# Patient Record
Sex: MALE | Race: WHITE | URBAN - METROPOLITAN AREA
[De-identification: names, ages, dates, MRNs, and addresses within clinical notes are randomized per-mention and may not be internally consistent; named-entity substitution may affect disease eponyms.]

---

## 2017-01-16 LAB — PSA SERPL-MCNC: 0.66

## 2017-04-06 ENCOUNTER — EMERGENCY (EMERGENCY)
Facility: HOSPITAL | Age: 52
LOS: 1 days | Discharge: HOME | End: 2017-04-06
Attending: EMERGENCY MEDICINE | Admitting: EMERGENCY MEDICINE
Payer: MEDICARE

## 2017-04-06 ENCOUNTER — TRANSCRIPTION ENCOUNTER (OUTPATIENT)
Age: 52
End: 2017-04-06

## 2017-04-06 VITALS
WEIGHT: 149.91 LBS | RESPIRATION RATE: 14 BRPM | SYSTOLIC BLOOD PRESSURE: 156 MMHG | TEMPERATURE: 97 F | HEIGHT: 70 IN | OXYGEN SATURATION: 95 % | HEART RATE: 97 BPM | DIASTOLIC BLOOD PRESSURE: 98 MMHG

## 2017-04-06 DIAGNOSIS — L97.521 NON-PRESSURE CHRONIC ULCER OF OTHER PART OF LEFT FOOT LIMITED TO BREAKDOWN OF SKIN: ICD-10-CM

## 2017-04-06 DIAGNOSIS — I10 ESSENTIAL (PRIMARY) HYPERTENSION: ICD-10-CM

## 2017-04-06 DIAGNOSIS — F79 UNSPECIFIED INTELLECTUAL DISABILITIES: ICD-10-CM

## 2017-04-06 LAB
ANION GAP SERPL CALC-SCNC: 10 MMOL/L — SIGNIFICANT CHANGE UP (ref 5–17)
BUN SERPL-MCNC: 21 MG/DL — SIGNIFICANT CHANGE UP (ref 7–23)
CALCIUM SERPL-MCNC: 9.5 MG/DL — SIGNIFICANT CHANGE UP (ref 8.5–10.1)
CHLORIDE SERPL-SCNC: 103 MMOL/L — SIGNIFICANT CHANGE UP (ref 96–108)
CO2 SERPL-SCNC: 26 MMOL/L — SIGNIFICANT CHANGE UP (ref 22–31)
CREAT SERPL-MCNC: 1.1 MG/DL — SIGNIFICANT CHANGE UP (ref 0.5–1.3)
ERYTHROCYTE [SEDIMENTATION RATE] IN BLOOD: 7 MM/HR — SIGNIFICANT CHANGE UP (ref 0–20)
GLUCOSE SERPL-MCNC: 105 MG/DL — HIGH (ref 70–99)
HCT VFR BLD CALC: 45.5 % — SIGNIFICANT CHANGE UP (ref 39–50)
HGB BLD-MCNC: 15.4 G/DL — SIGNIFICANT CHANGE UP (ref 13–17)
MCHC RBC-ENTMCNC: 30.1 PG — SIGNIFICANT CHANGE UP (ref 27–34)
MCHC RBC-ENTMCNC: 34 GM/DL — SIGNIFICANT CHANGE UP (ref 32–36)
MCV RBC AUTO: 88.6 FL — SIGNIFICANT CHANGE UP (ref 80–100)
PLATELET # BLD AUTO: 188 K/UL — SIGNIFICANT CHANGE UP (ref 150–400)
POTASSIUM SERPL-MCNC: 3.7 MMOL/L — SIGNIFICANT CHANGE UP (ref 3.5–5.3)
POTASSIUM SERPL-SCNC: 3.7 MMOL/L — SIGNIFICANT CHANGE UP (ref 3.5–5.3)
RBC # BLD: 5.13 M/UL — SIGNIFICANT CHANGE UP (ref 4.2–5.8)
RBC # FLD: 10.9 % — SIGNIFICANT CHANGE UP (ref 10.3–14.5)
SODIUM SERPL-SCNC: 139 MMOL/L — SIGNIFICANT CHANGE UP (ref 135–145)
WBC # BLD: 6.3 K/UL — SIGNIFICANT CHANGE UP (ref 3.8–10.5)
WBC # FLD AUTO: 6.3 K/UL — SIGNIFICANT CHANGE UP (ref 3.8–10.5)

## 2017-04-06 PROCEDURE — 86140 C-REACTIVE PROTEIN: CPT

## 2017-04-06 PROCEDURE — 99283 EMERGENCY DEPT VISIT LOW MDM: CPT | Mod: 25

## 2017-04-06 PROCEDURE — 85652 RBC SED RATE AUTOMATED: CPT

## 2017-04-06 PROCEDURE — 83036 HEMOGLOBIN GLYCOSYLATED A1C: CPT

## 2017-04-06 PROCEDURE — 80048 BASIC METABOLIC PNL TOTAL CA: CPT

## 2017-04-06 PROCEDURE — 73660 X-RAY EXAM OF TOE(S): CPT | Mod: 26,LT

## 2017-04-06 PROCEDURE — 85027 COMPLETE CBC AUTOMATED: CPT

## 2017-04-06 PROCEDURE — 73660 X-RAY EXAM OF TOE(S): CPT

## 2017-04-06 PROCEDURE — 99284 EMERGENCY DEPT VISIT MOD MDM: CPT

## 2017-04-06 RX ORDER — AZTREONAM 2 G
1 VIAL (EA) INJECTION
Qty: 20 | Refills: 0 | OUTPATIENT
Start: 2017-04-06 | End: 2017-04-16

## 2017-04-06 RX ADMIN — Medication 1 TABLET(S): at 20:31

## 2017-04-06 NOTE — ED PROVIDER NOTE - OBJECTIVE STATEMENT
50 yo white male with non healing wound to left 4th toe x 1-week per aide who is with and knows patient. Just came from podiatrist after debridement was performed and now here for evaluation

## 2017-04-06 NOTE — ED ADULT NURSE NOTE - OBJECTIVE STATEMENT
received pt in [facility] w/ aide in attendance with left 4th toe redness had debridement today by podiatrist & was sent to ER Pt seen by Dr Santana Will monitor

## 2017-04-06 NOTE — ED PROVIDER NOTE - MUSCULOSKELETAL, MLM
Chronic deformities, hammer type toes especially left 2nd toe with erythema and superficial type skin ulcer on dorsum of distal 2nd toe

## 2017-04-06 NOTE — ED PROVIDER NOTE - CONSTITUTIONAL, MLM
normal... Well appearing, white male with mild MRDD, well nourished, awake, alert, oriented to person, place, time/situation and in no apparent distress.

## 2017-04-07 LAB
CRP SERPL-MCNC: <0.2 MG/DL — SIGNIFICANT CHANGE UP (ref 0–0.4)
HBA1C BLD-MCNC: 5.4 % — SIGNIFICANT CHANGE UP (ref 4–5.6)

## 2017-04-19 ENCOUNTER — OUTPATIENT (OUTPATIENT)
Dept: OUTPATIENT SERVICES | Facility: HOSPITAL | Age: 52
LOS: 1 days | End: 2017-04-19
Payer: MEDICARE

## 2017-04-19 DIAGNOSIS — S91.309A UNSPECIFIED OPEN WOUND, UNSPECIFIED FOOT, INITIAL ENCOUNTER: ICD-10-CM

## 2017-04-19 PROCEDURE — G0463: CPT

## 2017-04-20 DIAGNOSIS — L40.9 PSORIASIS, UNSPECIFIED: ICD-10-CM

## 2017-04-20 DIAGNOSIS — I10 ESSENTIAL (PRIMARY) HYPERTENSION: ICD-10-CM

## 2017-04-20 DIAGNOSIS — M21.531: ICD-10-CM

## 2017-04-20 DIAGNOSIS — G40.909 EPILEPSY, UNSPECIFIED, NOT INTRACTABLE, WITHOUT STATUS EPILEPTICUS: ICD-10-CM

## 2017-04-20 DIAGNOSIS — M21.532: ICD-10-CM

## 2017-04-20 DIAGNOSIS — M48.00 SPINAL STENOSIS, SITE UNSPECIFIED: ICD-10-CM

## 2017-04-20 DIAGNOSIS — L97.511 NON-PRESSURE CHRONIC ULCER OF OTHER PART OF RIGHT FOOT LIMITED TO BREAKDOWN OF SKIN: ICD-10-CM

## 2017-04-20 DIAGNOSIS — Z79.899 OTHER LONG TERM (CURRENT) DRUG THERAPY: ICD-10-CM

## 2017-04-20 DIAGNOSIS — L97.521 NON-PRESSURE CHRONIC ULCER OF OTHER PART OF LEFT FOOT LIMITED TO BREAKDOWN OF SKIN: ICD-10-CM

## 2017-04-20 DIAGNOSIS — F79 UNSPECIFIED INTELLECTUAL DISABILITIES: ICD-10-CM

## 2017-04-20 DIAGNOSIS — N31.9 NEUROMUSCULAR DYSFUNCTION OF BLADDER, UNSPECIFIED: ICD-10-CM

## 2017-05-03 ENCOUNTER — OUTPATIENT (OUTPATIENT)
Dept: OUTPATIENT SERVICES | Facility: HOSPITAL | Age: 52
LOS: 1 days | End: 2017-05-03
Payer: MEDICARE

## 2017-05-03 DIAGNOSIS — L97.511 NON-PRESSURE CHRONIC ULCER OF OTHER PART OF RIGHT FOOT LIMITED TO BREAKDOWN OF SKIN: ICD-10-CM

## 2017-05-03 PROCEDURE — G0463: CPT

## 2017-05-04 DIAGNOSIS — M21.531: ICD-10-CM

## 2017-05-04 DIAGNOSIS — M48.00 SPINAL STENOSIS, SITE UNSPECIFIED: ICD-10-CM

## 2017-05-04 DIAGNOSIS — F79 UNSPECIFIED INTELLECTUAL DISABILITIES: ICD-10-CM

## 2017-05-04 DIAGNOSIS — I10 ESSENTIAL (PRIMARY) HYPERTENSION: ICD-10-CM

## 2017-05-04 DIAGNOSIS — N31.9 NEUROMUSCULAR DYSFUNCTION OF BLADDER, UNSPECIFIED: ICD-10-CM

## 2017-05-04 DIAGNOSIS — L97.511 NON-PRESSURE CHRONIC ULCER OF OTHER PART OF RIGHT FOOT LIMITED TO BREAKDOWN OF SKIN: ICD-10-CM

## 2017-05-04 DIAGNOSIS — M21.532: ICD-10-CM

## 2017-05-04 DIAGNOSIS — L97.521 NON-PRESSURE CHRONIC ULCER OF OTHER PART OF LEFT FOOT LIMITED TO BREAKDOWN OF SKIN: ICD-10-CM

## 2017-05-04 DIAGNOSIS — G40.909 EPILEPSY, UNSPECIFIED, NOT INTRACTABLE, WITHOUT STATUS EPILEPTICUS: ICD-10-CM

## 2017-05-04 DIAGNOSIS — L40.9 PSORIASIS, UNSPECIFIED: ICD-10-CM

## 2017-05-04 DIAGNOSIS — Z79.899 OTHER LONG TERM (CURRENT) DRUG THERAPY: ICD-10-CM

## 2017-05-17 ENCOUNTER — APPOINTMENT (OUTPATIENT)
Dept: UROLOGY | Facility: CLINIC | Age: 52
End: 2017-05-17

## 2017-05-17 VITALS
DIASTOLIC BLOOD PRESSURE: 76 MMHG | OXYGEN SATURATION: 97 % | SYSTOLIC BLOOD PRESSURE: 137 MMHG | WEIGHT: 148 LBS | HEART RATE: 83 BPM | HEIGHT: 69 IN | TEMPERATURE: 97.6 F | BODY MASS INDEX: 21.92 KG/M2

## 2017-05-17 DIAGNOSIS — F79 UNSPECIFIED INTELLECTUAL DISABILITIES: ICD-10-CM

## 2017-07-06 ENCOUNTER — OUTPATIENT (OUTPATIENT)
Dept: OUTPATIENT SERVICES | Facility: HOSPITAL | Age: 52
LOS: 1 days | End: 2017-07-06
Payer: MEDICARE

## 2017-07-06 DIAGNOSIS — L97.511 NON-PRESSURE CHRONIC ULCER OF OTHER PART OF RIGHT FOOT LIMITED TO BREAKDOWN OF SKIN: ICD-10-CM

## 2017-07-06 PROCEDURE — G0463: CPT

## 2017-07-08 DIAGNOSIS — N31.9 NEUROMUSCULAR DYSFUNCTION OF BLADDER, UNSPECIFIED: ICD-10-CM

## 2017-07-08 DIAGNOSIS — G40.909 EPILEPSY, UNSPECIFIED, NOT INTRACTABLE, WITHOUT STATUS EPILEPTICUS: ICD-10-CM

## 2017-07-08 DIAGNOSIS — Z79.899 OTHER LONG TERM (CURRENT) DRUG THERAPY: ICD-10-CM

## 2017-07-08 DIAGNOSIS — M48.00 SPINAL STENOSIS, SITE UNSPECIFIED: ICD-10-CM

## 2017-07-08 DIAGNOSIS — M21.531: ICD-10-CM

## 2017-07-08 DIAGNOSIS — L40.9 PSORIASIS, UNSPECIFIED: ICD-10-CM

## 2017-07-08 DIAGNOSIS — L97.521 NON-PRESSURE CHRONIC ULCER OF OTHER PART OF LEFT FOOT LIMITED TO BREAKDOWN OF SKIN: ICD-10-CM

## 2017-07-08 DIAGNOSIS — M21.532: ICD-10-CM

## 2017-07-08 DIAGNOSIS — L97.511 NON-PRESSURE CHRONIC ULCER OF OTHER PART OF RIGHT FOOT LIMITED TO BREAKDOWN OF SKIN: ICD-10-CM

## 2017-07-08 DIAGNOSIS — F79 UNSPECIFIED INTELLECTUAL DISABILITIES: ICD-10-CM

## 2017-07-08 DIAGNOSIS — I10 ESSENTIAL (PRIMARY) HYPERTENSION: ICD-10-CM

## 2017-07-08 DIAGNOSIS — E66.3 OVERWEIGHT: ICD-10-CM

## 2017-09-09 ENCOUNTER — EMERGENCY (EMERGENCY)
Facility: HOSPITAL | Age: 52
LOS: 1 days | Discharge: HOME | End: 2017-09-09
Admitting: EMERGENCY MEDICINE
Payer: MEDICARE

## 2017-09-09 VITALS
SYSTOLIC BLOOD PRESSURE: 122 MMHG | OXYGEN SATURATION: 97 % | TEMPERATURE: 97 F | RESPIRATION RATE: 16 BRPM | WEIGHT: 149.91 LBS | HEART RATE: 79 BPM | DIASTOLIC BLOOD PRESSURE: 83 MMHG
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
## 2017-09-09 DIAGNOSIS — S01.01XA LACERATION WITHOUT FOREIGN BODY OF SCALP, INITIAL ENCOUNTER: ICD-10-CM

## 2017-09-09 PROCEDURE — 70450 CT HEAD/BRAIN W/O DYE: CPT | Mod: 26

## 2017-09-09 PROCEDURE — 12001 RPR S/N/AX/GEN/TRNK 2.5CM/<: CPT

## 2017-09-09 PROCEDURE — 99284 EMERGENCY DEPT VISIT MOD MDM: CPT | Mod: 25

## 2017-09-09 PROCEDURE — 70450 CT HEAD/BRAIN W/O DYE: CPT

## 2017-11-13 ENCOUNTER — MEDICATION RENEWAL (OUTPATIENT)
Age: 52
End: 2017-11-13

## 2018-01-10 ENCOUNTER — MEDICATION RENEWAL (OUTPATIENT)
Age: 53
End: 2018-01-10

## 2018-03-23 ENCOUNTER — MEDICATION RENEWAL (OUTPATIENT)
Age: 53
End: 2018-03-23

## 2018-04-25 ENCOUNTER — MEDICATION RENEWAL (OUTPATIENT)
Age: 53
End: 2018-04-25

## 2018-05-18 ENCOUNTER — APPOINTMENT (OUTPATIENT)
Dept: UROLOGY | Facility: CLINIC | Age: 53
End: 2018-05-18

## 2018-05-22 ENCOUNTER — MEDICATION RENEWAL (OUTPATIENT)
Age: 53
End: 2018-05-22

## 2018-06-12 ENCOUNTER — APPOINTMENT (OUTPATIENT)
Dept: UROLOGY | Facility: CLINIC | Age: 53
End: 2018-06-12
Payer: MEDICARE

## 2018-06-12 VITALS
OXYGEN SATURATION: 98 % | HEIGHT: 69 IN | TEMPERATURE: 98.4 F | DIASTOLIC BLOOD PRESSURE: 78 MMHG | RESPIRATION RATE: 18 BRPM | HEART RATE: 80 BPM | BODY MASS INDEX: 22.22 KG/M2 | WEIGHT: 150 LBS | SYSTOLIC BLOOD PRESSURE: 138 MMHG

## 2018-06-12 PROCEDURE — 99213 OFFICE O/P EST LOW 20 MIN: CPT

## 2018-06-14 ENCOUNTER — MEDICATION RENEWAL (OUTPATIENT)
Age: 53
End: 2018-06-14

## 2018-08-10 ENCOUNTER — INPATIENT (INPATIENT)
Facility: HOSPITAL | Age: 53
LOS: 9 days | Discharge: NURSING, REHAB OR LONG-TERM CARE | DRG: 580 | End: 2018-08-20
Attending: FAMILY MEDICINE | Admitting: HOSPITALIST
Payer: MEDICARE

## 2018-08-10 VITALS
TEMPERATURE: 97 F | SYSTOLIC BLOOD PRESSURE: 99 MMHG | HEART RATE: 113 BPM | RESPIRATION RATE: 15 BRPM | WEIGHT: 175.05 LBS | OXYGEN SATURATION: 97 % | DIASTOLIC BLOOD PRESSURE: 56 MMHG | HEIGHT: 70 IN

## 2018-08-10 DIAGNOSIS — R10.819 ABDOMINAL TENDERNESS, UNSPECIFIED SITE: ICD-10-CM

## 2018-08-10 DIAGNOSIS — L03.113 CELLULITIS OF RIGHT UPPER LIMB: ICD-10-CM

## 2018-08-10 DIAGNOSIS — R56.9 UNSPECIFIED CONVULSIONS: ICD-10-CM

## 2018-08-10 DIAGNOSIS — M48.00 SPINAL STENOSIS, SITE UNSPECIFIED: ICD-10-CM

## 2018-08-10 DIAGNOSIS — L40.9 PSORIASIS, UNSPECIFIED: ICD-10-CM

## 2018-08-10 DIAGNOSIS — N31.9 NEUROMUSCULAR DYSFUNCTION OF BLADDER, UNSPECIFIED: ICD-10-CM

## 2018-08-10 DIAGNOSIS — Z29.9 ENCOUNTER FOR PROPHYLACTIC MEASURES, UNSPECIFIED: ICD-10-CM

## 2018-08-10 DIAGNOSIS — I10 ESSENTIAL (PRIMARY) HYPERTENSION: ICD-10-CM

## 2018-08-10 DIAGNOSIS — F79 UNSPECIFIED INTELLECTUAL DISABILITIES: ICD-10-CM

## 2018-08-10 LAB
ALBUMIN SERPL ELPH-MCNC: 2.8 G/DL — LOW (ref 3.3–5)
ALP SERPL-CCNC: 66 U/L — SIGNIFICANT CHANGE UP (ref 40–120)
ALT FLD-CCNC: 24 U/L — SIGNIFICANT CHANGE UP (ref 12–78)
ANION GAP SERPL CALC-SCNC: 12 MMOL/L — SIGNIFICANT CHANGE UP (ref 5–17)
APPEARANCE UR: CLEAR — SIGNIFICANT CHANGE UP
APTT BLD: 31.3 SEC — SIGNIFICANT CHANGE UP (ref 27.5–37.4)
AST SERPL-CCNC: 43 U/L — HIGH (ref 15–37)
BASOPHILS # BLD AUTO: 0.02 K/UL — SIGNIFICANT CHANGE UP (ref 0–0.2)
BASOPHILS NFR BLD AUTO: 0.1 % — SIGNIFICANT CHANGE UP (ref 0–2)
BILIRUB SERPL-MCNC: 0.8 MG/DL — SIGNIFICANT CHANGE UP (ref 0.2–1.2)
BILIRUB UR-MCNC: NEGATIVE — SIGNIFICANT CHANGE UP
BUN SERPL-MCNC: 21 MG/DL — SIGNIFICANT CHANGE UP (ref 7–23)
CALCIUM SERPL-MCNC: 9.5 MG/DL — SIGNIFICANT CHANGE UP (ref 8.5–10.1)
CHLORIDE SERPL-SCNC: 101 MMOL/L — SIGNIFICANT CHANGE UP (ref 96–108)
CO2 SERPL-SCNC: 24 MMOL/L — SIGNIFICANT CHANGE UP (ref 22–31)
COLOR SPEC: SIGNIFICANT CHANGE UP
CREAT SERPL-MCNC: 1 MG/DL — SIGNIFICANT CHANGE UP (ref 0.5–1.3)
DIFF PNL FLD: ABNORMAL
EOSINOPHIL # BLD AUTO: 0 K/UL — SIGNIFICANT CHANGE UP (ref 0–0.5)
EOSINOPHIL NFR BLD AUTO: 0 % — SIGNIFICANT CHANGE UP (ref 0–6)
GLUCOSE SERPL-MCNC: 124 MG/DL — HIGH (ref 70–99)
GLUCOSE UR QL: NEGATIVE — SIGNIFICANT CHANGE UP
HCT VFR BLD CALC: 37.5 % — LOW (ref 39–50)
HGB BLD-MCNC: 13.1 G/DL — SIGNIFICANT CHANGE UP (ref 13–17)
IMM GRANULOCYTES NFR BLD AUTO: 0.5 % — SIGNIFICANT CHANGE UP (ref 0–1.5)
INR BLD: 1.55 RATIO — HIGH (ref 0.88–1.16)
KETONES UR-MCNC: NEGATIVE — SIGNIFICANT CHANGE UP
LACTATE SERPL-SCNC: 1.3 MMOL/L — SIGNIFICANT CHANGE UP (ref 0.7–2)
LEUKOCYTE ESTERASE UR-ACNC: NEGATIVE — SIGNIFICANT CHANGE UP
LYMPHOCYTES # BLD AUTO: 0.58 K/UL — LOW (ref 1–3.3)
LYMPHOCYTES # BLD AUTO: 3.9 % — LOW (ref 13–44)
MCHC RBC-ENTMCNC: 29.6 PG — SIGNIFICANT CHANGE UP (ref 27–34)
MCHC RBC-ENTMCNC: 34.9 GM/DL — SIGNIFICANT CHANGE UP (ref 32–36)
MCV RBC AUTO: 84.8 FL — SIGNIFICANT CHANGE UP (ref 80–100)
MONOCYTES # BLD AUTO: 1.46 K/UL — HIGH (ref 0–0.9)
MONOCYTES NFR BLD AUTO: 9.9 % — SIGNIFICANT CHANGE UP (ref 2–14)
NEUTROPHILS # BLD AUTO: 12.64 K/UL — HIGH (ref 1.8–7.4)
NEUTROPHILS NFR BLD AUTO: 85.6 % — HIGH (ref 43–77)
NITRITE UR-MCNC: NEGATIVE — SIGNIFICANT CHANGE UP
PH UR: 5 — SIGNIFICANT CHANGE UP (ref 5–8)
PLATELET # BLD AUTO: 163 K/UL — SIGNIFICANT CHANGE UP (ref 150–400)
POTASSIUM SERPL-MCNC: 4.1 MMOL/L — SIGNIFICANT CHANGE UP (ref 3.5–5.3)
POTASSIUM SERPL-SCNC: 4.1 MMOL/L — SIGNIFICANT CHANGE UP (ref 3.5–5.3)
PROCALCITONIN SERPL-MCNC: 0.96 NG/ML — HIGH (ref 0–0.04)
PROT SERPL-MCNC: 7.8 G/DL — SIGNIFICANT CHANGE UP (ref 6–8.3)
PROT UR-MCNC: 75 MG/DL
PROTHROM AB SERPL-ACNC: 17.1 SEC — HIGH (ref 9.8–12.7)
RBC # BLD: 4.42 M/UL — SIGNIFICANT CHANGE UP (ref 4.2–5.8)
RBC # FLD: 11.4 % — SIGNIFICANT CHANGE UP (ref 10.3–14.5)
SODIUM SERPL-SCNC: 137 MMOL/L — SIGNIFICANT CHANGE UP (ref 135–145)
SP GR SPEC: 1.02 — SIGNIFICANT CHANGE UP (ref 1.01–1.02)
UROBILINOGEN FLD QL: NEGATIVE — SIGNIFICANT CHANGE UP
WBC # BLD: 14.78 K/UL — HIGH (ref 3.8–10.5)
WBC # FLD AUTO: 14.78 K/UL — HIGH (ref 3.8–10.5)

## 2018-08-10 PROCEDURE — 71045 X-RAY EXAM CHEST 1 VIEW: CPT | Mod: 26

## 2018-08-10 PROCEDURE — 99223 1ST HOSP IP/OBS HIGH 75: CPT | Mod: GC,AI

## 2018-08-10 PROCEDURE — 99285 EMERGENCY DEPT VISIT HI MDM: CPT

## 2018-08-10 PROCEDURE — 93010 ELECTROCARDIOGRAM REPORT: CPT

## 2018-08-10 PROCEDURE — 93971 EXTREMITY STUDY: CPT | Mod: 26,RT

## 2018-08-10 RX ORDER — DOCUSATE SODIUM 100 MG
100 CAPSULE ORAL THREE TIMES A DAY
Qty: 0 | Refills: 0 | Status: DISCONTINUED | OUTPATIENT
Start: 2018-08-10 | End: 2018-08-12

## 2018-08-10 RX ORDER — VANCOMYCIN HCL 1 G
1500 VIAL (EA) INTRAVENOUS EVERY 12 HOURS
Qty: 0 | Refills: 0 | Status: DISCONTINUED | OUTPATIENT
Start: 2018-08-11 | End: 2018-08-10

## 2018-08-10 RX ORDER — VANCOMYCIN HCL 1 G
1000 VIAL (EA) INTRAVENOUS EVERY 12 HOURS
Qty: 0 | Refills: 0 | Status: DISCONTINUED | OUTPATIENT
Start: 2018-08-11 | End: 2018-08-12

## 2018-08-10 RX ORDER — ENOXAPARIN SODIUM 100 MG/ML
40 INJECTION SUBCUTANEOUS EVERY 24 HOURS
Qty: 0 | Refills: 0 | Status: DISCONTINUED | OUTPATIENT
Start: 2018-08-10 | End: 2018-08-12

## 2018-08-10 RX ORDER — CALCIUM CARBONATE 500(1250)
500 TABLET ORAL
Qty: 0 | Refills: 0 | COMMUNITY

## 2018-08-10 RX ORDER — SENNA PLUS 8.6 MG/1
2 TABLET ORAL AT BEDTIME
Qty: 0 | Refills: 0 | Status: DISCONTINUED | OUTPATIENT
Start: 2018-08-10 | End: 2018-08-12

## 2018-08-10 RX ORDER — OXYCODONE AND ACETAMINOPHEN 5; 325 MG/1; MG/1
1 TABLET ORAL EVERY 6 HOURS
Qty: 0 | Refills: 0 | Status: DISCONTINUED | OUTPATIENT
Start: 2018-08-10 | End: 2018-08-12

## 2018-08-10 RX ORDER — LAMOTRIGINE 25 MG/1
100 TABLET, ORALLY DISINTEGRATING ORAL
Qty: 0 | Refills: 0 | Status: DISCONTINUED | OUTPATIENT
Start: 2018-08-10 | End: 2018-08-12

## 2018-08-10 RX ORDER — MORPHINE SULFATE 50 MG/1
1 CAPSULE, EXTENDED RELEASE ORAL EVERY 4 HOURS
Qty: 0 | Refills: 0 | Status: DISCONTINUED | OUTPATIENT
Start: 2018-08-10 | End: 2018-08-12

## 2018-08-10 RX ORDER — CITALOPRAM 10 MG/1
10 TABLET, FILM COATED ORAL DAILY
Qty: 0 | Refills: 0 | Status: DISCONTINUED | OUTPATIENT
Start: 2018-08-10 | End: 2018-08-12

## 2018-08-10 RX ORDER — ACETAMINOPHEN 500 MG
650 TABLET ORAL ONCE
Qty: 0 | Refills: 0 | Status: COMPLETED | OUTPATIENT
Start: 2018-08-10 | End: 2018-08-10

## 2018-08-10 RX ORDER — SODIUM CHLORIDE 9 MG/ML
1000 INJECTION INTRAMUSCULAR; INTRAVENOUS; SUBCUTANEOUS ONCE
Qty: 0 | Refills: 0 | Status: COMPLETED | OUTPATIENT
Start: 2018-08-10 | End: 2018-08-10

## 2018-08-10 RX ORDER — TAMSULOSIN HYDROCHLORIDE 0.4 MG/1
0.4 CAPSULE ORAL AT BEDTIME
Qty: 0 | Refills: 0 | Status: DISCONTINUED | OUTPATIENT
Start: 2018-08-10 | End: 2018-08-12

## 2018-08-10 RX ORDER — POLYETHYLENE GLYCOL 3350 17 G/17G
17 POWDER, FOR SOLUTION ORAL DAILY
Qty: 0 | Refills: 0 | Status: DISCONTINUED | OUTPATIENT
Start: 2018-08-10 | End: 2018-08-12

## 2018-08-10 RX ORDER — TIZANIDINE 4 MG/1
2 TABLET ORAL DAILY
Qty: 0 | Refills: 0 | Status: DISCONTINUED | OUTPATIENT
Start: 2018-08-10 | End: 2018-08-12

## 2018-08-10 RX ORDER — ACETAMINOPHEN 500 MG
650 TABLET ORAL EVERY 6 HOURS
Qty: 0 | Refills: 0 | Status: DISCONTINUED | OUTPATIENT
Start: 2018-08-10 | End: 2018-08-12

## 2018-08-10 RX ORDER — VANCOMYCIN HCL 1 G
1000 VIAL (EA) INTRAVENOUS ONCE
Qty: 0 | Refills: 0 | Status: COMPLETED | OUTPATIENT
Start: 2018-08-10 | End: 2018-08-10

## 2018-08-10 RX ORDER — CALCIUM CARBONATE 500(1250)
1 TABLET ORAL AT BEDTIME
Qty: 0 | Refills: 0 | Status: DISCONTINUED | OUTPATIENT
Start: 2018-08-10 | End: 2018-08-10

## 2018-08-10 RX ORDER — BETHANECHOL CHLORIDE 25 MG
25 TABLET ORAL DAILY
Qty: 0 | Refills: 0 | Status: DISCONTINUED | OUTPATIENT
Start: 2018-08-10 | End: 2018-08-12

## 2018-08-10 RX ORDER — PIPERACILLIN AND TAZOBACTAM 4; .5 G/20ML; G/20ML
3.38 INJECTION, POWDER, LYOPHILIZED, FOR SOLUTION INTRAVENOUS EVERY 8 HOURS
Qty: 0 | Refills: 0 | Status: DISCONTINUED | OUTPATIENT
Start: 2018-08-10 | End: 2018-08-12

## 2018-08-10 RX ORDER — PIPERACILLIN AND TAZOBACTAM 4; .5 G/20ML; G/20ML
3.38 INJECTION, POWDER, LYOPHILIZED, FOR SOLUTION INTRAVENOUS ONCE
Qty: 0 | Refills: 0 | Status: COMPLETED | OUTPATIENT
Start: 2018-08-10 | End: 2018-08-10

## 2018-08-10 RX ADMIN — TIZANIDINE 2 MILLIGRAM(S): 4 TABLET ORAL at 22:39

## 2018-08-10 RX ADMIN — PIPERACILLIN AND TAZOBACTAM 25 GRAM(S): 4; .5 INJECTION, POWDER, LYOPHILIZED, FOR SOLUTION INTRAVENOUS at 20:35

## 2018-08-10 RX ADMIN — Medication 250 MILLIGRAM(S): at 12:30

## 2018-08-10 RX ADMIN — Medication 100 MILLIGRAM(S): at 22:39

## 2018-08-10 RX ADMIN — Medication 1 TABLET(S): at 22:39

## 2018-08-10 RX ADMIN — SODIUM CHLORIDE 1000 MILLILITER(S): 9 INJECTION INTRAMUSCULAR; INTRAVENOUS; SUBCUTANEOUS at 12:00

## 2018-08-10 RX ADMIN — Medication 1000 MILLIGRAM(S): at 13:30

## 2018-08-10 RX ADMIN — TAMSULOSIN HYDROCHLORIDE 0.4 MILLIGRAM(S): 0.4 CAPSULE ORAL at 22:39

## 2018-08-10 RX ADMIN — SODIUM CHLORIDE 1000 MILLILITER(S): 9 INJECTION INTRAMUSCULAR; INTRAVENOUS; SUBCUTANEOUS at 13:00

## 2018-08-10 RX ADMIN — ENOXAPARIN SODIUM 40 MILLIGRAM(S): 100 INJECTION SUBCUTANEOUS at 22:40

## 2018-08-10 RX ADMIN — SENNA PLUS 2 TABLET(S): 8.6 TABLET ORAL at 22:39

## 2018-08-10 RX ADMIN — Medication 650 MILLIGRAM(S): at 20:50

## 2018-08-10 RX ADMIN — PIPERACILLIN AND TAZOBACTAM 200 GRAM(S): 4; .5 INJECTION, POWDER, LYOPHILIZED, FOR SOLUTION INTRAVENOUS at 12:00

## 2018-08-10 RX ADMIN — PIPERACILLIN AND TAZOBACTAM 3.38 GRAM(S): 4; .5 INJECTION, POWDER, LYOPHILIZED, FOR SOLUTION INTRAVENOUS at 12:30

## 2018-08-10 NOTE — PATIENT PROFILE ADULT. - SOURCE OF INFORMATION, PROFILE
patient/chart(s)/Copy Forward H&P from 8/10/2018 and transfer packet sent with patient from [facility]

## 2018-08-10 NOTE — PATIENT PROFILE ADULT. - NS MD HP INPLANTS MED DEV
unable to ascertain this info from transfer documents sent with patient from [facility]

## 2018-08-10 NOTE — H&P ADULT - NSHPSOCIALHISTORY_GEN_ALL_CORE
Does not smoke, or drink, or use recreational drugs.  Ambulates with wheelchair. Requires assistance for ADL's. Resident of [facility] in [place].

## 2018-08-10 NOTE — ED ADULT NURSE NOTE - OBJECTIVE STATEMENT
received pt in bed #14b PT A&O w/ swelling, warmth & pain to right arm since this AM Pt seen by Dr Santana Will monitor

## 2018-08-10 NOTE — H&P ADULT - ATTENDING COMMENTS
Seen and examined by attending MD, agree with above and edited to reflect my findings. Case discussed with patients mother  at bedside, who voiced understanding and agreement to aforementioned plan.

## 2018-08-10 NOTE — H&P ADULT - PROBLEM SELECTOR PLAN 1
- admit to [facility]  - start vanco and zosyn  - tylenol PRN for fevers  - pain control  - ID consulted (Dr. Pierce) - admit to [facility]  - Cont vancomycin and zosyn  - tylenol PRN for fevers  - pain control  - ID consulted (Dr. Pierce)

## 2018-08-10 NOTE — H&P ADULT - ASSESSMENT
51 y/o MRDD male with PMHx of HTN, neurogenic bladder, psoriasis, seizure, and spinal stenosis sent to the ED from [facility] for evaluation of worsening swelling and pain in RUE, admitted with Cellulitis of RUE.

## 2018-08-10 NOTE — H&P ADULT - NSHPREVIEWOFSYSTEMS_GEN_ALL_CORE
Constitutional: denies fever, chills, diaphoresis   HEENT: denies blurry vision, double vision, eye pain, difficulty hearing  Respiratory: denies SOB, PIÑA, cough, sputum production, wheezing  Cardiovascular: denies CP, palpitations  Gastrointestinal: denies nausea, vomiting, diarrhea, constipation, admits to suprapubic pain  Genitourinary: denies dysuria, frequency, urgency  Musculoskeletal: denies myalgias, admits to swelling and pain in RUE  Neurologic: denies headache, weakness, dizziness  ROS negative except as noted above

## 2018-08-10 NOTE — ED PROVIDER NOTE - OBJECTIVE STATEMENT
51 yo MRDD white male sent here for evaluation of worsening swelling and pain to RUE x few days, no trauma, pain increased with movement, better with rest associated with redness and warmth. No documented fever or chills and no nausea, vomiting or diarrhea.

## 2018-08-10 NOTE — H&P ADULT - PROBLEM SELECTOR PLAN 9
DVT PPx: Lovenox  [place] SCORE: 4   Pharmacological PPx indicated  Fall Precautions  Seizure Precautions  Aspiration Precautions DVT PPx: Lovenox  Pad[place] SCORE: 4   Pharmacological PPx indicated  Fall Precautions  Seizure Precautions  Aspiration Precautions  Enhanced obs for prevention agitation. Per pts mom, does well with  in room. Will reassess for possible benefit of constant obs

## 2018-08-10 NOTE — PATIENT PROFILE ADULT. - COPING STRESSORS, PROFILE
unable to ascertain this info from transfer documents sent with patient from [facility]

## 2018-08-10 NOTE — PATIENT PROFILE ADULT. - MEDICATION, ABILITY TO FOLLOW SCHEDULE, PROFILE
group home assists with med pre pour and administration of meds/lack of knowledge regarding managing health concern

## 2018-08-10 NOTE — ED PROVIDER NOTE - CONSTITUTIONAL, MLM
normal... Well appearing, white MRDD male, well nourished, awake, alert, oriented to person, place, time/situation and in no apparent distress.

## 2018-08-10 NOTE — H&P ADULT - NSHPPHYSICALEXAM_GEN_ALL_CORE
Vital Signs Last 24 Hrs  T(C): 37.2 (10 Aug 2018 17:55), Max: 38.3 (10 Aug 2018 11:45)  T(F): 99 (10 Aug 2018 17:55), Max: 101 (10 Aug 2018 11:45)  HR: 86 (10 Aug 2018 17:55) (86 - 113)  BP: 110/71 (10 Aug 2018 17:55) (99/56 - 113/63)  BP(mean): --  RR: 16 (10 Aug 2018 17:55) (15 - 16)  SpO2: 99% (10 Aug 2018 17:55) (97% - 99%)    Physical Exam:  General: Well developed, well nourished, NAD  HEENT: NCAT, PERRLA, EOMI b/l, moist mucous membranes   Neck: Supple, nontender, no masses  Neurology: A&Ox3, nonfocal, CN II-XII grossly intact, sensation intact  Respiratory: CTA B/L, No W/R/R  CV: RRR, +S1/S2, no murmurs, rubs or gallops  Abdominal: Soft, TTP on deep palpation in suprapubic region. ND +BSx4  Extremities: + peripheral pulses  MSK: swelling, erythema and warmth on pt's RUE. Limited ROM.  Skin: erythematous rashes present on back, lower extremities Vital Signs Last 24 Hrs  T(C): 37.2 (10 Aug 2018 17:55), Max: 38.3 (10 Aug 2018 11:45)  T(F): 99 (10 Aug 2018 17:55), Max: 101 (10 Aug 2018 11:45)  HR: 86 (10 Aug 2018 17:55) (86 - 113)  BP: 110/71 (10 Aug 2018 17:55) (99/56 - 113/63)  BP(mean): --  RR: 16 (10 Aug 2018 17:55) (15 - 16)  SpO2: 99% (10 Aug 2018 17:55) (97% - 99%)    Physical Exam:  General: Well developed, well nourished, NAD  HEENT: NCAT, PERRLA, EOMI b/l, moist mucous membranes   Neck: Supple, nontender, no masses  Neurology: A&Ox3, nonfocal, CN II-XII grossly intact, sensation intact  Respiratory: CTA B/L, No W/R/R  CV: RRR, +S1/S2, no murmurs, rubs or gallops  Abdominal: Soft, TTP on deep palpation in suprapubic region. ND +BSx4  Extremities: + peripheral pulses  MSK: swelling, erythema and warmth on pt's RUE. Limited ROM. no regional lymphadenopathy  Skin: erythematous rashes present on back, lower extremities

## 2018-08-10 NOTE — H&P ADULT - FAMILY HISTORY
Grandparent  Still living? Unknown  Family history of breast cancer, Age at diagnosis: Age Unknown     Father  Still living? Unknown  Family history of MI (myocardial infarction), Age at diagnosis: Age Unknown

## 2018-08-10 NOTE — H&P ADULT - HISTORY OF PRESENT ILLNESS
Pt is a 53 y/o MRDD male with PMHx of HTN, neurogenic bladder, psoriasis, seizure, and spinal stenosis sent here from [facility] for evaluation of worsening swelling and pain in RUE present for several days. History was elicited from his mother and home aide, present in ED. Pt denies any trauma, or fall, and states pain is increased with movement, and better with rest. No documented fever or chills and no nausea, vomiting or diarrhea.    In the ED: VS 99/56 53 y/o MRDD male with PMHx of HTN, neurogenic bladder, psoriasis, seizure, and spinal stenosis sent to the ED from [facility] for evaluation of worsening swelling and pain in RUE present for several days. Pt unsure of when swelling started, but states swelling and pain got progressively worse. History was elicited from his mother and home aide, present in ED. Pt denies any trauma, or fall, and states pain is increased with movement, and better with rest. No documented fever or chills and no nausea, vomiting or diarrhea.    In the ED: VS 99/56  RR 15 spO2 97% RA  Labs: WBC: 14.78 Hct: 37.5 PT:17.1 INR:1.55 Glu: 124 Alb 2.8 Ast : 43 ProCal: 0.96  US RUE: No evidence of DVT CXR: normal chest  Pt was given 1 dose Zosyn,1 dose Vanco and 1L NS. Blood cultures sent, pending results.

## 2018-08-10 NOTE — H&P ADULT - NSHPLABSRESULTS_GEN_ALL_CORE
13.1   14.78 )-----------( 163      ( 10 Aug 2018 12:09 )             37.5       08-10    137  |  101  |  21  ----------------------------<  124<H>  4.1   |  24  |  1.00    Ca    9.5      10 Aug 2018 12:09    TPro  7.8  /  Alb  2.8<L>  /  TBili  0.8  /  DBili  x   /  AST  43<H>  /  ALT  24  /  AlkPhos  66  08-10                  PT/INR - ( 10 Aug 2018 12:09 )   PT: 17.1 sec;   INR: 1.55 ratio         PTT - ( 10 Aug 2018 12:09 )  PTT:31.3 sec    Lactate Trend  08-10 @ 16:08 Lactate:1.3             CAPILLARY BLOOD GLUCOSE

## 2018-08-10 NOTE — ED ADULT NURSE NOTE - INTERVENTIONS DEFINITIONS
[place] to call system/Physically safe environment: no spills, clutter or unnecessary equipment/Monitor for mental status changes and reorient to person, place, and time

## 2018-08-11 ENCOUNTER — TRANSCRIPTION ENCOUNTER (OUTPATIENT)
Age: 53
End: 2018-08-11

## 2018-08-11 LAB
ALBUMIN SERPL ELPH-MCNC: 2.3 G/DL — LOW (ref 3.3–5)
ALP SERPL-CCNC: 56 U/L — SIGNIFICANT CHANGE UP (ref 40–120)
ALT FLD-CCNC: 29 U/L — SIGNIFICANT CHANGE UP (ref 12–78)
ANION GAP SERPL CALC-SCNC: 7 MMOL/L — SIGNIFICANT CHANGE UP (ref 5–17)
AST SERPL-CCNC: 41 U/L — HIGH (ref 15–37)
BASOPHILS # BLD AUTO: 0.03 K/UL — SIGNIFICANT CHANGE UP (ref 0–0.2)
BASOPHILS NFR BLD AUTO: 0.3 % — SIGNIFICANT CHANGE UP (ref 0–2)
BILIRUB SERPL-MCNC: 0.7 MG/DL — SIGNIFICANT CHANGE UP (ref 0.2–1.2)
BUN SERPL-MCNC: 21 MG/DL — SIGNIFICANT CHANGE UP (ref 7–23)
CALCIUM SERPL-MCNC: 9.5 MG/DL — SIGNIFICANT CHANGE UP (ref 8.5–10.1)
CHLORIDE SERPL-SCNC: 106 MMOL/L — SIGNIFICANT CHANGE UP (ref 96–108)
CO2 SERPL-SCNC: 27 MMOL/L — SIGNIFICANT CHANGE UP (ref 22–31)
CREAT SERPL-MCNC: 1 MG/DL — SIGNIFICANT CHANGE UP (ref 0.5–1.3)
EOSINOPHIL # BLD AUTO: 0.11 K/UL — SIGNIFICANT CHANGE UP (ref 0–0.5)
EOSINOPHIL NFR BLD AUTO: 1.2 % — SIGNIFICANT CHANGE UP (ref 0–6)
ERYTHROCYTE [SEDIMENTATION RATE] IN BLOOD: 97 MM/HR — HIGH (ref 0–20)
GLUCOSE SERPL-MCNC: 97 MG/DL — SIGNIFICANT CHANGE UP (ref 70–99)
HCT VFR BLD CALC: 33.1 % — LOW (ref 39–50)
HGB BLD-MCNC: 11.2 G/DL — LOW (ref 13–17)
IMM GRANULOCYTES NFR BLD AUTO: 0.3 % — SIGNIFICANT CHANGE UP (ref 0–1.5)
LYMPHOCYTES # BLD AUTO: 0.51 K/UL — LOW (ref 1–3.3)
LYMPHOCYTES # BLD AUTO: 5.5 % — LOW (ref 13–44)
MCHC RBC-ENTMCNC: 29.5 PG — SIGNIFICANT CHANGE UP (ref 27–34)
MCHC RBC-ENTMCNC: 33.8 GM/DL — SIGNIFICANT CHANGE UP (ref 32–36)
MCV RBC AUTO: 87.1 FL — SIGNIFICANT CHANGE UP (ref 80–100)
MONOCYTES # BLD AUTO: 0.85 K/UL — SIGNIFICANT CHANGE UP (ref 0–0.9)
MONOCYTES NFR BLD AUTO: 9.2 % — SIGNIFICANT CHANGE UP (ref 2–14)
NEUTROPHILS # BLD AUTO: 7.71 K/UL — HIGH (ref 1.8–7.4)
NEUTROPHILS NFR BLD AUTO: 83.5 % — HIGH (ref 43–77)
PLATELET # BLD AUTO: 133 K/UL — LOW (ref 150–400)
POTASSIUM SERPL-MCNC: 3.6 MMOL/L — SIGNIFICANT CHANGE UP (ref 3.5–5.3)
POTASSIUM SERPL-SCNC: 3.6 MMOL/L — SIGNIFICANT CHANGE UP (ref 3.5–5.3)
PROCALCITONIN SERPL-MCNC: 0.68 NG/ML — HIGH (ref 0–0.04)
PROT SERPL-MCNC: 6.9 G/DL — SIGNIFICANT CHANGE UP (ref 6–8.3)
RBC # BLD: 3.8 M/UL — LOW (ref 4.2–5.8)
RBC # FLD: 11.4 % — SIGNIFICANT CHANGE UP (ref 10.3–14.5)
SODIUM SERPL-SCNC: 140 MMOL/L — SIGNIFICANT CHANGE UP (ref 135–145)
WBC # BLD: 9.24 K/UL — SIGNIFICANT CHANGE UP (ref 3.8–10.5)
WBC # FLD AUTO: 9.24 K/UL — SIGNIFICANT CHANGE UP (ref 3.8–10.5)

## 2018-08-11 PROCEDURE — 73201 CT UPPER EXTREMITY W/DYE: CPT | Mod: 26,RT

## 2018-08-11 PROCEDURE — 99233 SBSQ HOSP IP/OBS HIGH 50: CPT

## 2018-08-11 RX ADMIN — Medication 250 MILLIGRAM(S): at 01:10

## 2018-08-11 RX ADMIN — Medication 0.1 MILLIGRAM(S): at 06:02

## 2018-08-11 RX ADMIN — TIZANIDINE 2 MILLIGRAM(S): 4 TABLET ORAL at 17:50

## 2018-08-11 RX ADMIN — Medication 100 MILLIGRAM(S): at 06:02

## 2018-08-11 RX ADMIN — Medication 1 TABLET(S): at 17:52

## 2018-08-11 RX ADMIN — Medication 0.1 MILLIGRAM(S): at 22:57

## 2018-08-11 RX ADMIN — PIPERACILLIN AND TAZOBACTAM 25 GRAM(S): 4; .5 INJECTION, POWDER, LYOPHILIZED, FOR SOLUTION INTRAVENOUS at 03:15

## 2018-08-11 RX ADMIN — Medication 250 MILLIGRAM(S): at 12:20

## 2018-08-11 RX ADMIN — Medication 25 MILLIGRAM(S): at 17:52

## 2018-08-11 RX ADMIN — Medication 1 TABLET(S): at 22:57

## 2018-08-11 RX ADMIN — PIPERACILLIN AND TAZOBACTAM 25 GRAM(S): 4; .5 INJECTION, POWDER, LYOPHILIZED, FOR SOLUTION INTRAVENOUS at 22:57

## 2018-08-11 RX ADMIN — PIPERACILLIN AND TAZOBACTAM 25 GRAM(S): 4; .5 INJECTION, POWDER, LYOPHILIZED, FOR SOLUTION INTRAVENOUS at 12:00

## 2018-08-11 RX ADMIN — LAMOTRIGINE 100 MILLIGRAM(S): 25 TABLET, ORALLY DISINTEGRATING ORAL at 06:02

## 2018-08-11 RX ADMIN — CITALOPRAM 10 MILLIGRAM(S): 10 TABLET, FILM COATED ORAL at 17:51

## 2018-08-11 RX ADMIN — Medication 100 MILLIGRAM(S): at 17:50

## 2018-08-11 RX ADMIN — LAMOTRIGINE 100 MILLIGRAM(S): 25 TABLET, ORALLY DISINTEGRATING ORAL at 17:51

## 2018-08-11 RX ADMIN — POLYETHYLENE GLYCOL 3350 17 GRAM(S): 17 POWDER, FOR SOLUTION ORAL at 17:54

## 2018-08-11 RX ADMIN — TAMSULOSIN HYDROCHLORIDE 0.4 MILLIGRAM(S): 0.4 CAPSULE ORAL at 22:57

## 2018-08-11 RX ADMIN — Medication 100 MILLIGRAM(S): at 22:57

## 2018-08-11 RX ADMIN — ENOXAPARIN SODIUM 40 MILLIGRAM(S): 100 INJECTION SUBCUTANEOUS at 22:58

## 2018-08-11 NOTE — PROGRESS NOTE ADULT - PROBLEM SELECTOR PLAN 9
DVT PPx: Lovenox  Pad[place] SCORE: 4   Pharmacological PPx indicated  Fall Precautions  Seizure Precautions  Aspiration Precautions  Enhanced obs for prevention agitation. Per pts mom, does well with  in room. Will reassess for possible benefit of constant obs

## 2018-08-11 NOTE — CONSULT NOTE ADULT - SUBJECTIVE AND OBJECTIVE BOX
Infectious Diseases Consult by Grisel Chang MD    Reason for Consult :Severe cellulitis right arm r/o septic arthritis right elbow    HPI:  53 y/o MRDD male with PMHx of HTN, neurogenic bladder, psoriasis, seizure, and spinal stenosis sent to the ED from [facility] for evaluation of worsening swelling and pain in RUE present for several days. Pt unsure of when swelling started, but states swelling and pain got progressively worse. History was elicited from his mother and home aide, present in ED. Pt denies any trauma, or fall, and states pain is increased with movement, and better with rest. No documented fever or chills and no nausea, vomiting or diarrhea. He has hx of right elbow abscess with MRSA in  .     he denies any hx of fall . he was febrile in ER to 101 .    In the ED: VS 99/56  RR 15 spO2 97% RA  Labs: WBC: 14.78 Hct: 37.5 PT:17.1 INR:1.55 Glu: 124 Alb 2.8 Ast : 43 ProCal: 0.96  US RUE: No evidence of DVT CXR: normal chest  Pt was given 1 dose Zosyn,1 dose Vanco and 1L NS. Blood cultures sent, pending results. (10 Aug 2018 17:05)        Past Medical & Surgical Hx:  PAST MEDICAL & SURGICAL HISTORY:  Spinal stenosis  Psoriasis  Neurogenic bladder  Seizure  Hypertension  Mental retardation  No significant past surgical history      Social History-- disabled  lives in group home   EtOH: denies   Tobacco: denies   Drug Use: denies     FAMILY HISTORY:  Family history of MI (myocardial infarction) (Father): Father  Family history of breast cancer (Grandparent): Grandmother on mother&#x27;s side      Allergies    No Known Allergies    Intolerances    Home/ Out patient  Medications :  Home Medications:  Artificial Tears preserved ophthalmic solution: 1 drop(s) to each affected eye 3 times a day (10 Aug 2018 21:24)  bethanechol 25 mg oral tablet: 1 tab(s) orally once a day (10 Aug 2018 21:24)  Calcium 500+D oral tablet, chewable: 2 tab(s) orally once a day (10 Aug 2018 21:24)  Catapres 0.1 mg oral tablet: 1 tab(s) orally once a day(morning) (10 Aug 2018 21:24)  Catapres 0.1 mg oral tablet: 2 tab(s) orally once a day (@8PM) (10 Aug 2018 21:24)  CeleXA 10 mg oral tablet: 1 tab(s) orally once a day (10 Aug 2018 21:24)  Diprolene AF 0.05% topical cream: Apply topically to affected area 2 times a day(for 2weeks) (10 Aug 2018 21:24)  docusate sodium 100 mg oral capsule: 1 cap(s) orally 3 times a day (10 Aug 2018 21:24)  Dovonex 0.005% topical cream: Apply topically to affected area 2 times a day (10 Aug 2018 21:24)  fluocinonide 0.05% topical cream: Apply topically to affected area 2 times a day(for 2 weeks) (10 Aug 2018 21:24)  lamoTRIgine 100 mg oral tablet: 1 tab(s) orally 2 times a day (10 Aug 2018 21:24)  MiraLax - oral powder for reconstitution: 17 gram(s) orally once a day (10 Aug 2018 21:24)  Multiple Vitamins oral tablet: 1 tab(s) orally once a day (10 Aug 2018 21:24)  Peridex 0.12% mucous membrane liquid: 15 milliliter(s) mucous membrane 2 times a day (10 Aug 2018 21:24)  PreviDent 5000 Plus topical paste: Apply topically to affected area once a day (brush daily) (10 Aug 2018 21:24)  tamsulosin 0.4 mg oral capsule: 1 cap(s) orally once a day (at bedtime) (10 Aug 2018 21:24)  tiZANidine 2 mg oral tablet: 2 milligram(s) orally once a day (10 Aug 2018 21:24)      Current Inpatient Medications :    ANTIBIOTICS:   piperacillin/tazobactam IVPB. 3.375 Gram(s) IV Intermittent every 8 hours  vancomycin  IVPB 1000 milliGRAM(s) IV Intermittent every 12 hours      OTHER RELEVANT MEDICATIONS :  acetaminophen   Tablet. 650 milliGRAM(s) Oral every 6 hours PRN  bethanechol 25 milliGRAM(s) Oral daily  calcium carbonate 1250 mG  + Vitamin D (OsCal 500 + D) 1 Tablet(s) Oral at bedtime  citalopram 10 milliGRAM(s) Oral daily  cloNIDine 0.1 milliGRAM(s) Oral three times a day  docusate sodium 100 milliGRAM(s) Oral three times a day  enoxaparin Injectable 40 milliGRAM(s) SubCutaneous every 24 hours  lamoTRIgine 100 milliGRAM(s) Oral two times a day  LORazepam   Injectable 1 milliGRAM(s) IntraMuscular every 4 hours PRN  morphine  - Injectable 1 milliGRAM(s) IV Push every 4 hours PRN  multivitamin 1 Tablet(s) Oral daily  oxyCODONE    5 mG/acetaminophen 325 mG 1 Tablet(s) Oral every 6 hours PRN  polyethylene glycol 3350 17 Gram(s) Oral daily  senna 2 Tablet(s) Oral at bedtime PRN  tamsulosin 0.4 milliGRAM(s) Oral at bedtime  tiZANidine 2 milliGRAM(s) Oral daily      ROS:  CONSTITUTIONAL:  positive for fever or chills, feels well, good appetite  EYES:  Negative  blurry vision or double vision  CARDIOVASCULAR:  Negative for chest pain or palpitations  RESPIRATORY:  Negative for cough, wheezing, or SOB   GASTROINTESTINAL:  Negative for nausea, vomiting, diarrhea, constipation, or abdominal pain  GENITOURINARY:  Negative frequency, urgency , dysuria or hematuria   NEUROLOGIC:  No headache, confusion, dizziness, lightheadedness  All other systems were reviewed and are negative          I&O's Detail    10 Aug 2018 07:01  -  11 Aug 2018 07:00  --------------------------------------------------------  IN:    IV PiggyBack: 450 mL  Total IN: 450 mL    OUT:    Voided: 300 mL  Total OUT: 300 mL    Total NET: 150 mL          Physical Exam:  Vital Signs Last 24 Hrs  T(C): 36.9 (11 Aug 2018 05:27), Max: 38.3 (10 Aug 2018 11:45)  T(F): 98.5 (11 Aug 2018 05:27), Max: 101 (10 Aug 2018 11:45)  HR: 90 (11 Aug 2018 05:27) (86 - 113)  BP: 110/72 (11 Aug 2018 05:27) (99/56 - 125/79)  BP(mean): --  RR: 18 (11 Aug 2018 05:27) (15 - 18)  SpO2: 97% (11 Aug 2018 05:27) (95% - 99%)  Height (cm): 177.8 (08-10 @ 19:07)  Weight (kg): 71.4 (08-10 @ 19:07)  BMI (kg/m2): 22.6 (08-10 @ 19:07)  BSA (m2): 1.89 (08-10 @ 19:07)    General: well developed well nourished, in no acute distress  Eyes: sclera anicteric, pupils equal and reactive to light  ENMT: buccal mucosa moist, pharynx not injected  Neck: supple, trachea midline  Lungs: clear, no wheeze/rhonchi  Cardiovascular: regular rate and rhythm, S1 S2  Abdomen: soft, nontender, no organomegaly present, bowel sounds normal  Neurological:  alert and oriented x3, Cranial Nerves II-XII grossly intact  Skin:no increased ecchymosis/petechiae/purpura  Lymph Nodes: no palpable cervical/supraclavicular lymph nodes enlargements  Extremities: no cyanosis/clubbing, marked STS of the right arm, most at elbow with brawny induration and erythema . ROM of elbow restricted . Skin is intact , Warmth     Labs:                  11.2   9.24   )----------(  133       ( 11 Aug 2018 08:39 )               33.1      140    |  106    |  21     ----------------------------<  97         ( 11 Aug 2018 08:39 )  3.6     |  27     |  1.00     Ca    9.5        ( 11 Aug 2018 08:39 )    TPro  6.9    /  Alb  2.3    /  TBili  0.7    /  DBili  x      /  AST  41     /  ALT  29     /  AlkPhos  56     ( 11 Aug 2018 08:39 )    LIVER FUNCTIONS - ( 11 Aug 2018 08:39 )  Alb: 2.3 g/dL / Pro: 6.9 g/dL / ALK PHOS: 56 U/L / ALT: 29 U/L / AST: 41 U/L / GGT: x           Lactate, Blood: 1.3 mmol/L (08-10-18 @ 16:08)      CAPILLARY BLOOD GLUCOSE        Urinalysis Basic - ( 10 Aug 2018 22:39 )    Color: Pale Yellow / Appearance: Clear / S.020 / pH: x  Gluc: x / Ketone: Negative  / Bili: Negative / Urobili: Negative   Blood: x / Protein: 75 mg/dL / Nitrite: Negative   Leuk Esterase: Negative / RBC: 0-2 /HPF / WBC 3-5   Sq Epi: x / Non Sq Epi: Few / Bacteria: Moderate          RECENT CULTURES:    RADIOLOGY & ADDITIONAL STUDIES:   US Duplex Venous Upper Ext Ltd, Right (08.10.18 @ 13:14) >    The right internal jugular, subclavian, axillary, brachial, basilic and   cephalic veins are patent and compressible where applicable.     Doppler examination shows normal spontaneous and phasic flow.    IMPRESSION:     No evidence of right upper extremity deep venous thrombosis.      Assessment :   53 y/o MRDD male with PMHx of HTN, neurogenic bladder, psoriasis, seizure, and spinal stenosis admitted with fever , swelling of the right arm most at elbow, denies any trauma . he most likely has cellulitis with soft tissue infection, possibility of olecranon bursitis or septic bursitis cannot be rule out .    He has hx of right elbow abscess with MRSA in      Plan :   - will screen for MRSA  - get ct scan of right arm with IV contrast   - elevate right arm   - get sed rate, CRP, procalcitonin   - warm compresses to right arm       Continue with present regime .  Appropriate use of antibiotics and adverse effects reviewed.      I have discussed the above plan of care with patient's family in detail. They expressed understanding of the treatment plan . Risks, benefits and alternatives discussed in detail. I have asked if they have any questions or concerns and appropriately addressed them to the best of my ability .      > 55 minutes spent in direct patient care reviewing  the notes, lab data/ imaging , discussion with multidisciplinary team. All questions were addressed and answered to the best of my capacity .    Thank you for allowing me to participate in the care of your patient .      Grisel Chang MD  [phone]

## 2018-08-11 NOTE — DIETITIAN INITIAL EVALUATION ADULT. - PROBLEM SELECTOR PLAN 1
- admit to [facility]  - Cont vancomycin and zosyn  - tylenol PRN for fevers  - pain control  - ID consulted (Dr. Pierce)

## 2018-08-11 NOTE — PROGRESS NOTE ADULT - PROBLEM SELECTOR PLAN 1
-WBC improving, no fever now.  -ID consult appreciated.  -will CT rt upper arm with c+. called mother and grp home few times for consent.  -Still waiting for call back  - Cont Zosyn, warm compresses to arm. elevate arm  - tylenol PRN for fevers  - pain control

## 2018-08-11 NOTE — DIETITIAN INITIAL EVALUATION ADULT. - OTHER INFO
As per chart 52 year old  Male with PMH of MRDD, HTN, neurogenic bladder, psoriasis, seizure, and spinal stenosis sent to the ED from [facility] for evaluation of worsening swelling and pain in RUE present for several days. Pt found to have right arm cellulitis. Pt reports currently good appetite and PO intake. Pt's admission weight 157.7lbs. Pt reports UBW of 151lbs. Pt with +3 right arm edema, no pressure ulcers noted at this time. No BM since admission.

## 2018-08-11 NOTE — PROGRESS NOTE ADULT - ASSESSMENT
53 y/o MRDD male with PMHx of HTN, neurogenic bladder, psoriasis, seizure, and spinal stenosis sent to the ED from [facility] for evaluation of worsening swelling and pain in RUE, admitted with Cellulitis of RUE.

## 2018-08-11 NOTE — PROGRESS NOTE ADULT - SUBJECTIVE AND OBJECTIVE BOX
53 y/o MRDD male with PMHx of HTN, neurogenic bladder, psoriasis, seizure, and spinal stenosis sent to the ED from [facility] for evaluation of worsening swelling and pain in RUE, admitted with Cellulitis of RUE.      INTERVAL HPI:  OVERNIGHT EVENTS:  T(F): 98.5 (18 @ 05:27), Max: 101 (08-10-18 @ 19:07)  HR: 90 (18 @ 05:27) (86 - 106)  BP: 110/72 (18 @ 05:27) (110/71 - 125/79)  RR: 18 (18 @ 05:27) (16 - 18)  SpO2: 97% (18 @ 05:27) (95% - 99%)  Wt(kg): --  I&O's Summary    10 Aug 2018 07:01  -  11 Aug 2018 07:00  --------------------------------------------------------  IN: 450 mL / OUT: 300 mL / NET: 150 mL        REVIEW OF SYSTEM:    Constitutional: No fever, chills, fatigue  Neuro: No headache, numbness, weakness  Resp: No cough, wheezing, shortness of breath  CVS: No chest pain, palpitations, leg swelling  GI: No abdominal pain, nausea, vomiting, diarrhea   : No dysuria, frequency, incontinence  Skin: No itching, burning, redness of RUE  Msk: pain and swelling of R upper arm.  Psych: No depression, anxiety, mood swings          PHYSICAL EXAM:  GENERAL: NAD, well-developed  HEAD:  Atraumatic, Normocephalic  EYES: EOMI, PERRLA, conjunctiva and sclera clear  ENMT: No tonsillar erythema, exudates, or enlargement; Moist mucous membranes,   NECK: Supple, No JVD, Normal thyroid  HEART: Regular rate and rhythm; No murmurs, rubs, or gallops  RESPIRATORY: CTA B/L, No wheezing / rhonchi  ABDOMEN: Soft, Nontender, Nondistended; Bowel sounds present  NEUROLOGY: A&Ox3, nonfocal, moving all extremities.  EXTREMITIES:  R upper arm swollen with tenderness and erythema  SKIN: erythema of rt UE    LABS:                        11.2   9.24  )-----------( 133      ( 11 Aug 2018 08:39 )             33.1     08    140  |  106  |  21  ----------------------------<  97  3.6   |  27  |  1.00    Ca    9.5      11 Aug 2018 08:39    TPro  6.9  /  Alb  2.3<L>  /  TBili  0.7  /  DBili  x   /  AST  41<H>  /  ALT  29  /  AlkPhos  56  0811    PT/INR - ( 10 Aug 2018 12:09 )   PT: 17.1 sec;   INR: 1.55 ratio         PTT - ( 10 Aug 2018 12:09 )  PTT:31.3 sec  Urinalysis Basic - ( 10 Aug 2018 22:39 )    Color: Pale Yellow / Appearance: Clear / S.020 / pH: x  Gluc: x / Ketone: Negative  / Bili: Negative / Urobili: Negative   Blood: x / Protein: 75 mg/dL / Nitrite: Negative   Leuk Esterase: Negative / RBC: 0-2 /HPF / WBC 3-5   Sq Epi: x / Non Sq Epi: Few / Bacteria: Moderate      CAPILLARY BLOOD GLUCOSE                  MEDICATIONS  (STANDING):  bethanechol 25 milliGRAM(s) Oral daily  calcium carbonate 1250 mG  + Vitamin D (OsCal 500 + D) 1 Tablet(s) Oral at bedtime  citalopram 10 milliGRAM(s) Oral daily  cloNIDine 0.1 milliGRAM(s) Oral three times a day  docusate sodium 100 milliGRAM(s) Oral three times a day  enoxaparin Injectable 40 milliGRAM(s) SubCutaneous every 24 hours  lamoTRIgine 100 milliGRAM(s) Oral two times a day  multivitamin 1 Tablet(s) Oral daily  piperacillin/tazobactam IVPB. 3.375 Gram(s) IV Intermittent every 8 hours  polyethylene glycol 3350 17 Gram(s) Oral daily  tamsulosin 0.4 milliGRAM(s) Oral at bedtime  tiZANidine 2 milliGRAM(s) Oral daily  vancomycin  IVPB 1000 milliGRAM(s) IV Intermittent every 12 hours    MEDICATIONS  (PRN):  acetaminophen   Tablet. 650 milliGRAM(s) Oral every 6 hours PRN Mild Pain (1 - 3)  LORazepam   Injectable 1 milliGRAM(s) IntraMuscular every 4 hours PRN Seizure  morphine  - Injectable 1 milliGRAM(s) IV Push every 4 hours PRN Severe Pain (7 - 10)  oxyCODONE    5 mG/acetaminophen 325 mG 1 Tablet(s) Oral every 6 hours PRN Moderate Pain (4 - 6)  senna 2 Tablet(s) Oral at bedtime PRN Constipation

## 2018-08-12 LAB
ANION GAP SERPL CALC-SCNC: 8 MMOL/L — SIGNIFICANT CHANGE UP (ref 5–17)
BUN SERPL-MCNC: 23 MG/DL — SIGNIFICANT CHANGE UP (ref 7–23)
CALCIUM SERPL-MCNC: 9.6 MG/DL — SIGNIFICANT CHANGE UP (ref 8.5–10.1)
CHLORIDE SERPL-SCNC: 105 MMOL/L — SIGNIFICANT CHANGE UP (ref 96–108)
CO2 SERPL-SCNC: 28 MMOL/L — SIGNIFICANT CHANGE UP (ref 22–31)
CREAT SERPL-MCNC: 1.1 MG/DL — SIGNIFICANT CHANGE UP (ref 0.5–1.3)
CRP SERPL-MCNC: 26.01 MG/DL — HIGH (ref 0–0.4)
CULTURE RESULTS: NO GROWTH — SIGNIFICANT CHANGE UP
GLUCOSE SERPL-MCNC: 118 MG/DL — HIGH (ref 70–99)
HCT VFR BLD CALC: 33.4 % — LOW (ref 39–50)
HGB BLD-MCNC: 11.4 G/DL — LOW (ref 13–17)
MCHC RBC-ENTMCNC: 29.8 PG — SIGNIFICANT CHANGE UP (ref 27–34)
MCHC RBC-ENTMCNC: 34.1 GM/DL — SIGNIFICANT CHANGE UP (ref 32–36)
MCV RBC AUTO: 87.2 FL — SIGNIFICANT CHANGE UP (ref 80–100)
MRSA PCR RESULT.: SIGNIFICANT CHANGE UP
NRBC # BLD: 0 /100 WBCS — SIGNIFICANT CHANGE UP (ref 0–0)
PLATELET # BLD AUTO: 152 K/UL — SIGNIFICANT CHANGE UP (ref 150–400)
POTASSIUM SERPL-MCNC: 3.7 MMOL/L — SIGNIFICANT CHANGE UP (ref 3.5–5.3)
POTASSIUM SERPL-SCNC: 3.7 MMOL/L — SIGNIFICANT CHANGE UP (ref 3.5–5.3)
RBC # BLD: 3.83 M/UL — LOW (ref 4.2–5.8)
RBC # FLD: 11.6 % — SIGNIFICANT CHANGE UP (ref 10.3–14.5)
S AUREUS DNA NOSE QL NAA+PROBE: DETECTED
SODIUM SERPL-SCNC: 141 MMOL/L — SIGNIFICANT CHANGE UP (ref 135–145)
SPECIMEN SOURCE: SIGNIFICANT CHANGE UP
VANCOMYCIN TROUGH SERPL-MCNC: 10.1 UG/ML — SIGNIFICANT CHANGE UP (ref 10–20)
WBC # BLD: 9.76 K/UL — SIGNIFICANT CHANGE UP (ref 3.8–10.5)
WBC # FLD AUTO: 9.76 K/UL — SIGNIFICANT CHANGE UP (ref 3.8–10.5)

## 2018-08-12 PROCEDURE — 73080 X-RAY EXAM OF ELBOW: CPT | Mod: 26,RT

## 2018-08-12 PROCEDURE — 73030 X-RAY EXAM OF SHOULDER: CPT | Mod: 26,RT

## 2018-08-12 PROCEDURE — 73060 X-RAY EXAM OF HUMERUS: CPT | Mod: 26,RT

## 2018-08-12 PROCEDURE — 99233 SBSQ HOSP IP/OBS HIGH 50: CPT

## 2018-08-12 RX ORDER — VANCOMYCIN HCL 1 G
1250 VIAL (EA) INTRAVENOUS EVERY 12 HOURS
Qty: 0 | Refills: 0 | Status: DISCONTINUED | OUTPATIENT
Start: 2018-08-12 | End: 2018-08-12

## 2018-08-12 RX ORDER — KETOROLAC TROMETHAMINE 30 MG/ML
30 SYRINGE (ML) INJECTION EVERY 6 HOURS
Qty: 0 | Refills: 0 | Status: DISCONTINUED | OUTPATIENT
Start: 2018-08-12 | End: 2018-08-12

## 2018-08-12 RX ORDER — PIPERACILLIN AND TAZOBACTAM 4; .5 G/20ML; G/20ML
3.38 INJECTION, POWDER, LYOPHILIZED, FOR SOLUTION INTRAVENOUS EVERY 8 HOURS
Qty: 0 | Refills: 0 | Status: DISCONTINUED | OUTPATIENT
Start: 2018-08-12 | End: 2018-08-14

## 2018-08-12 RX ORDER — ONDANSETRON 8 MG/1
4 TABLET, FILM COATED ORAL ONCE
Qty: 0 | Refills: 0 | Status: DISCONTINUED | OUTPATIENT
Start: 2018-08-12 | End: 2018-08-12

## 2018-08-12 RX ORDER — SODIUM CHLORIDE 9 MG/ML
1000 INJECTION, SOLUTION INTRAVENOUS
Qty: 0 | Refills: 0 | Status: DISCONTINUED | OUTPATIENT
Start: 2018-08-12 | End: 2018-08-12

## 2018-08-12 RX ORDER — TAMSULOSIN HYDROCHLORIDE 0.4 MG/1
0.4 CAPSULE ORAL AT BEDTIME
Qty: 0 | Refills: 0 | Status: DISCONTINUED | OUTPATIENT
Start: 2018-08-12 | End: 2018-08-17

## 2018-08-12 RX ORDER — VANCOMYCIN HCL 1 G
1250 VIAL (EA) INTRAVENOUS EVERY 12 HOURS
Qty: 0 | Refills: 0 | Status: DISCONTINUED | OUTPATIENT
Start: 2018-08-12 | End: 2018-08-14

## 2018-08-12 RX ORDER — LAMOTRIGINE 25 MG/1
100 TABLET, ORALLY DISINTEGRATING ORAL
Qty: 0 | Refills: 0 | Status: DISCONTINUED | OUTPATIENT
Start: 2018-08-12 | End: 2018-08-17

## 2018-08-12 RX ORDER — HYDROMORPHONE HYDROCHLORIDE 2 MG/ML
0.5 INJECTION INTRAMUSCULAR; INTRAVENOUS; SUBCUTANEOUS
Qty: 0 | Refills: 0 | Status: DISCONTINUED | OUTPATIENT
Start: 2018-08-12 | End: 2018-08-12

## 2018-08-12 RX ORDER — CITALOPRAM 10 MG/1
10 TABLET, FILM COATED ORAL DAILY
Qty: 0 | Refills: 0 | Status: DISCONTINUED | OUTPATIENT
Start: 2018-08-12 | End: 2018-08-17

## 2018-08-12 RX ORDER — KETOROLAC TROMETHAMINE 30 MG/ML
30 SYRINGE (ML) INJECTION EVERY 6 HOURS
Qty: 0 | Refills: 0 | Status: DISCONTINUED | OUTPATIENT
Start: 2018-08-12 | End: 2018-08-17

## 2018-08-12 RX ORDER — DOCUSATE SODIUM 100 MG
100 CAPSULE ORAL
Qty: 0 | Refills: 0 | Status: DISCONTINUED | OUTPATIENT
Start: 2018-08-12 | End: 2018-08-17

## 2018-08-12 RX ORDER — BETHANECHOL CHLORIDE 25 MG
25 TABLET ORAL DAILY
Qty: 0 | Refills: 0 | Status: DISCONTINUED | OUTPATIENT
Start: 2018-08-12 | End: 2018-08-17

## 2018-08-12 RX ADMIN — LAMOTRIGINE 100 MILLIGRAM(S): 25 TABLET, ORALLY DISINTEGRATING ORAL at 06:06

## 2018-08-12 RX ADMIN — Medication 100 MILLIGRAM(S): at 23:36

## 2018-08-12 RX ADMIN — Medication 0.1 MILLIGRAM(S): at 06:06

## 2018-08-12 RX ADMIN — PIPERACILLIN AND TAZOBACTAM 25 GRAM(S): 4; .5 INJECTION, POWDER, LYOPHILIZED, FOR SOLUTION INTRAVENOUS at 06:06

## 2018-08-12 RX ADMIN — Medication 250 MILLIGRAM(S): at 01:03

## 2018-08-12 RX ADMIN — Medication 100 MILLIGRAM(S): at 06:06

## 2018-08-12 RX ADMIN — PIPERACILLIN AND TAZOBACTAM 25 GRAM(S): 4; .5 INJECTION, POWDER, LYOPHILIZED, FOR SOLUTION INTRAVENOUS at 23:56

## 2018-08-12 RX ADMIN — TAMSULOSIN HYDROCHLORIDE 0.4 MILLIGRAM(S): 0.4 CAPSULE ORAL at 23:36

## 2018-08-12 RX ADMIN — Medication 0.1 MILLIGRAM(S): at 23:36

## 2018-08-12 RX ADMIN — LAMOTRIGINE 100 MILLIGRAM(S): 25 TABLET, ORALLY DISINTEGRATING ORAL at 23:36

## 2018-08-12 RX ADMIN — Medication 30 MILLIGRAM(S): at 23:39

## 2018-08-12 NOTE — CONSULT NOTE ADULT - ATTENDING COMMENTS
Patient seen/examined.  S/p irrigation and debridement for right arm abscess.  patient with no significant complaints    Exam:  neuro intact  wound extensive over posterior humerus 12cm x 5 cm; triceps muscle belly with good-healthy appearing muscle.  Contractile and bleeding.  no residual purulence    Plan:  Delayed closure with VAC.  may need closure of wound in a delayed fashion

## 2018-08-12 NOTE — PHYSICAL THERAPY INITIAL EVALUATION ADULT - ADDITIONAL COMMENTS
As per group home representative, patient was able to transfer and ambulate short distances with RW with A x 1.  PT used wheelchair as primary form of locomotion.

## 2018-08-12 NOTE — CONSULT NOTE ADULT - ASSESSMENT
A/P: 52y Male with R Upper arm abcess drained in OR with Dr. Becerra and orthopedic assistance    Shoulder, Humerus, Elbow xrays  Follow up physical exam once patient alert and oriented to baseline  IV abx per primary team  Patient has packing in wound to allow drainage  Pain control  FU labs/imaging  Will discuss with Dr. Charles who is aware of consult

## 2018-08-12 NOTE — PROGRESS NOTE ADULT - PROBLEM SELECTOR PLAN 1
-WBC improving, no fever now.  -CT of RUE shows abscesses within the right triceps muscle.  -Dr us called for consult. NPO for ? I&D  - Cont Zosyn, IV Vanco, warm compresses to arm. elevate arm  - F/U with ID  - tylenol PRN for fevers  - pain control

## 2018-08-12 NOTE — PROGRESS NOTE ADULT - ASSESSMENT
53 y/o MRDD male with PMHx of HTN, neurogenic bladder, psoriasis, seizure, and spinal stenosis sent to the ED from [facility] for evaluation of worsening swelling and pain in RUE, admitted with Cellulitis of RUE.

## 2018-08-12 NOTE — PROGRESS NOTE ADULT - SUBJECTIVE AND OBJECTIVE BOX
53 y/o MRDD male with PMHx of HTN, neurogenic bladder, psoriasis, seizure, and spinal stenosis sent to the ED from [facility] for evaluation of worsening swelling and pain in RUE, admitted with Cellulitis of RUE.      INTERVAL HPI: Pt seen and examined bedside, c/o pain and swelling of RUE. wants to go home. d/w mother and encouraged him to keep RT arm elevated   OVERNIGHT EVENTS:  Vital Signs Last 24 Hrs  T(C): 37.4 (12 Aug 2018 05:28), Max: 37.7 (11 Aug 2018 14:18)  T(F): 99.4 (12 Aug 2018 05:28), Max: 99.9 (11 Aug 2018 14:18)  HR: 97 (12 Aug 2018 05:28) (97 - 102)  BP: 137/88 (12 Aug 2018 05:28) (118/78 - 137/88)  BP(mean): --  RR: 17 (12 Aug 2018 05:28) (17 - 17)  SpO2: 98% (12 Aug 2018 05:28) (96% - 98%)      REVIEW OF SYSTEM:    Constitutional: No fever, chills, fatigue  Neuro: No headache, numbness, weakness  Resp: No cough, wheezing, shortness of breath  CVS: No chest pain, palpitations, leg swelling  GI: No abdominal pain, nausea, vomiting, diarrhea   : No dysuria, frequency, incontinence  Skin: No itching, burning, redness of RUE  Msk: pain and swelling of R upper arm.  Psych: No depression, anxiety, mood swings          PHYSICAL EXAM:  GENERAL: NAD, well-developed  HEAD:  Atraumatic, Normocephalic  EYES: EOMI, PERRLA, conjunctiva and sclera clear  ENMT: No tonsillar erythema, exudates, or enlargement; Moist mucous membranes,   NECK: Supple, No JVD, Normal thyroid  HEART: Regular rate and rhythm; No murmurs, rubs, or gallops  RESPIRATORY: CTA B/L, No wheezing / rhonchi  ABDOMEN: Soft, Nontender, Nondistended; Bowel sounds present  NEUROLOGY: A&Ox3, nonfocal, moving all extremities.  EXTREMITIES: improving R upper arm edema with tenderness and erythema. extending down below elbow  SKIN: erythema of rt UE    LABS:                        11.4   9.76  )-----------( 152      ( 12 Aug 2018 06:57 )             33.4     12 Aug 2018 06:57    141    |  105    |  23     ----------------------------<  118    3.7     |  28     |  1.10     Ca    9.6        12 Aug 2018 06:57      PT/INR - ( 10 Aug 2018 12:09 )   PT: 17.1 sec;   INR: 1.55 ratio         PTT - ( 10 Aug 2018 12:09 )  PTT:31.3 sec  Urinalysis Basic - ( 10 Aug 2018 22:39 )    Color: Pale Yellow / Appearance: Clear / S.020 / pH: x  Gluc: x / Ketone: Negative  / Bili: Negative / Urobili: Negative   Blood: x / Protein: 75 mg/dL / Nitrite: Negative   Leuk Esterase: Negative / RBC: 0-2 /HPF / WBC 3-5   Sq Epi: x / Non Sq Epi: Few / Bacteria: Moderate      CAPILLARY BLOOD GLUCOSE        UCx       RADIOLOGY & ADDITIONAL TESTS:   CT Humerus w/ IV Cont, Right   There are multiple loculated fluid collections in the wall enhancement,   likely representing abscesses within the right triceps muscle, the   largest lungs measure 2.3 x 4.4 x 7.4 cm (AP x TRV x CC).    No vascular thrombosis.    No fracture or dislocation.    The visualized right lung and upper right abdomen are unremarkable.    < end of copied text >            MEDICATIONS  (STANDING):  bethanechol 25 milliGRAM(s) Oral daily  calcium carbonate 1250 mG  + Vitamin D (OsCal 500 + D) 1 Tablet(s) Oral at bedtime  citalopram 10 milliGRAM(s) Oral daily  cloNIDine 0.1 milliGRAM(s) Oral three times a day  docusate sodium 100 milliGRAM(s) Oral three times a day  enoxaparin Injectable 40 milliGRAM(s) SubCutaneous every 24 hours  lamoTRIgine 100 milliGRAM(s) Oral two times a day  multivitamin 1 Tablet(s) Oral daily  piperacillin/tazobactam IVPB. 3.375 Gram(s) IV Intermittent every 8 hours  polyethylene glycol 3350 17 Gram(s) Oral daily  tamsulosin 0.4 milliGRAM(s) Oral at bedtime  tiZANidine 2 milliGRAM(s) Oral daily  vancomycin  IVPB 1000 milliGRAM(s) IV Intermittent every 12 hours    MEDICATIONS  (PRN):  acetaminophen   Tablet. 650 milliGRAM(s) Oral every 6 hours PRN Mild Pain (1 - 3)  LORazepam   Injectable 1 milliGRAM(s) IntraMuscular every 4 hours PRN Seizure  morphine  - Injectable 1 milliGRAM(s) IV Push every 4 hours PRN Severe Pain (7 - 10)  oxyCODONE    5 mG/acetaminophen 325 mG 1 Tablet(s) Oral every 6 hours PRN Moderate Pain (4 - 6)  senna 2 Tablet(s) Oral at bedtime PRN Constipation

## 2018-08-12 NOTE — PHYSICAL THERAPY INITIAL EVALUATION ADULT - GENERAL OBSERVATIONS, REHAB EVAL
Pt received supine in bed, + IV intact, group home representative present, requires encouragement to participate in therapy.

## 2018-08-12 NOTE — CONSULT NOTE ADULT - SUBJECTIVE AND OBJECTIVE BOX
Orthopedics paged by OR and Dr. Becerra intraoperatively during I&D of posterior arm abcess. The abcess was in the posterior sub q and intramuscular throughout the triceps musculature. Most of abcess was drained when Orthopedics scrubbed in to assist with I&D. Triceps muscle was debrided and most of wound was clean. Incision didn't appear to go through the triceps muscle completely. There didn't appear to be any damage no any surrounding neurovascular structures. Patient was copeously irrigated and partially closed proximally and distally with packing placed intramuscularly to fill the space  and allow for drainage. Incisions was from 5-6 cm above elbow extending proximally about 8-10 inches.     PE: See HPI  Intraopertive, no exam possible at time of consult, for description of wound see HPI or operative report form Dr. Becerra  Intraoperative culture taken      HEALTH ISSUES - PROBLEM Dx:  51 yo Male with Right arm abcess extending into triceps muscle s/p I&D    Psoriasis: Psoriasis  Spinal stenosis: Spinal stenosis  Mental retardation:   Seizure  Suprapubic tenderness  Neurogenic bladder  Hypertension:  MRSA of extremity in past        MEDICATIONS  (STANDING):  piperacillin/tazobactam IVPB. 3.375 Gram(s) IV Intermittent every 8 hours  vancomycin  IVPB 1250 milliGRAM(s) IV Intermittent every 12 hours    Allergies    No Known Allergies  Intolerances                        11.4   9.76  )-----------( 152      ( 12 Aug 2018 06:57 )             33.4     12 Aug 2018 06:57    141    |  105    |  23     ----------------------------<  118    3.7     |  28     |  1.10     Ca    9.6        12 Aug 2018 06:57    TPro  6.9    /  Alb  2.3    /  TBili  0.7    /  DBili  x      /  AST  41     /  ALT  29     /  AlkPhos  56     11 Aug 2018 08:39  Vital Signs Last 24 Hrs  T(C): 37.4 (08-12-18 @ 15:15), Max: 37.4 (08-12-18 @ 05:28)  T(F): 99.3 (08-12-18 @ 15:15), Max: 99.4 (08-12-18 @ 05:28)  HR: 85 (08-12-18 @ 15:37) (85 - 102)  BP: 135/85 (08-12-18 @ 15:15) (116/74 - 145/79)  BP(mean): --  RR: 15 (08-12-18 @ 15:37) (11 - 18)  SpO2: 100% (08-12-18 @ 15:37) (95% - 100%)

## 2018-08-12 NOTE — BRIEF OPERATIVE NOTE - PROCEDURE
<<-----Click on this checkbox to enter Procedure Drainage of abscess  08/12/2018  intramuscular, right triceps  Active  [email or web address]

## 2018-08-12 NOTE — CONSULT NOTE ADULT - SUBJECTIVE AND OBJECTIVE BOX
52 year old man admitted 2 days ago for worsening pain and swelling of posterior right arm which began several days earlier.  No known trauma or other etiology.    PMH: MRDD, seizure disorder, Htn, spinal stenosis, nonambulatory  meds noted on chart  NKA    FH nc  SH lives in group home    PE T max 99.9 VSS  Induration and swelling of posterior aspect right arm from shoulder to elbow  WBC 14 on admit to 9 today    CT images reviewed with radiologist: multiple loculated fluid collections, likely abscesses involving entire right triceps, largest measuring 7.4 x 4.4 x 2.3 cm    P: need immediate drainage in OR; pt had breakfast but I believe benefit of not waiting outweighs benefit of waiting 8 hours NPO

## 2018-08-12 NOTE — BRIEF OPERATIVE NOTE - OPERATION/FINDINGS
about 100 ml of creamy white pus in multiple loculations for almost entire length of right triceps; all tissue appears fully viable

## 2018-08-12 NOTE — PROGRESS NOTE ADULT - SUBJECTIVE AND OBJECTIVE BOX
ID Progress note covering Dr. Pierce    Name: DARI CIFUENTES  Age: 52y  Gender: Male  MRN: [id number]    Interval History-- Events noted, afebrile, results of Ct scan of the arm shows multiloculated collection right triceps . Seen by Dr. Becerra scheduled for I & D . MRSA screen negative .    Notes reviewed    Past Medical History--  Spinal stenosis  Psoriasis  Neurogenic bladder  Seizure  Hypertension  Mental retardation  No significant past surgical history      For details regarding the patient's social history, family history, and other miscellaneous elements, please refer the initial infectious diseases consultation and/or the admitting history and physical examination for this admission.    Allergies--  Allergies    No Known Allergies    Intolerances        Medications--  Antibiotics:  piperacillin/tazobactam IVPB. 3.375 Gram(s) IV Intermittent every 8 hours  vancomycin  IVPB 1000 milliGRAM(s) IV Intermittent every 12 hours    Immunologic:    Other:  acetaminophen   Tablet. PRN  bethanechol  calcium carbonate 1250 mG  + Vitamin D (OsCal 500 + D)  citalopram  cloNIDine  docusate sodium  enoxaparin Injectable  lamoTRIgine  LORazepam   Injectable PRN  morphine  - Injectable PRN  multivitamin  oxyCODONE    5 mG/acetaminophen 325 mG PRN  polyethylene glycol 3350  senna PRN  tamsulosin  tiZANidine      Review of Systems--  Review of systems unable to be obtained secondary to clinical condition.    Physical Examination--    Vital Signs: T(F): 99.4 (08-12-18 @ 05:28), Max: 99.9 (08-11-18 @ 14:18)  HR: 97 (08-12-18 @ 05:28)  BP: 137/88 (08-12-18 @ 05:28)  RR: 17 (08-12-18 @ 05:28)  SpO2: 98% (08-12-18 @ 05:28)  Wt(kg): --  General: Nontoxic-appearing Male in no acute distress.  HEENT: AT/NC. PERRL. EOMI. Anicteric. Conjunctiva pink and moist. Oropharynx clear. Dentition fair.  Neck: Not rigid. No sense of mass.  Nodes: None palpable.  Lungs: Clear bilaterally without rales, wheezing or rhonchi  Heart: Regular rate and rhythm. No Murmur. No rub. No gallop. No palpable thrill.  Abdomen: Bowel sounds present and normoactive. Soft. Nondistended. Nontender. No sense of mass. No organomegaly.  Back: No spinal tenderness. No costovertebral angle tenderness.   Extremities: No cyanosis or clubbing. decreased erythema and STS of the right arm and elbow , decreased tenderness   Skin: Warm. Dry. Good turgor. No rash. No vasculitic stigmata.  Psychiatric: Appropriate affect and mood for situation.         Laboratory Studies--  CBC                        11.4   9.76  )-----------( 152      ( 12 Aug 2018 06:57 )             33.4       Chemistries  08-12    141  |  105  |  23  ----------------------------<  118<H>  3.7   |  28  |  1.10    Ca    9.6      12 Aug 2018 06:57    TPro  6.9  /  Alb  2.3<L>  /  TBili  0.7  /  DBili  x   /  AST  41<H>  /  ALT  29  /  AlkPhos  56  08-11    Sedimentation Rate, Erythrocyte (08.11.18 @ 10:56)    Sedimentation Rate, Erythrocyte: 97 mm/hr    C-Reactive Protein, Serum (08.11.18 @ 15:49)    C-Reactive Protein, Serum: 26.01 mg/dL    Procalcitonin, Serum (08.11.18 @ 10:56)    Procalcitonin, Serum: 0.68:     Vancomycin Level, Trough -Pre 4th Dose, order if dosed q6/8/12h (08.12.18 @ 01:19)    Vancomycin Level, Trough: 10.1:     Recent Cultures:    Culture - Urine (collected 11 Aug 2018 10:09)  Source: .Urine Clean Catch (Midstream)  Final Report (12 Aug 2018 10:52):    No growth    Culture - Blood (collected 10 Aug 2018 14:56)  Source: .Blood Blood-Peripheral  Preliminary Report (11 Aug 2018 15:05):    No growth to date.    Culture - Blood (collected 10 Aug 2018 14:56)  Source: .Blood Blood-Peripheral  Preliminary Report (11 Aug 2018 15:05):    No growth to date.      MRSA/MSSA PCR (08.11.18 @ 15:49)    MRSA PCR Result.: NotDetec:     Staph Aureus PCR Result: Detected      Radiology:  CT Humerus w/ IV Cont, Right (08.11.18 @ 15:20) >  FINDINGS/  IMPRESSION:    There are multiple loculated fluid collections in the wall enhancement,   likely representing abscesses within the right triceps muscle, the   largest lungs measure 2.3 x 4.4 x 7.4 cm (AP x TRV x CC).    No vascular thrombosis.    No fracture or dislocation.    The visualized right lung and upper right abdomen are unremarkable.        Assessment--  53 y/o MRDD male with PMHx of HTN, neurogenic bladder, psoriasis, seizure, and spinal stenosis admitted with fever , swelling of the right arm most at elbow, denies any trauma . he most likely has cellulitis with soft tissue infection, possibility of olecranon bursitis or septic bursitis cannot be rule out . Ct scan shows multiloculated right triceps abscesses .     He has hx of right elbow abscess with MRSA in 2014 , but MRSA screen negative .    Ct scan of elbow - shows multi loculated fluid collections consistent with abscess  within right triceps muscle     Plan :   - will increase Vancomycin to 1250 q 12 , follow intra op cs   - keep right arm elevated  - DC MRSA precautions   - warm compresses to right arm     Discussed with Dr. Layton and Dr. Becerra     Continue with present regime .  Appropriate use of antibiotics and adverse effects reviewed.    I have discussed the above plan of care with patient 's family in detail. They expressed understanding of the treatment plan . Risks, benefits and alternatives discussed in detail. I have asked if they have any questions or concerns and appropriately addressed them to the best of my ability .      > 35 minutes spent in direct patient care reviewing  the notes, lab data/ imaging , discussion with multidisciplinary team. All questions were addressed and answered to the best of my capacity .    Thank you for allowing me to participate in the care of your patient .        Grisel Chang MD  [phone]

## 2018-08-12 NOTE — PROGRESS NOTE ADULT - ATTENDING COMMENTS
53 y/o MRDD male with PMHx of HTN, neurogenic bladder, psoriasis, seizure, and spinal stenosis. pt admitted for cellulitis of RUE and on CT found to have triceps abscesses . Dr Storey evaluated pt and is for OR and I&D. Pt is NPO and stable. WBCs decreased and is afebrile. swelling has   Pt is optimized and ia cleared for I&D.

## 2018-08-13 ENCOUNTER — TRANSCRIPTION ENCOUNTER (OUTPATIENT)
Age: 53
End: 2018-08-13

## 2018-08-13 LAB
ANION GAP SERPL CALC-SCNC: 9 MMOL/L — SIGNIFICANT CHANGE UP (ref 5–17)
BASOPHILS # BLD AUTO: 0.01 K/UL — SIGNIFICANT CHANGE UP (ref 0–0.2)
BASOPHILS NFR BLD AUTO: 0.1 % — SIGNIFICANT CHANGE UP (ref 0–2)
BUN SERPL-MCNC: 22 MG/DL — SIGNIFICANT CHANGE UP (ref 7–23)
CALCIUM SERPL-MCNC: 9.5 MG/DL — SIGNIFICANT CHANGE UP (ref 8.5–10.1)
CHLORIDE SERPL-SCNC: 102 MMOL/L — SIGNIFICANT CHANGE UP (ref 96–108)
CO2 SERPL-SCNC: 30 MMOL/L — SIGNIFICANT CHANGE UP (ref 22–31)
CREAT SERPL-MCNC: 1.1 MG/DL — SIGNIFICANT CHANGE UP (ref 0.5–1.3)
EOSINOPHIL # BLD AUTO: 0.18 K/UL — SIGNIFICANT CHANGE UP (ref 0–0.5)
EOSINOPHIL NFR BLD AUTO: 2.3 % — SIGNIFICANT CHANGE UP (ref 0–6)
GLUCOSE SERPL-MCNC: 103 MG/DL — HIGH (ref 70–99)
HCT VFR BLD CALC: 30.3 % — LOW (ref 39–50)
HGB BLD-MCNC: 10.1 G/DL — LOW (ref 13–17)
IMM GRANULOCYTES NFR BLD AUTO: 0.5 % — SIGNIFICANT CHANGE UP (ref 0–1.5)
LYMPHOCYTES # BLD AUTO: 1.11 K/UL — SIGNIFICANT CHANGE UP (ref 1–3.3)
LYMPHOCYTES # BLD AUTO: 14.1 % — SIGNIFICANT CHANGE UP (ref 13–44)
MCHC RBC-ENTMCNC: 29.5 PG — SIGNIFICANT CHANGE UP (ref 27–34)
MCHC RBC-ENTMCNC: 33.3 GM/DL — SIGNIFICANT CHANGE UP (ref 32–36)
MCV RBC AUTO: 88.6 FL — SIGNIFICANT CHANGE UP (ref 80–100)
MONOCYTES # BLD AUTO: 0.89 K/UL — SIGNIFICANT CHANGE UP (ref 0–0.9)
MONOCYTES NFR BLD AUTO: 11.3 % — SIGNIFICANT CHANGE UP (ref 2–14)
NEUTROPHILS # BLD AUTO: 5.63 K/UL — SIGNIFICANT CHANGE UP (ref 1.8–7.4)
NEUTROPHILS NFR BLD AUTO: 71.7 % — SIGNIFICANT CHANGE UP (ref 43–77)
NRBC # BLD: 0 /100 WBCS — SIGNIFICANT CHANGE UP (ref 0–0)
PLATELET # BLD AUTO: 176 K/UL — SIGNIFICANT CHANGE UP (ref 150–400)
POTASSIUM SERPL-MCNC: 4.1 MMOL/L — SIGNIFICANT CHANGE UP (ref 3.5–5.3)
POTASSIUM SERPL-SCNC: 4.1 MMOL/L — SIGNIFICANT CHANGE UP (ref 3.5–5.3)
RBC # BLD: 3.42 M/UL — LOW (ref 4.2–5.8)
RBC # FLD: 11.6 % — SIGNIFICANT CHANGE UP (ref 10.3–14.5)
SODIUM SERPL-SCNC: 141 MMOL/L — SIGNIFICANT CHANGE UP (ref 135–145)
WBC # BLD: 7.86 K/UL — SIGNIFICANT CHANGE UP (ref 3.8–10.5)
WBC # FLD AUTO: 7.86 K/UL — SIGNIFICANT CHANGE UP (ref 3.8–10.5)

## 2018-08-13 PROCEDURE — 99233 SBSQ HOSP IP/OBS HIGH 50: CPT | Mod: GC

## 2018-08-13 RX ORDER — TIZANIDINE 4 MG/1
2 TABLET ORAL DAILY
Qty: 0 | Refills: 0 | Status: DISCONTINUED | OUTPATIENT
Start: 2018-08-13 | End: 2018-08-17

## 2018-08-13 RX ADMIN — Medication 100 MILLIGRAM(S): at 18:41

## 2018-08-13 RX ADMIN — PIPERACILLIN AND TAZOBACTAM 25 GRAM(S): 4; .5 INJECTION, POWDER, LYOPHILIZED, FOR SOLUTION INTRAVENOUS at 05:56

## 2018-08-13 RX ADMIN — Medication 0.1 MILLIGRAM(S): at 22:31

## 2018-08-13 RX ADMIN — Medication 25 MILLIGRAM(S): at 12:44

## 2018-08-13 RX ADMIN — Medication 0.1 MILLIGRAM(S): at 15:09

## 2018-08-13 RX ADMIN — Medication 166.67 MILLIGRAM(S): at 12:44

## 2018-08-13 RX ADMIN — LAMOTRIGINE 100 MILLIGRAM(S): 25 TABLET, ORALLY DISINTEGRATING ORAL at 18:41

## 2018-08-13 RX ADMIN — Medication 30 MILLIGRAM(S): at 00:21

## 2018-08-13 RX ADMIN — TAMSULOSIN HYDROCHLORIDE 0.4 MILLIGRAM(S): 0.4 CAPSULE ORAL at 22:31

## 2018-08-13 RX ADMIN — LAMOTRIGINE 100 MILLIGRAM(S): 25 TABLET, ORALLY DISINTEGRATING ORAL at 05:56

## 2018-08-13 RX ADMIN — Medication 166.67 MILLIGRAM(S): at 18:42

## 2018-08-13 RX ADMIN — Medication 0.1 MILLIGRAM(S): at 05:56

## 2018-08-13 RX ADMIN — PIPERACILLIN AND TAZOBACTAM 25 GRAM(S): 4; .5 INJECTION, POWDER, LYOPHILIZED, FOR SOLUTION INTRAVENOUS at 15:09

## 2018-08-13 RX ADMIN — CITALOPRAM 10 MILLIGRAM(S): 10 TABLET, FILM COATED ORAL at 12:44

## 2018-08-13 RX ADMIN — Medication 166.67 MILLIGRAM(S): at 00:42

## 2018-08-13 RX ADMIN — PIPERACILLIN AND TAZOBACTAM 25 GRAM(S): 4; .5 INJECTION, POWDER, LYOPHILIZED, FOR SOLUTION INTRAVENOUS at 22:31

## 2018-08-13 RX ADMIN — Medication 100 MILLIGRAM(S): at 05:56

## 2018-08-13 NOTE — PROGRESS NOTE ADULT - SUBJECTIVE AND OBJECTIVE BOX
Patient seen and examined at bedside. Reports no acute events overnight. Pain is well controlled.    PHYSICAL EXAM:  Vital Signs Last 24 Hrs  T(C): 36.5 (13 Aug 2018 06:00), Max: 37.4 (12 Aug 2018 12:04)  T(F): 97.7 (13 Aug 2018 06:00), Max: 99.3 (12 Aug 2018 12:04)  HR: 81 (13 Aug 2018 06:00) (81 - 95)  BP: 121/84 (13 Aug 2018 06:00) (116/74 - 145/79)  RR: 19 (13 Aug 2018 06:00) (11 - 19)  SpO2: 98% (13 Aug 2018 06:00) (95% - 100%)    Gen: NAD, Resting comfortably  RUE: ACE bandage in place; Clean/Dry/Intact  SILT C5-T1  +AIN/PIN/Ulnar/Median/Radial  +2/4 Radial pulse  Painless ROM of Wrist and fingers  Compartments soft and compressible

## 2018-08-13 NOTE — DISCHARGE NOTE ADULT - PROVIDER TOKENS
TOKEN:'[id number]' TOKEN:'[id number]:'[id number]' TOKEN:'[id number]:'[id number]:'[id number]'

## 2018-08-13 NOTE — PROGRESS NOTE ADULT - PROBLEM SELECTOR PLAN 4
- evaluate for UTI vs cystitis  - Urine Cx negative, UA unremarkable UTI ruled out.   - Urine Cx negative, UA unremarkable

## 2018-08-13 NOTE — DISCHARGE NOTE ADULT - FUNCTIONAL SCREEN CURRENT LEVEL: BATHING, MLM
Burow's Advancement Flap Text: The defect edges were debeveled with a #15 scalpel blade.  Given the location of the defect and the proximity to free margins a Burow's advancement flap was deemed most appropriate.  Using a sterile surgical marker, the appropriate advancement flap was drawn incorporating the defect and placing the expected incisions within the relaxed skin tension lines where possible.    The area thus outlined was incised deep to adipose tissue with a #15 scalpel blade.  The skin margins were undermined to an appropriate distance in all directions utilizing iris scissors. (4) completely dependent

## 2018-08-13 NOTE — PROGRESS NOTE ADULT - SUBJECTIVE AND OBJECTIVE BOX
Patient is a 52y old  Male who presents with a chief complaint of Right arm swelling (10 Aug 2018 20:51)      INTERVAL HPI/OVERNIGHT EVENTS:  Denies present complaints. Notes R arm pain, can move fingers but movement of R arm elicits pain.     MEDICATIONS  (STANDING):  bethanechol 25 milliGRAM(s) Oral daily  citalopram 10 milliGRAM(s) Oral daily  cloNIDine 0.1 milliGRAM(s) Oral three times a day  docusate sodium 100 milliGRAM(s) Oral two times a day  lamoTRIgine 100 milliGRAM(s) Oral two times a day  piperacillin/tazobactam IVPB. 3.375 Gram(s) IV Intermittent every 8 hours  tamsulosin 0.4 milliGRAM(s) Oral at bedtime  vancomycin  IVPB 1250 milliGRAM(s) IV Intermittent every 12 hours    MEDICATIONS  (PRN):  ketorolac   Injectable 30 milliGRAM(s) IV Push every 6 hours PRN Moderate Pain (4 - 6)  LORazepam   Injectable 1 milliGRAM(s) IntraMuscular every 4 hours PRN seizure      Allergies    No Known Allergies    Intolerances        REVIEW OF SYSTEMS:  CONSTITUTIONAL: No fever, weight loss, or fatigue  EYES: No eye pain, visual disturbances, or discharge  RESPIRATORY: No cough, wheezing, chills or hemoptysis; No shortness of breath  CARDIOVASCULAR: No chest pain, palpitations, dizziness, or leg swelling  GASTROINTESTINAL: No abdominal or epigastric pain. No nausea, vomiting, or hematemesis; No diarrhea or constipation. No melena or hematochezia.  NEUROLOGICAL: No headaches, memory loss, loss of strength, numbness, or tremors  SKIN: No itching, burning, rashes, or lesions   MUSCULOSKELETAL: Nonpitting edema of dorsum and phalanges of R hand.   PSYCHIATRIC: No depression, anxiety, mood swings, or difficulty sleeping      Vital Signs Last 24 Hrs  T(C): 36.5 (13 Aug 2018 06:00), Max: 37.4 (12 Aug 2018 12:04)  T(F): 97.7 (13 Aug 2018 06:00), Max: 99.3 (12 Aug 2018 12:04)  HR: 81 (13 Aug 2018 06:00) (81 - 95)  BP: 121/84 (13 Aug 2018 06:00) (116/74 - 145/79)  BP(mean): --  RR: 19 (13 Aug 2018 06:00) (11 - 19)  SpO2: 98% (13 Aug 2018 06:00) (95% - 100%)    PHYSICAL EXAM:  GENERAL: NAD, well-groomed, well-developed  HEAD:  Atraumatic, Normocephalic  EYES: EOMI, PERRLA, conjunctiva and sclera clear  ENMT: No tonsillar erythema, exudates, or enlargement; Moist mucous membranes, Good dentition, No lesions  NECK: Supple, No JVD, Normal thyroid  NERVOUS SYSTEM:  Alert & Oriented X3, Good concentration; Motor Strength 5/5 B/L upper and lower extremities; DTRs 2+ intact and symmetric  CHEST/LUNG: Clear to auscultation bilaterally; No rales, rhonchi, wheezing, or rubs  HEART: Regular rate and rhythm; No murmurs, rubs, or gallops  ABDOMEN: Soft, Nontender, Nondistended; Bowel sounds present  EXTREMITIES:  2+ Peripheral Pulses, No clubbing, cyanosis, or edema  LYMPH: No lymphadenopathy noted  SKIN: No rashes or lesions    LABS:                        10.1   7.86  )-----------( 176      ( 13 Aug 2018 06:52 )             30.3     13 Aug 2018 06:57    141    |  102    |  22     ----------------------------<  103    4.1     |  30     |  1.10     Ca    9.5        13 Aug 2018 06:57          CAPILLARY BLOOD GLUCOSE          RADIOLOGY & ADDITIONAL TESTS:    Imaging Personally Reviewed:  [ ] YES  [ ] NO    Consultant(s) Notes Reviewed:  [ ] YES  [ ] NO    Care Discussed with Consultants/Other Providers [ ] YES  [ ] NO

## 2018-08-13 NOTE — DISCHARGE NOTE ADULT - MEDICATION SUMMARY - MEDICATIONS TO TAKE
I will START or STAY ON the medications listed below when I get home from the hospital:    Catapres 0.1 mg oral tablet  -- 1 tab(s) by mouth once a day(morning)  -- Indication: For Hypertension    Catapres 0.1 mg oral tablet  -- 2 tab(s) by mouth once a day (@8PM)  -- Indication: For Hypertension    tamsulosin 0.4 mg oral capsule  -- 1 cap(s) by mouth once a day (at bedtime)  -- Indication: For bph    lamoTRIgine 100 mg oral tablet  -- 1 tab(s) by mouth 2 times a day  -- Indication: For Seizure    CeleXA 10 mg oral tablet  -- 1 tab(s) by mouth once a day  -- Indication: For Mental retardation    Peridex 0.12% mucous membrane liquid  -- 15 milliliter(s) mucous membrane 2 times a day  -- Indication: For Need for prophylactic measure    fluocinonide 0.05% topical cream  -- Apply on skin to affected area 2 times a day(for 2 weeks)  -- Indication: For Psoriasis    Diprolene AF 0.05% topical cream  -- Apply on skin to affected area 2 times a day(for 2weeks)  -- Indication: For Psoriasis    Dovonex 0.005% topical cream  -- Apply on skin to affected area 2 times a day  -- Indication: For Psoriasis    docusate sodium 100 mg oral capsule  -- 1 cap(s) by mouth 3 times a day  -- Indication: For Constipation  prn     MiraLax - oral powder for reconstitution  -- 17 gram(s) by mouth once a day  -- 17 gram in 8 ounze of water or juice by mouth daily  -- Indication: For Constipation  prn     bethanechol 25 mg oral tablet  -- 1 tab(s) by mouth once a day  -- Indication: For Neurogenic bladder    PreviDent 5000 Plus topical paste  -- Apply on skin to affected area once a day (brush daily)  -- Indication: For Need for prophylactic measure    tiZANidine 2 mg oral tablet  -- 2 milligram(s) by mouth once a day  -- Indication: For Spinal stenosis    Artificial Tears preserved ophthalmic solution  -- 1 drop(s) to each affected eye 3 times a day  -- each eye  -- Indication: For Need for prophylactic measure    sulfamethoxazole-trimethoprim 800 mg-160 mg oral tablet  -- 1 tab(s) by mouth 2 times a day  -- Indication: For Abscess tricep post drain     Calcium 500+D oral tablet, chewable  -- 2 tab(s) by mouth once a day  -- Indication: For Need for prophylactic measure    Multiple Vitamins oral tablet  -- 1 tab(s) by mouth once a day  -- Indication: For Need for prophylactic measure

## 2018-08-13 NOTE — DISCHARGE NOTE ADULT - CARE PROVIDERS DIRECT ADDRESSES
[email or web address] ,[email or web address],DirectAddress_Unknown,[email or web address]

## 2018-08-13 NOTE — DISCHARGE NOTE ADULT - CARE PLAN
Principal Discharge DX:	Cellulitis of right upper extremity  Goal:	Controlled  Assessment and plan of treatment:	You were diagnosed with a skin infection. Continue ____  x times per day  Secondary Diagnosis:	Neurogenic bladder  Goal:	Controlled  Assessment and plan of treatment:	Continue home medications of tamsulosin and bethanecol  Secondary Diagnosis:	Essential hypertension  Assessment and plan of treatment:	Continue clonidine as prescribed  Secondary Diagnosis:	Seizure  Assessment and plan of treatment:	-Continue home dose lamotrogine and ativan PRN as prescribed  Secondary Diagnosis:	Spinal stenosis  Assessment and plan of treatment:	Continue tizanidine Principal Discharge DX:	Cellulitis of right upper extremity  Goal:	Controlled  Assessment and plan of treatment:	You were diagnosed with a skin infection. Continue ____  x times per day  You were treated with IV antibiotics  Secondary Diagnosis:	Neurogenic bladder  Goal:	Controlled  Assessment and plan of treatment:	Continue home medications of tamsulosin and bethanecol  Secondary Diagnosis:	Essential hypertension  Assessment and plan of treatment:	Continue clonidine as prescribed  Secondary Diagnosis:	Seizure  Assessment and plan of treatment:	-Continue home dose lamotrogine and ativan PRN as prescribed  Secondary Diagnosis:	Spinal stenosis  Assessment and plan of treatment:	Continue tizanidine Principal Discharge DX:	Cellulitis of right upper extremity  Goal:	Controlled  Assessment and plan of treatment:	You were diagnosed with a skin infection. Continue ____  x times per day  You were treated with IV antibiotics  You went to the OR for drainage of abscesses in your R arm  Secondary Diagnosis:	Neurogenic bladder  Goal:	Controlled  Assessment and plan of treatment:	Continue home medications of tamsulosin and bethanecol  Secondary Diagnosis:	Essential hypertension  Assessment and plan of treatment:	Continue clonidine as prescribed  Secondary Diagnosis:	Seizure  Assessment and plan of treatment:	-Continue home dose lamotrogine and ativan PRN as prescribed  Secondary Diagnosis:	Spinal stenosis  Assessment and plan of treatment:	Continue tizanidine Principal Discharge DX:	Cellulitis of right upper extremity  Goal:	Controlled  Assessment and plan of treatment:	You were diagnosed with a skin infection. Continue ____  x times per day  You were treated with IV antibiotics  You went to the OR for drainage of abscesses in your R arm    I&D Discharge Instructions:    1. Weight Bearing as Tolerated Right Upper Extremity  2. Keep dressing clean, dry and intact; If dressing becomes saturated, change with Aquacel bandage or Sterile Gauze and Tegederms  3. Follow-up with Orthopedic Surgeon Dr. Parra in office in 10-14 days. Call office to schedule appointment.  4. Keep arm elevated as much as possible to help with swelling; Ice as needed  5. If showering keep dressing completely dry; Use plastic garbage bags with tape to cover  6. Continue DVT prophylaxis per Med Rec  7. Continue current antibiotic therapy as instructed by infectious disease  Secondary Diagnosis:	Neurogenic bladder  Goal:	Controlled  Assessment and plan of treatment:	Continue home medications of tamsulosin and bethanecol  Secondary Diagnosis:	Essential hypertension  Assessment and plan of treatment:	Continue clonidine as prescribed  Secondary Diagnosis:	Seizure  Assessment and plan of treatment:	-Continue home dose lamotrogine and ativan PRN as prescribed  Secondary Diagnosis:	Spinal stenosis  Assessment and plan of treatment:	Continue tizanidine Principal Discharge DX:	Cellulitis of right upper extremity  Goal:	Controlled  Assessment and plan of treatment:	You were diagnosed with a skin infection. Continue ____  x times per day  You were treated with IV antibiotics  You went to the OR for drainage of abscesses in your R arm    I&D Discharge Instructions:    1. Weight Bearing as Tolerated Right Upper Extremity  2. Keep dressing clean, dry and intact; If dressing becomes saturated, change with Aquacel bandage or Sterile Gauze and Tegederms  3. Follow-up with Orthopedic Surgeon Dr. Parra in office in 7-10 days. Call office to schedule appointment.  4. Keep arm elevated as much as possible to help with swelling; Ice as needed  5. If showering keep dressing completely dry; Use plastic garbage bags with tape to cover  6. Continue DVT prophylaxis per Med Rec  7. Continue current antibiotic therapy as instructed by infectious disease  Secondary Diagnosis:	Neurogenic bladder  Goal:	Controlled  Assessment and plan of treatment:	Continue home medications of tamsulosin and bethanecol  Secondary Diagnosis:	Essential hypertension  Assessment and plan of treatment:	Continue clonidine as prescribed  Secondary Diagnosis:	Seizure  Assessment and plan of treatment:	-Continue home dose lamotrogine and ativan PRN as prescribed  Secondary Diagnosis:	Spinal stenosis  Assessment and plan of treatment:	Continue tizanidine Principal Discharge DX:	Cellulitis of right upper extremity  Goal:	Controlled  Assessment and plan of treatment:	You were diagnosed with a skin infection and treated with IV antibiotics. Continue Bactrim DS oral twice per day for 12 more days (14 day course which began evening of 8/18. Official start day 8/19). Today is day 2 of 14, and patient is to receive second dose of bactrim today (last dose 5:30 am 8/20).     You went to the OR for drainage of abscesses in your R arm    I&D Discharge Instructions:    1. Weight Bearing as Tolerated Right Upper Extremity  2. Keep dressing clean, dry and intact; If dressing becomes saturated, change with Aquacel bandage or Sterile Gauze and Tegederms  3. Follow-up with Orthopedic Surgeon Dr. Parra in office in 7-10 days. Call office to schedule appointment.  4. Keep arm elevated as much as possible to help with swelling; Ice as needed  5. If showering keep dressing completely dry; Use plastic garbage bags with tape to cover  6. Continue DVT prophylaxis per Med Rec  7. Continue current antibiotic therapy as instructed by infectious disease  Secondary Diagnosis:	Neurogenic bladder  Goal:	Controlled  Assessment and plan of treatment:	Continue home medications of tamsulosin and bethanecol  Secondary Diagnosis:	Essential hypertension  Assessment and plan of treatment:	Continue clonidine as prescribed  Secondary Diagnosis:	Seizure  Assessment and plan of treatment:	-Continue home dose lamotrogine and ativan PRN as prescribed  Secondary Diagnosis:	Spinal stenosis  Assessment and plan of treatment:	Continue tizanidine  Secondary Diagnosis:	Rash  Goal:	Controlled  Assessment and plan of treatment:	You were found to have a rash on your shins. Continue hydrocortisone cream two times a day as needed. Principal Discharge DX:	Cellulitis of right upper extremity  Goal:	Controlled  Assessment and plan of treatment:	You were diagnosed with a skin infection and treated with IV antibiotics. Continue Bactrim DS oral twice per day for 12 more days (14 day course which began evening of 8/18. Official start day 8/19). Today is day 2 of 14.     You went to the OR for drainage of abscesses in your R arm    I&D Discharge Instructions:    1. Weight Bearing as Tolerated Right Upper Extremity  2. Keep dressing clean, dry and intact; If dressing becomes saturated, change with Aquacel bandage or Sterile Gauze and Tegederms  3. Follow-up with Orthopedic Surgeon Dr. Parra in office in 7-10 days. Call office to schedule appointment.  4. Keep arm elevated as much as possible to help with swelling; Ice as needed  5. If showering keep dressing completely dry; Use plastic garbage bags with tape to cover  6. Continue DVT prophylaxis per Med Rec  7. Continue current antibiotic therapy as instructed by infectious disease  Secondary Diagnosis:	Neurogenic bladder  Goal:	Controlled  Assessment and plan of treatment:	Continue home medications of tamsulosin and bethanecol  Secondary Diagnosis:	Essential hypertension  Assessment and plan of treatment:	Continue clonidine as prescribed  Secondary Diagnosis:	Seizure  Assessment and plan of treatment:	-Continue home dose lamotrogine and ativan PRN as prescribed  Secondary Diagnosis:	Spinal stenosis  Assessment and plan of treatment:	Continue tizanidine  Secondary Diagnosis:	Rash  Goal:	Controlled  Assessment and plan of treatment:	You were found to have a rash on your shins. Continue hydrocortisone cream two times a day as needed. Principal Discharge DX:	Cellulitis of right upper extremity  Goal:	Controlled  Assessment and plan of treatment:	You were diagnosed with a skin infection and treated with IV antibiotics. Continue Bactrim DS oral twice per day for 12 more days (14 day course which began evening of 8/18. Official start day 8/19). Today is day 2 of 14. Patient received only 1 dose of day 2 (5:30 on 8/20). Patient due for second dose of day 2 tonight at 17:30. Patient has 13 day prescription instead of 12 because of this.     -650 mg of tylenol Q6H can be used for pain control    You went to the OR for drainage of abscesses in your R arm    I&D Discharge Instructions:    1. Weight Bearing as Tolerated Right Upper Extremity  2. Keep dressing clean, dry and intact; If dressing becomes saturated, change with Aquacel bandage or Sterile Gauze and Tegederms  3. Follow-up with Orthopedic Surgeon Dr. Parra in office in 7-10 days. Call office to schedule appointment.  4. Keep arm elevated as much as possible to help with swelling; Ice as needed  5. If showering keep dressing completely dry; Use plastic garbage bags with tape to cover  6. Continue DVT prophylaxis per Med Rec  7. Continue current antibiotic therapy as instructed by infectious disease  Secondary Diagnosis:	Neurogenic bladder  Goal:	Controlled  Assessment and plan of treatment:	Continue home medications of tamsulosin and bethanecol  Secondary Diagnosis:	Essential hypertension  Assessment and plan of treatment:	Continue clonidine as prescribed  Secondary Diagnosis:	Seizure  Assessment and plan of treatment:	-Continue home dose lamotrogine and ativan PRN as prescribed  Secondary Diagnosis:	Spinal stenosis  Assessment and plan of treatment:	Continue tizanidine  Secondary Diagnosis:	Rash  Goal:	Controlled  Assessment and plan of treatment:	You were found to have a rash on your shins. Continue hydrocortisone cream two times a day as needed. Principal Discharge DX:	Cellulitis of right upper extremity  Goal:	Controlled  Assessment and plan of treatment:	You were diagnosed with a skin infection and treated with IV antibiotics. Continue Bactrim DS oral twice per day for 12 more days (14 day course which began evening of 8/18. Official start day 8/19). Today is day 2 of 14. Patient received only 1 dose of day 2 (5:30 on 8/20). Patient due for second dose of day 2 tonight at 17:30. Patient has 13 day prescription instead of 12 because of this.     -650 mg of tylenol Q6H can be used for pain control    You went to the OR for drainage of abscesses in your R arm    I&D Discharge Instructions:    1. Weight Bearing as Tolerated Right Upper Extremity  2. Keep dressing clean, dry and intact; If dressing becomes saturated, change with Aquacel bandage or Sterile Gauze and Tegederms  3. Follow-up with Orthopedic Surgeon Dr. Parra in office in 7-10 days. Call office to schedule appointment.  4. Keep arm elevated as much as possible to help with swelling; Ice as needed  5. If showering keep dressing completely dry; Use plastic garbage bags with tape to cover  6. Continue DVT prophylaxis per Med Rec  7. Continue current antibiotic therapy as instructed by infectious disease  Secondary Diagnosis:	Neurogenic bladder  Goal:	Controlled  Assessment and plan of treatment:	Continue home medications of tamsulosin and bethanecol  Secondary Diagnosis:	Essential hypertension  Assessment and plan of treatment:	Continue clonidine as prescribed  Secondary Diagnosis:	Seizure  Assessment and plan of treatment:	-Continue home dose lamotrogine and ativan PRN as prescribed  Secondary Diagnosis:	Spinal stenosis  Assessment and plan of treatment:	Continue tizanidine  Secondary Diagnosis:	Rash  Goal:	Controlled  Assessment and plan of treatment:	You were found to have a rash on your shins. Continue steroid creams as needed for psoriasis.

## 2018-08-13 NOTE — DISCHARGE NOTE ADULT - PATIENT PORTAL LINK FT
You can access the [facility], offered by [facility], by registering with the following website: [email or web address]

## 2018-08-13 NOTE — SWALLOW BEDSIDE ASSESSMENT ADULT - COMMENTS
51 y/o MRDD male from [facility] with PMHx of HTN, neurogenic bladder, psoriasis, seizure, and spinal stenosis sent to the ED from [facility] for evaluation of worsening swelling and pain in RUE present for several days.  Pt awake, alert, communicates wants/ needs, follows commands, cognitive-linguistic deficits  Pt c reduced mastication of solids c adequate mastication and timely trigger of swallow c clear breath sounds pre/post deglutition via cervical auscultation. Spoke c 1:1 group home companion bedside and Dr Miller

## 2018-08-13 NOTE — SWALLOW BEDSIDE ASSESSMENT ADULT - ORAL PREPARATORY PHASE
Decreased mastication ability/Within functional limits/Reduced oral grading Decreased mastication ability

## 2018-08-13 NOTE — PROGRESS NOTE ADULT - ASSESSMENT
51 y/o MRDD male with PMHx of HTN, neurogenic bladder, psoriasis, seizure, and spinal stenosis sent to the ED from [facility] for evaluation of worsening swelling and pain in RUE, admitted with Cellulitis of RUE.

## 2018-08-13 NOTE — PROGRESS NOTE ADULT - SUBJECTIVE AND OBJECTIVE BOX
Patient is a 52y old  Male who presents with a chief complaint of Right arm swelling (10 Aug 2018 20:51)      INTERVAL HPI/OVERNIGHT EVENTS:  Denies present complaints. Notes R arm pain, can move fingers but movement of R arm elicits pain.    MEDICATIONS  (STANDING):  bethanechol 25 milliGRAM(s) Oral daily  citalopram 10 milliGRAM(s) Oral daily  cloNIDine 0.1 milliGRAM(s) Oral three times a day  docusate sodium 100 milliGRAM(s) Oral two times a day  lamoTRIgine 100 milliGRAM(s) Oral two times a day  piperacillin/tazobactam IVPB. 3.375 Gram(s) IV Intermittent every 8 hours  tamsulosin 0.4 milliGRAM(s) Oral at bedtime  vancomycin  IVPB 1250 milliGRAM(s) IV Intermittent every 12 hours    MEDICATIONS  (PRN):  ketorolac   Injectable 30 milliGRAM(s) IV Push every 6 hours PRN Moderate Pain (4 - 6)  LORazepam   Injectable 1 milliGRAM(s) IntraMuscular every 4 hours PRN seizure      Allergies:    No Known Allergies    Intolerances        REVIEW OF SYSTEMS:  CONSTITUTIONAL: No fever, weight loss, or fatigue  RESPIRATORY: No cough, wheezing, chills or hemoptysis; No shortness of breath  CARDIOVASCULAR: No chest pain, palpitations, dizziness, or leg swelling  NEUROLOGICAL: No headaches, memory loss, loss of strength, numbness, or tremors  SKIN: No itching, burning, rashes, or lesions   MUSCULOSKELETAL: Nonpitting edema of dorsum and phalanges of R hand.   PSYCHIATRIC: No depression, anxiety, mood swings, or difficulty sleeping      Vital Signs Last 24 Hrs  T(C): 36.5 (13 Aug 2018 06:00), Max: 37.4 (12 Aug 2018 12:04)  T(F): 97.7 (13 Aug 2018 06:00), Max: 99.3 (12 Aug 2018 12:04)  HR: 81 (13 Aug 2018 06:00) (81 - 95)  BP: 121/84 (13 Aug 2018 06:00) (116/74 - 145/79)  BP(mean): --  RR: 19 (13 Aug 2018 06:00) (11 - 19)  SpO2: 98% (13 Aug 2018 06:00) (95% - 100%)    PHYSICAL EXAM:  GENERAL: NAD, well-groomed, well-developed  NERVOUS SYSTEM:  Alert & Oriented X3, Good concentration; Motor Strength 5/5 B/L upper and lower extremities; DTRs 2+ intact and symmetric  CHEST/LUNG: Clear to auscultation bilaterally; No rales, rhonchi, wheezing, or rubs  HEART: Regular rate and rhythm; No murmurs, rubs, or gallops  EXTREMITIES:  2+ Peripheral Pulses, No clubbing, cyanosis, or edema  LYMPH: No lymphadenopathy noted  SKIN: No rashes or lesions    LABS:                        10.1   7.86  )-----------( 176      ( 13 Aug 2018 06:52 )             30.3     13 Aug 2018 06:57    141    |  102    |  22     ----------------------------<  103    4.1     |  30     |  1.10     Ca    9.5        13 Aug 2018 06:57          CAPILLARY BLOOD GLUCOSE          RADIOLOGY & ADDITIONAL TESTS:    Imaging Personally Reviewed:  [ ] YES  [ ] NO    Consultant(s) Notes Reviewed:  [ ] YES  [ ] NO    Care Discussed with Consultants/Other Providers [ ] YES  [ ] NO Patient is a 52y old  Male who presents with a chief complaint of Right arm swelling (10 Aug 2018 20:51)      INTERVAL HPI/OVERNIGHT EVENTS:  Denies present complaints. Notes R arm pain, can move fingers but movement of R arm elicits pain.    MEDICATIONS  (STANDING):  bethanechol 25 milliGRAM(s) Oral daily  citalopram 10 milliGRAM(s) Oral daily  cloNIDine 0.1 milliGRAM(s) Oral three times a day  docusate sodium 100 milliGRAM(s) Oral two times a day  lamoTRIgine 100 milliGRAM(s) Oral two times a day  piperacillin/tazobactam IVPB. 3.375 Gram(s) IV Intermittent every 8 hours  tamsulosin 0.4 milliGRAM(s) Oral at bedtime  vancomycin  IVPB 1250 milliGRAM(s) IV Intermittent every 12 hours    MEDICATIONS  (PRN):  ketorolac   Injectable 30 milliGRAM(s) IV Push every 6 hours PRN Moderate Pain (4 - 6)  LORazepam   Injectable 1 milliGRAM(s) IntraMuscular every 4 hours PRN seizure      Allergies:    No Known Allergies    Intolerances        REVIEW OF SYSTEMS:  CONSTITUTIONAL: No fever, weight loss, or fatigue  RESPIRATORY: No cough, wheezing, chills or hemoptysis; No shortness of breath  CARDIOVASCULAR: No chest pain, palpitations, dizziness, or leg swelling  NEUROLOGICAL: No headaches, memory loss, loss of strength, numbness, or tremors  SKIN: No itching, burning, rashes, or lesions   MUSCULOSKELETAL: Nonpitting edema of dorsum and phalanges of R hand.   PSYCHIATRIC: No depression, anxiety, mood swings, or difficulty sleeping      Vital Signs Last 24 Hrs  T(C): 36.5 (13 Aug 2018 06:00), Max: 37.4 (12 Aug 2018 12:04)  T(F): 97.7 (13 Aug 2018 06:00), Max: 99.3 (12 Aug 2018 12:04)  HR: 81 (13 Aug 2018 06:00) (81 - 95)  BP: 121/84 (13 Aug 2018 06:00) (116/74 - 145/79)  RR: 19 (13 Aug 2018 06:00) (11 - 19)  SpO2: 98% (13 Aug 2018 06:00) (95% - 100%)    PHYSICAL EXAM:  GENERAL: NAD, well-groomed, well-developed  NERVOUS SYSTEM:  Alert & Oriented X3, Good concentration  CHEST/LUNG: Clear to auscultation bilaterally; No rales, rhonchi, wheezing, or rubs  HEART: Regular rate and rhythm; No murmurs, rubs, or gallops  EXTREMITIES:  2+ Peripheral Pulses, No clubbing, cyanosis, or edema  LYMPH: No lymphadenopathy noted  SKIN: No rashes or lesions    LABS:                        10.1   7.86  )-----------( 176      ( 13 Aug 2018 06:52 )             30.3     13 Aug 2018 06:57    141    |  102    |  22     ----------------------------<  103    4.1     |  30     |  1.10     Ca    9.5        13 Aug 2018 06:57          CAPILLARY BLOOD GLUCOSE          RADIOLOGY & ADDITIONAL TESTS:    Imaging Personally Reviewed:  [ ] YES  [ ] NO    Consultant(s) Notes Reviewed:  [ ] YES  [ ] NO    Care Discussed with Consultants/Other Providers [ ] YES  [ ] NO Patient is a 52y old  Male who presents with a chief complaint of Right arm swelling (10 Aug 2018 20:51)      INTERVAL HPI/OVERNIGHT EVENTS:  Denies present complaints. Notes R arm pain, can move fingers but movement of R arm elicits pain.    MEDICATIONS  (STANDING):  bethanechol 25 milliGRAM(s) Oral daily  citalopram 10 milliGRAM(s) Oral daily  cloNIDine 0.1 milliGRAM(s) Oral three times a day  docusate sodium 100 milliGRAM(s) Oral two times a day  lamoTRIgine 100 milliGRAM(s) Oral two times a day  piperacillin/tazobactam IVPB. 3.375 Gram(s) IV Intermittent every 8 hours  tamsulosin 0.4 milliGRAM(s) Oral at bedtime  vancomycin  IVPB 1250 milliGRAM(s) IV Intermittent every 12 hours    MEDICATIONS  (PRN):  ketorolac   Injectable 30 milliGRAM(s) IV Push every 6 hours PRN Moderate Pain (4 - 6)  LORazepam   Injectable 1 milliGRAM(s) IntraMuscular every 4 hours PRN seizure      Allergies:    No Known Allergies    Intolerances        REVIEW OF SYSTEMS:  CONSTITUTIONAL: No fever, weight loss, or fatigue  RESPIRATORY: No cough, wheezing, chills or hemoptysis; No shortness of breath  CARDIOVASCULAR: No chest pain, palpitations, dizziness, or leg swelling  NEUROLOGICAL: No headaches, memory loss, loss of strength, numbness, or tremors  SKIN: No itching, burning, rashes, or lesions   MUSCULOSKELETAL: Nonpitting edema of dorsum and phalanges of R hand.   PSYCHIATRIC: No depression, anxiety, mood swings, or difficulty sleeping      Vital Signs Last 24 Hrs  T(C): 36.5 (13 Aug 2018 06:00), Max: 37.4 (12 Aug 2018 12:04)  T(F): 97.7 (13 Aug 2018 06:00), Max: 99.3 (12 Aug 2018 12:04)  HR: 81 (13 Aug 2018 06:00) (81 - 95)  BP: 121/84 (13 Aug 2018 06:00) (116/74 - 145/79)  RR: 19 (13 Aug 2018 06:00) (11 - 19)  SpO2: 98% (13 Aug 2018 06:00) (95% - 100%)    PHYSICAL EXAM:  GENERAL: NAD, well-groomed, well-developed  NERVOUS SYSTEM:  Alert & Oriented X3, Good concentration  CHEST/LUNG: Clear to auscultation bilaterally; No rales, rhonchi, wheezing, or rubs  HEART: Regular rate and rhythm; No murmurs, rubs, or gallops  EXTREMITIES:  2+ Peripheral Pulses, No clubbing, cyanosis, or edema, RUE: dressing C/D/I, no strikethrough, +swelling of arm, better movement in fingers today, decreased swelling, sensation intact and pulses present, brisk capillary refill.   LYMPH: No lymphadenopathy noted  SKIN: No rashes or lesions    LABS:                        10.1   7.86  )-----------( 176      ( 13 Aug 2018 06:52 )             30.3     13 Aug 2018 06:57    141    |  102    |  22     ----------------------------<  103    4.1     |  30     |  1.10     Ca    9.5        13 Aug 2018 06:57          CAPILLARY BLOOD GLUCOSE          RADIOLOGY & ADDITIONAL TESTS:    Imaging Personally Reviewed:  [ ] YES  [ ] NO    Consultant(s) Notes Reviewed:  [ ] YES  [ ] NO    Care Discussed with Consultants/Other Providers [ ] YES  [ ] NO

## 2018-08-13 NOTE — PROGRESS NOTE ADULT - PROBLEM SELECTOR PLAN 1
- Cont vancomycin 1250 IV Q12 Q8H  -Vanc trough pre 4th dose  -Continue zosyn 3.375g IV   - tylenol PRN for fevers  - pain control  -Dr. Pierce consulted and rec's appreciated. - Cont vancomycin 1250 IV Q12h  -Vanc trough pre 4th dose  -Continue zosyn 3.375g IV q8h  - tylenol PRN for fevers  - pain control  -Dr. Pierce consulted and rec's appreciated. Prelim operative culture growing MRSA.   -Cont vancomycin. Decrease dose to 1g q12h, given trough level this AM and worsening kidney function.  -Vanco trough prior to this evening's dose. If >20 will hold.   -Stop Zosyn per ID recs.  - tylenol PRN for fevers  - pain control  -ID Dr. Chang following. Prelim operative culture growing MRSA.   -Cont vancomycin 1250 IV q12h  -Trough pre 4th dose.  -Continue Zosyn.  - tylenol PRN for fevers  - pain control  -ID Dr. Chang following.

## 2018-08-13 NOTE — DISCHARGE NOTE ADULT - INSTRUCTIONS
Dysphagia 2 Mechanical Soft Thin Liquids Dysphagia 2 Mechanical Soft Thin Liquids  no alcohol with pain medication  encourage fluids daily

## 2018-08-13 NOTE — DISCHARGE NOTE ADULT - HOSPITAL COURSE
Patient is a 52 year old male admitted for RUE pain likely secondary to cellulitis. CT with IV contrast done of R arm demonstrating multiple fluid loculations (8/11). ID consulted (Dr. Kerline Chang) Pt given antibiotics. General surgery consulted (Dr. Dickson) and 100 cc's of fluid drained from abscesses. MRSA culture of R elbow was negative. Ortho consulted (Dr. Charles). Patient is a 52 year old male admitted for RUE pain likely secondary to cellulitis. CT with IV contrast done of R arm demonstrating multiple fluid loculations (8/11). ID consulted (Dr. Prashanth Chang) Pt given IV antibiotics. General surgery consulted (Dr. Becerra) and 100 cc's of fluid drained from abscesses in R arm. Surgical pathology positive for MRSA. Ortho consulted (Dr. Charles) as patient had extensive infiltration of abscesses in R arm. Patient is a 52 year old male admitted for RUE pain likely secondary to cellulitis. CT with IV contrast done of R arm demonstrating multiple fluid loculations (8/11). ID consulted (Dr. Prashanth Chang) Pt given IV antibiotics. General surgery consulted (Dr. Becerra) and 100 cc's of fluid drained from abscesses in R arm. Surgical pathology positive for MRSA. Ortho consulted (Dr. Charles) as patient had extensive infiltration of abscesses in R arm. Patient was continued on his medications while in the hospital. Patient is a 52 year old male admitted for RUE pain likely secondary to cellulitis. CT with IV contrast done of R arm demonstrating multiple fluid loculations (8/11). ID consulted (Dr. Prashanth Chang) Pt given IV antibiotics. General surgery consulted (Dr. Becerra) and 100 cc's of fluid drained from abscesses in R arm. Surgical pathology positive for MRSA. Ortho consulted (Dr. Charles) as patient had extensive infiltration of abscesses in R arm. Patient was continued on his medications while in the hospital. Patient had a wound vac put on for 6 days, and was post-op for wound vac removal and incision closure for 3 days prior to discharge. Patient hemodynamically stable and medically optimized for discharge. Patient is a 52 year old male admitted for RUE pain likely secondary to cellulitis. CT with IV contrast done of R arm demonstrating multiple fluid loculations (8/11). ID consulted (Dr. Prashanth Chang) Pt given IV antibiotics. General surgery consulted (Dr. Becerra) and 100 cc's of fluid drained from abscesses in R arm. Surgical pathology positive for MRSA. Ortho consulted (Dr. Charles) as patient had extensive infiltration of abscesses in R arm. Patient was continued on his medications while in the hospital. Patient had a wound vac put on for 6 days, and was post-op for wound vac removal and incision closure for 3 days prior to discharge. Patient hemodynamically stable and medically optimized for discharge.     Physical Exam:  Vital Signs Last 24 Hrs  T(C): 36.6 (20 Aug 2018 05:23), Max: 36.8 (19 Aug 2018 20:03)  T(F): 97.9 (20 Aug 2018 05:23), Max: 98.2 (19 Aug 2018 20:03)  HR: 70 (20 Aug 2018 05:23) (70 - 83)  BP: 100/67 (20 Aug 2018 05:23) (100/67 - 115/-)  RR: 16 (20 Aug 2018 05:23) (16 - 17)  SpO2: 99% (20 Aug 2018 05:23) (97% - 99%)    Gen: NAD, well developed, well nourished  Neck: No lymphadenopathy  Lungs: CTA b/l  Heart: +S1/S2 no m/r/g  Abdomen: +BSx4, NT, ND  Extremities: Bandage on R arm. No strikethrough. 2+ radial pulses b/l Patient is a 52 year old male admitted for RUE pain likely secondary to cellulitis. CT with IV contrast done of R arm demonstrating multiple fluid loculations (8/11). ID consulted (Dr. Prashanth Chang) Pt given IV antibiotics. General surgery consulted (Dr. Becerra) and 100 cc's of fluid drained from abscesses in R arm. Surgical pathology positive for MRSA. Ortho consulted (Dr. Charles) as patient had extensive infiltration of abscesses in R arm. Patient was continued on his medications while in the hospital. Patient had a wound vac put on for 6 days, and was post-op for wound vac removal and incision closure for 3 days prior to discharge.  so Cellulitis of RUE, found  to have multi loculated fluid collections consistent with abscess  within right triceps muscle ,   s/p wide I X D by surgery/orthopedics  , pt stable as per ortho wound closed    id dr chang  changed to PO bactrim DS 1 tab bid x 14 days , mild mackenzie notice day of dc explained need  increase oral hydration and fu cr closely out pt .  Patient hemodynamically stable and medically optimized for discharge.     Physical Exam: day of dc 8-20-18  Vital Signs Last 24 Hrs  T(C): 36.6 (20 Aug 2018 05:23), Max: 36.8 (19 Aug 2018 20:03)  T(F): 97.9 (20 Aug 2018 05:23), Max: 98.2 (19 Aug 2018 20:03)  HR: 70 (20 Aug 2018 05:23) (70 - 83)  BP: 100/67 (20 Aug 2018 05:23) (100/67 - 115/-)  RR: 16 (20 Aug 2018 05:23) (16 - 17)  SpO2: 99% (20 Aug 2018 05:23) (97% - 99%)    Gen: NAD, well developed, well nourished   Neck: No lymphadenopathy  Lungs: CTA b/l , bl air entery no wheezing   Heart: +S1/S2 no m/r/g  Abdomen: +BSx4, NT, ND  Extremities: Bandage on R arm intact surgical wound . No strikethrough. 2+ radial pulses b/l  skin rash posriatic lesion on head and leg and elbow, gu intact .  fu out pt pmd with in 1wk , and ortho in 1 to 2wk  . pt can resume all prior activity diet and meds as prior to hospitalization . pt going on abx po bactrim please fu cr closely with this abx  cr at dc  1.5 please check  electrolyte 2 to 3days  fu very closely  with abx , pt need to increase  oral fluid intake . fu  pmd with in 1 wk.

## 2018-08-13 NOTE — PROGRESS NOTE ADULT - PROBLEM SELECTOR PLAN 1
- Cont vancomycin 1250 IV Q12 Q8H  -Vanc trough pre 4th dose  -Continue zosyn 3.375g IV   - tylenol PRN for fevers  - pain control  -Dr. Pierce consulted and rec's appreciated.

## 2018-08-13 NOTE — PROGRESS NOTE ADULT - ASSESSMENT
A/P: 52M s/p I&D of RUE POD 1  Analgesia  DVT ppx  WBAT RUE  PT/OT  FU Labs  FU Imaging  Will discuss with attending and advise if plan changes

## 2018-08-13 NOTE — SWALLOW BEDSIDE ASSESSMENT ADULT - SWALLOW EVAL: RECOMMENDED FEEDING/EATING TECHNIQUES
crush medication (when feasible)/hard swallow w/ each bite or sip/position upright (90 degrees)/alternate food with liquid/oral hygiene

## 2018-08-13 NOTE — PROGRESS NOTE ADULT - ATTENDING COMMENTS
Seen and examined. Discussed with resident, and agree with assessment and plan above (edited where necessary).   Patient appears to be improving. Continue to monitor. May need further debridement if he does not continue to improve. Continues to improve. Ortho planning for wound vac and then eventual closure. Will discuss plan going forward with them and inquire about need for re-imaging (possible MRI to r/o OM) Patient seen and examined. Case discussed with resident. Agree with above (edited where necessary).

## 2018-08-13 NOTE — DISCHARGE NOTE ADULT - ADDITIONAL INSTRUCTIONS
pt going on abx po bactrim please fu cr closely with this abx / repeat  with in 1wk . fu out pt pmd with in 1wk , and ortho in 1 to 2wk  . pt can resume all prior activity diet and meds as prior to hospitalization . pt going on abx po bactrim please fu cr closely with this abx  cr at dc  1.5 please check  electrolyte 2 to 3days  fu very closely  with abx , pt need to increase  oral fluid intake . fu  pmd with in 1 wk.

## 2018-08-13 NOTE — PROGRESS NOTE ADULT - ASSESSMENT
51 y/o MRDD male with PMHx of HTN, neurogenic bladder, psoriasis, seizure, and spinal stenosis sent to the ED from [facility] for evaluation of worsening swelling and pain in RUE, admitted with Cellulitis of RUE.

## 2018-08-13 NOTE — PROGRESS NOTE ADULT - SUBJECTIVE AND OBJECTIVE BOX
DEPARTMENT OF ANESTHESIA  POST ANESTHETIC EVALUATION    The Patient was interviewed and evaluated.  The patient is S/P a i and d or right arm abscess    Vital Signs Last 24 Hrs  T(C): 36.5 (13 Aug 2018 06:00), Max: 37.4 (12 Aug 2018 12:04)  T(F): 97.7 (13 Aug 2018 06:00), Max: 99.3 (12 Aug 2018 12:04)  HR: 81 (13 Aug 2018 06:00) (81 - 95)  BP: 121/84 (13 Aug 2018 06:00) (116/74 - 145/79)  BP(mean): --  RR: 19 (13 Aug 2018 06:00) (11 - 19)  SpO2: 98% (13 Aug 2018 06:00) (95% - 100%)    Evaluation:  The patient is doing well      (x ) No apparent complications or complaints regarding anesthesia care at this time  (x) All questions were answered    Condition:  (x ) Stable      ( ) Guarded      ( ) Critical    Recommendations:  (x) None     ( ) Other:

## 2018-08-13 NOTE — DISCHARGE NOTE ADULT - CARE PROVIDER_API CALL
Jessie Maldonado), Internal Medicine  [address] [place] [address]  Phone: [phone]  Fax: [phone] Jessie Maldonado), Internal Medicine  [address] [place] [address]  Phone: [phone]  Fax: [phone]    Zenon Dale), Orthopaedic Surgery Surgery  [address] [place] [address]  Phone: [phone]  Fax: [phone] Jessie Maldonado), Internal Medicine  [address] [place] [address]  Phone: [phone]  Fax: [phone]    Zenon Dale), Orthopaedic Surgery Surgery  [address] [place] [address]  Phone: [phone]  Fax: [phone]    Grisel Chang), Infectious Disease; Internal Medicine  [address] [place] [address]  Phone: [phone]  Fax: [phone]

## 2018-08-13 NOTE — DISCHARGE NOTE ADULT - PLAN OF CARE
Controlled You were diagnosed with a skin infection. Continue ____  x times per day Continue home medications of tamsulosin and bethanecol Continue clonidine as prescribed -Continue home dose lamotrogine and ativan PRN as prescribed Continue tizanidine You were diagnosed with a skin infection. Continue ____  x times per day  You were treated with IV antibiotics You were diagnosed with a skin infection. Continue ____  x times per day  You were treated with IV antibiotics  You went to the OR for drainage of abscesses in your R arm You were diagnosed with a skin infection. Continue ____  x times per day  You were treated with IV antibiotics  You went to the OR for drainage of abscesses in your R arm    I&D Discharge Instructions:    1. Weight Bearing as Tolerated Right Upper Extremity  2. Keep dressing clean, dry and intact; If dressing becomes saturated, change with Aquacel bandage or Sterile Gauze and Tegederms  3. Follow-up with Orthopedic Surgeon Dr. Parra in office in 10-14 days. Call office to schedule appointment.  4. Keep arm elevated as much as possible to help with swelling; Ice as needed  5. If showering keep dressing completely dry; Use plastic garbage bags with tape to cover  6. Continue DVT prophylaxis per Med Rec  7. Continue current antibiotic therapy as instructed by infectious disease You were diagnosed with a skin infection. Continue ____  x times per day  You were treated with IV antibiotics  You went to the OR for drainage of abscesses in your R arm    I&D Discharge Instructions:    1. Weight Bearing as Tolerated Right Upper Extremity  2. Keep dressing clean, dry and intact; If dressing becomes saturated, change with Aquacel bandage or Sterile Gauze and Tegederms  3. Follow-up with Orthopedic Surgeon Dr. Parra in office in 7-10 days. Call office to schedule appointment.  4. Keep arm elevated as much as possible to help with swelling; Ice as needed  5. If showering keep dressing completely dry; Use plastic garbage bags with tape to cover  6. Continue DVT prophylaxis per Med Rec  7. Continue current antibiotic therapy as instructed by infectious disease You were diagnosed with a skin infection and treated with IV antibiotics. Continue Bactrim DS oral twice per day for 12 more days (14 day course which began evening of 8/18. Official start day 8/19). Today is day 2 of 14, and patient is to receive second dose of bactrim today (last dose 5:30 am 8/20).     You went to the OR for drainage of abscesses in your R arm    I&D Discharge Instructions:    1. Weight Bearing as Tolerated Right Upper Extremity  2. Keep dressing clean, dry and intact; If dressing becomes saturated, change with Aquacel bandage or Sterile Gauze and Tegederms  3. Follow-up with Orthopedic Surgeon Dr. Parra in office in 7-10 days. Call office to schedule appointment.  4. Keep arm elevated as much as possible to help with swelling; Ice as needed  5. If showering keep dressing completely dry; Use plastic garbage bags with tape to cover  6. Continue DVT prophylaxis per Med Rec  7. Continue current antibiotic therapy as instructed by infectious disease You were found to have a rash on your shins. Continue hydrocortisone cream two times a day as needed. You were diagnosed with a skin infection and treated with IV antibiotics. Continue Bactrim DS oral twice per day for 12 more days (14 day course which began evening of 8/18. Official start day 8/19). Today is day 2 of 14.     You went to the OR for drainage of abscesses in your R arm    I&D Discharge Instructions:    1. Weight Bearing as Tolerated Right Upper Extremity  2. Keep dressing clean, dry and intact; If dressing becomes saturated, change with Aquacel bandage or Sterile Gauze and Tegederms  3. Follow-up with Orthopedic Surgeon Dr. Parra in office in 7-10 days. Call office to schedule appointment.  4. Keep arm elevated as much as possible to help with swelling; Ice as needed  5. If showering keep dressing completely dry; Use plastic garbage bags with tape to cover  6. Continue DVT prophylaxis per Med Rec  7. Continue current antibiotic therapy as instructed by infectious disease You were diagnosed with a skin infection and treated with IV antibiotics. Continue Bactrim DS oral twice per day for 12 more days (14 day course which began evening of 8/18. Official start day 8/19). Today is day 2 of 14. Patient received only 1 dose of day 2 (5:30 on 8/20). Patient due for second dose of day 2 tonight at 17:30. Patient has 13 day prescription instead of 12 because of this.     -650 mg of tylenol Q6H can be used for pain control    You went to the OR for drainage of abscesses in your R arm    I&D Discharge Instructions:    1. Weight Bearing as Tolerated Right Upper Extremity  2. Keep dressing clean, dry and intact; If dressing becomes saturated, change with Aquacel bandage or Sterile Gauze and Tegederms  3. Follow-up with Orthopedic Surgeon Dr. Parra in office in 7-10 days. Call office to schedule appointment.  4. Keep arm elevated as much as possible to help with swelling; Ice as needed  5. If showering keep dressing completely dry; Use plastic garbage bags with tape to cover  6. Continue DVT prophylaxis per Med Rec  7. Continue current antibiotic therapy as instructed by infectious disease You were found to have a rash on your shins. Continue steroid creams as needed for psoriasis.

## 2018-08-14 DIAGNOSIS — R79.89 OTHER SPECIFIED ABNORMAL FINDINGS OF BLOOD CHEMISTRY: ICD-10-CM

## 2018-08-14 LAB
-  AMPICILLIN/SULBACTAM: SIGNIFICANT CHANGE UP
-  CEFAZOLIN: SIGNIFICANT CHANGE UP
-  CLINDAMYCIN: SIGNIFICANT CHANGE UP
-  DAPTOMYCIN: SIGNIFICANT CHANGE UP
-  ERYTHROMYCIN: SIGNIFICANT CHANGE UP
-  GENTAMICIN: SIGNIFICANT CHANGE UP
-  LINEZOLID: SIGNIFICANT CHANGE UP
-  OXACILLIN: SIGNIFICANT CHANGE UP
-  PENICILLIN: SIGNIFICANT CHANGE UP
-  RIFAMPIN: SIGNIFICANT CHANGE UP
-  TETRACYCLINE: SIGNIFICANT CHANGE UP
-  TRIMETHOPRIM/SULFAMETHOXAZOLE: SIGNIFICANT CHANGE UP
-  VANCOMYCIN: SIGNIFICANT CHANGE UP
ANION GAP SERPL CALC-SCNC: 7 MMOL/L — SIGNIFICANT CHANGE UP (ref 5–17)
BUN SERPL-MCNC: 20 MG/DL — SIGNIFICANT CHANGE UP (ref 7–23)
CALCIUM SERPL-MCNC: 9.6 MG/DL — SIGNIFICANT CHANGE UP (ref 8.5–10.1)
CHLORIDE SERPL-SCNC: 107 MMOL/L — SIGNIFICANT CHANGE UP (ref 96–108)
CO2 SERPL-SCNC: 31 MMOL/L — SIGNIFICANT CHANGE UP (ref 22–31)
CREAT SERPL-MCNC: 1.3 MG/DL — SIGNIFICANT CHANGE UP (ref 0.5–1.3)
GLUCOSE SERPL-MCNC: 94 MG/DL — SIGNIFICANT CHANGE UP (ref 70–99)
HCT VFR BLD CALC: 33.5 % — LOW (ref 39–50)
HGB BLD-MCNC: 11.3 G/DL — LOW (ref 13–17)
MCHC RBC-ENTMCNC: 30.1 PG — SIGNIFICANT CHANGE UP (ref 27–34)
MCHC RBC-ENTMCNC: 33.7 GM/DL — SIGNIFICANT CHANGE UP (ref 32–36)
MCV RBC AUTO: 89.1 FL — SIGNIFICANT CHANGE UP (ref 80–100)
METHOD TYPE: SIGNIFICANT CHANGE UP
NRBC # BLD: 0 /100 WBCS — SIGNIFICANT CHANGE UP (ref 0–0)
PLATELET # BLD AUTO: 220 K/UL — SIGNIFICANT CHANGE UP (ref 150–400)
POTASSIUM SERPL-MCNC: 4.1 MMOL/L — SIGNIFICANT CHANGE UP (ref 3.5–5.3)
POTASSIUM SERPL-SCNC: 4.1 MMOL/L — SIGNIFICANT CHANGE UP (ref 3.5–5.3)
RBC # BLD: 3.76 M/UL — LOW (ref 4.2–5.8)
RBC # FLD: 11.6 % — SIGNIFICANT CHANGE UP (ref 10.3–14.5)
SODIUM SERPL-SCNC: 145 MMOL/L — SIGNIFICANT CHANGE UP (ref 135–145)
VANCOMYCIN TROUGH SERPL-MCNC: 19.2 UG/ML — SIGNIFICANT CHANGE UP (ref 10–20)
VANCOMYCIN TROUGH SERPL-MCNC: 20.3 UG/ML — HIGH (ref 10–20)
WBC # BLD: 6.56 K/UL — SIGNIFICANT CHANGE UP (ref 3.8–10.5)
WBC # FLD AUTO: 6.56 K/UL — SIGNIFICANT CHANGE UP (ref 3.8–10.5)

## 2018-08-14 PROCEDURE — 99233 SBSQ HOSP IP/OBS HIGH 50: CPT | Mod: GC

## 2018-08-14 RX ORDER — VANCOMYCIN HCL 1 G
1000 VIAL (EA) INTRAVENOUS EVERY 12 HOURS
Qty: 0 | Refills: 0 | Status: COMPLETED | OUTPATIENT
Start: 2018-08-14 | End: 2019-07-13

## 2018-08-14 RX ORDER — VANCOMYCIN HCL 1 G
1000 VIAL (EA) INTRAVENOUS EVERY 12 HOURS
Qty: 0 | Refills: 0 | Status: DISCONTINUED | OUTPATIENT
Start: 2018-08-14 | End: 2018-08-17

## 2018-08-14 RX ORDER — ENOXAPARIN SODIUM 100 MG/ML
40 INJECTION SUBCUTANEOUS DAILY
Qty: 0 | Refills: 0 | Status: COMPLETED | OUTPATIENT
Start: 2018-08-14 | End: 2018-08-16

## 2018-08-14 RX ADMIN — Medication 166.67 MILLIGRAM(S): at 06:15

## 2018-08-14 RX ADMIN — Medication 0.1 MILLIGRAM(S): at 05:36

## 2018-08-14 RX ADMIN — Medication 0.1 MILLIGRAM(S): at 21:27

## 2018-08-14 RX ADMIN — TAMSULOSIN HYDROCHLORIDE 0.4 MILLIGRAM(S): 0.4 CAPSULE ORAL at 21:27

## 2018-08-14 RX ADMIN — CITALOPRAM 10 MILLIGRAM(S): 10 TABLET, FILM COATED ORAL at 11:55

## 2018-08-14 RX ADMIN — LAMOTRIGINE 100 MILLIGRAM(S): 25 TABLET, ORALLY DISINTEGRATING ORAL at 05:36

## 2018-08-14 RX ADMIN — Medication 100 MILLIGRAM(S): at 17:46

## 2018-08-14 RX ADMIN — Medication 0.1 MILLIGRAM(S): at 14:00

## 2018-08-14 RX ADMIN — Medication 100 MILLIGRAM(S): at 05:38

## 2018-08-14 RX ADMIN — Medication 250 MILLIGRAM(S): at 20:27

## 2018-08-14 RX ADMIN — Medication 30 MILLIGRAM(S): at 14:55

## 2018-08-14 RX ADMIN — ENOXAPARIN SODIUM 40 MILLIGRAM(S): 100 INJECTION SUBCUTANEOUS at 11:55

## 2018-08-14 RX ADMIN — Medication 25 MILLIGRAM(S): at 11:55

## 2018-08-14 RX ADMIN — LAMOTRIGINE 100 MILLIGRAM(S): 25 TABLET, ORALLY DISINTEGRATING ORAL at 17:46

## 2018-08-14 RX ADMIN — TIZANIDINE 2 MILLIGRAM(S): 4 TABLET ORAL at 11:55

## 2018-08-14 RX ADMIN — PIPERACILLIN AND TAZOBACTAM 25 GRAM(S): 4; .5 INJECTION, POWDER, LYOPHILIZED, FOR SOLUTION INTRAVENOUS at 05:37

## 2018-08-14 NOTE — PROGRESS NOTE ADULT - PROBLEM SELECTOR PLAN 1
-Vanc trough pre 4th dose  -Continue zosyn 3.375g IV q8h  -Hold Vanc till 6 pm as trough >20 (20.3)  - tylenol PRN for fevers  - pain control  -Dr. Pierce consulted and rec's appreciated.  -Awaiting Ortho Consult for continuation of DVT PPx  -SCD's in the mean time Vancomycin held this AM.   Given worsening renal function will decrease dose to 1g IV q12h starting tonight (provided trough is below 20).   -Stop Zosyn, given MRSA in operative culture (prelim).   - tylenol PRN for fevers  - pain control  -Per general surgery, ortho is planning for possible wound vac and then eventual closure. Awaiting call back from ortho.  ID: Bernardo following.

## 2018-08-14 NOTE — PROGRESS NOTE ADULT - ASSESSMENT
A/P: 52M s/p I&D of RUE POD 2  Analgesia  DVT ppx  WBAT RUE  OR Cultures prelim Staph Aureus;   BCx NGTD  PT/OT  FU Labs  FU Imaging  Will discuss with attending and advise if plan changes

## 2018-08-14 NOTE — PROGRESS NOTE ADULT - SUBJECTIVE AND OBJECTIVE BOX
Patient seen and examined at bedside. Reports no acute events overnight. Pain is well controlled.    PHYSICAL EXAM:  Vital Signs Last 24 Hrs  T(C): 36.4 (14 Aug 2018 05:09), Max: 36.9 (13 Aug 2018 20:16)  T(F): 97.5 (14 Aug 2018 05:09), Max: 98.5 (13 Aug 2018 20:16)  HR: 80 (14 Aug 2018 05:09) (80 - 88)  BP: 116/77 (14 Aug 2018 05:09) (116/77 - 143/90)  RR: 16 (14 Aug 2018 05:09) (16 - 18)  SpO2: 97% (14 Aug 2018 05:09) (94% - 100%)    Gen: NAD, Resting comfortably  RUE: ACE bandage in place; Clean/Dry/Intact  SILT C5-T1  +AIN/PIN/Ulnar/Median/Radial  +2/4 Radial pulse  Painless ROM of Wrist and fingers  Compartments soft and compressible

## 2018-08-14 NOTE — PROVIDER CONTACT NOTE (CHANGE IN STATUS NOTIFICATION) - ASSESSMENT
Patient is a 53 yo MRDD male presenting with open surgical wound right tricep secondary to abscess wound is red non-granulating 15 x 5 x 5 wound edges clean mallorie-wound intact patient tolerated procedure with some distress CAM therapy easily distracted him and dressing continued without any s/s of pain or distress. Will continue to follow

## 2018-08-14 NOTE — PROGRESS NOTE ADULT - PROBLEM SELECTOR PLAN 6
- c/w Citalopram 10mg  Supportive care. Slight increase in creatinine today.  Likely due to acute infection, ?dehydration, and treatment with Vancomycin.   Will give gentle fluids and Vancomycin dose adjusted by ID.   Recheck in AM.

## 2018-08-14 NOTE — PROGRESS NOTE ADULT - ASSESSMENT
53 y/o MRDD male with PMHx of HTN, neurogenic bladder, psoriasis, seizure, and spinal stenosis sent to the ED from [facility] for evaluation of worsening swelling and pain in RUE, admitted with Cellulitis of RUE.

## 2018-08-14 NOTE — PROGRESS NOTE ADULT - SUBJECTIVE AND OBJECTIVE BOX
Patient is a 52y old  Male who presents with a chief complaint of Right arm swelling (10 Aug 2018 20:51)      INTERVAL HPI/OVERNIGHT EVENTS:  Denies present complaints. Complaining of R arm pain, but able to make more significant fist in R hand vs. yesterday.     MEDICATIONS  (STANDING):  bethanechol 25 milliGRAM(s) Oral daily  citalopram 10 milliGRAM(s) Oral daily  cloNIDine 0.1 milliGRAM(s) Oral three times a day  docusate sodium 100 milliGRAM(s) Oral two times a day  lamoTRIgine 100 milliGRAM(s) Oral two times a day  piperacillin/tazobactam IVPB. 3.375 Gram(s) IV Intermittent every 8 hours  tamsulosin 0.4 milliGRAM(s) Oral at bedtime  vancomycin  IVPB 1250 milliGRAM(s) IV Intermittent every 12 hours    MEDICATIONS  (PRN):  ketorolac   Injectable 30 milliGRAM(s) IV Push every 6 hours PRN Moderate Pain (4 - 6)  LORazepam   Injectable 1 milliGRAM(s) IntraMuscular every 4 hours PRN seizure      Allergies:    No Known Allergies    Intolerances        REVIEW OF SYSTEMS:  CONSTITUTIONAL: No fever, weight loss, or fatigue  RESPIRATORY: No cough, wheezing, chills or hemoptysis; No shortness of breath  CARDIOVASCULAR: No chest pain, palpitations, dizziness, or leg swelling  NEUROLOGICAL: No headaches, memory loss, loss of strength, numbness, or tremors  SKIN: No itching, burning, rashes, or lesions   MUSCULOSKELETAL: Nonpitting edema of dorsum and phalanges of R hand.   PSYCHIATRIC: No depression, anxiety, mood swings, or difficulty sleeping      ICU Vital Signs Last 24 Hrs  T(C): 36.4 (14 Aug 2018 05:09), Max: 36.9 (13 Aug 2018 20:16)  T(F): 97.5 (14 Aug 2018 05:09), Max: 98.5 (13 Aug 2018 20:16)  HR: 80 (14 Aug 2018 05:09) (80 - 88)  BP: 116/77 (14 Aug 2018 05:09) (116/77 - 143/90)  RR: 16 (14 Aug 2018 05:09) (16 - 18)  SpO2: 97% (14 Aug 2018 05:09) (94% - 100%)    PHYSICAL EXAM:  GENERAL: NAD, well-groomed, well-developed  NERVOUS SYSTEM:  Alert & Oriented X3, Good concentration  CHEST/LUNG: Clear to auscultation bilaterally; No rales, rhonchi, wheezing, or rubs  HEART: Regular rate and rhythm; No murmurs, rubs, or gallops  EXTREMITIES:  2+ Peripheral Pulses, No clubbing, cyanosis, or edema. Bandage on R arm.   LYMPH: No lymphadenopathy noted  SKIN: No rashes or lesions    LABS:                        10.1   7.86  )-----------( 176      ( 13 Aug 2018 06:52 )             30.3     13 Aug 2018 06:57    141    |  102    |  22     ----------------------------<  103    4.1     |  30     |  1.10     Ca    9.5        13 Aug 2018 06:57          CAPILLARY BLOOD GLUCOSE          RADIOLOGY & ADDITIONAL TESTS:    Imaging Personally Reviewed:  [ ] YES  [ ] NO    Consultant(s) Notes Reviewed:  [ ] YES  [ ] NO    Care Discussed with Consultants/Other Providers [ ] YES  [ ] NO Patient is a 52y old  Male who presents with a chief complaint of Right arm swelling (10 Aug 2018 20:51)      INTERVAL HPI/OVERNIGHT EVENTS:  Denies present complaints. Complaining of R arm pain, but able to make more significant fist in R hand vs. yesterday.     MEDICATIONS  (STANDING):  bethanechol 25 milliGRAM(s) Oral daily  citalopram 10 milliGRAM(s) Oral daily  cloNIDine 0.1 milliGRAM(s) Oral three times a day  docusate sodium 100 milliGRAM(s) Oral two times a day  lamoTRIgine 100 milliGRAM(s) Oral two times a day  piperacillin/tazobactam IVPB. 3.375 Gram(s) IV Intermittent every 8 hours  tamsulosin 0.4 milliGRAM(s) Oral at bedtime  vancomycin  IVPB 1250 milliGRAM(s) IV Intermittent every 12 hours    MEDICATIONS  (PRN):  ketorolac   Injectable 30 milliGRAM(s) IV Push every 6 hours PRN Moderate Pain (4 - 6)  LORazepam   Injectable 1 milliGRAM(s) IntraMuscular every 4 hours PRN seizure      Allergies:    No Known Allergies    Intolerances        REVIEW OF SYSTEMS:  CONSTITUTIONAL: No fever, weight loss, or fatigue  RESPIRATORY: No cough, wheezing, chills or hemoptysis; No shortness of breath  CARDIOVASCULAR: No chest pain, palpitations, dizziness, or leg swelling  NEUROLOGICAL: No headaches, memory loss, loss of strength, numbness, or tremors  SKIN: No itching, burning, rashes, or lesions   MUSCULOSKELETAL: Nonpitting edema of dorsum and phalanges of R hand.   PSYCHIATRIC: No depression, anxiety, mood swings, or difficulty sleeping      ICU Vital Signs Last 24 Hrs  T(C): 36.4 (14 Aug 2018 05:09), Max: 36.9 (13 Aug 2018 20:16)  T(F): 97.5 (14 Aug 2018 05:09), Max: 98.5 (13 Aug 2018 20:16)  HR: 80 (14 Aug 2018 05:09) (80 - 88)  BP: 116/77 (14 Aug 2018 05:09) (116/77 - 143/90)  RR: 16 (14 Aug 2018 05:09) (16 - 18)  SpO2: 97% (14 Aug 2018 05:09) (94% - 100%)    PHYSICAL EXAM:  GENERAL: NAD, well-groomed, well-developed  NERVOUS SYSTEM:  Alert & Oriented X3, Good concentration  CHEST/LUNG: Clear to auscultation bilaterally; No rales, rhonchi, wheezing, or rubs  HEART: Regular rate and rhythm; No murmurs, rubs, or gallops  EXTREMITIES:  2+ Peripheral Pulses, No clubbing, cyanosis, or edema. Bandage on R arm.   LYMPH: No lymphadenopathy noted  SKIN: No rashes or lesions    LABS:       08-14    145  |  107  |  20  ----------------------------<  94  4.1   |  31  |  1.30    Ca    9.6      14 Aug 2018 06:16                            11.3   6.56  )-----------( 220      ( 14 Aug 2018 06:16 )             33.5           CAPILLARY BLOOD GLUCOSE          RADIOLOGY & ADDITIONAL TESTS:    Imaging Personally Reviewed:  [ ] YES  [ ] NO    Consultant(s) Notes Reviewed:  [ ] YES  [ ] NO    Care Discussed with Consultants/Other Providers [ ] YES  [ ] NO Patient is a 52y old  Male who presents with a chief complaint of Right arm swelling (10 Aug 2018 20:51)      INTERVAL HPI/OVERNIGHT EVENTS:  Denies present complaints. Complaining of R arm pain, but able to make more significant fist in R hand vs. yesterday.     MEDICATIONS  (STANDING):  bethanechol 25 milliGRAM(s) Oral daily  citalopram 10 milliGRAM(s) Oral daily  cloNIDine 0.1 milliGRAM(s) Oral three times a day  docusate sodium 100 milliGRAM(s) Oral two times a day  lamoTRIgine 100 milliGRAM(s) Oral two times a day  piperacillin/tazobactam IVPB. 3.375 Gram(s) IV Intermittent every 8 hours  tamsulosin 0.4 milliGRAM(s) Oral at bedtime  vancomycin  IVPB 1250 milliGRAM(s) IV Intermittent every 12 hours    MEDICATIONS  (PRN):  ketorolac   Injectable 30 milliGRAM(s) IV Push every 6 hours PRN Moderate Pain (4 - 6)  LORazepam   Injectable 1 milliGRAM(s) IntraMuscular every 4 hours PRN seizure      Allergies:    No Known Allergies    Intolerances        REVIEW OF SYSTEMS:  CONSTITUTIONAL: No fever, weight loss, or fatigue  RESPIRATORY: No cough, wheezing, chills or hemoptysis; No shortness of breath  CARDIOVASCULAR: No chest pain, palpitations, dizziness, or leg swelling  NEUROLOGICAL: No headaches, memory loss, loss of strength, numbness, or tremors  SKIN: No itching, burning, rashes, or lesions   MUSCULOSKELETAL: Nonpitting edema of dorsum and phalanges of R hand.   PSYCHIATRIC: No depression, anxiety, mood swings, or difficulty sleeping      ICU Vital Signs Last 24 Hrs  T(C): 36.4 (14 Aug 2018 05:09), Max: 36.9 (13 Aug 2018 20:16)  T(F): 97.5 (14 Aug 2018 05:09), Max: 98.5 (13 Aug 2018 20:16)  HR: 80 (14 Aug 2018 05:09) (80 - 88)  BP: 116/77 (14 Aug 2018 05:09) (116/77 - 143/90)  RR: 16 (14 Aug 2018 05:09) (16 - 18)  SpO2: 97% (14 Aug 2018 05:09) (94% - 100%)    PHYSICAL EXAM:  GENERAL: NAD, well-groomed, well-developed  NERVOUS SYSTEM:  Alert & Oriented X3, Good concentration  CHEST/LUNG: Clear to auscultation bilaterally; No rales, rhonchi, wheezing, or rubs  HEART: Regular rate and rhythm; No murmurs, rubs, or gallops  EXTREMITIES:  2+ Peripheral Pulses, No clubbing, cyanosis, or edema. Bandage on R arm C/D/I, no strikethrough, decreased swelling in uncovered portion of arm, good capillary refill in fingers of right hand and sensation intact.   LYMPH: No lymphadenopathy noted  SKIN: No rashes or lesions    LABS:       08-14    145  |  107  |  20  ----------------------------<  94  4.1   |  31  |  1.30    Ca    9.6      14 Aug 2018 06:16                            11.3   6.56  )-----------( 220      ( 14 Aug 2018 06:16 )             33.5           CAPILLARY BLOOD GLUCOSE          RADIOLOGY & ADDITIONAL TESTS:    Imaging Personally Reviewed:  [ ] YES  [ ] NO    Consultant(s) Notes Reviewed:  [ ] YES  [ ] NO    Care Discussed with Consultants/Other Providers [ ] YES  [ ] NO

## 2018-08-14 NOTE — PROGRESS NOTE ADULT - PROBLEM SELECTOR PLAN 9
DVT PPx: Lovenox  [place] SCORE: 4   Pharmacological PPx indicated  Fall Precautions  Seizure Precautions  Aspiration Precautions  Enhanced obs for prevention agitation. Per pts mom, does well with  in room. Will reassess for possible benefit of constant obs - home aide to bring in topical emollient

## 2018-08-14 NOTE — PROGRESS NOTE ADULT - SUBJECTIVE AND OBJECTIVE BOX
ID Progress note     Name: DARI CIFUENTES  Age: 52y  Gender: Male  MRN: [id number]    Interval History-- Events noted, doing well had wound vac placed by orthopedics . Mother at bedside   Notes reviewed    Past Medical History--  Spinal stenosis  Psoriasis  Neurogenic bladder  Seizure  Hypertension  Mental retardation  No significant past surgical history      For details regarding the patient's social history, family history, and other miscellaneous elements, please refer the initial infectious diseases consultation and/or the admitting history and physical examination for this admission.    Allergies--  Allergies    No Known Allergies    Intolerances        Medications--  Antibiotics:  piperacillin/tazobactam IVPB. 3.375 Gram(s) IV Intermittent every 8 hours  vancomycin  IVPB 1250 milliGRAM(s) IV Intermittent every 12 hours    Immunologic:    Other:  bethanechol  citalopram  cloNIDine  docusate sodium  enoxaparin Injectable  ketorolac   Injectable PRN  lamoTRIgine  LORazepam   Injectable PRN  tamsulosin  tiZANidine      Review of Systems--  Review of systems unable to be obtained secondary to clinical condition.    Physical Examination--    Vital Signs: T(F): 97.5 (08-14-18 @ 05:09), Max: 98.5 (08-13-18 @ 20:16)  HR: 80 (08-14-18 @ 05:09)  BP: 116/77 (08-14-18 @ 05:09)  RR: 16 (08-14-18 @ 05:09)  SpO2: 97% (08-14-18 @ 05:09)  Wt(kg): --  General: Nontoxic-appearing Male in no acute distress.  HEENT: AT/NC. PERRL. EOMI. Anicteric. Conjunctiva pink and moist. Oropharynx clear. Dentition fair.  Neck: Not rigid. No sense of mass.  Nodes: None palpable.  Lungs: Clear bilaterally without rales, wheezing or rhonchi  Heart: Regular rate and rhythm. No Murmur. No rub. No gallop. No palpable thrill.  Abdomen: Bowel sounds present and normoactive. Soft. Nondistended. Nontender. No sense of mass. No organomegaly.  Back: No spinal tenderness. No costovertebral angle tenderness.   Extremities: No cyanosis or clubbing. Right - swollen right arm, most swollen at right elbow   Skin: Warm. Dry. Good turgor. No rash. No vasculitic stigmata.  Psychiatric: Appropriate affect and mood for situation.         Laboratory Studies--  CBC                        11.3   6.56  )-----------( 220      ( 14 Aug 2018 06:16 )             33.5       Chemistries  08-14    145  |  107  |  20  ----------------------------<  94  4.1   |  31  |  1.30    Ca    9.6      14 Aug 2018 06:16    Vancomycin Level, Trough -Pre 4th Dose, order if dosed q6/8/12h (08.14.18 @ 06:16)    Vancomycin Level, Trough: 20.3:     Culture Data    Culture - Surgical Swab (collected 12 Aug 2018 17:45)  Source: .Surgical Swab right arm abcess  Preliminary Report (13 Aug 2018 17:15):    Numerous Staphylococcus aureus    Culture - Urine (collected 11 Aug 2018 10:09)  Source: .Urine Clean Catch (Midstream)  Final Report (12 Aug 2018 10:52):    No growth    Culture - Blood (collected 10 Aug 2018 14:56)  Source: .Blood Blood-Peripheral  Preliminary Report (11 Aug 2018 15:05):    No growth to date.    Culture - Blood (collected 10 Aug 2018 14:56)  Source: .Blood Blood-Peripheral  Preliminary Report (11 Aug 2018 15:05):    No growth to date.  Radiology:   Xray Elbow AP + Lateral + Oblique, Right (08.12.18 @ 18:52) >    INTERPRETATION:  Clinical information: Status post incision and drainage   right arm abscess    3 views of the right elbow AP lateral oblique.    Degenerative changes present, olecranon. No acute fracture-dislocation or   destructive bone lesions evident.    CT Humerus w/ IV Cont, Right (08.11.18 @ 15:20) >  FINDINGS/  IMPRESSION:    There are multiple loculated fluid collections in the wall enhancement,   likely representing abscesses within the right triceps muscle, the   largest lungs measure 2.3 x 4.4 x 7.4 cm (AP x TRV x CC).    No vascular thrombosis.    No fracture or dislocation.    The visualized right lung and upper right abdomen are unremarkable.        Assessment--  51 y/o MRDD male with PMHx of HTN, neurogenic bladder, psoriasis, seizure, and spinal stenosis admitted with fever , swelling of the right arm most at elbow, denies any trauma . he most likely has cellulitis with soft tissue infection, possibility of olecranon bursitis or septic bursitis cannot be rule out . Ct scan shows multiloculated right triceps abscesses .     He has hx of right elbow abscess with MRSA in 2014 , but MRSA screen negative .    Ct scan of elbow - shows multi loculated fluid collections consistent with abscess  within right triceps muscle , he is POD # 3  , s/p wide I X D by surgery/orthopedics     Plan :   - will continue with Vancomycin pending  intra op cs , dc Zosyn   - keep right arm elevated  - check vanco trough before next dose, if less than 20 will decrease dose to 1 gram q 12   - trend fevers and CBC  - obtain results of ct right elbow done on admission     Continue with present regime .  Appropriate use of antibiotics and adverse effects reviewed.        I have discussed the above plan of care with patient's family in detail. They expressed understanding of the treatment plan . Risks, benefits and alternatives discussed in detail. I have asked if they have any questions or concerns and appropriately addressed them to the best of my ability .      > 35 minutes spent in direct patient care reviewing  the notes, lab data/ imaging , discussion with multidisciplinary team. All questions were addressed and answered to the best of my capacity .    Thank you for allowing me to participate in the care of your patient .        Grisel Chang MD  [phone]

## 2018-08-15 LAB
ANION GAP SERPL CALC-SCNC: 7 MMOL/L — SIGNIFICANT CHANGE UP (ref 5–17)
BASOPHILS # BLD AUTO: 0.03 K/UL — SIGNIFICANT CHANGE UP (ref 0–0.2)
BASOPHILS NFR BLD AUTO: 0.6 % — SIGNIFICANT CHANGE UP (ref 0–2)
BUN SERPL-MCNC: 23 MG/DL — SIGNIFICANT CHANGE UP (ref 7–23)
CALCIUM SERPL-MCNC: 9.4 MG/DL — SIGNIFICANT CHANGE UP (ref 8.5–10.1)
CHLORIDE SERPL-SCNC: 106 MMOL/L — SIGNIFICANT CHANGE UP (ref 96–108)
CO2 SERPL-SCNC: 30 MMOL/L — SIGNIFICANT CHANGE UP (ref 22–31)
CREAT SERPL-MCNC: 1.1 MG/DL — SIGNIFICANT CHANGE UP (ref 0.5–1.3)
CULTURE RESULTS: SIGNIFICANT CHANGE UP
CULTURE RESULTS: SIGNIFICANT CHANGE UP
EOSINOPHIL # BLD AUTO: 0.37 K/UL — SIGNIFICANT CHANGE UP (ref 0–0.5)
EOSINOPHIL NFR BLD AUTO: 7.1 % — HIGH (ref 0–6)
GLUCOSE SERPL-MCNC: 88 MG/DL — SIGNIFICANT CHANGE UP (ref 70–99)
HCT VFR BLD CALC: 31.3 % — LOW (ref 39–50)
HGB BLD-MCNC: 10.5 G/DL — LOW (ref 13–17)
IMM GRANULOCYTES NFR BLD AUTO: 1.5 % — SIGNIFICANT CHANGE UP (ref 0–1.5)
LYMPHOCYTES # BLD AUTO: 0.91 K/UL — LOW (ref 1–3.3)
LYMPHOCYTES # BLD AUTO: 17.5 % — SIGNIFICANT CHANGE UP (ref 13–44)
MCHC RBC-ENTMCNC: 29.9 PG — SIGNIFICANT CHANGE UP (ref 27–34)
MCHC RBC-ENTMCNC: 33.5 GM/DL — SIGNIFICANT CHANGE UP (ref 32–36)
MCV RBC AUTO: 89.2 FL — SIGNIFICANT CHANGE UP (ref 80–100)
MONOCYTES # BLD AUTO: 0.58 K/UL — SIGNIFICANT CHANGE UP (ref 0–0.9)
MONOCYTES NFR BLD AUTO: 11.2 % — SIGNIFICANT CHANGE UP (ref 2–14)
NEUTROPHILS # BLD AUTO: 3.23 K/UL — SIGNIFICANT CHANGE UP (ref 1.8–7.4)
NEUTROPHILS NFR BLD AUTO: 62.1 % — SIGNIFICANT CHANGE UP (ref 43–77)
NRBC # BLD: 0 /100 WBCS — SIGNIFICANT CHANGE UP (ref 0–0)
PLATELET # BLD AUTO: 263 K/UL — SIGNIFICANT CHANGE UP (ref 150–400)
POTASSIUM SERPL-MCNC: 4.1 MMOL/L — SIGNIFICANT CHANGE UP (ref 3.5–5.3)
POTASSIUM SERPL-SCNC: 4.1 MMOL/L — SIGNIFICANT CHANGE UP (ref 3.5–5.3)
RBC # BLD: 3.51 M/UL — LOW (ref 4.2–5.8)
RBC # FLD: 11.9 % — SIGNIFICANT CHANGE UP (ref 10.3–14.5)
SODIUM SERPL-SCNC: 143 MMOL/L — SIGNIFICANT CHANGE UP (ref 135–145)
SPECIMEN SOURCE: SIGNIFICANT CHANGE UP
SPECIMEN SOURCE: SIGNIFICANT CHANGE UP
WBC # BLD: 5.2 K/UL — SIGNIFICANT CHANGE UP (ref 3.8–10.5)
WBC # FLD AUTO: 5.2 K/UL — SIGNIFICANT CHANGE UP (ref 3.8–10.5)

## 2018-08-15 PROCEDURE — 99233 SBSQ HOSP IP/OBS HIGH 50: CPT | Mod: GC

## 2018-08-15 RX ORDER — SOD,AMMONIUM,POTASSIUM LACTATE
1 CREAM (GRAM) TOPICAL
Qty: 0 | Refills: 0 | Status: DISCONTINUED | OUTPATIENT
Start: 2018-08-15 | End: 2018-08-17

## 2018-08-15 RX ADMIN — Medication 1 APPLICATION(S): at 18:13

## 2018-08-15 RX ADMIN — Medication 100 MILLIGRAM(S): at 05:55

## 2018-08-15 RX ADMIN — Medication 100 MILLIGRAM(S): at 18:13

## 2018-08-15 RX ADMIN — LAMOTRIGINE 100 MILLIGRAM(S): 25 TABLET, ORALLY DISINTEGRATING ORAL at 18:13

## 2018-08-15 RX ADMIN — Medication 0.1 MILLIGRAM(S): at 21:36

## 2018-08-15 RX ADMIN — Medication 25 MILLIGRAM(S): at 13:09

## 2018-08-15 RX ADMIN — ENOXAPARIN SODIUM 40 MILLIGRAM(S): 100 INJECTION SUBCUTANEOUS at 13:10

## 2018-08-15 RX ADMIN — Medication 250 MILLIGRAM(S): at 08:45

## 2018-08-15 RX ADMIN — Medication 0.1 MILLIGRAM(S): at 13:11

## 2018-08-15 RX ADMIN — LAMOTRIGINE 100 MILLIGRAM(S): 25 TABLET, ORALLY DISINTEGRATING ORAL at 05:55

## 2018-08-15 RX ADMIN — TAMSULOSIN HYDROCHLORIDE 0.4 MILLIGRAM(S): 0.4 CAPSULE ORAL at 21:36

## 2018-08-15 RX ADMIN — Medication 0.1 MILLIGRAM(S): at 05:55

## 2018-08-15 RX ADMIN — TIZANIDINE 2 MILLIGRAM(S): 4 TABLET ORAL at 13:09

## 2018-08-15 RX ADMIN — CITALOPRAM 10 MILLIGRAM(S): 10 TABLET, FILM COATED ORAL at 13:10

## 2018-08-15 RX ADMIN — Medication 250 MILLIGRAM(S): at 21:36

## 2018-08-15 NOTE — PROGRESS NOTE ADULT - NSHPATTENDINGPLANDISCUSS_GEN_ALL_CORE
patient, mother (re: treatment plan going forward, need for wound vac, continued antibiotics, and continued observation.

## 2018-08-15 NOTE — PROGRESS NOTE ADULT - SUBJECTIVE AND OBJECTIVE BOX
ID Progress note    Name: DARI CIFUENTES  Age: 52y  Gender: Male  MRN: [id number]    Interval History-- Events noted, no new complaints , wound vac in place . Abscess positive for MRSA  Notes reviewed    Past Medical History--  Spinal stenosis  Psoriasis  Neurogenic bladder  Seizure  Hypertension  Mental retardation  No significant past surgical history      For details regarding the patient's social history, family history, and other miscellaneous elements, please refer the initial infectious diseases consultation and/or the admitting history and physical examination for this admission.    Allergies--  Allergies    No Known Allergies    Intolerances        Medications--  Antibiotics:  vancomycin  IVPB 1000 milliGRAM(s) IV Intermittent every 12 hours    Immunologic:    Other:  ammonium lactate 12% Lotion  bethanechol  citalopram  cloNIDine  docusate sodium  enoxaparin Injectable  ketorolac   Injectable PRN  lamoTRIgine  LORazepam   Injectable PRN  tamsulosin  tiZANidine      Review of Systems--  Review of systems unable to be obtained secondary to clinical condition.    Physical Examination--    Vital Signs: T(F): 98.3 (08-15-18 @ 06:07), Max: 98.3 (08-15-18 @ 06:07)  HR: 82 (08-15-18 @ 06:07)  BP: 118/74 (08-15-18 @ 06:07)  RR: 16 (08-15-18 @ 06:07)  SpO2: 97% (08-15-18 @ 06:07)  Wt(kg): --  General: Nontoxic-appearing Male in no acute distress.  HEENT: AT/NC. PERRL. EOMI. Anicteric. Conjunctiva pink and moist. Oropharynx clear. Dentition fair.  Neck: Not rigid. No sense of mass.  Nodes: None palpable.  Lungs: Clear bilaterally without rales, wheezing or rhonchi  Heart: Regular rate and rhythm. No Murmur. No rub. No gallop. No palpable thrill.  Abdomen: Bowel sounds present and normoactive. Soft. Nondistended. Nontender. No sense of mass. No organomegaly.  Back: No spinal tenderness. No costovertebral angle tenderness.   Extremities: No cyanosis or clubbing. No edema. dressing in place right arm with wound vac, wound as per orthopedics   Skin: Warm. Dry. Good turgor. No rash. No vasculitic stigmata.  Psychiatric: Appropriate affect and mood for situation.         Laboratory Studies--  CBC                        10.5   5.20  )-----------( 263      ( 15 Aug 2018 07:57 )             31.3       Chemistries  08-15    143  |  106  |  23  ----------------------------<  88  4.1   |  30  |  1.10    Ca    9.4      15 Aug 2018 07:57    Vancomycin Level, Trough (08.14.18 @ 18:18)    Vancomycin Level, Trough: 19.2:      Culture Data    Culture - Surgical Swab (collected 12 Aug 2018 17:45)  Source: .Surgical Swab right arm abcess  Preliminary Report (14 Aug 2018 17:49):    Numerous Methicillin resistant Staphylococcus aureus  Organism: Methicillin resistant Staphylococcus aureus (14 Aug 2018 17:48)  Organism: Methicillin resistant Staphylococcus aureus (14 Aug 2018 17:48)    Culture - Urine (collected 11 Aug 2018 10:09)  Source: .Urine Clean Catch (Midstream)  Final Report (12 Aug 2018 10:52):    No growth    Culture - Blood (collected 10 Aug 2018 14:56)  Source: .Blood Blood-Peripheral  Preliminary Report (11 Aug 2018 15:05):    No growth to date.    Culture - Blood (collected 10 Aug 2018 14:56)  Source: .Blood Blood-Peripheral  Preliminary Report (11 Aug 2018 15:05):    No growth to date.          Radiology:    CT Humerus w/ IV Cont, Right (08.11.18 @ 15:20) >  FINDINGS/  IMPRESSION:    There are multiple loculated fluid collections in the wall enhancement,   likely representing abscesses within the right triceps muscle, the   largest lungs measure 2.3 x 4.4 x 7.4 cm (AP x TRV x CC).    No vascular thrombosis.    No fracture or dislocation.    The visualized right lung and upper right abdomen are unremarkable.        Assessment--  51 y/o MRDD male with PMHx of HTN, neurogenic bladder, psoriasis, seizure, and spinal stenosis admitted with fever , swelling of the right arm most at elbow, denies any trauma . he most likely has cellulitis with soft tissue infection, possibility of olecranon bursitis or septic bursitis cannot be rule out . Ct scan shows multiloculated right triceps abscesses .     He has hx of right elbow abscess with MRSA in 2014 , but MRSA screen negative . Abscess cs positive for MRSA    Ct scan of elbow - shows multi loculated fluid collections consistent with abscess  within right triceps muscle , he is POD # 4  , s/p wide I X D by surgery/orthopedics     Plan :   - will continue with Vancomycin , decrease dose to 1 gram q 12   - keep right arm elevated  - check vanco trough before 4 th dose   - trend fevers and CBC  - obtain results of ct right elbow done on admission   - may need MRI of right elbow to see if joint is involved as he has persistent elbow swelling     Continue with present regime .  Appropriate use of antibiotics and adverse effects reviewed.    I have discussed the above plan of care with patient 's family in detail. They expressed understanding of the treatment plan . Risks, benefits and alternatives discussed in detail. I have asked if they have any questions or concerns and appropriately addressed them to the best of my ability .      > 35 minutes spent in direct patient care reviewing  the notes, lab data/ imaging , discussion with multidisciplinary team. All questions were addressed and answered to the best of my capacity .    Thank you for allowing me to participate in the care of your patient .        Grisel Chang MD  [phone]

## 2018-08-15 NOTE — PROGRESS NOTE ADULT - ASSESSMENT
A/P: 52M s/p I&D of RUE POD 3  Analgesia  DVT ppx  WBAT RUE  OR Cultures MRSA; c/w Vanco  ID recs appreciated  BCx NGTD  PT/OT  FU Labs  FU Imaging  Plan for PICC  Will discuss with attending and advise if plan changes

## 2018-08-15 NOTE — PROGRESS NOTE ADULT - SUBJECTIVE AND OBJECTIVE BOX
Patient is a 52y old  Male who presents with a chief complaint of Right arm swelling (10 Aug 2018 20:51)      INTERVAL HPI/OVERNIGHT EVENTS:  Denies present complaints. Pain better controlled in R arm today. Patient has more movement and able to nearly fully squeeze R hand. Mother with patient at bedside with concern of atrophy of legs. Patient wheelchair bound at baseline.     MEDICATIONS  (STANDING):  bethanechol 25 milliGRAM(s) Oral daily  citalopram 10 milliGRAM(s) Oral daily  cloNIDine 0.1 milliGRAM(s) Oral three times a day  docusate sodium 100 milliGRAM(s) Oral two times a day  lamoTRIgine 100 milliGRAM(s) Oral two times a day  piperacillin/tazobactam IVPB. 3.375 Gram(s) IV Intermittent every 8 hours  tamsulosin 0.4 milliGRAM(s) Oral at bedtime  vancomycin  IVPB 1250 milliGRAM(s) IV Intermittent every 12 hours    MEDICATIONS  (PRN):  ketorolac   Injectable 30 milliGRAM(s) IV Push every 6 hours PRN Moderate Pain (4 - 6)  LORazepam   Injectable 1 milliGRAM(s) IntraMuscular every 4 hours PRN seizure      Allergies:    No Known Allergies    Intolerances        REVIEW OF SYSTEMS:  CONSTITUTIONAL: No fever, weight loss, or fatigue  RESPIRATORY: No cough, wheezing, chills or hemoptysis; No shortness of breath  CARDIOVASCULAR: No chest pain, palpitations, dizziness, or leg swelling  NEUROLOGICAL: No headaches, memory loss, loss of strength, numbness, or tremors  SKIN: No itching, burning, rashes, or lesions   MUSCULOSKELETAL: Nonpitting edema of dorsum and phalanges of R hand.   PSYCHIATRIC: No depression, anxiety, mood swings, or difficulty sleeping      Vital Signs Last 24 Hrs  T(C): 36.8 (15 Aug 2018 06:07), Max: 37.4 (14 Aug 2018 14:02)  T(F): 98.3 (15 Aug 2018 06:07), Max: 99.3 (14 Aug 2018 14:02)  HR: 82 (15 Aug 2018 06:07) (82 - 84)  BP: 118/74 (15 Aug 2018 06:07) (118/74 - 129/90)  RR: 16 (15 Aug 2018 06:07) (16 - 17)  SpO2: 97% (15 Aug 2018 06:07) (97% - 99%)    PHYSICAL EXAM:  GENERAL: NAD, well-groomed, well-developed  NERVOUS SYSTEM:  Alert & Oriented X3, Good concentration  CHEST/LUNG: Clear to auscultation bilaterally; No rales, rhonchi, wheezing, or rubs  HEART: Regular rate and rhythm; No murmurs, rubs, or gallops  EXTREMITIES:  2+ Peripheral Pulses, No clubbing, cyanosis, or edema. Thin white bandage on R arm C/D/I, no strikethrough, covering wound vac. Further decreased swelling in uncovered portion of arm, good capillary refill in fingers of right hand and sensation intact. Xeroderm on shins bilaterally.   LYMPH: No lymphadenopathy noted  SKIN: No rashes or lesions  LABS:                             10.5   5.20  )-----------( 263      ( 15 Aug 2018 07:57 )             31.3     08-15    143  |  106  |  23  ----------------------------<  88  4.1   |  30  |  1.10    Ca    9.4      15 Aug 2018 07:57            CAPILLARY BLOOD GLUCOSE          RADIOLOGY & ADDITIONAL TESTS:    Imaging Personally Reviewed:  [ ] YES  [ ] NO    Consultant(s) Notes Reviewed:  [ ] YES  [ ] NO    Care Discussed with Consultants/Other Providers [ ] YES  [ ] NO Patient is a 52y old  Male who presents with a chief complaint of Right arm swelling (10 Aug 2018 20:51)      INTERVAL HPI/OVERNIGHT EVENTS:  Denies present complaints. Pain better controlled in R arm today. Patient has more movement and able to nearly fully squeeze R hand. Mother with patient at bedside with concern of atrophy of legs. Patient wheelchair bound at baseline.     MEDICATIONS  (STANDING):  bethanechol 25 milliGRAM(s) Oral daily  citalopram 10 milliGRAM(s) Oral daily  cloNIDine 0.1 milliGRAM(s) Oral three times a day  docusate sodium 100 milliGRAM(s) Oral two times a day  lamoTRIgine 100 milliGRAM(s) Oral two times a day  piperacillin/tazobactam IVPB. 3.375 Gram(s) IV Intermittent every 8 hours  tamsulosin 0.4 milliGRAM(s) Oral at bedtime  vancomycin  IVPB 1250 milliGRAM(s) IV Intermittent every 12 hours    MEDICATIONS  (PRN):  ketorolac   Injectable 30 milliGRAM(s) IV Push every 6 hours PRN Moderate Pain (4 - 6)  LORazepam   Injectable 1 milliGRAM(s) IntraMuscular every 4 hours PRN seizure      Allergies:    No Known Allergies    Intolerances        REVIEW OF SYSTEMS:  CONSTITUTIONAL: No fever, weight loss, or fatigue  RESPIRATORY: No cough, wheezing, chills or hemoptysis; No shortness of breath  CARDIOVASCULAR: No chest pain, palpitations, dizziness, or leg swelling  NEUROLOGICAL: No headaches, memory loss, loss of strength, numbness, or tremors  SKIN: No itching, burning, rashes, or lesions   MUSCULOSKELETAL: Nonpitting edema of dorsum and phalanges of R hand.   PSYCHIATRIC: No depression, anxiety, mood swings, or difficulty sleeping      Vital Signs Last 24 Hrs  T(C): 36.8 (15 Aug 2018 06:07), Max: 37.4 (14 Aug 2018 14:02)  T(F): 98.3 (15 Aug 2018 06:07), Max: 99.3 (14 Aug 2018 14:02)  HR: 82 (15 Aug 2018 06:07) (82 - 84)  BP: 118/74 (15 Aug 2018 06:07) (118/74 - 129/90)  RR: 16 (15 Aug 2018 06:07) (16 - 17)  SpO2: 97% (15 Aug 2018 06:07) (97% - 99%)    PHYSICAL EXAM:  GENERAL: NAD, well-groomed, well-developed  NERVOUS SYSTEM:  Alert & Oriented X3, Good concentration  CHEST/LUNG: Clear to auscultation bilaterally; No rales, rhonchi, wheezing, or rubs  HEART: Regular rate and rhythm; No murmurs, rubs, or gallops  EXTREMITIES:  2+ Peripheral Pulses, No clubbing, cyanosis, or edema. Thin white bandage on R arm C/D/I, no strikethrough, covering wound vac. Further decreased swelling in uncovered portion of arm, good capillary refill in fingers of right hand and sensation intact. Dry scaling skin on bilateral shins.   LYMPH: No lymphadenopathy noted  SKIN: No rashes or lesions  LABS:                             10.5   5.20  )-----------( 263      ( 15 Aug 2018 07:57 )             31.3     08-15    143  |  106  |  23  ----------------------------<  88  4.1   |  30  |  1.10    Ca    9.4      15 Aug 2018 07:57            CAPILLARY BLOOD GLUCOSE          RADIOLOGY & ADDITIONAL TESTS:    Imaging Personally Reviewed:  [ ] YES  [ ] NO    Consultant(s) Notes Reviewed:  [ ] YES  [ ] NO    Care Discussed with Consultants/Other Providers [ ] YES  [ ] NO

## 2018-08-15 NOTE — PROGRESS NOTE ADULT - PROBLEM SELECTOR PLAN 1
-Vancomycin trough due tomorrow am, recent trough 19.2  -Continue Vanc of 1g IV q12h starting tonight    -Stop Zosyn, given MRSA in operative culture (prelim).   - tylenol PRN for fevers  - pain control  ID: Bernardo following.  -Ortho recc for re-drainage of R arm pending -Vancomycin trough due tomorrow am, recent trough 19.2  -Continue Vanc of 1g IV q12h starting tonight    -Stop Zosyn, given MRSA in operative culture (prelim).   - tylenol PRN for fevers  - pain control  ID: Chang following.  -Patient now with wound vac, unclear if he will need further wash out as of now.   -May need PICC line for extended course of antibiotics.

## 2018-08-15 NOTE — PROGRESS NOTE ADULT - PROBLEM SELECTOR PLAN 6
Slight increase in creatinine today.  Likely due to acute infection, ?dehydration, and treatment with Vancomycin.   Will give gentle fluids and Vancomycin dose adjusted by ID.   Recheck in AM. Slight increase in creatinine yesterday. Has since improved.   Recheck in AM.

## 2018-08-15 NOTE — PROGRESS NOTE ADULT - SUBJECTIVE AND OBJECTIVE BOX
Patient seen and examined at bedside. Reports no acute events overnight. Pain is well controlled.    PHYSICAL EXAM:  Vital Signs Last 24 Hrs  T(C): 36.3 (14 Aug 2018 20:40), Max: 37.4 (14 Aug 2018 14:02)  T(F): 97.3 (14 Aug 2018 20:40), Max: 99.3 (14 Aug 2018 14:02)  HR: 84 (14 Aug 2018 20:40) (84 - 84)  BP: 123/84 (14 Aug 2018 20:40) (123/84 - 129/90)  RR: 17 (14 Aug 2018 20:40) (17 - 17)  SpO2: 99% (14 Aug 2018 20:40) (98% - 99%)    Gen: NAD, Resting comfortably  RUE: Wound vac in place; Intact seal  SILT C5-T1  +AIN/PIN/Ulnar/Median/Radial  +2/4 Radial pulse  Painless ROM of Wrist and fingers  Compartments soft and compressible

## 2018-08-15 NOTE — PROGRESS NOTE ADULT - ASSESSMENT
51 y/o MRDD male with PMHx of HTN, neurogenic bladder, psoriasis, seizure, and spinal stenosis sent to the ED from [facility] for evaluation of worsening swelling and pain in RUE, admitted with Cellulitis of RUE.

## 2018-08-16 ENCOUNTER — TRANSCRIPTION ENCOUNTER (OUTPATIENT)
Age: 53
End: 2018-08-16

## 2018-08-16 LAB
ANION GAP SERPL CALC-SCNC: 8 MMOL/L — SIGNIFICANT CHANGE UP (ref 5–17)
BUN SERPL-MCNC: 21 MG/DL — SIGNIFICANT CHANGE UP (ref 7–23)
CALCIUM SERPL-MCNC: 9.4 MG/DL — SIGNIFICANT CHANGE UP (ref 8.5–10.1)
CHLORIDE SERPL-SCNC: 104 MMOL/L — SIGNIFICANT CHANGE UP (ref 96–108)
CO2 SERPL-SCNC: 29 MMOL/L — SIGNIFICANT CHANGE UP (ref 22–31)
CREAT SERPL-MCNC: 1 MG/DL — SIGNIFICANT CHANGE UP (ref 0.5–1.3)
GLUCOSE SERPL-MCNC: 93 MG/DL — SIGNIFICANT CHANGE UP (ref 70–99)
HCT VFR BLD CALC: 33.8 % — LOW (ref 39–50)
HGB BLD-MCNC: 11.5 G/DL — LOW (ref 13–17)
MCHC RBC-ENTMCNC: 30.1 PG — SIGNIFICANT CHANGE UP (ref 27–34)
MCHC RBC-ENTMCNC: 34 GM/DL — SIGNIFICANT CHANGE UP (ref 32–36)
MCV RBC AUTO: 88.5 FL — SIGNIFICANT CHANGE UP (ref 80–100)
NRBC # BLD: 0 /100 WBCS — SIGNIFICANT CHANGE UP (ref 0–0)
PLATELET # BLD AUTO: 283 K/UL — SIGNIFICANT CHANGE UP (ref 150–400)
POTASSIUM SERPL-MCNC: 4.2 MMOL/L — SIGNIFICANT CHANGE UP (ref 3.5–5.3)
POTASSIUM SERPL-SCNC: 4.2 MMOL/L — SIGNIFICANT CHANGE UP (ref 3.5–5.3)
RBC # BLD: 3.82 M/UL — LOW (ref 4.2–5.8)
RBC # FLD: 11.8 % — SIGNIFICANT CHANGE UP (ref 10.3–14.5)
SODIUM SERPL-SCNC: 141 MMOL/L — SIGNIFICANT CHANGE UP (ref 135–145)
VANCOMYCIN TROUGH SERPL-MCNC: 19.5 UG/ML — SIGNIFICANT CHANGE UP (ref 10–20)
VANCOMYCIN TROUGH SERPL-MCNC: 23.2 UG/ML — HIGH (ref 10–20)
WBC # BLD: 5.5 K/UL — SIGNIFICANT CHANGE UP (ref 3.8–10.5)
WBC # FLD AUTO: 5.5 K/UL — SIGNIFICANT CHANGE UP (ref 3.8–10.5)

## 2018-08-16 PROCEDURE — 99233 SBSQ HOSP IP/OBS HIGH 50: CPT | Mod: GC

## 2018-08-16 RX ORDER — POLYETHYLENE GLYCOL 3350 17 G/17G
17 POWDER, FOR SOLUTION ORAL AT BEDTIME
Qty: 0 | Refills: 0 | Status: DISCONTINUED | OUTPATIENT
Start: 2018-08-16 | End: 2018-08-17

## 2018-08-16 RX ORDER — SENNA PLUS 8.6 MG/1
2 TABLET ORAL AT BEDTIME
Qty: 0 | Refills: 0 | Status: DISCONTINUED | OUTPATIENT
Start: 2018-08-16 | End: 2018-08-17

## 2018-08-16 RX ORDER — SODIUM CHLORIDE 9 MG/ML
1000 INJECTION, SOLUTION INTRAVENOUS
Qty: 0 | Refills: 0 | Status: DISCONTINUED | OUTPATIENT
Start: 2018-08-16 | End: 2018-08-17

## 2018-08-16 RX ADMIN — Medication 30 MILLIGRAM(S): at 03:14

## 2018-08-16 RX ADMIN — CITALOPRAM 10 MILLIGRAM(S): 10 TABLET, FILM COATED ORAL at 12:00

## 2018-08-16 RX ADMIN — Medication 30 MILLIGRAM(S): at 03:33

## 2018-08-16 RX ADMIN — Medication 1 APPLICATION(S): at 18:06

## 2018-08-16 RX ADMIN — Medication 250 MILLIGRAM(S): at 09:11

## 2018-08-16 RX ADMIN — LAMOTRIGINE 100 MILLIGRAM(S): 25 TABLET, ORALLY DISINTEGRATING ORAL at 18:05

## 2018-08-16 RX ADMIN — Medication 25 MILLIGRAM(S): at 12:00

## 2018-08-16 RX ADMIN — Medication 1 APPLICATION(S): at 05:36

## 2018-08-16 RX ADMIN — Medication 0.1 MILLIGRAM(S): at 05:36

## 2018-08-16 RX ADMIN — Medication 100 MILLIGRAM(S): at 18:05

## 2018-08-16 RX ADMIN — Medication 0.1 MILLIGRAM(S): at 13:26

## 2018-08-16 RX ADMIN — Medication 250 MILLIGRAM(S): at 18:06

## 2018-08-16 RX ADMIN — SENNA PLUS 2 TABLET(S): 8.6 TABLET ORAL at 22:05

## 2018-08-16 RX ADMIN — ENOXAPARIN SODIUM 40 MILLIGRAM(S): 100 INJECTION SUBCUTANEOUS at 11:59

## 2018-08-16 RX ADMIN — TIZANIDINE 2 MILLIGRAM(S): 4 TABLET ORAL at 12:00

## 2018-08-16 RX ADMIN — Medication 0.1 MILLIGRAM(S): at 22:05

## 2018-08-16 RX ADMIN — LAMOTRIGINE 100 MILLIGRAM(S): 25 TABLET, ORALLY DISINTEGRATING ORAL at 05:36

## 2018-08-16 RX ADMIN — Medication 100 MILLIGRAM(S): at 05:36

## 2018-08-16 RX ADMIN — TAMSULOSIN HYDROCHLORIDE 0.4 MILLIGRAM(S): 0.4 CAPSULE ORAL at 22:05

## 2018-08-16 NOTE — PROGRESS NOTE ADULT - PROBLEM SELECTOR PLAN 7
- c/w Citalopram 10mg  Supportive care. -Patient is highly functioning  - c/w Citalopram 10mg  Supportive care.

## 2018-08-16 NOTE — PROGRESS NOTE ADULT - PROBLEM SELECTOR PLAN 1
-Vancomycin trough due tomorrow am, recent trough 19.2  -Continue Vanc of 1g IV q12h starting tonight    -Stop Zosyn, given MRSA in operative culture (prelim).   - tylenol PRN for fevers  - pain control  ID: Chang following.  -Patient now with wound vac, unclear if he will need further wash out as of now.   -May need PICC line for extended course of antibiotics. -Vancomycin trough due tomorrow am, recent trough 19.2  -Continue Vanc of 1g IV q12h starting tonight    -MRSA in operative culture.  - tylenol PRN for fevers  - pain control  ID: Bernardo following.  -Patient now with wound vac, ortho planning for secondary closure this weekend.   -May need PICC line for extended course of antibiotics vs. transition to PO if no signs of remaining infections when patient goes back to OR.

## 2018-08-16 NOTE — PROGRESS NOTE ADULT - SUBJECTIVE AND OBJECTIVE BOX
Patient is a 52y old  Male who presents with a chief complaint of Right arm swelling (10 Aug 2018 20:51)      INTERVAL HPI/OVERNIGHT EVENTS:  No acute events overnight. Only mild complaints of R arm pain, patient still able to close fist but not as strong as yesterday. Likely due to lack of compliance.     MEDICATIONS  (STANDING):  bethanechol 25 milliGRAM(s) Oral daily  citalopram 10 milliGRAM(s) Oral daily  cloNIDine 0.1 milliGRAM(s) Oral three times a day  docusate sodium 100 milliGRAM(s) Oral two times a day  lamoTRIgine 100 milliGRAM(s) Oral two times a day  piperacillin/tazobactam IVPB. 3.375 Gram(s) IV Intermittent every 8 hours  tamsulosin 0.4 milliGRAM(s) Oral at bedtime  vancomycin  IVPB 1250 milliGRAM(s) IV Intermittent every 12 hours    MEDICATIONS  (PRN):  ketorolac   Injectable 30 milliGRAM(s) IV Push every 6 hours PRN Moderate Pain (4 - 6)  LORazepam   Injectable 1 milliGRAM(s) IntraMuscular every 4 hours PRN seizure      Allergies:    No Known Allergies    Intolerances        REVIEW OF SYSTEMS:  CONSTITUTIONAL: No fever, weight loss, or fatigue  RESPIRATORY: No cough, wheezing, chills or hemoptysis; No shortness of breath  CARDIOVASCULAR: No chest pain, palpitations, dizziness, or leg swelling  NEUROLOGICAL: No headaches, memory loss, loss of strength, numbness, or tremors  SKIN: No itching, burning, rashes, or lesions   MUSCULOSKELETAL: Nonpitting edema of dorsum and phalanges of R hand.   PSYCHIATRIC: No depression, anxiety, mood swings, or difficulty sleeping      Vital Signs Last 24 Hrs  T(C): 36.9 (16 Aug 2018 05:26), Max: 36.9 (16 Aug 2018 05:26)  T(F): 98.4 (16 Aug 2018 05:26), Max: 98.4 (16 Aug 2018 05:26)  HR: 72 (16 Aug 2018 05:26) (72 - 80)  BP: 131/85 (16 Aug 2018 05:26) (113/73 - 131/85)  BP(mean): --  RR: 16 (16 Aug 2018 05:26) (16 - 17)  SpO2: 97% (16 Aug 2018 05:26) (94% - 97%)  PHYSICAL EXAM:  GENERAL: NAD, well-groomed, well-developed  NERVOUS SYSTEM:  Alert & Oriented X3, Good concentration  CHEST/LUNG: Clear to auscultation bilaterally; No rales, rhonchi, wheezing, or rubs  HEART: Regular rate and rhythm; No murmurs, rubs, or gallops  EXTREMITIES:  2+ Peripheral Pulses, No clubbing, cyanosis, or edema. Thin white bandage on R arm C/D/I, no strikethrough, covering wound vac. Further decreased swelling in uncovered portion of arm, good capillary refill in fingers of right hand and sensation intact. Dry scaling skin on bilateral shins.   LYMPH: No lymphadenopathy noted  SKIN: No rashes or lesions  LABS:                                        10.5   5.20  )-----------( 263      ( 15 Aug 2018 07:57 )             31.3     08-15    143  |  106  |  23  ----------------------------<  88  4.1   |  30  |  1.10    Ca    9.4      15 Aug 2018 07:57                  CAPILLARY BLOOD GLUCOSE          RADIOLOGY & ADDITIONAL TESTS:    Imaging Personally Reviewed:  [ ] YES  [ ] NO    Consultant(s) Notes Reviewed:  [ ] YES  [ ] NO    Care Discussed with Consultants/Other Providers [ ] YES  [ ] NO

## 2018-08-16 NOTE — PROGRESS NOTE ADULT - SUBJECTIVE AND OBJECTIVE BOX
ID Progress note     Name: DARI CIFUENTES  Age: 52y  Gender: Male  MRN: [id number]    Interval History-- Events noted, doing well. Improved swelling right arm , improved ROM of right arm   Notes reviewed    Past Medical History--  Spinal stenosis  Psoriasis  Neurogenic bladder  Seizure  Hypertension  Mental retardation  No significant past surgical history      For details regarding the patient's social history, family history, and other miscellaneous elements, please refer the initial infectious diseases consultation and/or the admitting history and physical examination for this admission.    Allergies--  Allergies    No Known Allergies    Intolerances        Medications--  Antibiotics:  vancomycin  IVPB 1000 milliGRAM(s) IV Intermittent every 12 hours    Immunologic:    Other:  ammonium lactate 12% Lotion  bethanechol  citalopram  cloNIDine  docusate sodium  enoxaparin Injectable  ketorolac   Injectable PRN  lamoTRIgine  LORazepam   Injectable PRN  polyethylene glycol 3350 PRN  senna  tamsulosin  tiZANidine      Review of Systems--  Review of systems unable to be obtained secondary to clinical condition.    Physical Examination--    Vital Signs: T(F): 98.4 (08-16-18 @ 05:26), Max: 98.4 (08-16-18 @ 05:26)  HR: 72 (08-16-18 @ 05:26)  BP: 131/85 (08-16-18 @ 05:26)  RR: 16 (08-16-18 @ 05:26)  SpO2: 97% (08-16-18 @ 05:26)  Wt(kg): --    General: Nontoxic-appearing Male in no acute distress.  HEENT: AT/NC. PERRL. EOMI. Anicteric. Conjunctiva pink and moist. Oropharynx clear. Dentition fair.  Neck: Not rigid. No sense of mass.  Nodes: None palpable.  Lungs: Clear bilaterally without rales, wheezing or rhonchi  Heart: Regular rate and rhythm. No Murmur. No rub. No gallop. No palpable thrill.  Abdomen: Bowel sounds present and normoactive. Soft. Nondistended. Nontender. No sense of mass. No organomegaly.  Back: No spinal tenderness. No costovertebral angle tenderness.   Extremities: No cyanosis or clubbing. No edema. dressing in place right arm with wound vac, wound as per orthopedics   Skin: Warm. Dry. Good turgor. No rash. No vasculitic stigmata.  Psychiatric: Appropriate affect and mood for situation.       Laboratory Studies--  CBC                        11.5   5.50  )-----------( 283      ( 16 Aug 2018 08:00 )             33.8       Chemistries  08-16    141  |  104  |  21  ----------------------------<  93  4.2   |  29  |  1.00    Ca    9.4      16 Aug 2018 08:00    Vancomycin Level, Trough (08.16.18 @ 08:00)    Vancomycin Level, Trough: 19.5      Culture Data    Culture - Surgical Swab (collected 12 Aug 2018 17:45)  Source: .Surgical Swab right arm abcess  Preliminary Report (14 Aug 2018 17:49):    Numerous Methicillin resistant Staphylococcus aureus  Organism: Methicillin resistant Staphylococcus aureus (14 Aug 2018 17:48)  Organism: Methicillin resistant Staphylococcus aureus (14 Aug 2018 17:48)    Culture - Urine (collected 11 Aug 2018 10:09)  Source: .Urine Clean Catch (Midstream)  Final Report (12 Aug 2018 10:52):    No growth    Culture - Blood (collected 10 Aug 2018 14:56)  Source: .Blood Blood-Peripheral  Final Report (15 Aug 2018 15:03):    No growth at 5 days.    Culture - Blood (collected 10 Aug 2018 14:56)  Source: .Blood Blood-Peripheral  Final Report (15 Aug 2018 15:03):    No growth at 5 days.          Radiology:  CT Humerus w/ IV Cont, Right (08.11.18 @ 15:20) >  FINDINGS/  IMPRESSION:    There are multiple loculated fluid collections in the wall enhancement,   likely representing abscesses within the right triceps muscle, the   largest lungs measure 2.3 x 4.4 x 7.4 cm (AP x TRV x CC).    No vascular thrombosis.    No fracture or dislocation.    The visualized right lung and upper right abdomen are unremarkable.        Assessment--  51 y/o MRDD male with PMHx of HTN, neurogenic bladder, psoriasis, seizure, and spinal stenosis admitted with fever , swelling of the right arm most at elbow, denies any trauma . he most likely has cellulitis with soft tissue infection, possibility of olecranon bursitis or septic bursitis cannot be rule out . Ct scan shows multiloculated right triceps abscesses .     He has hx of right elbow abscess with MRSA in 2014 , but MRSA screen negative . Abscess cs positive for MRSA    Ct scan of elbow - shows multi loculated fluid collections consistent with abscess  within right triceps muscle , he is  s/p wide I X D by surgery/orthopedics     Plan :   - will continue with Vancomycin 1 gram q 12 till ready for dc   - keep right arm elevated  - monitor renal function   - trend fevers and CBC  - obtain results of ct right elbow done on admission   - spoke with Dr. Baltazar from orthopedics , no need for MRI. he will return to OR for secondary wound closure      Continue with present regime .  Appropriate use of antibiotics and adverse effects reviewed.    I have discussed the above plan of care with patient/family in detail. They expressed understanding of the treatment plan . Risks, benefits and alternatives discussed in detail. I have asked if they have any questions or concerns and appropriately addressed them to the best of my ability .      > 35 minutes spent in direct patient care reviewing  the notes, lab data/ imaging , discussion with multidisciplinary team. All questions were addressed and answered to the best of my capacity .    Thank you for allowing me to participate in the care of your patient .        Grisel Chang MD  [phone]

## 2018-08-16 NOTE — PROGRESS NOTE ADULT - SUBJECTIVE AND OBJECTIVE BOX
Patient seen and examined at bedside. Reports no acute events overnight. Pain is well controlled.    PHYSICAL EXAM:  Vital Signs Last 24 Hrs  T(C): 36.9 (16 Aug 2018 05:26), Max: 36.9 (16 Aug 2018 05:26)  T(F): 98.4 (16 Aug 2018 05:26), Max: 98.4 (16 Aug 2018 05:26)  HR: 72 (16 Aug 2018 05:26) (72 - 80)  BP: 131/85 (16 Aug 2018 05:26) (113/73 - 131/85)  RR: 16 (16 Aug 2018 05:26) (16 - 17)  SpO2: 97% (16 Aug 2018 05:26) (94% - 97%)    Gen: NAD, Resting comfortably  RUE: Wound vac in place; Intact seal  SILT C5-T1  +AIN/PIN/Ulnar/Median/Radial  +2/4 Radial pulse  Painless ROM of Wrist and fingers  Compartments soft and compressible

## 2018-08-16 NOTE — PROGRESS NOTE ADULT - NSHPATTENDINGPLANDISCUSS_GEN_ALL_CORE
ID, patient and mother (re: treatment plan going forward, need for continued IV antibiotics, planned secondary closure by orthopedics)

## 2018-08-16 NOTE — PROGRESS NOTE ADULT - ASSESSMENT
53 y/o MRDD male with PMHx of HTN, neurogenic bladder, psoriasis, seizure, and spinal stenosis sent to the ED from [facility] for evaluation of worsening swelling and pain in RUE, admitted with Cellulitis of RUE. 51 y/o MRDD male with PMHx of HTN, neurogenic bladder, psoriasis, seizure, and spinal stenosis sent to the ED from [facility] for evaluation of worsening swelling and pain in RUE, admitted with Cellulitis of RUE, found to have extensive abscess.

## 2018-08-16 NOTE — PROGRESS NOTE ADULT - ASSESSMENT
A/P: 52M s/p I&D of RUE POD 3  Analgesia  DVT ppx  WBAT RUE  OR Cultures MRSA; c/w Vanco  ID recs appreciated  BCx No growth  PT/OT  FU Labs  FU Imaging  Plan for PICC  Will discuss with attending and advise if plan changes

## 2018-08-17 LAB
ANION GAP SERPL CALC-SCNC: 5 MMOL/L — SIGNIFICANT CHANGE UP (ref 5–17)
APTT BLD: 31.9 SEC — SIGNIFICANT CHANGE UP (ref 27.5–37.4)
BUN SERPL-MCNC: 18 MG/DL — SIGNIFICANT CHANGE UP (ref 7–23)
CALCIUM SERPL-MCNC: 9.5 MG/DL — SIGNIFICANT CHANGE UP (ref 8.5–10.1)
CHLORIDE SERPL-SCNC: 104 MMOL/L — SIGNIFICANT CHANGE UP (ref 96–108)
CO2 SERPL-SCNC: 31 MMOL/L — SIGNIFICANT CHANGE UP (ref 22–31)
CREAT SERPL-MCNC: 1.1 MG/DL — SIGNIFICANT CHANGE UP (ref 0.5–1.3)
CULTURE RESULTS: SIGNIFICANT CHANGE UP
GLUCOSE SERPL-MCNC: 87 MG/DL — SIGNIFICANT CHANGE UP (ref 70–99)
HCT VFR BLD CALC: 34.9 % — LOW (ref 39–50)
HCT VFR BLD CALC: 35 % — LOW (ref 39–50)
HGB BLD-MCNC: 11.6 G/DL — LOW (ref 13–17)
HGB BLD-MCNC: 12 G/DL — LOW (ref 13–17)
INR BLD: 1.31 RATIO — HIGH (ref 0.88–1.16)
MCHC RBC-ENTMCNC: 29.5 PG — SIGNIFICANT CHANGE UP (ref 27–34)
MCHC RBC-ENTMCNC: 29.9 PG — SIGNIFICANT CHANGE UP (ref 27–34)
MCHC RBC-ENTMCNC: 33.2 GM/DL — SIGNIFICANT CHANGE UP (ref 32–36)
MCHC RBC-ENTMCNC: 34.3 GM/DL — SIGNIFICANT CHANGE UP (ref 32–36)
MCV RBC AUTO: 87.1 FL — SIGNIFICANT CHANGE UP (ref 80–100)
MCV RBC AUTO: 88.8 FL — SIGNIFICANT CHANGE UP (ref 80–100)
NRBC # BLD: 0 /100 WBCS — SIGNIFICANT CHANGE UP (ref 0–0)
NRBC # BLD: 0 /100 WBCS — SIGNIFICANT CHANGE UP (ref 0–0)
ORGANISM # SPEC MICROSCOPIC CNT: SIGNIFICANT CHANGE UP
ORGANISM # SPEC MICROSCOPIC CNT: SIGNIFICANT CHANGE UP
PLATELET # BLD AUTO: 319 K/UL — SIGNIFICANT CHANGE UP (ref 150–400)
PLATELET # BLD AUTO: 326 K/UL — SIGNIFICANT CHANGE UP (ref 150–400)
POTASSIUM SERPL-MCNC: 4.2 MMOL/L — SIGNIFICANT CHANGE UP (ref 3.5–5.3)
POTASSIUM SERPL-SCNC: 4.2 MMOL/L — SIGNIFICANT CHANGE UP (ref 3.5–5.3)
PROTHROM AB SERPL-ACNC: 14.4 SEC — HIGH (ref 9.8–12.7)
RBC # BLD: 3.93 M/UL — LOW (ref 4.2–5.8)
RBC # BLD: 4.02 M/UL — LOW (ref 4.2–5.8)
RBC # FLD: 11.5 % — SIGNIFICANT CHANGE UP (ref 10.3–14.5)
RBC # FLD: 11.7 % — SIGNIFICANT CHANGE UP (ref 10.3–14.5)
SODIUM SERPL-SCNC: 140 MMOL/L — SIGNIFICANT CHANGE UP (ref 135–145)
SPECIMEN SOURCE: SIGNIFICANT CHANGE UP
WBC # BLD: 5.85 K/UL — SIGNIFICANT CHANGE UP (ref 3.8–10.5)
WBC # BLD: 6.26 K/UL — SIGNIFICANT CHANGE UP (ref 3.8–10.5)
WBC # FLD AUTO: 5.85 K/UL — SIGNIFICANT CHANGE UP (ref 3.8–10.5)
WBC # FLD AUTO: 6.26 K/UL — SIGNIFICANT CHANGE UP (ref 3.8–10.5)

## 2018-08-17 PROCEDURE — 99233 SBSQ HOSP IP/OBS HIGH 50: CPT | Mod: GC

## 2018-08-17 RX ORDER — OXYCODONE HYDROCHLORIDE 5 MG/1
10 TABLET ORAL EVERY 4 HOURS
Qty: 0 | Refills: 0 | Status: DISCONTINUED | OUTPATIENT
Start: 2018-08-18 | End: 2018-08-20

## 2018-08-17 RX ORDER — METOCLOPRAMIDE HCL 10 MG
10 TABLET ORAL ONCE
Qty: 0 | Refills: 0 | Status: DISCONTINUED | OUTPATIENT
Start: 2018-08-17 | End: 2018-08-17

## 2018-08-17 RX ORDER — HYDROMORPHONE HYDROCHLORIDE 2 MG/ML
0.5 INJECTION INTRAMUSCULAR; INTRAVENOUS; SUBCUTANEOUS EVERY 6 HOURS
Qty: 0 | Refills: 0 | Status: DISCONTINUED | OUTPATIENT
Start: 2018-08-18 | End: 2018-08-20

## 2018-08-17 RX ORDER — ACETAMINOPHEN 500 MG
650 TABLET ORAL EVERY 6 HOURS
Qty: 0 | Refills: 0 | Status: DISCONTINUED | OUTPATIENT
Start: 2018-08-18 | End: 2018-08-20

## 2018-08-17 RX ORDER — POLYETHYLENE GLYCOL 3350 17 G/17G
17 POWDER, FOR SOLUTION ORAL AT BEDTIME
Qty: 0 | Refills: 0 | Status: DISCONTINUED | OUTPATIENT
Start: 2018-08-17 | End: 2018-08-20

## 2018-08-17 RX ORDER — SOD,AMMONIUM,POTASSIUM LACTATE
1 CREAM (GRAM) TOPICAL
Qty: 0 | Refills: 0 | Status: DISCONTINUED | OUTPATIENT
Start: 2018-08-17 | End: 2018-08-20

## 2018-08-17 RX ORDER — SODIUM CHLORIDE 9 MG/ML
1000 INJECTION, SOLUTION INTRAVENOUS
Qty: 0 | Refills: 0 | Status: DISCONTINUED | OUTPATIENT
Start: 2018-08-17 | End: 2018-08-17

## 2018-08-17 RX ORDER — VANCOMYCIN HCL 1 G
1000 VIAL (EA) INTRAVENOUS EVERY 12 HOURS
Qty: 0 | Refills: 0 | Status: DISCONTINUED | OUTPATIENT
Start: 2018-08-17 | End: 2018-08-18

## 2018-08-17 RX ORDER — ONDANSETRON 8 MG/1
4 TABLET, FILM COATED ORAL EVERY 6 HOURS
Qty: 0 | Refills: 0 | Status: DISCONTINUED | OUTPATIENT
Start: 2018-08-18 | End: 2018-08-20

## 2018-08-17 RX ORDER — HYDROMORPHONE HYDROCHLORIDE 2 MG/ML
0.5 INJECTION INTRAMUSCULAR; INTRAVENOUS; SUBCUTANEOUS
Qty: 0 | Refills: 0 | Status: DISCONTINUED | OUTPATIENT
Start: 2018-08-17 | End: 2018-08-17

## 2018-08-17 RX ORDER — DOCUSATE SODIUM 100 MG
100 CAPSULE ORAL THREE TIMES A DAY
Qty: 0 | Refills: 0 | Status: DISCONTINUED | OUTPATIENT
Start: 2018-08-18 | End: 2018-08-20

## 2018-08-17 RX ORDER — TAMSULOSIN HYDROCHLORIDE 0.4 MG/1
0.4 CAPSULE ORAL AT BEDTIME
Qty: 0 | Refills: 0 | Status: DISCONTINUED | OUTPATIENT
Start: 2018-08-17 | End: 2018-08-20

## 2018-08-17 RX ORDER — CEFAZOLIN SODIUM 1 G
2000 VIAL (EA) INJECTION ONCE
Qty: 0 | Refills: 0 | Status: DISCONTINUED | OUTPATIENT
Start: 2018-08-17 | End: 2018-08-17

## 2018-08-17 RX ORDER — BETHANECHOL CHLORIDE 25 MG
25 TABLET ORAL DAILY
Qty: 0 | Refills: 0 | Status: DISCONTINUED | OUTPATIENT
Start: 2018-08-17 | End: 2018-08-20

## 2018-08-17 RX ORDER — OXYCODONE HYDROCHLORIDE 5 MG/1
5 TABLET ORAL EVERY 4 HOURS
Qty: 0 | Refills: 0 | Status: DISCONTINUED | OUTPATIENT
Start: 2018-08-18 | End: 2018-08-20

## 2018-08-17 RX ORDER — FOLIC ACID 0.8 MG
1 TABLET ORAL DAILY
Qty: 0 | Refills: 0 | Status: DISCONTINUED | OUTPATIENT
Start: 2018-08-18 | End: 2018-08-20

## 2018-08-17 RX ORDER — CITALOPRAM 10 MG/1
10 TABLET, FILM COATED ORAL DAILY
Qty: 0 | Refills: 0 | Status: DISCONTINUED | OUTPATIENT
Start: 2018-08-17 | End: 2018-08-20

## 2018-08-17 RX ORDER — ONDANSETRON 8 MG/1
4 TABLET, FILM COATED ORAL ONCE
Qty: 0 | Refills: 0 | Status: DISCONTINUED | OUTPATIENT
Start: 2018-08-17 | End: 2018-08-17

## 2018-08-17 RX ORDER — ASCORBIC ACID 60 MG
500 TABLET,CHEWABLE ORAL
Qty: 0 | Refills: 0 | Status: DISCONTINUED | OUTPATIENT
Start: 2018-08-18 | End: 2018-08-20

## 2018-08-17 RX ORDER — PIPERACILLIN AND TAZOBACTAM 4; .5 G/20ML; G/20ML
3.38 INJECTION, POWDER, LYOPHILIZED, FOR SOLUTION INTRAVENOUS EVERY 8 HOURS
Qty: 0 | Refills: 0 | Status: DISCONTINUED | OUTPATIENT
Start: 2018-08-17 | End: 2018-08-17

## 2018-08-17 RX ORDER — FERROUS SULFATE 325(65) MG
325 TABLET ORAL
Qty: 0 | Refills: 0 | Status: DISCONTINUED | OUTPATIENT
Start: 2018-08-18 | End: 2018-08-20

## 2018-08-17 RX ORDER — SODIUM CHLORIDE 9 MG/ML
1000 INJECTION, SOLUTION INTRAVENOUS
Qty: 0 | Refills: 0 | Status: DISCONTINUED | OUTPATIENT
Start: 2018-08-18 | End: 2018-08-18

## 2018-08-17 RX ORDER — LAMOTRIGINE 25 MG/1
100 TABLET, ORALLY DISINTEGRATING ORAL
Qty: 0 | Refills: 0 | Status: DISCONTINUED | OUTPATIENT
Start: 2018-08-17 | End: 2018-08-20

## 2018-08-17 RX ORDER — SENNA PLUS 8.6 MG/1
2 TABLET ORAL AT BEDTIME
Qty: 0 | Refills: 0 | Status: DISCONTINUED | OUTPATIENT
Start: 2018-08-17 | End: 2018-08-20

## 2018-08-17 RX ADMIN — Medication 0.1 MILLIGRAM(S): at 22:07

## 2018-08-17 RX ADMIN — TAMSULOSIN HYDROCHLORIDE 0.4 MILLIGRAM(S): 0.4 CAPSULE ORAL at 22:06

## 2018-08-17 RX ADMIN — SODIUM CHLORIDE 75 MILLILITER(S): 9 INJECTION, SOLUTION INTRAVENOUS at 20:26

## 2018-08-17 RX ADMIN — TIZANIDINE 2 MILLIGRAM(S): 4 TABLET ORAL at 12:06

## 2018-08-17 RX ADMIN — Medication 25 MILLIGRAM(S): at 12:06

## 2018-08-17 RX ADMIN — Medication 250 MILLIGRAM(S): at 21:00

## 2018-08-17 RX ADMIN — CITALOPRAM 10 MILLIGRAM(S): 10 TABLET, FILM COATED ORAL at 12:06

## 2018-08-17 RX ADMIN — Medication 250 MILLIGRAM(S): at 06:25

## 2018-08-17 RX ADMIN — Medication 0.1 MILLIGRAM(S): at 05:06

## 2018-08-17 RX ADMIN — SODIUM CHLORIDE 110 MILLILITER(S): 9 INJECTION, SOLUTION INTRAVENOUS at 00:13

## 2018-08-17 RX ADMIN — HYDROMORPHONE HYDROCHLORIDE 0.5 MILLIGRAM(S): 2 INJECTION INTRAMUSCULAR; INTRAVENOUS; SUBCUTANEOUS at 20:20

## 2018-08-17 RX ADMIN — LAMOTRIGINE 100 MILLIGRAM(S): 25 TABLET, ORALLY DISINTEGRATING ORAL at 05:06

## 2018-08-17 RX ADMIN — HYDROMORPHONE HYDROCHLORIDE 0.5 MILLIGRAM(S): 2 INJECTION INTRAMUSCULAR; INTRAVENOUS; SUBCUTANEOUS at 20:10

## 2018-08-17 RX ADMIN — Medication 1 APPLICATION(S): at 05:05

## 2018-08-17 RX ADMIN — Medication 0.1 MILLIGRAM(S): at 14:42

## 2018-08-17 RX ADMIN — SENNA PLUS 2 TABLET(S): 8.6 TABLET ORAL at 22:06

## 2018-08-17 NOTE — CHART NOTE - NSCHARTNOTEFT_GEN_A_CORE
Assessment:   spoke with patients family . patient with good PO. NPO for procedure today  Factors impacting intake: [ ] none [ ] nausea  [ ] vomiting [ ] diarrhea [ ] constipation  [ ]chewing problems [x ] swallowing issues  [ ] other:     Diet Presciption: Diet, NPO after Midnight:      NPO Start Date: 16-Aug-2018,   NPO Start Time: 23:59  Except Medications (08-17-18 @ 04:55)    Intake: 100% prior to NPO    Current Weight: Weight 8/17 157.4#      Pertinent Medications: MEDICATIONS  (STANDING):  ammonium lactate 12% Lotion 1 Application(s) Topical two times a day  bethanechol 25 milliGRAM(s) Oral daily  citalopram 10 milliGRAM(s) Oral daily  cloNIDine 0.1 milliGRAM(s) Oral three times a day  docusate sodium 100 milliGRAM(s) Oral two times a day  lactated ringers. 1000 milliLiter(s) (110 mL/Hr) IV Continuous <Continuous>  lamoTRIgine 100 milliGRAM(s) Oral two times a day  senna 2 Tablet(s) Oral at bedtime  tamsulosin 0.4 milliGRAM(s) Oral at bedtime  tiZANidine 2 milliGRAM(s) Oral daily  vancomycin  IVPB 1000 milliGRAM(s) IV Intermittent every 12 hours      Pertinent Labs: 08-17 Na140 mmol/L Glu 87 mg/dL K+ 4.2 mmol/L Cr  1.10 mg/dL BUN 18 mg/dL 08-11 Alb 2.3 g/dL<L        Skin:   mike 15 with cellulitis for removal of wound vac closure of wound today  Estimated Needs:   [x ] no change since previous assessment  [ ] recalculated:     Previous Nutrition Diagnosis: none      Nutrition Diagnosis is [ ] ongoing  [ ] resolved [x ] not applicable     New Nutrition Diagnosis: [ x] not applicable       Interventions:   Recommend  [ ] Change Diet To:  [ ] Nutrition Supplement  [ ] Nutrition Support  [x ] Other: advance diet after procedure to dysphagia 2 mech soft thin liquids    Monitoring and Evaluation:   [ ] PO intake [ x ] Tolerance to diet prescription [ x ] weights [ x ] labs[ x ] follow up per protocol  [x ] other: follow NPO status

## 2018-08-17 NOTE — PROGRESS NOTE ADULT - ASSESSMENT
A/P: Irrigation and Closure of the Right Tricep Abscess POD 0     Analgesia  DVT ppx, scds, meds to start tomorrow 8/17  WBAT RUE  Keep dressing clean and dry, ace wrap for support and comfort  PT/OT  DC planning  c/w antibiotics  FU labs

## 2018-08-17 NOTE — PROGRESS NOTE ADULT - SUBJECTIVE AND OBJECTIVE BOX
ID Progress note     Name: DARI CIFUENTES  Age: 52y  Gender: Male  MRN: [id number]    Interval History-- events noted, going for wound explolarion and possible secondary closure today by orthopedics. Doing well. Mother at bedside   Notes reviewed    Past Medical History--  Spinal stenosis  Psoriasis  Neurogenic bladder  Seizure  Hypertension  Mental retardation  No significant past surgical history      For details regarding the patient's social history, family history, and other miscellaneous elements, please refer the initial infectious diseases consultation and/or the admitting history and physical examination for this admission.    Allergies--  Allergies    No Known Allergies    Intolerances        Medications--  Antibiotics:  vancomycin  IVPB 1000 milliGRAM(s) IV Intermittent every 12 hours    Immunologic:    Other:  ammonium lactate 12% Lotion  bethanechol  citalopram  cloNIDine  docusate sodium  ketorolac   Injectable PRN  lactated ringers.  lamoTRIgine  LORazepam   Injectable PRN  polyethylene glycol 3350 PRN  senna  tamsulosin  tiZANidine      Review of Systems--  Review of systems unable to be obtained secondary to clinical condition.    Physical Examination--    Vital Signs: T(F): 98.3 (08-17-18 @ 14:30), Max: 98.3 (08-17-18 @ 14:30)  HR: 82 (08-17-18 @ 14:30)  BP: 146/89 (08-17-18 @ 14:30)  RR: 19 (08-17-18 @ 14:30)  SpO2: 98% (08-17-18 @ 14:30)  Wt(kg): --  General: Nontoxic-appearing Male in no acute distress.  HEENT: AT/NC. PERRL. EOMI. Anicteric. Conjunctiva pink and moist. Oropharynx clear. Dentition fair.  Neck: Not rigid. No sense of mass.  Nodes: None palpable.  Lungs: Clear bilaterally without rales, wheezing or rhonchi  Heart: Regular rate and rhythm. No Murmur. No rub. No gallop. No palpable thrill.  Abdomen: Bowel sounds present and normoactive. Soft. Nondistended. Nontender. No sense of mass. No organomegaly.  Back: No spinal tenderness. No costovertebral angle tenderness.   Extremities: No cyanosis or clubbing. No edema. right upper arm - wound vac in place   Skin: Warm. Dry. Good turgor. No rash. No vasculitic stigmata.  Psychiatric: Appropriate affect and mood for situation.         Laboratory Studies--  CBC                        12.0   5.85  )-----------( 326      ( 17 Aug 2018 05:20 )             35.0       Chemistries  08-17    140  |  104  |  18  ----------------------------<  87  4.2   |  31  |  1.10    Ca    9.5      17 Aug 2018 05:20        Culture Data    Culture - Surgical Swab (collected 12 Aug 2018 17:45)  Source: .Surgical Swab right arm abcess  Preliminary Report (14 Aug 2018 17:49):    Numerous Methicillin resistant Staphylococcus aureus  Organism: Methicillin resistant Staphylococcus aureus (14 Aug 2018 17:48)  Organism: Methicillin resistant Staphylococcus aureus (14 Aug 2018 17:48)    Culture - Urine (collected 11 Aug 2018 10:09)  Source: .Urine Clean Catch (Midstream)  Final Report (12 Aug 2018 10:52):    No growth      Assessment--  51 y/o MRDD male with PMHx of HTN, neurogenic bladder, psoriasis, seizure, and spinal stenosis admitted with fever , swelling of the right arm most at elbow, denies any trauma . he most likely has cellulitis with soft tissue infection, possibility of olecranon bursitis or septic bursitis cannot be rule out . Ct scan shows multiloculated right triceps abscesses .     He has hx of right elbow abscess with MRSA in 2014 , but MRSA screen negative . Abscess cs positive for MRSA    Ct scan of elbow - shows multi loculated fluid collections consistent with abscess  within right triceps muscle , he is  s/p wide I X D by surgery/orthopedics     Plan :   - will continue with Vancomycin 1 gram q 12 till ready for dc   - keep right arm elevated  - monitor renal function   - trend fevers and CBC  - obtain results of ct right elbow done on admission   - message left with Dr Baltazar  and with orthopedics resident to clarify if he needs more IV antibiotics or can be changed to po abx         Continue with present regime .  Appropriate use of antibiotics and adverse effects reviewed.      I have discussed the above plan of care with patient's family in detail. They expressed understanding of the treatment plan . Risks, benefits and alternatives discussed in detail. I have asked if they have any questions or concerns and appropriately addressed them to the best of my ability .      > 35 minutes spent in direct patient care reviewing  the notes, lab data/ imaging , discussion with multidisciplinary team. All questions were addressed and answered to the best of my capacity .    Thank you for allowing me to participate in the care of your patient .        Grisel Chang MD  [phone]

## 2018-08-17 NOTE — CHART NOTE - NSCHARTNOTEFT_GEN_A_CORE
Patient scheduled for OR this AM for wound closure. He is considered medically optimized for this procedure and there is no contraindication to proceeding with surgical intervention at this time.

## 2018-08-17 NOTE — PROGRESS NOTE ADULT - ASSESSMENT
A/P: 52M s/p I&D of RUE POD 4  Analgesia  Hold chemical DVT ppx for OR today  WBAT RUE  OR Cultures MRSA; c/w Vanco  ID recs appreciated  BCx No growth  PT/OT  FU Labs  Plan for OR today with Dr. Charles for Wound Vac removal and closure of wound  Plan for PICC  Will discuss with attending and advise if plan changes

## 2018-08-17 NOTE — PROGRESS NOTE ADULT - SUBJECTIVE AND OBJECTIVE BOX
Patient seen and examined at bedside. Pain is well controlled. No other complaints at this time.       PE:    Vital Signs Last 24 Hrs  T(C): 36.8 (17 Aug 2018 19:40), Max: 36.8 (17 Aug 2018 05:27)  T(F): 98.2 (17 Aug 2018 19:40), Max: 98.3 (17 Aug 2018 14:30)  HR: 71 (17 Aug 2018 20:25) (66 - 92)  BP: 137/95 (17 Aug 2018 20:25) (129/82 - 146/89)    RR: 13 (17 Aug 2018 20:25) (12 - 19)  SpO2: 96% (17 Aug 2018 20:25) (94% - 99%)    RUE:    Dressing c/d/i  SILT C5-T1  AIN/PIN/Median/Ulnar/Radial intact  radial pulse 2+  compartments soft and compressible

## 2018-08-17 NOTE — PROGRESS NOTE ADULT - ASSESSMENT
53 y/o MRDD male with PMHx of HTN, neurogenic bladder, psoriasis, seizure, and spinal stenosis sent to the ED from [facility] for evaluation of worsening swelling and pain in RUE, admitted with Cellulitis of RUE, found to have extensive abscess.

## 2018-08-17 NOTE — PROGRESS NOTE ADULT - SUBJECTIVE AND OBJECTIVE BOX
Patient is a 52y old  Male who presents with a chief complaint of Right arm swelling (10 Aug 2018 20:51)      INTERVAL HPI/OVERNIGHT EVENTS:  No acute events overnight. Complaining of mild R arm pain today. Patient able to close fist 85% comfortably. Possible issue with compliance and direction that inhibits full closure.      MEDICATIONS  (STANDING):  bethanechol 25 milliGRAM(s) Oral daily  citalopram 10 milliGRAM(s) Oral daily  cloNIDine 0.1 milliGRAM(s) Oral three times a day  docusate sodium 100 milliGRAM(s) Oral two times a day  lamoTRIgine 100 milliGRAM(s) Oral two times a day  piperacillin/tazobactam IVPB. 3.375 Gram(s) IV Intermittent every 8 hours  tamsulosin 0.4 milliGRAM(s) Oral at bedtime  vancomycin  IVPB 1250 milliGRAM(s) IV Intermittent every 12 hours    MEDICATIONS  (PRN):  ketorolac   Injectable 30 milliGRAM(s) IV Push every 6 hours PRN Moderate Pain (4 - 6)  LORazepam   Injectable 1 milliGRAM(s) IntraMuscular every 4 hours PRN seizure      Allergies:    No Known Allergies    Intolerances        REVIEW OF SYSTEMS:  CONSTITUTIONAL: No fever, weight loss, or fatigue  RESPIRATORY: No cough, wheezing, chills or hemoptysis; No shortness of breath  CARDIOVASCULAR: No chest pain, palpitations, dizziness, or leg swelling  NEUROLOGICAL: No headaches, memory loss, loss of strength, numbness, or tremors  SKIN: No itching, burning, rashes, or lesions   MUSCULOSKELETAL: Nonpitting edema of dorsum and phalanges of R hand.   PSYCHIATRIC: No depression, anxiety, mood swings, or difficulty sleeping      Vital Signs Last 24 Hrs  T(C): 36.9 (16 Aug 2018 05:26), Max: 36.9 (16 Aug 2018 05:26)  T(F): 98.4 (16 Aug 2018 05:26), Max: 98.4 (16 Aug 2018 05:26)  HR: 72 (16 Aug 2018 05:26) (72 - 80)  BP: 131/85 (16 Aug 2018 05:26) (113/73 - 131/85)  RR: 16 (16 Aug 2018 05:26) (16 - 17)  SpO2: 97% (16 Aug 2018 05:26) (94% - 97%)    PHYSICAL EXAM:  GENERAL: NAD, well-groomed, well-developed  NERVOUS SYSTEM:  Alert & Oriented X3, Good concentration  CHEST/LUNG: Clear to auscultation bilaterally; No rales, rhonchi, wheezing, or rubs  HEART: Regular rate and rhythm; No murmurs, rubs, or gallops  EXTREMITIES:  2+ Peripheral Pulses, No clubbing, cyanosis, or edema. Thin bandage on R arm, C/D/It. Good capillary refill in fingers. Wound Vac on R tricep with good patency. Dry scaling skin on bilateral shins.   LYMPH: No lymphadenopathy noted  SKIN: As noted in Extremities.   LABS:                                        10.5   5.20  )-----------( 263      ( 15 Aug 2018 07:57 )             31.3     08-15    143  |  106  |  23  ----------------------------<  88  4.1   |  30  |  1.10    Ca    9.4      15 Aug 2018 07:57                  CAPILLARY BLOOD GLUCOSE          RADIOLOGY & ADDITIONAL TESTS:    Imaging Personally Reviewed:  [ ] YES  [ ] NO    Consultant(s) Notes Reviewed:  [ ] YES  [ ] NO    Care Discussed with Consultants/Other Providers [ ] YES  [ ] NO

## 2018-08-17 NOTE — PROGRESS NOTE ADULT - PROBLEM SELECTOR PLAN 1
-Medically cleared for closure of R arm wound. Will go to OR today  -Pt. Still has wound vac  -Continue Vanc of 1g IV q12h if vanc trough today at 18:15  <20  -MRSA in operative culture as per prelim tissue culture  - tylenol PRN for fevers  - pain control  ID: Bernardo following.  -Patient now with wound vac   -May need PICC line for extended course of antibiotics vs. transition to PO if no signs of remaining infections when patient goes back to OR.

## 2018-08-17 NOTE — BRIEF OPERATIVE NOTE - PROCEDURE
<<-----Click on this checkbox to enter Procedure Irrigation and closure of right shoulder  08/17/2018    Active  [id number]

## 2018-08-17 NOTE — PROGRESS NOTE ADULT - SUBJECTIVE AND OBJECTIVE BOX
Patient seen and examined at bedside. Reports no acute events overnight. Pain is well controlled.    PHYSICAL EXAM:  Vital Signs Last 24 Hrs  T(C): 36.8 (17 Aug 2018 05:27), Max: 36.8 (16 Aug 2018 21:07)  T(F): 98.2 (17 Aug 2018 05:27), Max: 98.2 (16 Aug 2018 21:07)  HR: 81 (17 Aug 2018 05:27) (72 - 83)  BP: 136/89 (17 Aug 2018 05:27) (127/86 - 161/101)  RR: 18 (17 Aug 2018 05:27) (17 - 18)  SpO2: 95% (17 Aug 2018 05:27) (95% - 98%)    Gen: NAD, Resting comfortably  RUE: Wound vac in place; Intact seal  SILT C5-T1  +AIN/PIN/Ulnar/Median/Radial  +2/4 Radial pulse  Painless ROM of Wrist and fingers  Compartments soft and compressible

## 2018-08-18 LAB
ANION GAP SERPL CALC-SCNC: 8 MMOL/L — SIGNIFICANT CHANGE UP (ref 5–17)
BUN SERPL-MCNC: 16 MG/DL — SIGNIFICANT CHANGE UP (ref 7–23)
CALCIUM SERPL-MCNC: 9.1 MG/DL — SIGNIFICANT CHANGE UP (ref 8.5–10.1)
CHLORIDE SERPL-SCNC: 103 MMOL/L — SIGNIFICANT CHANGE UP (ref 96–108)
CO2 SERPL-SCNC: 28 MMOL/L — SIGNIFICANT CHANGE UP (ref 22–31)
CREAT SERPL-MCNC: 1.1 MG/DL — SIGNIFICANT CHANGE UP (ref 0.5–1.3)
GLUCOSE SERPL-MCNC: 88 MG/DL — SIGNIFICANT CHANGE UP (ref 70–99)
HCT VFR BLD CALC: 35.1 % — LOW (ref 39–50)
HGB BLD-MCNC: 11.8 G/DL — LOW (ref 13–17)
MCHC RBC-ENTMCNC: 29.4 PG — SIGNIFICANT CHANGE UP (ref 27–34)
MCHC RBC-ENTMCNC: 33.6 GM/DL — SIGNIFICANT CHANGE UP (ref 32–36)
MCV RBC AUTO: 87.5 FL — SIGNIFICANT CHANGE UP (ref 80–100)
NRBC # BLD: 0 /100 WBCS — SIGNIFICANT CHANGE UP (ref 0–0)
PLATELET # BLD AUTO: 322 K/UL — SIGNIFICANT CHANGE UP (ref 150–400)
POTASSIUM SERPL-MCNC: 4.2 MMOL/L — SIGNIFICANT CHANGE UP (ref 3.5–5.3)
POTASSIUM SERPL-SCNC: 4.2 MMOL/L — SIGNIFICANT CHANGE UP (ref 3.5–5.3)
RBC # BLD: 4.01 M/UL — LOW (ref 4.2–5.8)
RBC # FLD: 11.6 % — SIGNIFICANT CHANGE UP (ref 10.3–14.5)
SODIUM SERPL-SCNC: 139 MMOL/L — SIGNIFICANT CHANGE UP (ref 135–145)
VANCOMYCIN TROUGH SERPL-MCNC: 13.2 UG/ML — SIGNIFICANT CHANGE UP (ref 10–20)
VANCOMYCIN TROUGH SERPL-MCNC: 21.7 UG/ML — HIGH (ref 10–20)
WBC # BLD: 6.84 K/UL — SIGNIFICANT CHANGE UP (ref 3.8–10.5)
WBC # FLD AUTO: 6.84 K/UL — SIGNIFICANT CHANGE UP (ref 3.8–10.5)

## 2018-08-18 PROCEDURE — 99233 SBSQ HOSP IP/OBS HIGH 50: CPT

## 2018-08-18 RX ORDER — LACTOBACILLUS ACIDOPHILUS 100MM CELL
1 CAPSULE ORAL
Qty: 0 | Refills: 0 | Status: DISCONTINUED | OUTPATIENT
Start: 2018-08-18 | End: 2018-08-20

## 2018-08-18 RX ADMIN — CITALOPRAM 10 MILLIGRAM(S): 10 TABLET, FILM COATED ORAL at 12:31

## 2018-08-18 RX ADMIN — Medication 100 MILLIGRAM(S): at 06:19

## 2018-08-18 RX ADMIN — LAMOTRIGINE 100 MILLIGRAM(S): 25 TABLET, ORALLY DISINTEGRATING ORAL at 06:19

## 2018-08-18 RX ADMIN — Medication 1 TABLET(S): at 12:31

## 2018-08-18 RX ADMIN — Medication 1 MILLIGRAM(S): at 12:31

## 2018-08-18 RX ADMIN — Medication 325 MILLIGRAM(S): at 17:14

## 2018-08-18 RX ADMIN — Medication 325 MILLIGRAM(S): at 12:31

## 2018-08-18 RX ADMIN — OXYCODONE HYDROCHLORIDE 10 MILLIGRAM(S): 5 TABLET ORAL at 12:31

## 2018-08-18 RX ADMIN — Medication 500 MILLIGRAM(S): at 17:15

## 2018-08-18 RX ADMIN — Medication 0.1 MILLIGRAM(S): at 14:15

## 2018-08-18 RX ADMIN — Medication 25 MILLIGRAM(S): at 12:31

## 2018-08-18 RX ADMIN — LAMOTRIGINE 100 MILLIGRAM(S): 25 TABLET, ORALLY DISINTEGRATING ORAL at 17:14

## 2018-08-18 RX ADMIN — HYDROMORPHONE HYDROCHLORIDE 0.5 MILLIGRAM(S): 2 INJECTION INTRAMUSCULAR; INTRAVENOUS; SUBCUTANEOUS at 08:44

## 2018-08-18 RX ADMIN — SENNA PLUS 2 TABLET(S): 8.6 TABLET ORAL at 22:19

## 2018-08-18 RX ADMIN — Medication 1 APPLICATION(S): at 17:18

## 2018-08-18 RX ADMIN — Medication 100 MILLIGRAM(S): at 14:15

## 2018-08-18 RX ADMIN — Medication 0.1 MILLIGRAM(S): at 22:19

## 2018-08-18 RX ADMIN — Medication 100 MILLIGRAM(S): at 22:19

## 2018-08-18 RX ADMIN — Medication 0.1 MILLIGRAM(S): at 06:19

## 2018-08-18 RX ADMIN — Medication 1 APPLICATION(S): at 06:19

## 2018-08-18 RX ADMIN — Medication 500 MILLIGRAM(S): at 06:20

## 2018-08-18 RX ADMIN — Medication 325 MILLIGRAM(S): at 08:44

## 2018-08-18 RX ADMIN — Medication 1 TABLET(S): at 17:14

## 2018-08-18 RX ADMIN — TAMSULOSIN HYDROCHLORIDE 0.4 MILLIGRAM(S): 0.4 CAPSULE ORAL at 22:19

## 2018-08-18 RX ADMIN — OXYCODONE HYDROCHLORIDE 10 MILLIGRAM(S): 5 TABLET ORAL at 13:19

## 2018-08-18 RX ADMIN — HYDROMORPHONE HYDROCHLORIDE 0.5 MILLIGRAM(S): 2 INJECTION INTRAMUSCULAR; INTRAVENOUS; SUBCUTANEOUS at 08:59

## 2018-08-18 NOTE — PROGRESS NOTE ADULT - SUBJECTIVE AND OBJECTIVE BOX
Patient seen and examined at bedside. Reports no acute events overnight. Pain is well controlled.    PHYSICAL EXAM:  Vital Signs Last 24 Hrs  T(C): 36.8 (18 Aug 2018 05:10), Max: 36.8 (17 Aug 2018 08:36)  T(F): 98.2 (18 Aug 2018 05:10), Max: 98.3 (17 Aug 2018 14:30)  HR: 74 (18 Aug 2018 05:10) (65 - 92)  BP: 153/87 (18 Aug 2018 05:10) (129/82 - 153/87)  RR: 16 (18 Aug 2018 05:10) (12 - 19)  SpO2: 97% (18 Aug 2018 05:10) (94% - 99%)    Gen: NAD, Resting comfortably  RUE: ACE bandage in place; Clean/dry/intact  SILT C5-T1  +AIN/PIN/Ulnar/Median/Radial  +2/4 Radial pulse  Painless ROM of Wrist and fingers  Compartments soft and compressible

## 2018-08-18 NOTE — PROGRESS NOTE ADULT - PROBLEM SELECTOR PLAN 1
post closure of R arm wound   -Continue Vanc of 1g IV q12h if vanc trough today at 18:15  <20  -MRSA in operative culture as per prelim tissue culture  - tylenol PRN for fevers  - pain control  ID: Bernardo greenwood to switch po abx

## 2018-08-18 NOTE — PROGRESS NOTE ADULT - SUBJECTIVE AND OBJECTIVE BOX
ID Progress note     Name: DARI CIFUENTES  Age: 52y  Gender: Male  MRN: [id number]    Interval History-- Events noted, s/p irrigation and secondary closure of the right arm . Cleared by orthopedics for change to po antibiotics   Notes reviewed    Past Medical History--  Spinal stenosis  Psoriasis  Neurogenic bladder  Seizure  Hypertension  Mental retardation  No significant past surgical history      For details regarding the patient's social history, family history, and other miscellaneous elements, please refer the initial infectious diseases consultation and/or the admitting history and physical examination for this admission.    Allergies--  Allergies    No Known Allergies    Intolerances        Medications--  Antibiotics:  vancomycin  IVPB 1000 milliGRAM(s) IV Intermittent every 12 hours    Immunologic:    Other:  acetaminophen   Tablet PRN  acetaminophen   Tablet. PRN  ammonium lactate 12% Lotion  ascorbic acid  bethanechol  citalopram  cloNIDine  docusate sodium  ferrous    sulfate  folic acid  HYDROmorphone  Injectable PRN  lactated ringers.  lamoTRIgine  multivitamin  ondansetron Injectable PRN  oxyCODONE    IR PRN  oxyCODONE    IR PRN  polyethylene glycol 3350 PRN  senna  tamsulosin      Review of Systems--  Review of systems unable to be obtained secondary to clinical condition.    Physical Examination--    Vital Signs: T(F): 98.2 (08-18-18 @ 05:10), Max: 98.3 (08-17-18 @ 14:30)  HR: 74 (08-18-18 @ 05:10)  BP: 153/87 (08-18-18 @ 05:10)  RR: 16 (08-18-18 @ 05:10)  SpO2: 97% (08-18-18 @ 05:10)  Wt(kg): --  General: Nontoxic-appearing Male in no acute distress.  HEENT: AT/NC. PERRL. EOMI. Anicteric. Conjunctiva pink and moist. Oropharynx clear. Dentition fair.  Neck: Not rigid. No sense of mass.  Nodes: None palpable.  Lungs: Clear bilaterally without rales, wheezing or rhonchi  Heart: Regular rate and rhythm. No Murmur. No rub. No gallop. No palpable thrill.  Abdomen: Bowel sounds present and normoactive. Soft. Nondistended. Nontender. No sense of mass. No organomegaly.  Back: No spinal tenderness. No costovertebral angle tenderness.   Extremities: No cyanosis or clubbing. No edema. dressing intact right upper arm, wound as per orthopedics   Skin: Warm. Dry. Good turgor. No rash. No vasculitic stigmata.  Psychiatric: Appropriate affect and mood for situation.         Laboratory Studies--  CBC                        11.8   6.84  )-----------( 322      ( 18 Aug 2018 06:42 )             35.1       Chemistries  08-18    139  |  103  |  16  ----------------------------<  88  4.2   |  28  |  1.10    Ca    9.1      18 Aug 2018 06:42        Culture Data    Culture - Tissue with Gram Stain (collected 17 Aug 2018 22:50)  Source: .Tissue deep right arm #1  Gram Stain (18 Aug 2018 02:09):    No polymorphonuclear cells seen per low power field    No organisms seen per oil power field    Culture - Surgical Swab (collected 12 Aug 2018 17:45)  Source: .Surgical Swab right arm abcess  Final Report (17 Aug 2018 22:18):    Numerous Methicillin resistant Staphylococcus aureus  Organism: Methicillin resistant Staphylococcus aureus (17 Aug 2018 22:18)  Organism: Methicillin resistant Staphylococcus aureus (17 Aug 2018 22:18)    Assessment--  53 y/o MRDD male with PMHx of HTN, neurogenic bladder, psoriasis, seizure, and spinal stenosis admitted with fever , swelling of the right arm most at elbow, denies any trauma . he most likely has cellulitis with soft tissue infection, possibility of olecranon bursitis or septic bursitis cannot be rule out . Ct scan shows multiloculated right triceps abscesses .     He has hx of right elbow abscess with MRSA in 2014 , but MRSA screen negative . Abscess cs positive for MRSA    Ct scan of elbow - shows multi loculated fluid collections consistent with abscess  within right triceps muscle , he is  s/p wide I X D by surgery/orthopedics     Plan :   - will change to PO bactrim DS 1 tab bid x 14 days   - DC planning , needs follow up with orthopedics as out patient   - keep right arm elevated  - monitor renal function   - trend fevers and CBC        Continue with present regime .  Appropriate use of antibiotics and adverse effects reviewed.      I have discussed the above plan of care with patient and his mother present at bedside in detail. They expressed understanding of the treatment plan . Risks, benefits and alternatives discussed in detail. I have asked if they have any questions or concerns and appropriately addressed them to the best of my ability .      > 35 minutes spent in direct patient care reviewing  the notes, lab data/ imaging , discussion with multidisciplinary team. All questions were addressed and answered to the best of my capacity .    Thank you for allowing me to participate in the care of your patient .        Grisel Chang MD  [phone]

## 2018-08-18 NOTE — PROGRESS NOTE ADULT - SUBJECTIVE AND OBJECTIVE BOX
52y Male     T(C): 36.8 (08-18-18 @ 05:10), Max: 36.8 (08-17-18 @ 14:30)  HR: 74 (08-18-18 @ 05:10) (65 - 82)  BP: 153/87 (08-18-18 @ 05:10) (129/84 - 153/87)  RR: 16 (08-18-18 @ 05:10) (12 - 19)  SpO2: 97% (08-18-18 @ 05:10) (94% - 99%)  Wt(kg): --    Pt seen, doing well, no anesthesia complications or complaints noted or reported.   No Nausea  Pain well controlled

## 2018-08-18 NOTE — PROGRESS NOTE ADULT - ATTENDING COMMENTS
pt seen  and examine see above - Cellulitis of RUE, found  to have multi loculated fluid collections consistent with abscess  within right triceps muscle , he is  s/p wide I X D by surgery/orthopedics    id dr palomino  changed to PO bactrim DS 1 tab bid x 14 days   - DC planning , needs follow up with orthopedics as out patient   - keep right arm elevated.fu lab .

## 2018-08-18 NOTE — PROGRESS NOTE ADULT - SUBJECTIVE AND OBJECTIVE BOX
Patient is a 52y old  Male who presents with a chief complaint of Right arm swelling (13 Aug 2018 12:29)      HPI:  51 y/o MRDD male with PMHx of HTN, neurogenic bladder, psoriasis, seizure, and spinal stenosis sent to the ED from [facility] for evaluation of worsening swelling and pain in RUE present for several days. Pt unsure of when swelling started, but states swelling and pain got progressively worse. History was elicited from his mother and home aide, present in ED. Pt denies any trauma, or fall, and states pain is increased with movement, and better with rest. No documented fever or chills and no nausea, vomiting or diarrhea.     admitted with Cellulitis of RUE, found to have extensive abscess post surgical drain and closer of wound .  pt seen and examine today - alert awake , rt elbow dry clean no discomfort , no fever oob.     INTERVAL HPI/OVERNIGHT EVENTS:  T(C): 36.8 (08-18-18 @ 05:10), Max: 36.8 (08-17-18 @ 19:40)  HR: 74 (08-18-18 @ 05:10) (65 - 75)  BP: 153/87 (08-18-18 @ 05:10) (129/84 - 153/87)  RR: 16 (08-18-18 @ 05:10) (12 - 16)  SpO2: 97% (08-18-18 @ 05:10) (94% - 99%)  Wt(kg): --  I&O's Summary      REVIEW OF SYSTEMS:  CONSTITUTIONAL: No fever, weight loss, or fatigue  EYES: No eye pain, visual disturbances, or discharge  ENMT:  No difficulty hearing,   NECK: No pain or stiffness  RESPIRATORY: No cough, wheezing, ; No shortness of breath  CARDIOVASCULAR: No chest pain, palpitations, dizziness, or leg swelling  GASTROINTESTINAL: No abdominal or epigastric pain. No nausea, vomiting, or hematemesis; No diarrhea or constipation.   GENITOURINARY: No dysuria, frequency,   NEUROLOGICAL: No headaches, memory loss, loss of strength, numbness,   SKIN: No itching, burning, rashes, or lesions   MUSCULOSKELETAL: No joint pain or swelling; No muscle, back, or extremity pain ,  rt elbow mild discomfort       PHYSICAL EXAM:  GENERAL: NAD, well-groomed, well-developed  HEAD:  Atraumatic, Normocephalic  EYES: EOMI, PERRLA, conjunctiva and sclera clear  ENMT:  Moist mucous membranes,  No lesions  NECK: Supple,   NERVOUS SYSTEM:  Alert & Oriented X3, Good concentration; Motor Strength 5/5 B/L upper and lower extremities; DTRs 2+ intact and symmetric  CHEST/LUNG: Clear to percussion bilaterally; No rales, rhonchi, wheezing,   HEART: Regular rate and rhythm; No murmurs, no tachy   ABDOMEN: Soft, Nontender, Nondistended; Bowel sounds present  EXTREMITIES:  2+ Peripheral Pulses, No clubbing, cyanosis, or edema  SKIN: No rashes or lesions / rt  elbow surgical site intact , dry     MEDICATIONS  (STANDING):  ammonium lactate 12% Lotion 1 Application(s) Topical two times a day  ascorbic acid 500 milliGRAM(s) Oral two times a day  bethanechol 25 milliGRAM(s) Oral daily  citalopram 10 milliGRAM(s) Oral daily  cloNIDine 0.1 milliGRAM(s) Oral three times a day  docusate sodium 100 milliGRAM(s) Oral three times a day  ferrous    sulfate 325 milliGRAM(s) Oral three times a day with meals  folic acid 1 milliGRAM(s) Oral daily  lactobacillus acidophilus 1 Tablet(s) Oral two times a day with meals  lamoTRIgine 100 milliGRAM(s) Oral two times a day  multivitamin 1 Tablet(s) Oral daily  senna 2 Tablet(s) Oral at bedtime  tamsulosin 0.4 milliGRAM(s) Oral at bedtime  trimethoprim  160 mG/sulfamethoxazole 800 mG 1 Tablet(s) Oral two times a day    MEDICATIONS  (PRN):  acetaminophen   Tablet 650 milliGRAM(s) Oral every 6 hours PRN For Temp greater than 38 C (100.4 F)  acetaminophen   Tablet. 650 milliGRAM(s) Oral every 6 hours PRN for headache  HYDROmorphone  Injectable 0.5 milliGRAM(s) IV Push every 6 hours PRN Severe Pain (7 - 10)  ondansetron Injectable 4 milliGRAM(s) IV Push every 6 hours PRN Nausea and/or Vomiting  oxyCODONE    IR 10 milliGRAM(s) Oral every 4 hours PRN Moderate Pain (4 - 6)  oxyCODONE    IR 5 milliGRAM(s) Oral every 4 hours PRN Mild Pain (1 - 3)  polyethylene glycol 3350 17 Gram(s) Oral at bedtime PRN Constipation      LABS:                        11.8   6.84  )-----------( 322      ( 18 Aug 2018 06:42 )             35.1     08-18    139  |  103  |  16  ----------------------------<  88  4.2   |  28  |  1.10    Ca    9.1      18 Aug 2018 06:42      PT/INR - ( 17 Aug 2018 05:20 )   PT: 14.4 sec;   INR: 1.31 ratio         PTT - ( 17 Aug 2018 05:20 )  PTT:31.9 sec    CAPILLARY BLOOD GLUCOSE                  RADIOLOGY & ADDITIONAL TESTS:    Imaging Personally Reviewed:   no new test     Advance Directives:  full code

## 2018-08-18 NOTE — PROGRESS NOTE ADULT - ASSESSMENT
A/P: 52M s/p I&D of RUE POD 5; s/p Wound Vac Removal/I&D/Wound closure POD 1  Analgesia  DVT ppx  WBAT RUE  OR Cultures MRSA  ID recs appreciated; Plan to transition to po abx in anticipation of discharge  BCx No growth  PT/OT  FU Labs  DC Planning  Will discuss with attending and advise if plan changes

## 2018-08-18 NOTE — PROGRESS NOTE ADULT - ASSESSMENT
51 y/o MRDD male with PMHx of HTN, neurogenic bladder, psoriasis, seizure, and spinal stenosis sent to the ED from [facility] for evaluation of worsening swelling and pain in RUE,   admitted with Cellulitis of RUE, found to have extensive abscess.

## 2018-08-19 LAB
ANION GAP SERPL CALC-SCNC: 8 MMOL/L — SIGNIFICANT CHANGE UP (ref 5–17)
BUN SERPL-MCNC: 20 MG/DL — SIGNIFICANT CHANGE UP (ref 7–23)
CALCIUM SERPL-MCNC: 9.6 MG/DL — SIGNIFICANT CHANGE UP (ref 8.5–10.1)
CHLORIDE SERPL-SCNC: 101 MMOL/L — SIGNIFICANT CHANGE UP (ref 96–108)
CO2 SERPL-SCNC: 30 MMOL/L — SIGNIFICANT CHANGE UP (ref 22–31)
CREAT SERPL-MCNC: 1.3 MG/DL — SIGNIFICANT CHANGE UP (ref 0.5–1.3)
GLUCOSE SERPL-MCNC: 92 MG/DL — SIGNIFICANT CHANGE UP (ref 70–99)
HCT VFR BLD CALC: 35.5 % — LOW (ref 39–50)
HGB BLD-MCNC: 12 G/DL — LOW (ref 13–17)
MCHC RBC-ENTMCNC: 29.4 PG — SIGNIFICANT CHANGE UP (ref 27–34)
MCHC RBC-ENTMCNC: 33.8 GM/DL — SIGNIFICANT CHANGE UP (ref 32–36)
MCV RBC AUTO: 87 FL — SIGNIFICANT CHANGE UP (ref 80–100)
NRBC # BLD: 0 /100 WBCS — SIGNIFICANT CHANGE UP (ref 0–0)
PLATELET # BLD AUTO: 335 K/UL — SIGNIFICANT CHANGE UP (ref 150–400)
POTASSIUM SERPL-MCNC: 4.3 MMOL/L — SIGNIFICANT CHANGE UP (ref 3.5–5.3)
POTASSIUM SERPL-SCNC: 4.3 MMOL/L — SIGNIFICANT CHANGE UP (ref 3.5–5.3)
RBC # BLD: 4.08 M/UL — LOW (ref 4.2–5.8)
RBC # FLD: 11.7 % — SIGNIFICANT CHANGE UP (ref 10.3–14.5)
SODIUM SERPL-SCNC: 139 MMOL/L — SIGNIFICANT CHANGE UP (ref 135–145)
WBC # BLD: 6.61 K/UL — SIGNIFICANT CHANGE UP (ref 3.8–10.5)
WBC # FLD AUTO: 6.61 K/UL — SIGNIFICANT CHANGE UP (ref 3.8–10.5)

## 2018-08-19 PROCEDURE — 99232 SBSQ HOSP IP/OBS MODERATE 35: CPT

## 2018-08-19 RX ADMIN — Medication 100 MILLIGRAM(S): at 22:14

## 2018-08-19 RX ADMIN — Medication 325 MILLIGRAM(S): at 17:13

## 2018-08-19 RX ADMIN — CITALOPRAM 10 MILLIGRAM(S): 10 TABLET, FILM COATED ORAL at 11:58

## 2018-08-19 RX ADMIN — LAMOTRIGINE 100 MILLIGRAM(S): 25 TABLET, ORALLY DISINTEGRATING ORAL at 17:13

## 2018-08-19 RX ADMIN — SENNA PLUS 2 TABLET(S): 8.6 TABLET ORAL at 22:14

## 2018-08-19 RX ADMIN — Medication 325 MILLIGRAM(S): at 11:58

## 2018-08-19 RX ADMIN — Medication 0.1 MILLIGRAM(S): at 22:14

## 2018-08-19 RX ADMIN — Medication 1 TABLET(S): at 17:12

## 2018-08-19 RX ADMIN — Medication 100 MILLIGRAM(S): at 06:16

## 2018-08-19 RX ADMIN — Medication 1 APPLICATION(S): at 17:13

## 2018-08-19 RX ADMIN — Medication 500 MILLIGRAM(S): at 06:15

## 2018-08-19 RX ADMIN — Medication 1 TABLET(S): at 06:17

## 2018-08-19 RX ADMIN — Medication 100 MILLIGRAM(S): at 13:19

## 2018-08-19 RX ADMIN — Medication 1 TABLET(S): at 08:53

## 2018-08-19 RX ADMIN — TAMSULOSIN HYDROCHLORIDE 0.4 MILLIGRAM(S): 0.4 CAPSULE ORAL at 22:14

## 2018-08-19 RX ADMIN — Medication 1 MILLIGRAM(S): at 11:58

## 2018-08-19 RX ADMIN — LAMOTRIGINE 100 MILLIGRAM(S): 25 TABLET, ORALLY DISINTEGRATING ORAL at 06:17

## 2018-08-19 RX ADMIN — Medication 0.1 MILLIGRAM(S): at 13:19

## 2018-08-19 RX ADMIN — Medication 500 MILLIGRAM(S): at 17:13

## 2018-08-19 RX ADMIN — Medication 0.1 MILLIGRAM(S): at 06:16

## 2018-08-19 RX ADMIN — Medication 1 TABLET(S): at 11:58

## 2018-08-19 RX ADMIN — Medication 25 MILLIGRAM(S): at 11:57

## 2018-08-19 RX ADMIN — Medication 325 MILLIGRAM(S): at 08:53

## 2018-08-19 RX ADMIN — Medication 1 APPLICATION(S): at 06:15

## 2018-08-19 NOTE — PROGRESS NOTE ADULT - PROBLEM SELECTOR PLAN 2
- stable,  Clonidine 0.1g TID  - monitor bp closely
- stable, c/w Clonidine 0.1g TID  - monitor Vitals
- stable,  Clonidine 0.1g TID  - monitor bp closely
- stable, c/w Clonidine 0.1g TID  - monitor Vitals
- stable, c/w Clonidine 0.1g TID  - monitor Vitals

## 2018-08-19 NOTE — PROGRESS NOTE ADULT - SUBJECTIVE AND OBJECTIVE BOX
Patient seen and examined at bedside. Reports no acute events overnight. Pain is well controlled.    PHYSICAL EXAM:  Vital Signs Last 24 Hrs  T(C): 36.7 (19 Aug 2018 05:13), Max: 36.8 (18 Aug 2018 14:25)  T(F): 98 (19 Aug 2018 05:13), Max: 98.2 (18 Aug 2018 14:25)  HR: 79 (19 Aug 2018 05:13) (68 - 79)  BP: 100/65 (19 Aug 2018 05:13) (100/65 - 129/86)  RR: 16 (19 Aug 2018 05:13) (16 - 16)  SpO2: 94% (19 Aug 2018 05:13) (94% - 98%)    Gen: NAD, Resting comfortably  RUE: ACE bandage in place; Saturated/intact  SILT C5-T1  +AIN/PIN/Ulnar/Median/Radial  +2/4 Radial pulse  Painless ROM of Wrist and fingers  Compartments soft and compressible

## 2018-08-19 NOTE — PROGRESS NOTE ADULT - ASSESSMENT
A/P: 52M s/p I&D of RUE POD 6; s/p Wound Vac Removal/I&D/Wound closure POD 2    Analgesia  DVT ppx  WBAT RUE  OR Cultures MRSA  ID recs appreciated; Transitioned to po abx per ID in anticipation of discharge  BCx No growth  PT/OT  FU Labs  Dressing change today  DC Planning  Will discuss with attending and advise if plan changes

## 2018-08-19 NOTE — PROGRESS NOTE ADULT - ATTENDING COMMENTS
pt seen  and examine see above - Cellulitis of RUE, found  to have multi loculated fluid collections consistent with abscess  within right triceps muscle , he is  s/p wide I X D by surgery/orthopedics    id dr palomino  changed to PO bactrim DS 1 tab bid x 14 days   - DC planning , needs follow up with orthopedics as out patient as cleared by ortho    - keep right arm elevated.   pt mother and  aware plan  .

## 2018-08-19 NOTE — PROGRESS NOTE ADULT - PROBLEM SELECTOR PLAN 10
DVT PPx: Lovenox  [place] SCORE: 4   Pharmacological PPx indicated  Fall Precautions  Seizure Precautions  Aspiration Precautions  Enhanced obs for prevention agitation. Per pts mom, does well with  in room. Will reassess for possible benefit of constant obs
DVT PPx: Lovenox  [place] SCORE: 4   Pharmacological PPx indicated  Fall Precautions  Seizure Precautions  Aspiration Precautions  Enhanced obs for prevention agitation. Per pts mom, does well with  in room. Will reassess for possible benefit of constant obs
DVT PPx: Lovenox  [place] SCORE: 4   Pharmacological PPx indicated  Fall Precautions  Seizure Precautions  Aspiration Precautions  Enhanced obs for prevention agitation. Per pts mom, does well with  in room.
DVT PPx: Lovenox  [place] SCORE: 4   Pharmacological PPx indicated  Fall Precautions  Seizure Precautions  Aspiration Precautions  Enhanced obs for prevention agitation. Per pts mom, does well with  in room. Will reassess for possible benefit of constant obs

## 2018-08-19 NOTE — PROGRESS NOTE ADULT - SUBJECTIVE AND OBJECTIVE BOX
Patient is a 52y old  Male who presents with a chief complaint of Right arm swelling (13 Aug 2018 12:29)      HPI:  51 y/o MRDD male with PMHx of HTN, neurogenic bladder, psoriasis, seizure, and spinal stenosis sent to the ED from [facility] for evaluation of worsening swelling and pain in RUE present for several days. Pt unsure of when swelling started, but states swelling and pain got progressively worse. History was elicited from his mother and home aide, present in ED. Pt denies any trauma, or fall, and states pain is increased with movement, and better with rest. No documented fever or chills and admitted with Cellulitis of RUE, found to have extensive abscess post surgical drain and closer of wound .    pt seen and examine today - alert awake  want to go home ,  no distress , no acute c/o .       INTERVAL HPI/OVERNIGHT EVENTS:  T(C): 36.7 (08-19-18 @ 05:13), Max: 36.8 (08-18-18 @ 14:25)  HR: 79 (08-19-18 @ 05:13) (68 - 79)  BP: 100/65 (08-19-18 @ 05:13) (100/65 - 129/86)  RR: 16 (08-19-18 @ 05:13) (16 - 16)  SpO2: 94% (08-19-18 @ 05:13) (94% - 98%)  Wt(kg): --  I&O's Summary    18 Aug 2018 07:01  -  19 Aug 2018 07:00  --------------------------------------------------------  IN: 340 mL / OUT: 0 mL / NET: 340 mL      REVIEW OF SYSTEMS:  CONSTITUTIONAL: No fever, weight loss, or fatigue  EYES: No eye pain, visual disturbances, or discharge  ENMT:  No difficulty hearing,   NECK: No pain or stiffness  RESPIRATORY: No cough, wheezing, ; No shortness of breath  CARDIOVASCULAR: No chest pain, palpitations, dizziness, or leg swelling  GASTROINTESTINAL: No abdominal or epigastric pain. No nausea, vomiting, or hematemesis; No diarrhea or constipation.   GENITOURINARY: No dysuria, frequency,   NEUROLOGICAL: No headaches, memory loss, loss of strength, numbness,   SKIN: No itching, burning, rashes, or lesions   MUSCULOSKELETAL: No joint pain or swelling; No muscle, back, or extremity pain ,        PHYSICAL EXAM:  GENERAL: NAD, well-groomed, well-developed  HEAD:  Atraumatic, Normocephalic  EYES: EOMI, PERRLA, conjunctiva and sclera clear  ENMT:  Moist mucous membranes,  No lesions  NECK: Supple,   NERVOUS SYSTEM:  Alert & Oriented X3, Good concentration; Motor Strength 5/5 B/L upper and lower extremities; DTRs 2+ intact and symmetric  CHEST/LUNG: Clear to percussion bilaterally; No rales, rhonchi, wheezing,   HEART: Regular rate and rhythm; No murmurs, no tachy   ABDOMEN: Soft, Nontender, Nondistended; Bowel sounds present  EXTREMITIES:  2+ Peripheral Pulses, No clubbing, cyanosis, or edema  SKIN: No rashes or lesions / rt  elbow surgical site intact , dry  , no redness , no swelling       MEDICATIONS  (STANDING):  ammonium lactate 12% Lotion 1 Application(s) Topical two times a day  ascorbic acid 500 milliGRAM(s) Oral two times a day  bethanechol 25 milliGRAM(s) Oral daily  citalopram 10 milliGRAM(s) Oral daily  cloNIDine 0.1 milliGRAM(s) Oral three times a day  docusate sodium 100 milliGRAM(s) Oral three times a day  ferrous    sulfate 325 milliGRAM(s) Oral three times a day with meals  folic acid 1 milliGRAM(s) Oral daily  lactobacillus acidophilus 1 Tablet(s) Oral two times a day with meals  lamoTRIgine 100 milliGRAM(s) Oral two times a day  multivitamin 1 Tablet(s) Oral daily  senna 2 Tablet(s) Oral at bedtime  tamsulosin 0.4 milliGRAM(s) Oral at bedtime  trimethoprim  160 mG/sulfamethoxazole 800 mG 1 Tablet(s) Oral two times a day    MEDICATIONS  (PRN):  acetaminophen   Tablet 650 milliGRAM(s) Oral every 6 hours PRN For Temp greater than 38 C (100.4 F)  acetaminophen   Tablet. 650 milliGRAM(s) Oral every 6 hours PRN for headache  HYDROmorphone  Injectable 0.5 milliGRAM(s) IV Push every 6 hours PRN Severe Pain (7 - 10)  ondansetron Injectable 4 milliGRAM(s) IV Push every 6 hours PRN Nausea and/or Vomiting  oxyCODONE    IR 10 milliGRAM(s) Oral every 4 hours PRN Moderate Pain (4 - 6)  oxyCODONE    IR 5 milliGRAM(s) Oral every 4 hours PRN Mild Pain (1 - 3)  polyethylene glycol 3350 17 Gram(s) Oral at bedtime PRN Constipation      LABS:                        12.0   6.61  )-----------( 335      ( 19 Aug 2018 08:30 )             35.5     08-19    139  |  101  |  20  ----------------------------<  92  4.3   |  30  |  1.30    Ca    9.6      19 Aug 2018 08:30                  08-17 @ 22:50   No growth to date.  --  --  08-17 @ 21:22   No growth to date.  --  --          RADIOLOGY & ADDITIONAL TESTS:    Imaging Personally Reviewed:   stable     Advance Directives:  full code

## 2018-08-19 NOTE — OCCUPATIONAL THERAPY INITIAL EVALUATION ADULT - ADDITIONAL COMMENTS
As per group home representative, patient was able to transfer and ambulate short distances with RW with A x 1.  PT used wheelchair as primary form of locomotion.  From [facility]  Pt with  MRDD. Able to communicate.

## 2018-08-19 NOTE — PROGRESS NOTE ADULT - SUBJECTIVE AND OBJECTIVE BOX
ID progress note     Name: DARI CIFUENTES  Age: 52y  Gender: Male  MRN: [id number]    Interval History--Events noted, s/p irrigation and secondary closure of the right arm, POD # 2  . Cleared by orthopedics for change to po antibiotics as intra op cs negative   Notes reviewed    Past Medical History--  Spinal stenosis  Psoriasis  Neurogenic bladder  Seizure  Hypertension  Mental retardation  No significant past surgical history      For details regarding the patient's social history, family history, and other miscellaneous elements, please refer the initial infectious diseases consultation and/or the admitting history and physical examination for this admission.    Allergies--  Allergies    No Known Allergies    Intolerances        Medications--  Antibiotics:  trimethoprim  160 mG/sulfamethoxazole 800 mG 1 Tablet(s) Oral two times a day    Immunologic:    Other:  acetaminophen   Tablet PRN  acetaminophen   Tablet. PRN  ammonium lactate 12% Lotion  ascorbic acid  bethanechol  citalopram  cloNIDine  docusate sodium  ferrous    sulfate  folic acid  HYDROmorphone  Injectable PRN  lactobacillus acidophilus  lamoTRIgine  multivitamin  ondansetron Injectable PRN  oxyCODONE    IR PRN  oxyCODONE    IR PRN  polyethylene glycol 3350 PRN  senna  tamsulosin      Review of Systems--  A 10-point review of systems was obtained.     Pertinent positives and negatives--  Constitutional: No fevers. No Chills. No Rigors.   Cardiovascular: No chest pain. No palpitations.  Respiratory: No shortness of breath. No cough.  Gastrointestinal: No nausea or vomiting. No diarrhea or constipation.   Psychiatric: Pleasant. Appropriate affect.    Review of systems otherwise negative except as previously noted.    Physical Examination--  Vital Signs: T(F): 98 (08-19-18 @ 05:13), Max: 98.2 (08-18-18 @ 14:25)  HR: 79 (08-19-18 @ 05:13)  BP: 100/65 (08-19-18 @ 05:13)  RR: 16 (08-19-18 @ 05:13)  SpO2: 94% (08-19-18 @ 05:13)  Wt(kg): --  General: Nontoxic-appearing Male in no acute distress.  HEENT: AT/NC. PERRL. EOMI. Anicteric. Conjunctiva pink and moist. Oropharynx clear. Dentition fair.  Neck: Not rigid. No sense of mass.  Nodes: None palpable.  Lungs: Clear bilaterally without rales, wheezing or rhonchi  Heart: Regular rate and rhythm. No Murmur. No rub. No gallop. No palpable thrill.  Abdomen: Bowel sounds present and normoactive. Soft. Nondistended. Nontender. No sense of mass. No organomegaly.  Back: No spinal tenderness. No costovertebral angle tenderness.   Extremities: No cyanosis or clubbing. No edema. Right arm - dressing in place- wound as per ortho   Skin: Warm. Dry. Good turgor. No rash. No vasculitic stigmata.  Psychiatric: Appropriate affect and mood for situation.         Laboratory Studies--  CBC                        12.0   6.61  )-----------( 335      ( 19 Aug 2018 08:30 )             35.5       Chemistries  08-19    139  |  101  |  20  ----------------------------<  92  4.3   |  30  |  1.30    Ca    9.6      19 Aug 2018 08:30    Vancomycin Level, Trough (08.18.18 @ 17:41)    Vancomycin Level, Trough: 13.2:     RECENT CULTURES    Culture - Tissue with Gram Stain (collected 17 Aug 2018 22:50)  Source: .Tissue deep right arm #1  Gram Stain (18 Aug 2018 02:09):    No polymorphonuclear cells seen per low power field    No organisms seen per oil power field  Preliminary Report (18 Aug 2018 16:33):    No growth to date.    Culture - Surgical Swab (collected 17 Aug 2018 21:22)  Source: .Surgical Swab deep right arm #2  Preliminary Report (18 Aug 2018 16:31):    No growth to date.      Assessment--  53 y/o MRDD male with PMHx of HTN, neurogenic bladder, psoriasis, seizure, and spinal stenosis admitted with fever , swelling of the right arm most at elbow, denies any trauma . he most likely has cellulitis with soft tissue infection, possibility of olecranon bursitis or septic bursitis cannot be rule out . Ct scan shows multiloculated right triceps abscesses .     He has hx of right elbow abscess with MRSA in 2014 , but MRSA screen negative . Abscess cs positive for MRSA    Ct scan of elbow - shows multi loculated fluid collections consistent with abscess  within right triceps muscle , he is  s/p wide I X D by surgery/orthopedics     Plan :   - will continue  PO bactrim DS 1 tab bid x 14 days   - DC planning , needs follow up with orthopedics as out patient   - keep right arm elevated  - monitor renal function weekly while on bactrim   - trend fevers and CBC  - needs PT as out patient         Continue with present regime .  Appropriate use of antibiotics and adverse effects reviewed.      I have discussed the above plan of care with patient/family in detail. They expressed understanding of the treatment plan . Risks, benefits and alternatives discussed in detail. I have asked if they have any questions or concerns and appropriately addressed them to the best of my ability .      > 15 minutes spent in direct patient care reviewing  the notes, lab data/ imaging , discussion with multidisciplinary team. All questions were addressed and answered to the best of my capacity .    Thank you for allowing me to participate in the care of your patient .        Grisel Chang MD  [phone]

## 2018-08-19 NOTE — PROGRESS NOTE ADULT - PROBLEM SELECTOR PROBLEM 1
Cellulitis of right upper extremity

## 2018-08-19 NOTE — PROGRESS NOTE ADULT - PROBLEM/PLAN-5
No
DISPLAY PLAN FREE TEXT

## 2018-08-19 NOTE — PROGRESS NOTE ADULT - PROBLEM SELECTOR PLAN 5
- c/w [id number] BID  - Ativan PRN
- c/w [id number] BID  - Ativan PRN
- c/w [id number] BID  - Ativan PRN
- c/w [id number] BID  - Ativan PRN
- c/w [id number] BID  - Ativan PRN
- c/w [id number] BID  - Ativan PRN
- c/w [id number] BID  - Ativan PRN
- c/w [id number] BID  - Ativan PRN
- c/w [id number] BID  - Ativan PRN
- c/w [id number] BID  - Ativan PRN

## 2018-08-19 NOTE — PROGRESS NOTE ADULT - PROBLEM SELECTOR PROBLEM 3
Neurogenic bladder

## 2018-08-19 NOTE — PROGRESS NOTE ADULT - ASSESSMENT
53 y/o MRDD male with PMHx of HTN, neurogenic bladder, psoriasis, seizure, and spinal stenosis sent to the ED from [facility] for evaluation of worsening swelling and pain in RUE,   admitted with Cellulitis of RUE, found to have extensive abscess.

## 2018-08-19 NOTE — PROGRESS NOTE ADULT - PROBLEM SELECTOR PLAN 3
- c/w Flomax and Bethanechol

## 2018-08-19 NOTE — PROGRESS NOTE ADULT - PROBLEM SELECTOR PROBLEM 4
Suprapubic tenderness

## 2018-08-20 VITALS
SYSTOLIC BLOOD PRESSURE: 100 MMHG | RESPIRATION RATE: 16 BRPM | TEMPERATURE: 98 F | HEART RATE: 70 BPM | OXYGEN SATURATION: 99 % | DIASTOLIC BLOOD PRESSURE: 67 MMHG

## 2018-08-20 LAB
-  AMPICILLIN/SULBACTAM: SIGNIFICANT CHANGE UP
-  CEFAZOLIN: SIGNIFICANT CHANGE UP
-  CLINDAMYCIN: SIGNIFICANT CHANGE UP
-  DAPTOMYCIN: SIGNIFICANT CHANGE UP
-  ERYTHROMYCIN: SIGNIFICANT CHANGE UP
-  GENTAMICIN: SIGNIFICANT CHANGE UP
-  LINEZOLID: SIGNIFICANT CHANGE UP
-  OXACILLIN: SIGNIFICANT CHANGE UP
-  PENICILLIN: SIGNIFICANT CHANGE UP
-  RIFAMPIN: SIGNIFICANT CHANGE UP
-  TETRACYCLINE: SIGNIFICANT CHANGE UP
-  TRIMETHOPRIM/SULFAMETHOXAZOLE: SIGNIFICANT CHANGE UP
-  VANCOMYCIN: SIGNIFICANT CHANGE UP
ANION GAP SERPL CALC-SCNC: 8 MMOL/L — SIGNIFICANT CHANGE UP (ref 5–17)
BUN SERPL-MCNC: 22 MG/DL — SIGNIFICANT CHANGE UP (ref 7–23)
CALCIUM SERPL-MCNC: 9.8 MG/DL — SIGNIFICANT CHANGE UP (ref 8.5–10.1)
CHLORIDE SERPL-SCNC: 101 MMOL/L — SIGNIFICANT CHANGE UP (ref 96–108)
CO2 SERPL-SCNC: 30 MMOL/L — SIGNIFICANT CHANGE UP (ref 22–31)
CREAT SERPL-MCNC: 1.5 MG/DL — HIGH (ref 0.5–1.3)
GLUCOSE SERPL-MCNC: 93 MG/DL — SIGNIFICANT CHANGE UP (ref 70–99)
METHOD TYPE: SIGNIFICANT CHANGE UP
POTASSIUM SERPL-MCNC: 4.5 MMOL/L — SIGNIFICANT CHANGE UP (ref 3.5–5.3)
POTASSIUM SERPL-SCNC: 4.5 MMOL/L — SIGNIFICANT CHANGE UP (ref 3.5–5.3)
SODIUM SERPL-SCNC: 139 MMOL/L — SIGNIFICANT CHANGE UP (ref 135–145)

## 2018-08-20 PROCEDURE — 99239 HOSP IP/OBS DSCHRG MGMT >30: CPT

## 2018-08-20 RX ORDER — HYDROCORTISONE 1 %
1 OINTMENT (GRAM) TOPICAL
Qty: 1 | Refills: 0 | OUTPATIENT
Start: 2018-08-20 | End: 2018-09-18

## 2018-08-20 RX ORDER — HYDROCORTISONE 1 %
1 OINTMENT (GRAM) TOPICAL DAILY
Qty: 0 | Refills: 0 | Status: DISCONTINUED | OUTPATIENT
Start: 2018-08-20 | End: 2018-08-20

## 2018-08-20 RX ADMIN — CITALOPRAM 10 MILLIGRAM(S): 10 TABLET, FILM COATED ORAL at 11:29

## 2018-08-20 RX ADMIN — Medication 1 APPLICATION(S): at 05:31

## 2018-08-20 RX ADMIN — Medication 325 MILLIGRAM(S): at 07:41

## 2018-08-20 RX ADMIN — Medication 100 MILLIGRAM(S): at 05:29

## 2018-08-20 RX ADMIN — Medication 1 TABLET(S): at 05:30

## 2018-08-20 RX ADMIN — Medication 1 MILLIGRAM(S): at 11:29

## 2018-08-20 RX ADMIN — Medication 500 MILLIGRAM(S): at 05:30

## 2018-08-20 RX ADMIN — Medication 25 MILLIGRAM(S): at 11:29

## 2018-08-20 RX ADMIN — LAMOTRIGINE 100 MILLIGRAM(S): 25 TABLET, ORALLY DISINTEGRATING ORAL at 05:30

## 2018-08-20 RX ADMIN — Medication 0.1 MILLIGRAM(S): at 05:29

## 2018-08-20 RX ADMIN — Medication 325 MILLIGRAM(S): at 11:45

## 2018-08-20 RX ADMIN — Medication 1 TABLET(S): at 07:41

## 2018-08-20 RX ADMIN — Medication 1 TABLET(S): at 11:29

## 2018-08-20 NOTE — PROGRESS NOTE ADULT - ASSESSMENT
A/P: 52M s/p I&D of RUE POD 6; s/p Wound Vac Removal/I&D/Wound closure POD 3    Analgesia  DVT ppx  WBAT RUE  OR Cultures MRSA  ID recs appreciated; Transitioned to po abx per ID in anticipation of discharge  BCx No growth  PT/OT  FU Labs  Aquacel Dressing change today    Patient planned for DC today    Will discuss with attending and advise if plan changes

## 2018-08-20 NOTE — PROGRESS NOTE ADULT - SUBJECTIVE AND OBJECTIVE BOX
Patient seen and examined at bedside. Reports no acute events overnight. Pain is well controlled.    PHYSICAL EXAM:    Vital Signs Last 24 Hrs  T(C): 36.6 (20 Aug 2018 05:23), Max: 36.8 (19 Aug 2018 20:03)  T(F): 97.9 (20 Aug 2018 05:23), Max: 98.2 (19 Aug 2018 20:03)  HR: 70 (20 Aug 2018 05:23) (70 - 83)  BP: 100/67 (20 Aug 2018 05:23) (100/67 - 115/-)  RR: 16 (20 Aug 2018 05:23) (16 - 17)  SpO2: 99% (20 Aug 2018 05:23) (97% - 99%)    Gen: NAD, Resting comfortably  RUE: ACE bandage in place; Saturated/intact  SILT C5-T1  +AIN/PIN/Ulnar/Median/Radial  +2/4 Radial pulse  Painless ROM of Wrist and fingers  Compartments soft and compressible

## 2018-08-20 NOTE — PROGRESS NOTE ADULT - PROVIDER SPECIALTY LIST ADULT
Anesthesia
Anesthesia
Hospitalist
Infectious Disease
Orthopedics
Surgery
Infectious Disease
Hospitalist

## 2018-08-22 LAB
CULTURE RESULTS: SIGNIFICANT CHANGE UP
ORGANISM # SPEC MICROSCOPIC CNT: SIGNIFICANT CHANGE UP
ORGANISM # SPEC MICROSCOPIC CNT: SIGNIFICANT CHANGE UP
SPECIMEN SOURCE: SIGNIFICANT CHANGE UP

## 2018-09-14 ENCOUNTER — MEDICATION RENEWAL (OUTPATIENT)
Age: 53
End: 2018-09-14

## 2018-10-24 PROCEDURE — 85730 THROMBOPLASTIN TIME PARTIAL: CPT

## 2018-10-24 PROCEDURE — C1889: CPT

## 2018-10-24 PROCEDURE — 73201 CT UPPER EXTREMITY W/DYE: CPT

## 2018-10-24 PROCEDURE — 86850 RBC ANTIBODY SCREEN: CPT

## 2018-10-24 PROCEDURE — 80202 ASSAY OF VANCOMYCIN: CPT

## 2018-10-24 PROCEDURE — 86900 BLOOD TYPING SEROLOGIC ABO: CPT

## 2018-10-24 PROCEDURE — 87070 CULTURE OTHR SPECIMN AEROBIC: CPT

## 2018-10-24 PROCEDURE — 97530 THERAPEUTIC ACTIVITIES: CPT

## 2018-10-24 PROCEDURE — 73080 X-RAY EXAM OF ELBOW: CPT

## 2018-10-24 PROCEDURE — 97162 PT EVAL MOD COMPLEX 30 MIN: CPT

## 2018-10-24 PROCEDURE — 85027 COMPLETE CBC AUTOMATED: CPT

## 2018-10-24 PROCEDURE — 73030 X-RAY EXAM OF SHOULDER: CPT

## 2018-10-24 PROCEDURE — 87086 URINE CULTURE/COLONY COUNT: CPT

## 2018-10-24 PROCEDURE — 93005 ELECTROCARDIOGRAM TRACING: CPT

## 2018-10-24 PROCEDURE — 85610 PROTHROMBIN TIME: CPT

## 2018-10-24 PROCEDURE — 81001 URINALYSIS AUTO W/SCOPE: CPT

## 2018-10-24 PROCEDURE — 93971 EXTREMITY STUDY: CPT

## 2018-10-24 PROCEDURE — 97165 OT EVAL LOW COMPLEX 30 MIN: CPT

## 2018-10-24 PROCEDURE — 86140 C-REACTIVE PROTEIN: CPT

## 2018-10-24 PROCEDURE — 84145 PROCALCITONIN (PCT): CPT

## 2018-10-24 PROCEDURE — 80048 BASIC METABOLIC PNL TOTAL CA: CPT

## 2018-10-24 PROCEDURE — 86901 BLOOD TYPING SEROLOGIC RH(D): CPT

## 2018-10-24 PROCEDURE — 99285 EMERGENCY DEPT VISIT HI MDM: CPT | Mod: 25

## 2018-10-24 PROCEDURE — 87641 MR-STAPH DNA AMP PROBE: CPT

## 2018-10-24 PROCEDURE — 87186 SC STD MICRODIL/AGAR DIL: CPT

## 2018-10-24 PROCEDURE — 71045 X-RAY EXAM CHEST 1 VIEW: CPT

## 2018-10-24 PROCEDURE — 87075 CULTR BACTERIA EXCEPT BLOOD: CPT

## 2018-10-24 PROCEDURE — 73060 X-RAY EXAM OF HUMERUS: CPT

## 2018-10-24 PROCEDURE — 87640 STAPH A DNA AMP PROBE: CPT

## 2018-10-24 PROCEDURE — 87040 BLOOD CULTURE FOR BACTERIA: CPT

## 2018-10-24 PROCEDURE — 97110 THERAPEUTIC EXERCISES: CPT

## 2018-10-24 PROCEDURE — 80053 COMPREHEN METABOLIC PANEL: CPT

## 2018-10-24 PROCEDURE — 83605 ASSAY OF LACTIC ACID: CPT

## 2018-10-24 PROCEDURE — 85652 RBC SED RATE AUTOMATED: CPT

## 2019-01-13 ENCOUNTER — TRANSCRIPTION ENCOUNTER (OUTPATIENT)
Age: 54
End: 2019-01-13

## 2019-01-14 ENCOUNTER — APPOINTMENT (OUTPATIENT)
Dept: ORTHOPEDIC SURGERY | Facility: CLINIC | Age: 54
End: 2019-01-14
Payer: MEDICARE

## 2019-01-14 VITALS
BODY MASS INDEX: 21.92 KG/M2 | WEIGHT: 148 LBS | DIASTOLIC BLOOD PRESSURE: 86 MMHG | HEART RATE: 96 BPM | SYSTOLIC BLOOD PRESSURE: 125 MMHG | HEIGHT: 69 IN

## 2019-01-14 DIAGNOSIS — M25.572 PAIN IN LEFT ANKLE AND JOINTS OF LEFT FOOT: ICD-10-CM

## 2019-01-14 PROCEDURE — 99204 OFFICE O/P NEW MOD 45 MIN: CPT

## 2019-01-14 NOTE — PHYSICAL EXAM
[de-identified] : General: Alert and oriented x3. In no acute distress. Pleasant in nature with a normal affect. No apparent respiratory distress.\par \par L Ankle Exam \par \par Skin: Clean, dry, intact\par Inspection: No obvious malalignment, no swelling, no effusion; no lymphadenopathy, anterior tibia redness, no open wounds or ulcerations, hammer toe to all toes, cavus foot\par Pulses: 2+ DP/PT pulses\par ROM: Left: Limited motion exam \par Tenderness: medial swelling but no pain, but no tenderness+Calf pain. No tenderness over the lateral malleolus, no CFL/ATFL/PTFL pain. No medial malleolus pain, no deltoid ligament pain. No proximal fibular pain. No heel pain.\par Stability: Negative anterior/posterior drawer.\par Strength: 5/5 TA/GS/EHL\par Neuro: In tact to light touch throughout\par Additional tests: Negative Mortons test, Negative syndesmosis squeeze test.\par \par \par \par \par   [de-identified] : Xray of left ankle obtained from outside facility on 1/8/18 and reviewed by me today, 01/14/2019, revealed:  old fx of medial malleolus with displacement, stable mortise\par \par \par \par

## 2019-01-14 NOTE — ADDENDUM
[FreeTextEntry1] : I, Sy Pascual, acted solely as a scribe for Dr. Christiano Jean on this date 01/14/2019  .\par  \par All medical record entries made by the Scribe were at my, Dr. Christiano Jean, direction and personally dictated by me on 01/14/2019 . I have reviewed the chart and agree that the record accurately reflects my personal performance of the history, physical exam, assessment and plan. I have also personally directed, reviewed, and agreed with the chart.\par \par \par

## 2019-01-14 NOTE — HISTORY OF PRESENT ILLNESS
[FreeTextEntry1] : 53 year year old male presenting with left ankle pain. The patient’s pain is noted to be a 0/10. The patient's pain began on 1/6/19 when he fell from his wheel chair.  He is currently taking no pain medication. The patient is ambulating in a wheel chair. The patient has cerebral palsy. He is accompanied by an aid. No other complaints at this time. \par  The patient had a behavioral issue which resulted in concern for the ankle.  the staff would like to ensure the patient may participate in program. Outside x-rays were obtained from January 8, 2019

## 2019-01-14 NOTE — CONSULT LETTER
[Consult Letter:] : I had the pleasure of evaluating your patient, [unfilled]. [Please see my note below.] : Please see my note below. [Consult Closing:] : Thank you very much for allowing me to participate in the care of this patient.  If you have any questions, please do not hesitate to contact me. [Sincerely,] : Sincerely, [FreeTextEntry3] : Christiano Jean

## 2019-01-14 NOTE — DISCUSSION/SUMMARY
[de-identified] : Today I had a lengthy discussion with the patient regarding their left ankle pain.I have addressed all the patient's concerns surrounding the pathology of their condition. The patient is clear to participate in his program. There is no indication for any further orthopedic intervention. I would like to see the pt back in the office PRN.The patient understood and verbally agreed to the treatment plan. All of their questions were answered and they were satisfied with the visit. The patient should call the office if they have any questions or experience worsening symptoms. \par

## 2019-03-05 ENCOUNTER — TRANSCRIPTION ENCOUNTER (OUTPATIENT)
Age: 54
End: 2019-03-05

## 2019-03-11 ENCOUNTER — MEDICATION RENEWAL (OUTPATIENT)
Age: 54
End: 2019-03-11

## 2019-05-09 ENCOUNTER — EMERGENCY (EMERGENCY)
Facility: HOSPITAL | Age: 54
LOS: 0 days | Discharge: HOME | End: 2019-05-09
Attending: EMERGENCY MEDICINE | Admitting: EMERGENCY MEDICINE
Payer: MEDICARE

## 2019-05-09 VITALS
RESPIRATION RATE: 18 BRPM | TEMPERATURE: 98 F | DIASTOLIC BLOOD PRESSURE: 89 MMHG | OXYGEN SATURATION: 98 % | HEART RATE: 80 BPM | SYSTOLIC BLOOD PRESSURE: 136 MMHG

## 2019-05-09 VITALS — HEIGHT: 69 IN | WEIGHT: 169.98 LBS

## 2019-05-09 DIAGNOSIS — M79.671 PAIN IN RIGHT FOOT: ICD-10-CM

## 2019-05-09 DIAGNOSIS — G40.909 EPILEPSY, UNSPECIFIED, NOT INTRACTABLE, WITHOUT STATUS EPILEPTICUS: ICD-10-CM

## 2019-05-09 DIAGNOSIS — M50.20 OTHER CERVICAL DISC DISPLACEMENT, UNSPECIFIED CERVICAL REGION: ICD-10-CM

## 2019-05-09 DIAGNOSIS — R29.6 REPEATED FALLS: ICD-10-CM

## 2019-05-09 DIAGNOSIS — M51.26 OTHER INTERVERTEBRAL DISC DISPLACEMENT, LUMBAR REGION: ICD-10-CM

## 2019-05-09 DIAGNOSIS — F70 MILD INTELLECTUAL DISABILITIES: ICD-10-CM

## 2019-05-09 DIAGNOSIS — Z87.81 PERSONAL HISTORY OF (HEALED) TRAUMATIC FRACTURE: ICD-10-CM
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
## 2019-05-09 DIAGNOSIS — M79.661 PAIN IN RIGHT LOWER LEG: ICD-10-CM
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
## 2019-05-09 PROCEDURE — 93971 EXTREMITY STUDY: CPT | Mod: 26,RT

## 2019-05-09 PROCEDURE — 99284 EMERGENCY DEPT VISIT MOD MDM: CPT

## 2019-05-09 PROCEDURE — 73590 X-RAY EXAM OF LOWER LEG: CPT | Mod: 26,RT

## 2019-05-09 PROCEDURE — 73630 X-RAY EXAM OF FOOT: CPT | Mod: 26,RT

## 2019-05-09 PROCEDURE — 73610 X-RAY EXAM OF ANKLE: CPT | Mod: 26,RT

## 2019-05-09 RX ORDER — ACETAMINOPHEN 500 MG
650 TABLET ORAL ONCE
Refills: 0 | Status: COMPLETED | OUTPATIENT
Start: 2019-05-09 | End: 2019-05-09

## 2019-05-09 RX ADMIN — Medication 650 MILLIGRAM(S): at 11:18

## 2019-05-09 NOTE — ED ADULT NURSE NOTE - OBJECTIVE STATEMENT
pt brought to ED for evaluation of fall that happened 4 days ago. pt c/o right lower leg pain. Suspected deformity right lower leg; unknown baseline leg appearance. Pt in wheelchair secondary to spinal disease.

## 2019-05-09 NOTE — ED STATDOCS - ATTENDING CONTRIBUTION TO CARE
I, Erinn Nance MD, personally saw the patient with ACP.  I have personally performed a face to face diagnostic evaluation on this patient.  I have reviewed the ACP note and agree with the history, exam, and plan of care, except as noted.

## 2019-05-09 NOTE — ED STATDOCS - PROGRESS NOTE DETAILS
Patient intially seen and evaluated with ED attending at intake.  Xray with old tib fib fracture, caregivers state this occurred in February and they are aware.  sono results pending -Denisha Portillo PA-C doppler negative.  [place] symptom care and orthopedic follow up for his healing fracture -Denisha Portillo PA-C

## 2019-05-09 NOTE — ED STATDOCS - CLINICAL SUMMARY MEDICAL DECISION MAKING FREE TEXT BOX
52 y/o male with right foot and ankle TTP, will get US, xray and reassess. 52 y/o male with right foot and ankle TTP, will get US, xray and reassess.    Workup with no acute findings, healing fracture is known, he will follow up with orthopedics

## 2019-05-09 NOTE — ED STATDOCS - OBJECTIVE STATEMENT
52 y/o male with a PMHx of mild MR, psoriasis, nocturia, spinal stenosis, seizures, neurogenic bladder, cervical lumbar herniation presents to the ED c/o right foot pain, notes he struck his foot. As per aide, pt has been c/o right leg pain x2 days, was given Tylenol with no relief. Aide also notes pt has frequent falls out of bed.

## 2019-05-09 NOTE — ED ADULT TRIAGE NOTE - CHIEF COMPLAINT QUOTE
c/o right leg pain started yesterday, pt is from group home, group home staff denies acute injury to right leg, denies redness or swelling to right leg, took tylenol yesterday at 3pm with no relief in symptoms HX: mild MR, psoriasis, nocturia, spinal stenosis, seizures, neurogenic bladder, cervical lumbar herniation

## 2019-06-01 ENCOUNTER — TRANSCRIPTION ENCOUNTER (OUTPATIENT)
Age: 54
End: 2019-06-01

## 2019-06-03 ENCOUNTER — EMERGENCY (EMERGENCY)
Facility: HOSPITAL | Age: 54
LOS: 0 days | Discharge: HOME | End: 2019-06-03
Attending: EMERGENCY MEDICINE | Admitting: EMERGENCY MEDICINE
Payer: MEDICARE

## 2019-06-03 VITALS
DIASTOLIC BLOOD PRESSURE: 91 MMHG | OXYGEN SATURATION: 99 % | RESPIRATION RATE: 18 BRPM | HEART RATE: 71 BPM | SYSTOLIC BLOOD PRESSURE: 120 MMHG | TEMPERATURE: 98 F

## 2019-06-03 VITALS — HEIGHT: 70 IN | WEIGHT: 169.09 LBS
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
## 2019-06-03 DIAGNOSIS — I10 ESSENTIAL (PRIMARY) HYPERTENSION: ICD-10-CM

## 2019-06-03 DIAGNOSIS — M25.571 PAIN IN RIGHT ANKLE AND JOINTS OF RIGHT FOOT: ICD-10-CM

## 2019-06-03 DIAGNOSIS — G40.909 EPILEPSY, UNSPECIFIED, NOT INTRACTABLE, WITHOUT STATUS EPILEPTICUS: ICD-10-CM
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
## 2019-06-03 DIAGNOSIS — F79 UNSPECIFIED INTELLECTUAL DISABILITIES: ICD-10-CM

## 2019-06-03 PROCEDURE — 99283 EMERGENCY DEPT VISIT LOW MDM: CPT

## 2019-06-03 PROCEDURE — 73610 X-RAY EXAM OF ANKLE: CPT | Mod: 26,RT

## 2019-06-03 NOTE — ED STATDOCS - OBJECTIVE STATEMENT
52 y/o M with a PMHx of HTN, mental retardation, spinal stenosis, seizures presenting to the ED from group home c/o ankle pain x 4 days ago.  Patient reports that pain started when he fell out of his bed, and is complaining of R leg ankle pain. Patient is non ambulatory, and uses wheelchair.   Patient denies any other pain, fever or chills. Patient has no other complaints at this time. NKDA

## 2019-06-03 NOTE — ED ADULT NURSE NOTE - NSIMPLEMENTINTERV_GEN_ALL_ED
Implemented All Fall Risk Interventions:  [place] to call system. Call bell, personal items and telephone within reach. Instruct patient to call for assistance. Room bathroom lighting operational. Non-slip footwear when patient is off stretcher. Physically safe environment: no spills, clutter or unnecessary equipment. Stretcher in lowest position, wheels locked, appropriate side rails in place. Provide visual cue, wrist band, yellow gown, etc. Monitor gait and stability. Monitor for mental status changes and reorient to person, place, and time. Review medications for side effects contributing to fall risk. Reinforce activity limits and safety measures with patient and family.

## 2019-06-03 NOTE — ED ADULT TRIAGE NOTE - CHIEF COMPLAINT QUOTE
swollen right ankle   HX: mild MR, psoriasis, nocturia, spinal stenosis, seizures, neurogenic bladder, cervical lumbar herniation swollen right ankle worsening since this morning, right ankle pain. no injury  HX: mild MR, psoriasis, nocturia, spinal stenosis, seizures, neurogenic bladder, cervical lumbar herniation

## 2019-06-03 NOTE — ED STATDOCS - MUSCULOSKELETAL, MLM
range of motion is not limited and there is no muscle tenderness. +Right ankle swelling and TTP laterally and medially. patient is non ambulatory and is in wheelchair. range of motion is not limited and there is no muscle tenderness. +Right ankle slight swelling and TTP laterally and medially. patient is non ambulatory and is in wheelchair.

## 2019-06-03 NOTE — ED STATDOCS - PMH
Hypertension    Mental retardation    Neurogenic bladder    Neurogenic bladder    Psoriasis    Seizure    Seizure disorder    Spinal stenosis    Spinal stenosis

## 2019-06-03 NOTE — ED ADULT NURSE NOTE - OBJECTIVE STATEMENT
swollen right ankle worsening since this morning, right ankle pain. no injury  HX: mild MR, psoriasis, nocturia, spinal stenosis, seizures, neurogenic bladder, cervical lumbar herniation  pt sent directly to xray and discharged when came back. pt came from facility with care takers  no trauma or fall, no abnormality seen

## 2019-06-03 NOTE — ED ADULT NURSE NOTE - CHIEF COMPLAINT QUOTE
swollen right ankle worsening since this morning, right ankle pain. no injury  HX: mild MR, psoriasis, nocturia, spinal stenosis, seizures, neurogenic bladder, cervical lumbar herniation

## 2019-06-03 NOTE — ED STATDOCS - CARE PROVIDER_API CALL
Christiano Jean (DO)  Orthopaedic Surgery  [address] [place] [address]  Phone: [phone]  Fax: [phone]  Follow Up Time:

## 2019-06-04 PROBLEM — M48.00 SPINAL STENOSIS, SITE UNSPECIFIED: Chronic | Status: ACTIVE | Noted: 2019-05-09

## 2019-06-04 PROBLEM — N31.9 NEUROMUSCULAR DYSFUNCTION OF BLADDER, UNSPECIFIED: Chronic | Status: ACTIVE | Noted: 2019-05-09

## 2019-06-04 PROBLEM — G40.909 EPILEPSY, UNSPECIFIED, NOT INTRACTABLE, WITHOUT STATUS EPILEPTICUS: Chronic | Status: ACTIVE | Noted: 2019-05-09

## 2019-06-12 ENCOUNTER — APPOINTMENT (OUTPATIENT)
Age: 54
End: 2019-06-12
Payer: MEDICARE

## 2019-06-12 VITALS
HEIGHT: 69 IN | HEART RATE: 83 BPM | WEIGHT: 160 LBS | OXYGEN SATURATION: 97 % | BODY MASS INDEX: 23.7 KG/M2 | DIASTOLIC BLOOD PRESSURE: 70 MMHG | SYSTOLIC BLOOD PRESSURE: 124 MMHG

## 2019-06-12 DIAGNOSIS — N31.2 FLACCID NEUROPATHIC BLADDER, NOT ELSEWHERE CLASSIFIED: ICD-10-CM

## 2019-06-12 DIAGNOSIS — R32 UNSPECIFIED URINARY INCONTINENCE: ICD-10-CM

## 2019-06-12 PROCEDURE — 99213 OFFICE O/P EST LOW 20 MIN: CPT

## 2019-06-12 NOTE — HISTORY OF PRESENT ILLNESS
[FreeTextEntry1] : This patient seems to have developed enuresis since last seen. He is wheelchair bound and appears to be doing well from a other urologic standpoint for

## 2019-06-12 NOTE — END OF VISIT
[FreeTextEntry3] : This may be an age-related phenomenon and the aid was advised that he should cut fluids around dinnertime each night. His labs were ordered and I prescribed oxybutynin 5 mg h.s. as well

## 2019-06-24 ENCOUNTER — RX RENEWAL (OUTPATIENT)
Age: 54
End: 2019-06-24

## 2019-07-01 ENCOUNTER — TRANSCRIPTION ENCOUNTER (OUTPATIENT)
Age: 54
End: 2019-07-01

## 2019-09-24 NOTE — ED ADULT NURSE NOTE - CCCP TRG CHIEF CMPLNT
pain, lower leg O-T Plasty Text: The defect edges were debeveled with a #15 scalpel blade.  Given the location of the defect, shape of the defect and the proximity to free margins an O-T plasty was deemed most appropriate.  Using a sterile surgical marker, an appropriate O-T plasty was drawn incorporating the defect and placing the expected incisions within the relaxed skin tension lines where possible.    The area thus outlined was incised deep to adipose tissue with a #15 scalpel blade.  The skin margins were undermined to an appropriate distance in all directions utilizing iris scissors.

## 2019-10-01 ENCOUNTER — MEDICATION RENEWAL (OUTPATIENT)
Age: 54
End: 2019-10-01

## 2019-12-04 ENCOUNTER — TRANSCRIPTION ENCOUNTER (OUTPATIENT)
Age: 54
End: 2019-12-04

## 2019-12-06 ENCOUNTER — APPOINTMENT (OUTPATIENT)
Dept: ORTHOPEDIC SURGERY | Facility: CLINIC | Age: 54
End: 2019-12-06
Payer: MEDICARE

## 2019-12-06 DIAGNOSIS — S63.92XD SPRAIN OF UNSPECIFIED PART OF LEFT WRIST AND HAND, SUBSEQUENT ENCOUNTER: ICD-10-CM

## 2019-12-06 PROCEDURE — 99215 OFFICE O/P EST HI 40 MIN: CPT

## 2019-12-06 NOTE — HISTORY OF PRESENT ILLNESS
[FreeTextEntry1] : the patient is a 53-year-old male who resides in a  group home.  he states that he had a fall from his wheelchair on November 27 and had pain and swelling in the thumb and index finger of the left hand. He was seen at an urgent care where x-rays revealed subluxation of the thumb MCP joint as well as a age indeterminate fracture/general joint changes of the second metacarpal head. He complains of pain and swelling in the index and thumb which is improved with immobilization in the splint.

## 2020-01-03 ENCOUNTER — APPOINTMENT (OUTPATIENT)
Dept: ORTHOPEDIC SURGERY | Facility: CLINIC | Age: 55
End: 2020-01-03

## 2020-01-09 ENCOUNTER — MEDICATION RENEWAL (OUTPATIENT)
Age: 55
End: 2020-01-09

## 2020-01-21 ENCOUNTER — EMERGENCY (EMERGENCY)
Facility: HOSPITAL | Age: 55
LOS: 1 days | Discharge: HOME | End: 2020-01-21
Attending: EMERGENCY MEDICINE | Admitting: EMERGENCY MEDICINE
Payer: MEDICARE

## 2020-01-21 VITALS
DIASTOLIC BLOOD PRESSURE: 82 MMHG | TEMPERATURE: 98 F | OXYGEN SATURATION: 97 % | RESPIRATION RATE: 14 BRPM | HEART RATE: 80 BPM | SYSTOLIC BLOOD PRESSURE: 124 MMHG

## 2020-01-21 VITALS
HEIGHT: 68 IN | SYSTOLIC BLOOD PRESSURE: 125 MMHG | DIASTOLIC BLOOD PRESSURE: 80 MMHG | HEART RATE: 85 BPM | WEIGHT: 169.98 LBS | TEMPERATURE: 98 F | RESPIRATION RATE: 15 BRPM | OXYGEN SATURATION: 97 %

## 2020-01-21 PROCEDURE — 70450 CT HEAD/BRAIN W/O DYE: CPT | Mod: 26

## 2020-01-21 PROCEDURE — 99284 EMERGENCY DEPT VISIT MOD MDM: CPT | Mod: 25

## 2020-01-21 PROCEDURE — 70450 CT HEAD/BRAIN W/O DYE: CPT

## 2020-01-21 PROCEDURE — 12013 RPR F/E/E/N/L/M 2.6-5.0 CM: CPT

## 2020-01-21 PROCEDURE — 99283 EMERGENCY DEPT VISIT LOW MDM: CPT

## 2020-01-21 NOTE — ED PROVIDER NOTE - CLINICAL SUMMARY MEDICAL DECISION MAKING FREE TEXT BOX
"Occupational Therapy   Treatment    Name: Ciro Chandler  MRN: [id number]  Admitting Diagnosis:  Nontraumatic subcortical hemorrhage of right cerebral hemisphere       Recommendations:     Discharge Recommendations: nursing facility, skilled  Discharge Equipment Recommendations:  hospital bed, lift device, wheelchair  Barriers to discharge:  None    Subjective     Communicated with:   Elizabeth prior to session.  Pain/Comfort:  · Pain Rating 1: 0/10    Patients cultural, spiritual, Church conflicts given the current situation: none    Objective:     Patient found with: telemetry, pressure relief boots, SCD, oxygen    General Precautions: Standard, aspiration, fall   Orthopedic Precautions:N/A   Braces: N/A     Occupational Performance:    Bed Mobility:    · Total assist for repositioning.    Functional Mobility/Transfers:  · Functional Mobility: Deferred on this date due to pt not able to stay awake. Also, giving no effort with ROM.     Activities of Daily Living:  · Feeding:  dependence    · Grooming: dependence    · Bathing: dependence    · Upper Body Dressing: dependence    · Lower Body Dressing: dependence    · Toileting: dependence      Patient left right sidelying with all lines intact and call button in reach    [facility] 6 Click:  [facility] Total Score: 6    Treatment & Education:  · Education:  · Pt found positioned very well with heel protector boots and SCDs. He was sleeping but with sternal rub and consistency he was able to communicate with therapist. I asked him if I would be okay to help him stretch out his arms and legs. Pt replied, "That would be miguel."  Pt difficult to keep awake and kept eyes closed 100% of session despite max effort to increase alertness level.   · Provided UE and LE PROM, all joints and all major planes 1x10. Pt did moan when flexing L hip.  · Pt observed with spontaneous movement in B UE. No LE spontaneous movement observed.  · RN reports that he needs total assist for feeding and she " will be helping him.     Assessment:     Ciro Chandler is a 69 y.o. male with a medical diagnosis of Nontraumatic subcortical hemorrhage of right cerebral hemisphere.  He presents with decline in ADLs and functional mobility. Pt would benefit from skilled OT services in order to maximize independence with ADLs and facilitate safe discharge..  Performance deficits affecting function are weakness, impaired endurance, impaired self care skills, impaired functional mobilty, decreased coordination, decreased safety awareness, decreased lower extremity function, decreased upper extremity function.      Rehab Prognosis:  Fair, pending improvement in alertness level; patient would benefit from acute skilled OT services to address these deficits and reach maximum level of function.       Plan:     Patient to be seen 3 x/week to address the above listed problems via self-care/home management, therapeutic activities, therapeutic exercises, neuromuscular re-education  · Plan of Care Expires: 08/24/18  · Plan of Care Reviewed with: patient, family    This Plan of care has been discussed with the patient who was involved in its development and understands and is in agreement with the identified goals and treatment plan    GOALS:    Occupational Therapy Goals        Problem: Occupational Therapy Goal    Goal Priority Disciplines Outcome Interventions   Occupational Therapy Goal     OT, PT/OT Ongoing (interventions implemented as appropriate)    Description:  Goals to be met by: 8/12/18     Patient will increase functional independence with ADLs by performing:    UE Dressing with Moderate Assistance.  LE Dressing with Max Assistance.  Grooming while seated with Min Assistance.  Toileting from bedside commode with Moderate Assistance for hygiene and clothing management.   Rolling to Bilateral with Moderate Assistance.   Supine to sit with Moderate Assistance.  Stand pivot transfers with Moderate Assistance.                         Time Tracking:     OT Date of Treatment: 08/08/18  OT Start Time: 1657  OT Stop Time: 1710  OT Total Time (min): 13 min    Billable Minutes:Therapeutic Exercise 13    ZOE Guan  8/8/2018     s/p fall out of wheelchair, eyebrow laceration repaired, CT head negative

## 2020-01-21 NOTE — ED ADULT NURSE NOTE - CHIEF COMPLAINT QUOTE
from [facility]   while at day  program fell and hit his head has laceration to R side of forhead

## 2020-01-21 NOTE — ED ADULT NURSE NOTE - OBJECTIVE STATEMENT
Received the patient in the Er. Patient is alert and responsive. Patient is non verbal. S/P get ou of wheel chair and fell. Sustained a laceration to right side forehead.

## 2020-01-21 NOTE — ED PROVIDER NOTE - OBJECTIVE STATEMENT
55 yo male MRDD wheelchair bound s/p fall out of wheelchair hit right side head this morning, no loc, mild headache, no nausea/vomiting, no blurry vision. 53 yo male MRDD wheelchair bound s/p fall out of wheelchair hit right side head this morning, no loc, mild headache, no nausea/vomiting, no blurry vision. No other complaints,  +right eyebrow laceration.

## 2020-01-21 NOTE — ED PROVIDER NOTE - PHYSICAL EXAMINATION
Gen: Alert, NAD  Head/eyes: NC/AT, PERRL, EOMI, normal lids/conjunctiva  ENT: airway patent  Neck: supple, no tenderness/meningismus/JVD, Trachea midline  Pulm/lung: Bilateral clear BS, normal resp effort, no wheeze/stridor/retractions  CV/heart: RRR, no M/R/G, +2 dist pulses (radial, pedal DP/PT, popliteal)  GI/Abd: soft, NT/ND, +BS, no guarding/rebound tenderness  Musculoskeletal: no edema/erythema/cyanosis, FROM in all extremities, no C/T/L spine ttp  Skin: no rash, no vesicles, no petechaie, no ecchymosis, no swelling  Neuro: AAOx3, CN 2-12 intact, normal sensation, 5/5 motor strength in all extremities, normal gait, no dysmetria Gen: Alert, NAD  Head/eyes: NC/AT, PERRL, EOMI, normal lids/conjunctiva  ENT: airway patent  Neck: supple, no tenderness/meningismus/JVD, Trachea midline  Pulm/lung: Bilateral clear BS, normal resp effort, no wheeze/stridor/retractions  CV/heart: RRR, no M/R/G, +2 dist pulses (radial, pedal DP/PT, popliteal)  GI/Abd: soft, NT/ND, +BS, no guarding/rebound tenderness  Musculoskeletal: no edema/erythema/cyanosis, FROM in all extremities, no C/T/L spine ttp  Skin: +right lateral eyebrow laceration 4cm  Neuro: AAOx3, CN 2-12 intact, normal sensation, 5/5 motor strength in all extremities, normal gait, no dysmetria

## 2020-01-21 NOTE — ED ADULT NURSE NOTE - NSIMPLEMENTINTERV_GEN_ALL_ED
Implemented All Fall with Harm Risk Interventions:  [place] to call system. Call bell, personal items and telephone within reach. Instruct patient to call for assistance. Room bathroom lighting operational. Non-slip footwear when patient is off stretcher. Physically safe environment: no spills, clutter or unnecessary equipment. Stretcher in lowest position, wheels locked, appropriate side rails in place. Provide visual cue, wrist band, yellow gown, etc. Monitor gait and stability. Monitor for mental status changes and reorient to person, place, and time. Review medications for side effects contributing to fall risk. Reinforce activity limits and safety measures with patient and family. Provide visual clues: red socks.

## 2020-01-21 NOTE — ED PROVIDER NOTE - NS ED ROS FT

## 2020-01-21 NOTE — ED PROVIDER NOTE - NSFOLLOWUPINSTRUCTIONS_ED_ALL_ED_FT
1) Follow-up with your Primary Medical Doctor or referred doctor. Call today / next business day for prompt follow-up.  2) Return to Emergency room for any worsening or persistent pain, weakness, fever, or any other concerning symptoms.  3) See attached instruction sheets for additional information, including information regarding signs and symptoms to look out for, reasons to seek immediate care and other important instructions.  4) You may return to day program without any restrictions 1) Return in 7-10 days for suture removal  2) Return to Emergency room for any worsening or persistent pain, weakness, fever, or any other concerning symptoms.  3) See attached instruction sheets for additional information, including information regarding signs and symptoms to look out for, reasons to seek immediate care and other important instructions.  4) You may return to day program without any restrictions

## 2020-01-21 NOTE — ED ADULT NURSE NOTE - MUSCULOSKELETAL ASSESSMENT
MD's called per Dr. Weber to get a hold of [facility] to see if they have ICU beds available @ this time. Currently on hold @ this time.      Jesús Pierre  01/11/18 1235     - - -

## 2020-01-21 NOTE — ED PROVIDER NOTE - CARE PLAN
Principal Discharge DX:	Eyebrow laceration, right, initial encounter  Secondary Diagnosis:	Injury of head, initial encounter

## 2020-01-30 ENCOUNTER — EMERGENCY (EMERGENCY)
Facility: HOSPITAL | Age: 55
LOS: 1 days | Discharge: HOME | End: 2020-01-30
Attending: EMERGENCY MEDICINE | Admitting: EMERGENCY MEDICINE
Payer: MEDICARE

## 2020-01-30 VITALS
TEMPERATURE: 97 F | OXYGEN SATURATION: 95 % | WEIGHT: 151.9 LBS | DIASTOLIC BLOOD PRESSURE: 88 MMHG | HEIGHT: 68 IN | HEART RATE: 90 BPM | RESPIRATION RATE: 15 BRPM | SYSTOLIC BLOOD PRESSURE: 127 MMHG

## 2020-01-30 PROCEDURE — G0463: CPT

## 2020-01-30 PROCEDURE — L9995: CPT

## 2020-01-30 NOTE — ED ADULT NURSE NOTE - CHPI ED NUR SYMPTOMS NEG
no fever/no pain/no bleeding at site/no drainage/no inflammation/no purulent drainage/no rectal pain/no redness/no chills/no bleeding

## 2020-01-30 NOTE — ED PROVIDER NOTE - PATIENT PORTAL LINK FT
You can access the FollowMyHealth Patient Portal offered by [facility] by registering at the following website: [email or web address]. By joining [employer]’s FollowMyHealth portal, you will also be able to view your health information using other applications (apps) compatible with our system.

## 2020-01-30 NOTE — ED ADULT NURSE NOTE - NS ED NURSE LEVEL OF CONSCIOUSNESS ORIENTATION
Age appropriate behavior Other postprocedural status  H/O hernia repair  Other postprocedural status  H/O prostatectomy

## 2020-01-30 NOTE — ED ADULT NURSE NOTE - NSIMPLEMENTINTERV_GEN_ALL_ED
Implemented All Fall with Harm Risk Interventions:  [place] to call system. Call bell, personal items and telephone within reach. Instruct patient to call for assistance. Room bathroom lighting operational. Non-slip footwear when patient is off stretcher. Physically safe environment: no spills, clutter or unnecessary equipment. Stretcher in lowest position, wheels locked, appropriate side rails in place. Provide visual cue, wrist band, yellow gown, etc. Monitor gait and stability. Monitor for mental status changes and reorient to person, place, and time. Review medications for side effects contributing to fall risk. Reinforce activity limits and safety measures with patient and family. Provide visual clues: red socks.

## 2020-01-30 NOTE — ED PROVIDER NOTE - OBJECTIVE STATEMENT
pt bib aide for suture removal s/p right eyebrow lac repair on 1/21. no fevers, chills, vomiting, wound erythema or d/c

## 2020-01-30 NOTE — ED PROCEDURE NOTE - CPROC ED INFORMED CONSENT1
Tanisha Gary   MRN: [id number]    Department:  [address] [facility] in [place]   Date of Visit:  11/6/2019           Disclosure     Insurance plans vary and the physician(s) referred by the ER may not be covered by your plan.  Please contac tell this physician (or your personal doctor if your instructions are to return to your personal doctor) about any new or lasting problems. The primary care or specialist physician will see patients referred from the [facility] Emergency Department.  Suzanne Bell Benefits, risks, and possible complications of procedure explained to patient/caregiver who verbalized understanding and gave verbal consent.

## 2020-03-11 NOTE — ED PROVIDER NOTE - SEVERITY
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  3/1: Alert, orinted.  mild pain at the CT insertion, otherwise pain well controlled. No CP, SOB, palpitations. Family at bedside. D/w RN. Tolerating TF, advancing to 40 cc/hr.VSS. Hb stable. Possible transfer to step down unit tomorrow.  3/2/2020: Positive bowel sounds.  High anxiety level.  Wants NG tube out as soon as possible.  Discussed with the nursing staff.  Await surgical recommendations.  Add IV lorazepam 1 mg every 8 hours.  For anxiety.  Would consider psych consult.  Dr. Moran is the primary oncologist.  Outside primary care physician is Dr. Boyd Bell.  3/3/2020: Feels better than yesterday.  Had a good night sleep.  Good bowel sounds.  Mobilize.  Discussed with the nursing staff.  Lorazepam stopped by the surgical trauma intensive care unit team as he did not need it.  3/4/2020: Epidural was removed 3/3/2020.  Pain currently at 6/10.  Tolerating tube feeds at 30 mils per hour.  Would transfer to stepdown unit.  Continue n.p.o.  Postop day 6 of total gastrectomy, Obie and Y is esophago-jejunostomy with thoracic anastomosis  3/5/2020: Increase total bili to 3.1.  AST and ALT are normal.  White count normal.  Hemoglobin 11.5.  Complains of pain at the chest tube site.  Upper GI series to be done today.  3/6/2020: T-max 38.5 3/5/2020: Started on IV Zosyn.  CT scan of the chest no  evidence of pulmonary volume but infiltrate and possibly aspiration noted.  Feels better this morning.  3/7/2020: Feels better than yesterday.  White count 19 today.  Did not have a fever in the past 24 hours.  Infectious diseases consulted.  3/8/2020: Feels better than yesterday.  White count decreasing.  Continue chest tube care.  No bowel movement.  Mobilize.  Stool softeners and laxative.  3/9/2020: Says that feels better than yesterday.  Advance G-tube feeding.  Continue n.p.o.  Thoracic surgery managing chest tube.  Transfer out to stepdown unit today.  3/10/2020: Postoperative day 12.  Leukocytosis improving.  On Zosyn and fluconazole.  Cultures growing staph aureus, Pseudomonas, Streptococcus and haemophilus influenza.  In the wound culture.  Hemoglobin is stable.  3/11/2020: Complains of pain in the right side the chest but says that it is better than before.  Leukocytosis with same.  Hemoglobin stays stable.  Mobilize.  Watch for bowel movement.       Review of Systems   All other systems ROS reviewed as documented in chart.     Health Status   Allergies: Allergies (ST)   Allergies (1) Active Reaction  NKA None Documented    Current medications:    Medications (17) Active  Scheduled: (8)  fluconazole-NaCl 0.9%  400 mg 200 mL, IVPB, Q24H  Gabapentin 300 mg/6 mL oral soln repack  300 mg 6 mL, PEG-tube, Q8H  Heparin 5,000 unit/1 mL inj  5,000 unit 1 mL, Subcutaneous, Q8H  Lidocaine 4% (40 mg/gm) patch  1 patch, TransDermal, Daily  lidocaine topical patch removal`  Remove Patch, TransDermal, Q Bedtime  piperacillin-tazobactam  3.375 gm, IVPB, Q8H  Polyethylene glycol 3350 oral recon powder 17 gm packet UD  17 gm 1 packet, J-tube, Daily  Ursodiol 250 mg tab  250 mg 1 tab, J-tube, TID  Continuous: (0)  PRN: (9)  Acetaminophen 650 mg/20.3 mL oral liquid UD  650 mg 20.3 mL, J-tube, Q6H  Bisacodyl 10 mg suppos  10 mg 1 suppository, Rectal, Daily  Hydrocodone-acetaminophen 7.5-325 mg/15 mL oral soln UD  7.5 mg 15  mL, J-tube, Q4H  Ibuprofen 200 mg/10 mL oral susp UD  600 mg 30 mL, J-tube, Q8H  Melatonin 3 mg tab  3 mg 1 tab, Oral, Q Bedtime  Milk of magnesia 8% 30 mL oral susp UD  30 mL, J-tube, QID  Morphine 10 mg/5 mL oral liquid UD  10 mg 5 mL, J-tube, Q4H  Nalbuphine 10 mg/1 mL inj SDV  10 mg 1 mL, IV, Q6H  Ondansetron 4 mg/2 mL inj SDV  4 mg 2 mL, Slow IV Push, Q6H      Physical Examination   General:  Alert and oriented, No acute distress.    Eye:  Extraocular movements are intact, Normal conjunctiva.    HENT:  Normocephalic, Oral mucosa is moist, NGT to low suction..   Neck:  Supple, No carotid bruit, No jugular venous distention.    Respiratory:  Lungs are clear to auscultation, Respirations are non-labored, Symmetrical chest wall expansion.   Cardiovascular:  Normal rate, Regular rhythm, No murmur, Normal peripheral perfusion.   Gastrointestinal:  Soft, Non-distended, J-tube in place..    Genitourinary:  No costovertebral angle tenderness.    Musculoskeletal:  Normal range of motion.    Integumentary:  Warm, Dry, No pallor.    Neurologic:  Alert, Oriented, No focal deficits.    Cognition and Speech:  Oriented, Speech clear and coherent, Functional cognition intact.   Psychiatric:  Cooperative, Appropriate mood & affect.      Review / Management   Laboratory results:     Labs between:  10-MAR-2020 10:40 to 11-MAR-2020 10:40  CBC:                 WBC  HgB  Hct  Plt  MCV  RDW   11-MAR-2020 (H) 13.6  (L) 10.8  (L) 33.2  (H) 452  90.7  (H) 15.1  DIFF:                 Seg  Neutroph//ABS  Lymph//ABS  Mono//ABS  EOS/ABS  11-MAR-2020 80  // (H) 11.0  // (L) 0.7  // 0.8 // 0.3  BMP:                 Na  Cl  BUN  Glu   11-MAR-2020 139  106  13  (H) 100                              K  CO2  Cr  Ca                              4.3  25  0.75  (L) 8.0   CMP:                 AST  ALT  AlkPhos  Bili  Albumin   11-MAR-2020 (H) 42  48  (H) 599  (H) 1.1  (L) 2.1   Other Chem:             Mg  Phos  Triglycerides  GGTP  DirectBili                            2.2  2.8                        .    Lines and Tubes:    LINES  Peripheral Intravenous Antecubital Right   Gauge: 18   Charted: 03/11/20 09:51  Inserted: 03/05/20   Days Since Insertion: 6 days  Indication of Use: IVPB  Peripheral Intravenous Antecubital Left   Gauge: 20   Charted: 03/11/20 09:51  Inserted: 03/06/20   Days Since Insertion: 5 days  Indication of Use: Saline Lock  DRAINS AND TUBES  Chest Tubes Right Middle Lobe   Charted: 03/11/20 09:51  Inserted: 02/27/20   Days Since Insertion: 13 days  Drains vanesa 10 Fr right chest   Drainage Action: Suction Compressed And Closed   Charted: 03/11/20 09:51  Inserted: 02/27/20   Days Since Insertion: 13 days  Drains vanesa 15 Fr right abdomen   Drainage Action: Suction Compressed And Closed   Charted: 03/11/20 09:51  Inserted: 02/27/20   Days Since Insertion: 13 days  Gastric Tubes Jejunal   Tube Size:    Gastric Tube Level:    Charted: 03/11/20 09:51  Inserted: 02/28/20   Days Since Insertion: 12 days  Usage: Feeding   .      Impression and Plan   64 yo male with PMH HTN, HLD, testicular ca s/p orchiectomy, retroperitoneal tumor resection and recent diagnosis of gastric CA per EGD 2/18/20. Admitted to STICU post total gastrectomy, obie-en-y, and esophagojejunal anastamosis via right thoracotomy approach with Dr. Bearden and Dr. Lucero on 2/27.  NEURO: Postoperative Pain   -epidural with fentanyl   -dilaudid prn for breakthrough pain   -lido patch  CV: HLD   - HD stable       -home lipitor on hold while npo  hx HTN   -not currently on meds for HTN at home    PULM: R thoracotomy   -R chest tube to suction, management per Dr. Lucero   -CXR 2/29 no pneumo   -encourage IS  Acute postop respiratory failure.  Postop respiratory failure has resolved.  Patient had brief episodes of decreased oxygen saturation with respiratory rate between 16 and 25 postop.  Oxygen saturation remained 94 to 97%.  Would continue supplemental oxygen support via nasal cannula.   Goal oxygen saturation approximately 92%.  GI: Gastric CA    -surgery as above, Dr. Bearden managing   -NGT to LIS, minimal sanguinous output   -Strict NPO, no po meds   -J tube patent   -increase TF to 20ml/hr today per gen surg recs   -Vanesa drain x2 with 83cc output each over last 24H   -monitor output from NGT, vanesa x2   -monitorfor TF wendy  RENAL:     -peters with adequate urine output   -Cr 0.88   -monitor output   -/hr   -check am labs  ENDO: Hyperglycemia   -monitor   -low dose insulin SS  HEME: Acute blood loss anemia, mild   -Hgb 12.4   -HD stable   -check am cbc  ID: Leukocytosis, reactive   -WBC 12.3   -afebrile   -monitor   PAIN SCALE 5 OF 10.

## 2020-03-12 ENCOUNTER — EMERGENCY (EMERGENCY)
Facility: HOSPITAL | Age: 55
LOS: 0 days | Discharge: HOME | End: 2020-03-12
Attending: STUDENT IN AN ORGANIZED HEALTH CARE EDUCATION/TRAINING PROGRAM
Payer: MEDICARE

## 2020-03-12 VITALS
RESPIRATION RATE: 16 BRPM | SYSTOLIC BLOOD PRESSURE: 123 MMHG | TEMPERATURE: 98 F | DIASTOLIC BLOOD PRESSURE: 56 MMHG | OXYGEN SATURATION: 100 % | HEART RATE: 70 BPM

## 2020-03-12 VITALS — HEIGHT: 70 IN | WEIGHT: 199.96 LBS

## 2020-03-12 DIAGNOSIS — R50.9 FEVER, UNSPECIFIED: ICD-10-CM

## 2020-03-12 DIAGNOSIS — R07.9 CHEST PAIN, UNSPECIFIED: ICD-10-CM

## 2020-03-12 DIAGNOSIS — I10 ESSENTIAL (PRIMARY) HYPERTENSION: ICD-10-CM

## 2020-03-12 DIAGNOSIS — G80.9 CEREBRAL PALSY, UNSPECIFIED: ICD-10-CM

## 2020-03-12 DIAGNOSIS — F79 UNSPECIFIED INTELLECTUAL DISABILITIES: ICD-10-CM

## 2020-03-12 DIAGNOSIS — G40.909 EPILEPSY, UNSPECIFIED, NOT INTRACTABLE, WITHOUT STATUS EPILEPTICUS: ICD-10-CM

## 2020-03-12 LAB
ALBUMIN SERPL ELPH-MCNC: 3.8 G/DL — SIGNIFICANT CHANGE UP (ref 3.3–5)
ALP SERPL-CCNC: 60 U/L — SIGNIFICANT CHANGE UP (ref 40–120)
ALT FLD-CCNC: 26 U/L — SIGNIFICANT CHANGE UP (ref 12–78)
ANION GAP SERPL CALC-SCNC: 6 MMOL/L — SIGNIFICANT CHANGE UP (ref 5–17)
APPEARANCE UR: ABNORMAL
APTT BLD: 31.1 SEC — SIGNIFICANT CHANGE UP (ref 27.5–36.3)
AST SERPL-CCNC: 25 U/L — SIGNIFICANT CHANGE UP (ref 15–37)
BASOPHILS # BLD AUTO: 0.03 K/UL — SIGNIFICANT CHANGE UP (ref 0–0.2)
BASOPHILS NFR BLD AUTO: 0.6 % — SIGNIFICANT CHANGE UP (ref 0–2)
BILIRUB SERPL-MCNC: 0.7 MG/DL — SIGNIFICANT CHANGE UP (ref 0.2–1.2)
BILIRUB UR-MCNC: NEGATIVE — SIGNIFICANT CHANGE UP
BUN SERPL-MCNC: 21 MG/DL — SIGNIFICANT CHANGE UP (ref 7–23)
CALCIUM SERPL-MCNC: 10 MG/DL — SIGNIFICANT CHANGE UP (ref 8.5–10.1)
CHLORIDE SERPL-SCNC: 105 MMOL/L — SIGNIFICANT CHANGE UP (ref 96–108)
CO2 SERPL-SCNC: 26 MMOL/L — SIGNIFICANT CHANGE UP (ref 22–31)
COLOR SPEC: YELLOW — SIGNIFICANT CHANGE UP
CREAT SERPL-MCNC: 1.19 MG/DL — SIGNIFICANT CHANGE UP (ref 0.5–1.3)
DIFF PNL FLD: ABNORMAL
EOSINOPHIL # BLD AUTO: 0.06 K/UL — SIGNIFICANT CHANGE UP (ref 0–0.5)
EOSINOPHIL NFR BLD AUTO: 1.3 % — SIGNIFICANT CHANGE UP (ref 0–6)
GLUCOSE SERPL-MCNC: 97 MG/DL — SIGNIFICANT CHANGE UP (ref 70–99)
GLUCOSE UR QL: NEGATIVE MG/DL — SIGNIFICANT CHANGE UP
HCT VFR BLD CALC: 41.9 % — SIGNIFICANT CHANGE UP (ref 39–50)
HGB BLD-MCNC: 14.2 G/DL — SIGNIFICANT CHANGE UP (ref 13–17)
IMM GRANULOCYTES NFR BLD AUTO: 0.2 % — SIGNIFICANT CHANGE UP (ref 0–1.5)
INR BLD: 1.1 RATIO — SIGNIFICANT CHANGE UP (ref 0.88–1.16)
KETONES UR-MCNC: NEGATIVE — SIGNIFICANT CHANGE UP
LACTATE SERPL-SCNC: 1 MMOL/L — SIGNIFICANT CHANGE UP (ref 0.7–2)
LEUKOCYTE ESTERASE UR-ACNC: NEGATIVE — SIGNIFICANT CHANGE UP
LYMPHOCYTES # BLD AUTO: 0.72 K/UL — LOW (ref 1–3.3)
LYMPHOCYTES # BLD AUTO: 15.1 % — SIGNIFICANT CHANGE UP (ref 13–44)
MCHC RBC-ENTMCNC: 30.4 PG — SIGNIFICANT CHANGE UP (ref 27–34)
MCHC RBC-ENTMCNC: 33.9 GM/DL — SIGNIFICANT CHANGE UP (ref 32–36)
MCV RBC AUTO: 89.7 FL — SIGNIFICANT CHANGE UP (ref 80–100)
MONOCYTES # BLD AUTO: 0.32 K/UL — SIGNIFICANT CHANGE UP (ref 0–0.9)
MONOCYTES NFR BLD AUTO: 6.7 % — SIGNIFICANT CHANGE UP (ref 2–14)
NEUTROPHILS # BLD AUTO: 3.63 K/UL — SIGNIFICANT CHANGE UP (ref 1.8–7.4)
NEUTROPHILS NFR BLD AUTO: 76.1 % — SIGNIFICANT CHANGE UP (ref 43–77)
NITRITE UR-MCNC: NEGATIVE — SIGNIFICANT CHANGE UP
PH UR: 8 — SIGNIFICANT CHANGE UP (ref 5–8)
PLATELET # BLD AUTO: 160 K/UL — SIGNIFICANT CHANGE UP (ref 150–400)
POTASSIUM SERPL-MCNC: 3.9 MMOL/L — SIGNIFICANT CHANGE UP (ref 3.5–5.3)
POTASSIUM SERPL-SCNC: 3.9 MMOL/L — SIGNIFICANT CHANGE UP (ref 3.5–5.3)
PROT SERPL-MCNC: 7.8 GM/DL — SIGNIFICANT CHANGE UP (ref 6–8.3)
PROT UR-MCNC: NEGATIVE MG/DL — SIGNIFICANT CHANGE UP
PROTHROM AB SERPL-ACNC: 12.2 SEC — SIGNIFICANT CHANGE UP (ref 10–12.9)
RAPID RVP RESULT: SIGNIFICANT CHANGE UP
RBC # BLD: 4.67 M/UL — SIGNIFICANT CHANGE UP (ref 4.2–5.8)
RBC # FLD: 11.9 % — SIGNIFICANT CHANGE UP (ref 10.3–14.5)
SODIUM SERPL-SCNC: 137 MMOL/L — SIGNIFICANT CHANGE UP (ref 135–145)
SP GR SPEC: 1.01 — SIGNIFICANT CHANGE UP (ref 1.01–1.02)
TROPONIN I SERPL-MCNC: <0.015 NG/ML — SIGNIFICANT CHANGE UP (ref 0.01–0.04)
UROBILINOGEN FLD QL: NEGATIVE MG/DL — SIGNIFICANT CHANGE UP
WBC # BLD: 4.77 K/UL — SIGNIFICANT CHANGE UP (ref 3.8–10.5)
WBC # FLD AUTO: 4.77 K/UL — SIGNIFICANT CHANGE UP (ref 3.8–10.5)

## 2020-03-12 PROCEDURE — 72125 CT NECK SPINE W/O DYE: CPT | Mod: 26

## 2020-03-12 PROCEDURE — 93005 ELECTROCARDIOGRAM TRACING: CPT

## 2020-03-12 PROCEDURE — 71260 CT THORAX DX C+: CPT

## 2020-03-12 PROCEDURE — 87086 URINE CULTURE/COLONY COUNT: CPT

## 2020-03-12 PROCEDURE — 87633 RESP VIRUS 12-25 TARGETS: CPT

## 2020-03-12 PROCEDURE — 36415 COLL VENOUS BLD VENIPUNCTURE: CPT

## 2020-03-12 PROCEDURE — 85025 COMPLETE CBC W/AUTO DIFF WBC: CPT

## 2020-03-12 PROCEDURE — 84484 ASSAY OF TROPONIN QUANT: CPT | Mod: 91

## 2020-03-12 PROCEDURE — 80053 COMPREHEN METABOLIC PANEL: CPT

## 2020-03-12 PROCEDURE — 87486 CHLMYD PNEUM DNA AMP PROBE: CPT

## 2020-03-12 PROCEDURE — 87798 DETECT AGENT NOS DNA AMP: CPT

## 2020-03-12 PROCEDURE — 85610 PROTHROMBIN TIME: CPT

## 2020-03-12 PROCEDURE — 87040 BLOOD CULTURE FOR BACTERIA: CPT | Mod: 91

## 2020-03-12 PROCEDURE — 71045 X-RAY EXAM CHEST 1 VIEW: CPT

## 2020-03-12 PROCEDURE — 74177 CT ABD & PELVIS W/CONTRAST: CPT | Mod: 26

## 2020-03-12 PROCEDURE — 81001 URINALYSIS AUTO W/SCOPE: CPT

## 2020-03-12 PROCEDURE — 70450 CT HEAD/BRAIN W/O DYE: CPT | Mod: 26

## 2020-03-12 PROCEDURE — 99285 EMERGENCY DEPT VISIT HI MDM: CPT

## 2020-03-12 PROCEDURE — 87581 M.PNEUMON DNA AMP PROBE: CPT

## 2020-03-12 PROCEDURE — 71045 X-RAY EXAM CHEST 1 VIEW: CPT | Mod: 26

## 2020-03-12 PROCEDURE — 71260 CT THORAX DX C+: CPT | Mod: 26

## 2020-03-12 PROCEDURE — 83605 ASSAY OF LACTIC ACID: CPT

## 2020-03-12 PROCEDURE — 99284 EMERGENCY DEPT VISIT MOD MDM: CPT | Mod: 25

## 2020-03-12 PROCEDURE — 85730 THROMBOPLASTIN TIME PARTIAL: CPT

## 2020-03-12 PROCEDURE — 93010 ELECTROCARDIOGRAM REPORT: CPT

## 2020-03-12 PROCEDURE — 72125 CT NECK SPINE W/O DYE: CPT

## 2020-03-12 PROCEDURE — 70450 CT HEAD/BRAIN W/O DYE: CPT

## 2020-03-12 PROCEDURE — 74177 CT ABD & PELVIS W/CONTRAST: CPT

## 2020-03-12 RX ORDER — SODIUM CHLORIDE 9 MG/ML
2800 INJECTION INTRAMUSCULAR; INTRAVENOUS; SUBCUTANEOUS ONCE
Refills: 0 | Status: COMPLETED | OUTPATIENT
Start: 2020-03-12 | End: 2020-03-12

## 2020-03-12 RX ADMIN — SODIUM CHLORIDE 2800 MILLILITER(S): 9 INJECTION INTRAMUSCULAR; INTRAVENOUS; SUBCUTANEOUS at 10:51

## 2020-03-12 NOTE — ED ADULT TRIAGE NOTE - CHIEF COMPLAINT QUOTE
Patient presents in ED from [facility] home with fever and chest pain since this morning.

## 2020-03-12 NOTE — ED PROVIDER NOTE - OBJECTIVE STATEMENT
55 y/o M with a PMHx of seizure disorder, neurogenic bladder, HTN, cerebral palsy, mental retardation presents to the ED from group home BIBaide s/p fall this morning. Per aide, pt rolled out of bed this morning and hit his head. Aide notes that over the last 3-4 days, pt has been c/o CP and L arm pain. Aide also notes that pt has been having behavioral issues and has been banging his head on walls. This morning, pt was found with 103F fever and was given Tylenol. Denies cough. PMD: Dr. Maldonado. Pt ambulates with a walker and assistance at baseline. Partial history obtained from aides at bedside.

## 2020-03-12 NOTE — ED PROVIDER NOTE - PATIENT PORTAL LINK FT
You can access the FollowMyHealth Patient Portal offered by [facility] by registering at the following website: [email or web address]. By joining [employer]’s FollowMyHealth portal, you will also be able to view your health information using other applications (apps) compatible with our system.

## 2020-03-12 NOTE — ED PROVIDER NOTE - PROGRESS NOTE DETAILS
Katarina DO: fever work up negative; left thumb contracted at baseline; 2 troponins neg; instructed to f/u with pmd in 1-2 days without fail; strict return precautions given.

## 2020-03-12 NOTE — ED ADULT NURSE NOTE - NSIMPLEMENTINTERV_GEN_ALL_ED
Implemented All Fall with Harm Risk Interventions:  [place] to call system. Call bell, personal items and telephone within reach. Instruct patient to call for assistance. Room bathroom lighting operational. Non-slip footwear when patient is off stretcher. Physically safe environment: no spills, clutter or unnecessary equipment. Stretcher in lowest position, wheels locked, appropriate side rails in place. Provide visual cue, wrist band, yellow gown, etc. Monitor gait and stability. Monitor for mental status changes and reorient to person, place, and time. Review medications for side effects contributing to fall risk. Reinforce activity limits and safety measures with patient and family. Provide visual clues: red socks.

## 2020-03-12 NOTE — ED PROVIDER NOTE - NSFOLLOWUPINSTRUCTIONS_ED_ALL_ED_FT
Chest Pain    WHAT YOU NEED TO KNOW:    Chest pain can be caused by a range of conditions, from not serious to life-threatening. Chest pain can be a symptom of a digestive problem, such as acid reflux or a stomach ulcer. An anxiety attack or a strong emotion, such as anger, can also cause chest pain. Infection, inflammation, or a fracture in the bones or cartilage in your chest can cause pain or discomfort. Sometimes chest pain or pressure is caused by poor blood flow to your heart (angina). Chest pain may also be caused by life-threatening conditions such as a heart attack or blood clot in your lungs.     DISCHARGE INSTRUCTIONS:    Call 911 if:     You have any of the following signs of a heart attack:   Squeezing, pressure, or pain in your chest       and any of the following:   Discomfort or pain in your back, neck, jaw, stomach, or arm       Shortness of breath      Nausea or vomiting      Lightheadedness or a sudden cold sweat        Return to the emergency department if:     You have chest discomfort that gets worse, even with medicine.      You cough or vomit blood.       Your bowel movements are black or bloody.       You cannot stop vomiting, or it hurts to swallow.     Contact your healthcare provider if:     You have questions or concerns about your condition or care.        Medicines:     Medicines may be given to treat the cause of your chest pain. Examples include pain medicine, anxiety medicine, or medicines to increase blood flow to your heart.       Do not take certain medicines without asking your healthcare provider first. These include NSAIDs, herbal or vitamin supplements, or hormones (estrogen or progestin).       Take your medicine as directed. Contact your healthcare provider if you think your medicine is not helping or if you have side effects. Tell him or her if you are allergic to any medicine. Keep a list of the medicines, vitamins, and herbs you take. Include the amounts, and when and why you take them. Bring the list or the pill bottles to follow-up visits. Carry your medicine list with you in case of an emergency.    Follow up with your healthcare provider within 72 hours, or as directed: You may need to return for more tests to find the cause of your chest pain. You may be referred to a specialist, such as a cardiologist or gastroenterologist. Write down your questions so you remember to ask them during your visits.     Healthy living tips: The following are general healthy guidelines. If your chest pain is caused by a heart problem, your healthcare provider will give you specific guidelines to follow.    Do not smoke. Nicotine and other chemicals in cigarettes and cigars can cause lung and heart damage. Ask your healthcare provider for information if you currently smoke and need help to quit. E-cigarettes or smokeless tobacco still contain nicotine. Talk to your healthcare provider before you use these products.       Eat a variety of healthy, low-fat, low-salt foods. Healthy foods include fruits, vegetables, whole-grain breads, low-fat dairy products, beans, lean meats, and fish. Ask for more information about a heart healthy diet.      Drink plenty of water every day. Your body is made of mostly water. Water helps your body to control your temperature and blood pressure. Ask your healthcare provider how much water you should drink every day.      Ask about activity. Your healthcare provider will tell you which activities to limit or avoid. Ask when you can drive, return to work, and have sex. Ask about the best exercise plan for you.      Maintain a healthy weight. Ask your healthcare provider how much you should weigh. Ask him or her to help you create a weight loss plan if you are overweight.       Get the flu and pneumonia vaccines. All adults should get the influenza (flu) vaccine. Get it every year as soon as it becomes available. The pneumococcal vaccine is given to adults aged 65 years or older. The vaccine is given every 5 years to prevent pneumococcal disease, such as pneumonia.    If you have a stent:     Carry your stent card with you at all times.       Let all healthcare providers know that you have a stent.  Fever in Adults    WHAT YOU NEED TO KNOW:    A fever is an increase in your body temperature. Normal body temperature is 98.6°F (37°C). Fever is generally defined as greater than 100.4°F (38°C). Common causes include an infection, injury, or disease such as arthritis.    DISCHARGE INSTRUCTIONS:    Return to the emergency department if:     Your fever does not go away or gets worse even after treatment.      You have a stiff neck and a bad headache.       You are confused. You may not be able to think clearly or remember things like you normally do.       Your heart beats faster than usual even after treatment.      You have shortness of breath or chest pain when you breathe.      You urinate small amounts or not at all.       Your skin, lips, or nails turn blue.     Contact your healthcare provider if:     You have abdominal pain or you feel bloated.      You have nausea or are vomiting.      You have pain or burning when you urinate, or you have pain in your back.      You have questions or concerns about your condition or care.     Medicines: You may need any of the following:     NSAIDs, such as ibuprofen, help decrease swelling, pain, and fever. This medicine is available with or without a doctor's order. NSAIDs can cause stomach bleeding or kidney problems in certain people. If you take blood thinner medicine, always ask if NSAIDs are safe for you. Always read the medicine label and follow directions. Do not give these medicines to children under 6 months of age without direction from your child's healthcare provider.      Acetaminophen decreases pain and fever. It is available without a doctor's order. Ask how much to take and how often to take it. Follow directions. Read the labels of all other medicines you are using to see if they also contain acetaminophen, or ask your doctor or pharmacist. Acetaminophen can cause liver damage if not taken correctly. Do not use more than 4 grams (4,000 milligrams) total of acetaminophen in one day.       Antibiotics may be given if you have an infection caused by bacteria.      Take your medicine as directed. Contact your healthcare provider if you think your medicine is not helping or if you have side effects. Tell him of her if you are allergic to any medicine. Keep a list of the medicines, vitamins, and herbs you take. Include the amounts, and when and why you take them. Bring the list or the pill bottles to follow-up visits. Carry your medicine list with you in case of an emergency.    Follow up with your healthcare provider as directed: Write down your questions so you remember to ask them during your visits.    Self-care:     Drink more liquids as directed. A fever makes you sweat. This can increase your risk for dehydration. Liquids can help prevent dehydration.   Drink at least 6 to 8 eight-ounce cups of clear liquids each day. Drink water, juice, or broth. Do not drink sports drinks. They may contain caffeine.      Ask your healthcare provider if you should drink an oral rehydration solution (ORS). An ORS has the right amounts of water, salts, and sugar you need to replace body fluids.      Dress in lightweight clothes. Shivers may be a sign that your fever is rising. Do not put extra blankets or clothes on. This may cause your fever to rise even higher. Dress in light, comfortable clothing. Use a lightweight blanket or sheet when you sleep. Change your clothes, blanket, or sheets if they get wet.      Cool yourself safely. Take a bath in cool or lukewarm water. Use an ice pack wrapped in a small towel or wet a washcloth with cool water. Place the ice pack or wet washcloth on your forehead or the back of your neck.

## 2020-03-12 NOTE — ED PROVIDER NOTE - CPE EDP NEURO NORM
Patient arrived for INR testing and management of anticoagulation.      Last INR 4/1/19 was 1.8. Dose per protocol.  INR is 3.0 with goal of 2.0 to 3.0.  Per protocol, warfarin dose remains the same with INR recheck in 6 weeks. On-site Provider: Dr Wallace    Dose, return date, and conditions requiring contact with clinic discussed. Patient verbalized understanding of instructions and is aware of need to contact clinic with changes in medication, diet, and/or health status.   normal...

## 2020-03-12 NOTE — ED ADULT NURSE NOTE - OBJECTIVE STATEMENT
Pt presents from Group home with aides at bedside, pt hx MR wheelchair bound.  Pt noted to have Stage 2 sacrum pressure ulcer.  Pt presents with c/o chest pain radiating down left arm and fever today 103.  Tylenol 650 mg given PTA.  Vitals a stable at this time.  Staff states pt "not acting right the past 2 weeks", reporting pt has been "acting out".  PT with no complaints at this time, cooperative and calm.  Will continue to monitor,s afety maintained

## 2020-03-12 NOTE — ED ADULT NURSE NOTE - CHIEF COMPLAINT QUOTE
Patient presents in ED from [facility] home with fever and chest pain since this morning.

## 2020-03-12 NOTE — ED STATDOCS - ATTENDING CONTRIBUTION TO CARE
I, Chauncey Knapp MD,  performed the initial face to face bedside interview with this patient regarding history of present illness, review of symptoms and relevant past medical, social and family history.  I completed an independent physical examination.  I was the initial provider who evaluated this patient. I have signed out the follow up of any pending tests (i.e. labs, radiological studies) to the ACP.  I have communicated the patient’s plan of care and disposition with the ACP.  The history, relevant review of systems, past medical and surgical history, medical decision making, and physical examination was documented by the scribe in my presence and I attest to the accuracy of the documentation.

## 2020-03-13 LAB
CULTURE RESULTS: NO GROWTH — SIGNIFICANT CHANGE UP
SPECIMEN SOURCE: SIGNIFICANT CHANGE UP

## 2020-03-17 LAB
CULTURE RESULTS: SIGNIFICANT CHANGE UP
CULTURE RESULTS: SIGNIFICANT CHANGE UP
SPECIMEN SOURCE: SIGNIFICANT CHANGE UP
SPECIMEN SOURCE: SIGNIFICANT CHANGE UP

## 2020-03-23 ENCOUNTER — APPOINTMENT (OUTPATIENT)
Dept: ORTHOPEDIC SURGERY | Facility: CLINIC | Age: 55
End: 2020-03-23

## 2020-04-08 ENCOUNTER — RECORD ABSTRACTING (OUTPATIENT)
Age: 55
End: 2020-04-08

## 2020-04-20 ENCOUNTER — APPOINTMENT (OUTPATIENT)
Dept: ORTHOPEDIC SURGERY | Facility: CLINIC | Age: 55
End: 2020-04-20

## 2020-08-18 ENCOUNTER — APPOINTMENT (OUTPATIENT)
Dept: UROLOGY | Facility: CLINIC | Age: 55
End: 2020-08-18

## 2020-09-09 ENCOUNTER — APPOINTMENT (OUTPATIENT)
Dept: UROLOGY | Facility: CLINIC | Age: 55
End: 2020-09-09
Payer: MEDICARE

## 2020-09-09 DIAGNOSIS — N40.1 BENIGN PROSTATIC HYPERPLASIA WITH LOWER URINARY TRACT SYMPMS: ICD-10-CM

## 2020-09-09 DIAGNOSIS — R31.9 HEMATURIA, UNSPECIFIED: ICD-10-CM

## 2020-09-09 DIAGNOSIS — N13.8 BENIGN PROSTATIC HYPERPLASIA WITH LOWER URINARY TRACT SYMPMS: ICD-10-CM

## 2020-09-09 PROCEDURE — 99213 OFFICE O/P EST LOW 20 MIN: CPT | Mod: 95

## 2020-09-09 NOTE — END OF VISIT
[FreeTextEntry3] : Labs were sent to the patient to be done and his medications were refilled.  We will see him again in 1 year or sooner if there are any problems

## 2020-09-09 NOTE — HISTORY OF PRESENT ILLNESS
[FreeTextEntry1] : This visit was done by telehealth which did not connect and this was done by phone with his primary aide.  No complaints are noted by this patient and he remains on his standard medications

## 2021-01-08 RX ORDER — OXYBUTYNIN CHLORIDE 5 MG/1
5 TABLET ORAL
Qty: 30 | Refills: 6 | Status: ACTIVE | COMMUNITY
Start: 2019-06-12 | End: 1900-01-01

## 2021-02-05 ENCOUNTER — APPOINTMENT (OUTPATIENT)
Dept: UROLOGY | Facility: CLINIC | Age: 56
End: 2021-02-05

## 2021-02-21 ENCOUNTER — INPATIENT (INPATIENT)
Facility: HOSPITAL | Age: 56
LOS: 2 days | Discharge: HOME HEALTH | DRG: 177 | End: 2021-02-24
Attending: INTERNAL MEDICINE | Admitting: INTERNAL MEDICINE
Payer: MEDICARE

## 2021-02-21 VITALS
SYSTOLIC BLOOD PRESSURE: 102 MMHG | HEIGHT: 70 IN | HEART RATE: 96 BPM | RESPIRATION RATE: 19 BRPM | DIASTOLIC BLOOD PRESSURE: 74 MMHG | TEMPERATURE: 100 F | OXYGEN SATURATION: 90 % | WEIGHT: 125 LBS

## 2021-02-21 DIAGNOSIS — R14.0 ABDOMINAL DISTENSION (GASEOUS): ICD-10-CM

## 2021-02-21 LAB
ALBUMIN SERPL ELPH-MCNC: 2.9 G/DL — LOW (ref 3.3–5)
ALP SERPL-CCNC: 70 U/L — SIGNIFICANT CHANGE UP (ref 40–120)
ALT FLD-CCNC: 54 U/L — SIGNIFICANT CHANGE UP (ref 12–78)
ANION GAP SERPL CALC-SCNC: 8 MMOL/L — SIGNIFICANT CHANGE UP (ref 5–17)
APPEARANCE UR: CLEAR — SIGNIFICANT CHANGE UP
APTT BLD: 30.5 SEC — SIGNIFICANT CHANGE UP (ref 27.5–35.5)
AST SERPL-CCNC: 30 U/L — SIGNIFICANT CHANGE UP (ref 15–37)
BASOPHILS # BLD AUTO: 0 K/UL — SIGNIFICANT CHANGE UP (ref 0–0.2)
BASOPHILS NFR BLD AUTO: 0 % — SIGNIFICANT CHANGE UP (ref 0–2)
BILIRUB SERPL-MCNC: 0.5 MG/DL — SIGNIFICANT CHANGE UP (ref 0.2–1.2)
BILIRUB UR-MCNC: NEGATIVE — SIGNIFICANT CHANGE UP
BUN SERPL-MCNC: 47 MG/DL — HIGH (ref 7–23)
CALCIUM SERPL-MCNC: 10.1 MG/DL — SIGNIFICANT CHANGE UP (ref 8.5–10.1)
CHLORIDE SERPL-SCNC: 107 MMOL/L — SIGNIFICANT CHANGE UP (ref 96–108)
CO2 SERPL-SCNC: 26 MMOL/L — SIGNIFICANT CHANGE UP (ref 22–31)
COLOR SPEC: ABNORMAL
CREAT SERPL-MCNC: 1.14 MG/DL — SIGNIFICANT CHANGE UP (ref 0.5–1.3)
DIFF PNL FLD: ABNORMAL
EOSINOPHIL # BLD AUTO: 0 K/UL — SIGNIFICANT CHANGE UP (ref 0–0.5)
EOSINOPHIL NFR BLD AUTO: 0 % — SIGNIFICANT CHANGE UP (ref 0–6)
GLUCOSE SERPL-MCNC: 159 MG/DL — HIGH (ref 70–99)
GLUCOSE UR QL: NEGATIVE MG/DL — SIGNIFICANT CHANGE UP
HCT VFR BLD CALC: 36 % — LOW (ref 39–50)
HGB BLD-MCNC: 12.1 G/DL — LOW (ref 13–17)
INR BLD: 1.23 RATIO — HIGH (ref 0.88–1.16)
KETONES UR-MCNC: ABNORMAL
LACTATE SERPL-SCNC: 3.3 MMOL/L — HIGH (ref 0.7–2)
LEUKOCYTE ESTERASE UR-ACNC: ABNORMAL
LYMPHOCYTES # BLD AUTO: 0.26 K/UL — LOW (ref 1–3.3)
LYMPHOCYTES # BLD AUTO: 3 % — LOW (ref 13–44)
MCHC RBC-ENTMCNC: 32.4 PG — SIGNIFICANT CHANGE UP (ref 27–34)
MCHC RBC-ENTMCNC: 33.6 GM/DL — SIGNIFICANT CHANGE UP (ref 32–36)
MCV RBC AUTO: 96.5 FL — SIGNIFICANT CHANGE UP (ref 80–100)
MONOCYTES # BLD AUTO: 0.35 K/UL — SIGNIFICANT CHANGE UP (ref 0–0.9)
MONOCYTES NFR BLD AUTO: 4 % — SIGNIFICANT CHANGE UP (ref 2–14)
NEUTROPHILS # BLD AUTO: 7.94 K/UL — HIGH (ref 1.8–7.4)
NEUTROPHILS NFR BLD AUTO: 83 % — HIGH (ref 43–77)
NITRITE UR-MCNC: NEGATIVE — SIGNIFICANT CHANGE UP
NRBC # BLD: SIGNIFICANT CHANGE UP /100 WBCS (ref 0–0)
PH UR: 5 — SIGNIFICANT CHANGE UP (ref 5–8)
PLATELET # BLD AUTO: 196 K/UL — SIGNIFICANT CHANGE UP (ref 150–400)
POTASSIUM SERPL-MCNC: 4.1 MMOL/L — SIGNIFICANT CHANGE UP (ref 3.5–5.3)
POTASSIUM SERPL-SCNC: 4.1 MMOL/L — SIGNIFICANT CHANGE UP (ref 3.5–5.3)
PROT SERPL-MCNC: 6.8 GM/DL — SIGNIFICANT CHANGE UP (ref 6–8.3)
PROT UR-MCNC: 30 MG/DL
PROTHROM AB SERPL-ACNC: 14.3 SEC — HIGH (ref 10.6–13.6)
RBC # BLD: 3.73 M/UL — LOW (ref 4.2–5.8)
RBC # FLD: 12.3 % — SIGNIFICANT CHANGE UP (ref 10.3–14.5)
SARS-COV-2 RNA SPEC QL NAA+PROBE: SIGNIFICANT CHANGE UP
SODIUM SERPL-SCNC: 141 MMOL/L — SIGNIFICANT CHANGE UP (ref 135–145)
SP GR SPEC: 1.03 — HIGH (ref 1.01–1.02)
UROBILINOGEN FLD QL: 4 MG/DL
WBC # BLD: 8.73 K/UL — SIGNIFICANT CHANGE UP (ref 3.8–10.5)
WBC # FLD AUTO: 8.73 K/UL — SIGNIFICANT CHANGE UP (ref 3.8–10.5)

## 2021-02-21 PROCEDURE — 92610 EVALUATE SWALLOWING FUNCTION: CPT | Mod: GN

## 2021-02-21 PROCEDURE — 80053 COMPREHEN METABOLIC PANEL: CPT

## 2021-02-21 PROCEDURE — 86803 HEPATITIS C AB TEST: CPT

## 2021-02-21 PROCEDURE — 97116 GAIT TRAINING THERAPY: CPT | Mod: GP

## 2021-02-21 PROCEDURE — 85025 COMPLETE CBC W/AUTO DIFF WBC: CPT

## 2021-02-21 PROCEDURE — 83735 ASSAY OF MAGNESIUM: CPT

## 2021-02-21 PROCEDURE — 97162 PT EVAL MOD COMPLEX 30 MIN: CPT | Mod: GP

## 2021-02-21 PROCEDURE — 36415 COLL VENOUS BLD VENIPUNCTURE: CPT

## 2021-02-21 PROCEDURE — 87040 BLOOD CULTURE FOR BACTERIA: CPT

## 2021-02-21 PROCEDURE — 83605 ASSAY OF LACTIC ACID: CPT

## 2021-02-21 PROCEDURE — 84100 ASSAY OF PHOSPHORUS: CPT

## 2021-02-21 PROCEDURE — 74176 CT ABD & PELVIS W/O CONTRAST: CPT | Mod: 26

## 2021-02-21 PROCEDURE — 99223 1ST HOSP IP/OBS HIGH 75: CPT

## 2021-02-21 PROCEDURE — 71045 X-RAY EXAM CHEST 1 VIEW: CPT | Mod: 26

## 2021-02-21 PROCEDURE — 85610 PROTHROMBIN TIME: CPT

## 2021-02-21 PROCEDURE — 86769 SARS-COV-2 COVID-19 ANTIBODY: CPT

## 2021-02-21 RX ORDER — SODIUM CHLORIDE 9 MG/ML
1000 INJECTION INTRAMUSCULAR; INTRAVENOUS; SUBCUTANEOUS ONCE
Refills: 0 | Status: COMPLETED | OUTPATIENT
Start: 2021-02-21 | End: 2021-02-21

## 2021-02-21 RX ORDER — CITALOPRAM 10 MG/1
20 TABLET, FILM COATED ORAL DAILY
Refills: 0 | Status: DISCONTINUED | OUTPATIENT
Start: 2021-02-21 | End: 2021-02-24

## 2021-02-21 RX ORDER — LAMOTRIGINE 25 MG/1
100 TABLET, ORALLY DISINTEGRATING ORAL
Refills: 0 | Status: DISCONTINUED | OUTPATIENT
Start: 2021-02-21 | End: 2021-02-21

## 2021-02-21 RX ORDER — ACETAMINOPHEN 500 MG
650 TABLET ORAL EVERY 4 HOURS
Refills: 0 | Status: DISCONTINUED | OUTPATIENT
Start: 2021-02-21 | End: 2021-02-24

## 2021-02-21 RX ORDER — AZITHROMYCIN 500 MG/1
500 TABLET, FILM COATED ORAL ONCE
Refills: 0 | Status: COMPLETED | OUTPATIENT
Start: 2021-02-21 | End: 2021-02-21

## 2021-02-21 RX ORDER — CEFTRIAXONE 500 MG/1
1000 INJECTION, POWDER, FOR SOLUTION INTRAMUSCULAR; INTRAVENOUS ONCE
Refills: 0 | Status: COMPLETED | OUTPATIENT
Start: 2021-02-21 | End: 2021-02-21

## 2021-02-21 RX ORDER — SODIUM CHLORIDE 9 MG/ML
1000 INJECTION INTRAMUSCULAR; INTRAVENOUS; SUBCUTANEOUS
Refills: 0 | Status: DISCONTINUED | OUTPATIENT
Start: 2021-02-21 | End: 2021-02-23

## 2021-02-21 RX ORDER — CHLORHEXIDINE GLUCONATE 213 G/1000ML
15 SOLUTION TOPICAL
Refills: 0 | Status: DISCONTINUED | OUTPATIENT
Start: 2021-02-21 | End: 2021-02-24

## 2021-02-21 RX ORDER — TAMSULOSIN HYDROCHLORIDE 0.4 MG/1
0.4 CAPSULE ORAL AT BEDTIME
Refills: 0 | Status: DISCONTINUED | OUTPATIENT
Start: 2021-02-21 | End: 2021-02-24

## 2021-02-21 RX ORDER — PIPERACILLIN AND TAZOBACTAM 4; .5 G/20ML; G/20ML
3.38 INJECTION, POWDER, LYOPHILIZED, FOR SOLUTION INTRAVENOUS EVERY 8 HOURS
Refills: 0 | Status: DISCONTINUED | OUTPATIENT
Start: 2021-02-22 | End: 2021-02-24

## 2021-02-21 RX ORDER — POLYETHYLENE GLYCOL 3350 17 G/17G
17 POWDER, FOR SOLUTION ORAL DAILY
Refills: 0 | Status: DISCONTINUED | OUTPATIENT
Start: 2021-02-21 | End: 2021-02-24

## 2021-02-21 RX ORDER — BACLOFEN 100 %
10 POWDER (GRAM) MISCELLANEOUS
Refills: 0 | Status: DISCONTINUED | OUTPATIENT
Start: 2021-02-21 | End: 2021-02-24

## 2021-02-21 RX ORDER — ENOXAPARIN SODIUM 100 MG/ML
40 INJECTION SUBCUTANEOUS DAILY
Refills: 0 | Status: DISCONTINUED | OUTPATIENT
Start: 2021-02-21 | End: 2021-02-24

## 2021-02-21 RX ORDER — ONDANSETRON 8 MG/1
4 TABLET, FILM COATED ORAL EVERY 6 HOURS
Refills: 0 | Status: DISCONTINUED | OUTPATIENT
Start: 2021-02-21 | End: 2021-02-24

## 2021-02-21 RX ORDER — LAMOTRIGINE 25 MG/1
100 TABLET, ORALLY DISINTEGRATING ORAL
Refills: 0 | Status: DISCONTINUED | OUTPATIENT
Start: 2021-02-21 | End: 2021-02-24

## 2021-02-21 RX ORDER — DEXAMETHASONE 0.5 MG/5ML
6 ELIXIR ORAL DAILY
Refills: 0 | Status: DISCONTINUED | OUTPATIENT
Start: 2021-02-21 | End: 2021-02-23

## 2021-02-21 RX ORDER — BETHANECHOL CHLORIDE 25 MG
25 TABLET ORAL DAILY
Refills: 0 | Status: DISCONTINUED | OUTPATIENT
Start: 2021-02-21 | End: 2021-02-24

## 2021-02-21 RX ADMIN — Medication 10 MILLIGRAM(S): at 22:41

## 2021-02-21 RX ADMIN — SODIUM CHLORIDE 1000 MILLILITER(S): 9 INJECTION INTRAMUSCULAR; INTRAVENOUS; SUBCUTANEOUS at 15:00

## 2021-02-21 RX ADMIN — Medication 6 MILLIGRAM(S): at 22:42

## 2021-02-21 RX ADMIN — TAMSULOSIN HYDROCHLORIDE 0.4 MILLIGRAM(S): 0.4 CAPSULE ORAL at 22:42

## 2021-02-21 RX ADMIN — SODIUM CHLORIDE 100 MILLILITER(S): 9 INJECTION INTRAMUSCULAR; INTRAVENOUS; SUBCUTANEOUS at 21:03

## 2021-02-21 RX ADMIN — CEFTRIAXONE 1000 MILLIGRAM(S): 500 INJECTION, POWDER, FOR SOLUTION INTRAMUSCULAR; INTRAVENOUS at 15:48

## 2021-02-21 RX ADMIN — Medication 1 DROP(S): at 22:41

## 2021-02-21 RX ADMIN — Medication 1 ENEMA: at 17:00

## 2021-02-21 RX ADMIN — LAMOTRIGINE 100 MILLIGRAM(S): 25 TABLET, ORALLY DISINTEGRATING ORAL at 22:41

## 2021-02-21 RX ADMIN — ENOXAPARIN SODIUM 40 MILLIGRAM(S): 100 INJECTION SUBCUTANEOUS at 22:42

## 2021-02-21 RX ADMIN — AZITHROMYCIN 255 MILLIGRAM(S): 500 TABLET, FILM COATED ORAL at 15:50

## 2021-02-21 NOTE — H&P ADULT - NSICDXPASTMEDICALHX_GEN_ALL_CORE_FT
PAST MEDICAL HISTORY:  Hypertension     Mental retardation     Neurogenic bladder     Neurogenic bladder     Psoriasis     Seizure     Seizure disorder     Spinal stenosis     Spinal stenosis

## 2021-02-21 NOTE — ED PROVIDER NOTE - OBJECTIVE STATEMENT
56 y/o male with a PMHx of seizure disorder, neurogenic bladder, HTN, cerebral palsy, mental retardation presents to the ED BIB EMS from group home with abdominal distention x1 day. No nausea/vomiting. Is passing gas. Pt is a limited historian.

## 2021-02-21 NOTE — ED ADULT NURSE NOTE - NSIMPLEMENTINTERV_GEN_ALL_ED
Implemented All Fall Risk Interventions:  Man to call system. Call bell, personal items and telephone within reach. Instruct patient to call for assistance. Room bathroom lighting operational. Non-slip footwear when patient is off stretcher. Physically safe environment: no spills, clutter or unnecessary equipment. Stretcher in lowest position, wheels locked, appropriate side rails in place. Provide visual cue, wrist band, yellow gown, etc. Monitor gait and stability. Monitor for mental status changes and reorient to person, place, and time. Review medications for side effects contributing to fall risk. Reinforce activity limits and safety measures with patient and family.

## 2021-02-21 NOTE — H&P ADULT - NSICDXFAMILYHX_GEN_ALL_CORE_FT
FAMILY HISTORY:  Father  Still living? Unknown  Family history of MI (myocardial infarction), Age at diagnosis: Age Unknown    Grandparent  Still living? Unknown  Family history of breast cancer, Age at diagnosis: Age Unknown

## 2021-02-21 NOTE — ED PROVIDER NOTE - PROGRESS NOTE DETAILS
Atelectasis/infiltrate on CXR, RA SpO2=90. ?COVID ?abd disten preventing full expansion  COVID swab ordered.    Lactate = 3.3, will hydrate with NS x 1L (limted hydration d/t concern re COVID).   Awaiting results CT abd/pelv. Atelectasis/infiltrate on CXR, RA SpO2=90. ?COVID ?pneumonia  Lactate = 3.3, will hydrate with NS x 1L (limited hydration d/t concern re COVID).   CT w/severe constipation -- on rectal soft brown stool. Will order enemas

## 2021-02-21 NOTE — ED PROVIDER NOTE - CARE PLAN
Principal Discharge DX:	Abdominal distention   Principal Discharge DX:	Abdominal distention  Secondary Diagnosis:	Pneumonia  Secondary Diagnosis:	Constipation

## 2021-02-21 NOTE — ED ADULT NURSE NOTE - CHIEF COMPLAINT QUOTE
pt alert, brought in by EMS from half-way, c/o abdominal distention. pt denies complaints at this time. Hx of cerebral palsy. pt 90% on room air, pt placed on 2L Nasal cannula.

## 2021-02-21 NOTE — H&P ADULT - NSHPPHYSICALEXAM_GEN_ALL_CORE
Vital Signs Last 24 Hrs  T(C): 37.7 (21 Feb 2021 13:25), Max: 37.7 (21 Feb 2021 13:25)  T(F): 99.9 (21 Feb 2021 13:25), Max: 99.9 (21 Feb 2021 13:25)  HR: 84 (21 Feb 2021 14:10) (84 - 96)  BP: 114/64 (21 Feb 2021 14:10) (102/74 - 120/59)  BP(mean): --  RR: 17 (21 Feb 2021 14:10) (17 - 20)  SpO2: 97% (21 Feb 2021 14:10) (90% - 97%)

## 2021-02-21 NOTE — ED ADULT TRIAGE NOTE - CHIEF COMPLAINT QUOTE
pt alert, brought in by EMS from custodial, c/o abdominal distention. pt denies complaints at this time. Hx of cerebral palsy. pt 90% on room air, pt placed on 2L Nasal cannula.

## 2021-02-21 NOTE — H&P ADULT - HISTORY OF PRESENT ILLNESS
55 year old male patient with pertinent history of Cerebral Palsy presented to the ED from his group home after complaining of diffuse abdominal pain and difficulty having bowel movements for the past two days. History limited secondary to patient baseline Disability. Patient does however endorse abdominal pain and not having bowel movements.      In the ED patient found to be hypoxic on room air, with initial Lactate of 3.8. CT abdomen/pelvis noted for Diffusely distended colon containing a large amount of stool. In particular, the rectum is stool filled and dilated to 10.3 cm.  Bibasilar airspace opacities suspect for pneumonia with findings not typical for Covid 19.

## 2021-02-21 NOTE — ED ADULT NURSE NOTE - OBJECTIVE STATEMENT
Patient brought in by ambulance from a Group home for increased abdominal distension . Abdomen soft and distended, bowel sounds + all 4 quads . Color good.

## 2021-02-21 NOTE — H&P ADULT - ASSESSMENT
55 year old male with abdominal pain found to be hypoxic with bilateral infiltrates on ED presentation        -Admit to [facility]        # Abdominal Pain  -likely secondary to constipation  -give fleet enema  -pain control  -IVF hydration  -serial Abdominal examinations    # Constipation  -give fleet enema  -give miralax  -may need manual disimpaction if no improvement noted    # Acute Hypoxemic respiratory failure  -likely secondary to pna  -Covid pcr negative  -s/p zithromax and Rocephin in the ED  -give zosyn to cover for possible aspiration    # Elevated Lactate  -trial of IVF hydration  -lactate currently trending down    #Seizure disorder  -on lamotrigine    #Hx of Cerebral Palsy  -pt wheelchair bound at baseline  -supportive care    #Intellectual Disability  -supportive care      #HTN  -bp stable    #DVT ppx  -give lovenox

## 2021-02-22 LAB
ALBUMIN SERPL ELPH-MCNC: 2.6 G/DL — LOW (ref 3.3–5)
ALP SERPL-CCNC: 61 U/L — SIGNIFICANT CHANGE UP (ref 40–120)
ALT FLD-CCNC: 44 U/L — SIGNIFICANT CHANGE UP (ref 12–78)
ANION GAP SERPL CALC-SCNC: 6 MMOL/L — SIGNIFICANT CHANGE UP (ref 5–17)
AST SERPL-CCNC: 24 U/L — SIGNIFICANT CHANGE UP (ref 15–37)
BASOPHILS # BLD AUTO: 0 K/UL — SIGNIFICANT CHANGE UP (ref 0–0.2)
BASOPHILS NFR BLD AUTO: 0 % — SIGNIFICANT CHANGE UP (ref 0–2)
BILIRUB SERPL-MCNC: 0.6 MG/DL — SIGNIFICANT CHANGE UP (ref 0.2–1.2)
BUN SERPL-MCNC: 37 MG/DL — HIGH (ref 7–23)
CALCIUM SERPL-MCNC: 9.4 MG/DL — SIGNIFICANT CHANGE UP (ref 8.5–10.1)
CHLORIDE SERPL-SCNC: 109 MMOL/L — HIGH (ref 96–108)
CO2 SERPL-SCNC: 27 MMOL/L — SIGNIFICANT CHANGE UP (ref 22–31)
CREAT SERPL-MCNC: 0.82 MG/DL — SIGNIFICANT CHANGE UP (ref 0.5–1.3)
CULTURE RESULTS: NO GROWTH — SIGNIFICANT CHANGE UP
EOSINOPHIL # BLD AUTO: 0 K/UL — SIGNIFICANT CHANGE UP (ref 0–0.5)
EOSINOPHIL NFR BLD AUTO: 0 % — SIGNIFICANT CHANGE UP (ref 0–6)
GLUCOSE SERPL-MCNC: 104 MG/DL — HIGH (ref 70–99)
HCT VFR BLD CALC: 29.1 % — LOW (ref 39–50)
HCV AB S/CO SERPL IA: 0.08 S/CO — SIGNIFICANT CHANGE UP (ref 0–0.99)
HCV AB SERPL-IMP: SIGNIFICANT CHANGE UP
HGB BLD-MCNC: 9.9 G/DL — LOW (ref 13–17)
INR BLD: 1.3 RATIO — HIGH (ref 0.88–1.16)
LYMPHOCYTES # BLD AUTO: 0.49 K/UL — LOW (ref 1–3.3)
LYMPHOCYTES # BLD AUTO: 8 % — LOW (ref 13–44)
MAGNESIUM SERPL-MCNC: 2.2 MG/DL — SIGNIFICANT CHANGE UP (ref 1.6–2.6)
MCHC RBC-ENTMCNC: 32.9 PG — SIGNIFICANT CHANGE UP (ref 27–34)
MCHC RBC-ENTMCNC: 34 GM/DL — SIGNIFICANT CHANGE UP (ref 32–36)
MCV RBC AUTO: 96.7 FL — SIGNIFICANT CHANGE UP (ref 80–100)
MONOCYTES # BLD AUTO: 0.43 K/UL — SIGNIFICANT CHANGE UP (ref 0–0.9)
MONOCYTES NFR BLD AUTO: 7 % — SIGNIFICANT CHANGE UP (ref 2–14)
NEUTROPHILS # BLD AUTO: 5.19 K/UL — SIGNIFICANT CHANGE UP (ref 1.8–7.4)
NEUTROPHILS NFR BLD AUTO: 84 % — HIGH (ref 43–77)
NRBC # BLD: SIGNIFICANT CHANGE UP /100 WBCS (ref 0–0)
PHOSPHATE SERPL-MCNC: 2.8 MG/DL — SIGNIFICANT CHANGE UP (ref 2.5–4.5)
PLATELET # BLD AUTO: 146 K/UL — LOW (ref 150–400)
POTASSIUM SERPL-MCNC: 3.5 MMOL/L — SIGNIFICANT CHANGE UP (ref 3.5–5.3)
POTASSIUM SERPL-SCNC: 3.5 MMOL/L — SIGNIFICANT CHANGE UP (ref 3.5–5.3)
PROT SERPL-MCNC: 6.1 GM/DL — SIGNIFICANT CHANGE UP (ref 6–8.3)
PROTHROM AB SERPL-ACNC: 14.9 SEC — HIGH (ref 10.6–13.6)
RBC # BLD: 3.01 M/UL — LOW (ref 4.2–5.8)
RBC # FLD: 12.5 % — SIGNIFICANT CHANGE UP (ref 10.3–14.5)
SARS-COV-2 IGG SERPL QL IA: POSITIVE
SARS-COV-2 IGM SERPL IA-ACNC: 9.29 RATIO — HIGH
SODIUM SERPL-SCNC: 142 MMOL/L — SIGNIFICANT CHANGE UP (ref 135–145)
SPECIMEN SOURCE: SIGNIFICANT CHANGE UP
WBC # BLD: 6.11 K/UL — SIGNIFICANT CHANGE UP (ref 3.8–10.5)
WBC # FLD AUTO: 6.11 K/UL — SIGNIFICANT CHANGE UP (ref 3.8–10.5)

## 2021-02-22 PROCEDURE — 99233 SBSQ HOSP IP/OBS HIGH 50: CPT

## 2021-02-22 RX ADMIN — ENOXAPARIN SODIUM 40 MILLIGRAM(S): 100 INJECTION SUBCUTANEOUS at 08:57

## 2021-02-22 RX ADMIN — Medication 1 DROP(S): at 13:30

## 2021-02-22 RX ADMIN — Medication 0.1 MILLIGRAM(S): at 20:19

## 2021-02-22 RX ADMIN — Medication 10 MILLIGRAM(S): at 08:58

## 2021-02-22 RX ADMIN — SODIUM CHLORIDE 100 MILLILITER(S): 9 INJECTION INTRAMUSCULAR; INTRAVENOUS; SUBCUTANEOUS at 05:32

## 2021-02-22 RX ADMIN — TAMSULOSIN HYDROCHLORIDE 0.4 MILLIGRAM(S): 0.4 CAPSULE ORAL at 20:19

## 2021-02-22 RX ADMIN — CHLORHEXIDINE GLUCONATE 15 MILLILITER(S): 213 SOLUTION TOPICAL at 20:19

## 2021-02-22 RX ADMIN — Medication 0.1 MILLIGRAM(S): at 08:59

## 2021-02-22 RX ADMIN — Medication 6 MILLIGRAM(S): at 08:59

## 2021-02-22 RX ADMIN — Medication 25 MILLIGRAM(S): at 08:58

## 2021-02-22 RX ADMIN — Medication 1 DROP(S): at 05:32

## 2021-02-22 RX ADMIN — POLYETHYLENE GLYCOL 3350 17 GRAM(S): 17 POWDER, FOR SOLUTION ORAL at 09:00

## 2021-02-22 RX ADMIN — LAMOTRIGINE 100 MILLIGRAM(S): 25 TABLET, ORALLY DISINTEGRATING ORAL at 09:00

## 2021-02-22 RX ADMIN — PIPERACILLIN AND TAZOBACTAM 25 GRAM(S): 4; .5 INJECTION, POWDER, LYOPHILIZED, FOR SOLUTION INTRAVENOUS at 05:32

## 2021-02-22 RX ADMIN — CITALOPRAM 20 MILLIGRAM(S): 10 TABLET, FILM COATED ORAL at 08:59

## 2021-02-22 RX ADMIN — Medication 1 TABLET(S): at 08:58

## 2021-02-22 RX ADMIN — CHLORHEXIDINE GLUCONATE 15 MILLILITER(S): 213 SOLUTION TOPICAL at 08:59

## 2021-02-22 RX ADMIN — LAMOTRIGINE 100 MILLIGRAM(S): 25 TABLET, ORALLY DISINTEGRATING ORAL at 20:19

## 2021-02-22 RX ADMIN — Medication 10 MILLIGRAM(S): at 20:19

## 2021-02-22 RX ADMIN — PIPERACILLIN AND TAZOBACTAM 25 GRAM(S): 4; .5 INJECTION, POWDER, LYOPHILIZED, FOR SOLUTION INTRAVENOUS at 20:18

## 2021-02-22 RX ADMIN — Medication 1 DROP(S): at 20:18

## 2021-02-22 RX ADMIN — PIPERACILLIN AND TAZOBACTAM 25 GRAM(S): 4; .5 INJECTION, POWDER, LYOPHILIZED, FOR SOLUTION INTRAVENOUS at 13:30

## 2021-02-22 NOTE — PROGRESS NOTE ADULT - SUBJECTIVE AND OBJECTIVE BOX
History of Present Illness:   55 year old male patient with pertinent history of Cerebral Palsy presented to the ED from his group home after complaining of diffuse abdominal pain and difficulty having bowel movements for the past two days. History limited secondary to patient baseline Disability. Patient does however endorse abdominal pain and not having bowel movements.      In the ED patient found to be hypoxic on room air, with initial Lactate of 3.8. CT abdomen/pelvis noted for Diffusely distended colon containing a large amount of stool. In particular, the rectum is stool filled and dilated to 10.3 cm.  Bibasilar airspace opacities suspect for pneumonia with findings not typical for Covid 19.    2.22: no distress , wants to go home            REVIEW OF SYSTEMS:  unable to fully obtain, however no distress     All other review of systems is negative unless indicated above    Vital Signs Last 24 Hrs  T(C): 36.7 (22 Feb 2021 07:48), Max: 37 (21 Feb 2021 21:06)  T(F): 98 (22 Feb 2021 07:48), Max: 98.6 (21 Feb 2021 21:06)  HR: 83 (22 Feb 2021 07:48) (82 - 85)  BP: 113/77 (22 Feb 2021 07:48) (109/78 - 114/87)  RR: 18 (21 Feb 2021 21:06) (17 - 18)  SpO2: 97% (22 Feb 2021 07:48) (94% - 97%)    PHYSICAL EXAM:    GENERAL: NAD, contracted   HEENT:  NC/AT, EOMI, PERRLA, No scleral icterus, Moist mucous membranes  NECK: Supple, No JVD  CNS:  Alert & Oriented X1, flaccid LE  LUNG: Normal Breath sounds, Clear to auscultation bilaterally, No rales, No rhonchi, No wheezing  HEART: RRR; No murmurs, No rubs  ABDOMEN: +BS, ST/ND/NT  GENITOURINARY: Voiding, Bladder not distended  EXTREMITIES:  2+ Peripheral Pulses, No clubbing, No cyanosis, No tibial edema  MUSCULOSKELTAL: Joints normal ROM, No TTP, No effusion  SKIN: + rashes on LE  RECTAL: deferred, not indicated  BREAST: deferred                          9.9    6.11  )-----------( 146      ( 22 Feb 2021 08:04 )             29.1     02-22    142  |  109<H>  |  37<H>  ----------------------------<  104<H>  3.5   |  27  |  0.82    Ca    9.4      22 Feb 2021 08:04  Phos  2.8     02-22  Mg     2.2     02-22    TPro  6.1  /  Alb  2.6<L>  /  TBili  0.6  /  DBili  x   /  AST  24  /  ALT  44  /  AlkPhos  61  02-22    Vancomycin levels:   Cultures:     MEDICATIONS  (STANDING):  artificial  tears Solution 1 Drop(s) Both EYES three times a day  baclofen 10 milliGRAM(s) Oral two times a day  bethanechol 25 milliGRAM(s) Oral daily  calcium carbonate 1250 mG  + Vitamin D (OsCal 500 + D) 1 Tablet(s) Oral daily  chlorhexidine 0.12% Liquid 15 milliLiter(s) Oral Mucosa two times a day  citalopram 20 milliGRAM(s) Oral daily  cloNIDine 0.1 milliGRAM(s) Oral daily  cloNIDine 0.1 milliGRAM(s) Oral <User Schedule>  dexAMETHasone  Injectable 6 milliGRAM(s) IV Push daily  enoxaparin Injectable 40 milliGRAM(s) SubCutaneous daily  lamoTRIgine 100 milliGRAM(s) Oral two times a day  multivitamin 1 Tablet(s) Oral daily  piperacillin/tazobactam IVPB.. 3.375 Gram(s) IV Intermittent every 8 hours  polyethylene glycol 3350 17 Gram(s) Oral daily  sodium chloride 0.9%. 1000 milliLiter(s) (100 mL/Hr) IV Continuous <Continuous>  tamsulosin Oral Tab/Cap - Peds 0.4 milliGRAM(s) Oral at bedtime    MEDICATIONS  (PRN):  acetaminophen   Tablet .. 650 milliGRAM(s) Oral every 4 hours PRN Mild Pain (1 - 3)  ondansetron Injectable 4 milliGRAM(s) IV Push every 6 hours PRN Nausea      all labs reviewed  all imaging reviewed    a/p:      # Acute Hypoxemic respiratory failure  -likely secondary to pna  -Covid pcr negative  -s/p zithromax and Rocephin in the ED  -on  zosyn day#2 to cover for possible aspiration    # Abdominal Pain  -likely secondary to constipation  -s/p fleet enema, c/w miralax   -pain control  -IVF hydration  -serial Abdominal examination      #Seizure disorder  -on lamotrigine    #Hx of Cerebral Palsy  -pt wheelchair bound at baseline  -supportive care      #HTN  -bp stable    #DVT ppx  -give lovenox

## 2021-02-22 NOTE — PHYSICAL THERAPY INITIAL EVALUATION ADULT - PERTINENT HX OF CURRENT PROBLEM, REHAB EVAL
55 year old male patient with pertinent history of Cerebral Palsy presented to the ED from his group home after complaining of diffuse abdominal pain and difficulty having bowel movements for the past two days. History limited secondary to patient baseline Disability. Patient does however endorse abdominal pain and not having bowel movements.

## 2021-02-22 NOTE — PHYSICAL THERAPY INITIAL EVALUATION ADULT - GENERAL OBSERVATIONS, REHAB EVAL
Pt rec'd supine in bed, no acute c/o pain, BUEs held in flexor synergy L>R, pt cooperative with PT, endorses halie to w/c at baseline.

## 2021-02-23 ENCOUNTER — TRANSCRIPTION ENCOUNTER (OUTPATIENT)
Age: 56
End: 2021-02-23

## 2021-02-23 PROCEDURE — 99232 SBSQ HOSP IP/OBS MODERATE 35: CPT

## 2021-02-23 RX ADMIN — Medication 1 TABLET(S): at 10:00

## 2021-02-23 RX ADMIN — PIPERACILLIN AND TAZOBACTAM 25 GRAM(S): 4; .5 INJECTION, POWDER, LYOPHILIZED, FOR SOLUTION INTRAVENOUS at 05:11

## 2021-02-23 RX ADMIN — Medication 0.1 MILLIGRAM(S): at 10:01

## 2021-02-23 RX ADMIN — Medication 10 MILLIGRAM(S): at 20:25

## 2021-02-23 RX ADMIN — Medication 0.1 MILLIGRAM(S): at 20:25

## 2021-02-23 RX ADMIN — Medication 1 TABLET(S): at 10:01

## 2021-02-23 RX ADMIN — LAMOTRIGINE 100 MILLIGRAM(S): 25 TABLET, ORALLY DISINTEGRATING ORAL at 20:25

## 2021-02-23 RX ADMIN — POLYETHYLENE GLYCOL 3350 17 GRAM(S): 17 POWDER, FOR SOLUTION ORAL at 10:01

## 2021-02-23 RX ADMIN — ENOXAPARIN SODIUM 40 MILLIGRAM(S): 100 INJECTION SUBCUTANEOUS at 10:00

## 2021-02-23 RX ADMIN — Medication 1 DROP(S): at 13:05

## 2021-02-23 RX ADMIN — CITALOPRAM 20 MILLIGRAM(S): 10 TABLET, FILM COATED ORAL at 10:00

## 2021-02-23 RX ADMIN — Medication 25 MILLIGRAM(S): at 10:00

## 2021-02-23 RX ADMIN — Medication 6 MILLIGRAM(S): at 10:01

## 2021-02-23 RX ADMIN — LAMOTRIGINE 100 MILLIGRAM(S): 25 TABLET, ORALLY DISINTEGRATING ORAL at 10:01

## 2021-02-23 RX ADMIN — TAMSULOSIN HYDROCHLORIDE 0.4 MILLIGRAM(S): 0.4 CAPSULE ORAL at 20:25

## 2021-02-23 RX ADMIN — PIPERACILLIN AND TAZOBACTAM 25 GRAM(S): 4; .5 INJECTION, POWDER, LYOPHILIZED, FOR SOLUTION INTRAVENOUS at 20:25

## 2021-02-23 RX ADMIN — CHLORHEXIDINE GLUCONATE 15 MILLILITER(S): 213 SOLUTION TOPICAL at 20:25

## 2021-02-23 RX ADMIN — Medication 1 DROP(S): at 05:11

## 2021-02-23 RX ADMIN — Medication 10 MILLIGRAM(S): at 10:00

## 2021-02-23 RX ADMIN — PIPERACILLIN AND TAZOBACTAM 25 GRAM(S): 4; .5 INJECTION, POWDER, LYOPHILIZED, FOR SOLUTION INTRAVENOUS at 13:04

## 2021-02-23 RX ADMIN — CHLORHEXIDINE GLUCONATE 15 MILLILITER(S): 213 SOLUTION TOPICAL at 10:01

## 2021-02-23 RX ADMIN — Medication 1 DROP(S): at 20:25

## 2021-02-23 NOTE — DIETITIAN INITIAL EVALUATION ADULT. - ORAL INTAKE PTA/DIET HISTORY
As per RN pt eating very while in hospital. Pt consumed > 75% tray and was asking for snacks. During visit pt unable to provide diet hx and only wanted to discuss menu selection for upcoming meals. RD obtained dinner and breakfast order. Recommend adding ensure enlive BID and encouraging between meals.

## 2021-02-23 NOTE — DISCHARGE NOTE PROVIDER - DETAILS OF MALNUTRITION DIAGNOSIS/DIAGNOSES
This patient has been assessed with a concern for Malnutrition and was treated during this hospitalization for the following Nutrition diagnosis/diagnoses:     -  02/23/2021: Severe protein-calorie malnutrition   This patient has been assessed with a concern for Malnutrition and was treated during this hospitalization for the following Nutrition diagnosis/diagnoses:     -  02/23/2021: Severe protein-calorie malnutrition    This patient has been assessed with a concern for Malnutrition and was treated during this hospitalization for the following Nutrition diagnosis/diagnoses:     -  02/23/2021: Severe protein-calorie malnutrition

## 2021-02-23 NOTE — DIETITIAN INITIAL EVALUATION ADULT. - MALNUTRITION
Pt meets criteria for severe malnutrition in the context of chronic illness AEB severe muscle/fat wasting and unintentional wt loss of 37.5% BW. Pt meets criteria for severe malnutrition in the context of social/environmental circumstances AEB severe muscle/fat wasting and unintentional wt loss of 37.5% BW.

## 2021-02-23 NOTE — DISCHARGE NOTE PROVIDER - NSDCMRMEDTOKEN_GEN_ALL_CORE_FT
Artificial Tears preserved ophthalmic solution: 1 drop(s) to each affected eye 3 times a day  baclofen 10 mg oral tablet: 1 tab(s) orally 2 times a day  bethanechol 25 mg oral tablet: 1 tab(s) orally once a day  Calcium 500+D oral tablet, chewable: 2 tab(s) orally once a day  Catapres 0.1 mg oral tablet: 1 tab(s) orally once a day(morning)  Catapres 0.1 mg oral tablet: 2 tab(s) orally once a day (@8PM)  CeleXA 10 mg oral tablet: 1 tab(s) orally once a day  Diprolene AF 0.05% topical cream: Apply topically to affected area 2 times a day(for 2weeks)  docusate sodium 100 mg oral capsule: 1 cap(s) orally 3 times a day  Dovonex 0.005% topical cream: Apply topically to affected area 2 times a day  fluocinonide 0.05% topical cream: Apply topically to affected area 2 times a day(for 2 weeks)  lamoTRIgine 100 mg oral tablet: 1 tab(s) orally 2 times a day  lamoTRIgine 100 mg oral tablet: 1 tab(s) orally 2 times a day  MiraLax - oral powder for reconstitution: 17 gram(s) orally once a day  Multiple Vitamins oral tablet: 1 tab(s) orally once a day  Peridex 0.12% mucous membrane liquid: 15 milliliter(s) mucous membrane 2 times a day  PreviDent 5000 Plus topical paste: Apply topically to affected area once a day (brush daily)  tamsulosin 0.4 mg oral capsule: 1 cap(s) orally once a day (at bedtime)  tiZANidine 2 mg oral tablet: 2 milligram(s) orally once a day   Augmentin 875 mg-125 mg oral tablet: 875 milligram(s) orally 2 times a day   baclofen 10 mg oral tablet: 0.5 tab(s) orally 2 times a day  citalopram 10 mg oral tablet: 1 tab(s) orally once a day  cloNIDine 0.1 mg oral tablet: 1 tab(s) orally once a day  cloNIDine 0.1 mg oral tablet: 2 tab(s) orally once a day (at bedtime)   mg oral tablet: 1 tab(s) orally 3 times a day  lamoTRIgine 100 mg oral tablet: 1 tab(s) orally 2 times a day  oxybutynin 5 mg oral tablet: 1 tab(s) orally once a day (at bedtime)  Oysco 500 with D 500 mg-200 intl units (5 mcg) oral tablet: 2 tab(s) orally once a day  polyethylene glycol 3350 oral powder for reconstitution: 17 gram(s) orally once a day  Refresh Liquigel ophthalmic gel: 1 day(s) to each affected eye once a day  Tab-A-Heaven oral tablet: 1 tab(s) orally once a day  tamsulosin 0.4 mg oral capsule: 1 cap(s) orally once a day (at bedtime)  tiZANidine 2 mg oral tablet: 2 milligram(s) orally 3 times a day

## 2021-02-23 NOTE — DIETITIAN INITIAL EVALUATION ADULT. - NS FNS WEIGHT CHANGE REASON
pt unable to provide wt hx. Wt hx as per EMR: 5/2019: 77.1kg; 6/2019: 72.6kg; 1/2020: 77.1kg; 1/2020: 68.9kg; 3/2020: 90.7kg. Based on wt hx pt w unintentional wt loss/unintentional

## 2021-02-23 NOTE — DIETITIAN INITIAL EVALUATION ADULT. - ENERGY INTAKE
Good (>75%) PO intake currently good however pt requires total assistance w/ meals and as per RN concern for inadequate care at group home. PO intake PTA questionable. Pt w/ increased needs 2/2 multiple stage 2 pressure ulcers.

## 2021-02-23 NOTE — DISCHARGE NOTE PROVIDER - CARE PROVIDER_API CALL
Primary care MD,   Phone: (   )    -  Fax: (   )    -  Follow Up Time:    Jessie Maldonado)  Internal Medicine  [address] [place] [address]  Phone: [phone]  Fax: [phone]  Follow Up Time:

## 2021-02-23 NOTE — DIETITIAN INITIAL EVALUATION ADULT. - PERTINENT MEDS FT
MEDICATIONS  (STANDING):  artificial  tears Solution 1 Drop(s) Both EYES three times a day  baclofen 10 milliGRAM(s) Oral two times a day  bethanechol 25 milliGRAM(s) Oral daily  calcium carbonate 1250 mG  + Vitamin D (OsCal 500 + D) 1 Tablet(s) Oral daily  chlorhexidine 0.12% Liquid 15 milliLiter(s) Oral Mucosa two times a day  citalopram 20 milliGRAM(s) Oral daily  cloNIDine 0.1 milliGRAM(s) Oral daily  cloNIDine 0.1 milliGRAM(s) Oral <User Schedule>  dexAMETHasone  Injectable 6 milliGRAM(s) IV Push daily  enoxaparin Injectable 40 milliGRAM(s) SubCutaneous daily  lamoTRIgine 100 milliGRAM(s) Oral two times a day  multivitamin 1 Tablet(s) Oral daily  piperacillin/tazobactam IVPB.. 3.375 Gram(s) IV Intermittent every 8 hours  polyethylene glycol 3350 17 Gram(s) Oral daily  tamsulosin Oral Tab/Cap - Peds 0.4 milliGRAM(s) Oral at bedtime    MEDICATIONS  (PRN):  acetaminophen   Tablet .. 650 milliGRAM(s) Oral every 4 hours PRN Mild Pain (1 - 3)  ondansetron Injectable 4 milliGRAM(s) IV Push every 6 hours PRN Nausea

## 2021-02-23 NOTE — DIETITIAN INITIAL EVALUATION ADULT. - ETIOLOGY
inadequate po intake likely r/t difficulty feeding self and inadequate care to meet estimated nutrition needs.

## 2021-02-23 NOTE — PROGRESS NOTE ADULT - SUBJECTIVE AND OBJECTIVE BOX
History of Present Illness:   55 year old male patient with pertinent history of Cerebral Palsy presented to the ED from his group home after complaining of diffuse abdominal pain and difficulty having bowel movements for the past two days. History limited secondary to patient baseline Disability. Patient does however endorse abdominal pain and not having bowel movements.      In the ED patient found to be hypoxic on room air, with initial Lactate of 3.8. CT abdomen/pelvis noted for Diffusely distended colon containing a large amount of stool. In particular, the rectum is stool filled and dilated to 10.3 cm.  Bibasilar airspace opacities suspect for pneumonia with findings not typical for Covid 19.    2.22: no distress , wants to go home  2.23: wants to go home             REVIEW OF SYSTEMS:  unable to fully obtain, however no distress     All other review of systems is negative unless indicated above    Vital Signs Last 24 Hrs  T(C): 36.3 (23 Feb 2021 07:51), Max: 36.9 (22 Feb 2021 16:07)  T(F): 97.3 (23 Feb 2021 07:51), Max: 98.4 (22 Feb 2021 16:07)  HR: 83 (23 Feb 2021 07:51) (83 - 97)  BP: 135/92 (23 Feb 2021 07:51) (124/85 - 143/96)  RR: 18 (23 Feb 2021 07:51) (17 - 18)  SpO2: 94% (23 Feb 2021 07:51) (94% - 95%)    PHYSICAL EXAM:    GENERAL: NAD, contracted   HEENT:  NC/AT, EOMI, PERRLA, No scleral icterus, Moist mucous membranes  NECK: Supple, No JVD  CNS:  Alert & Oriented X1, flaccid LE  LUNG: Normal Breath sounds, Clear to auscultation bilaterally, No rales, No rhonchi, No wheezing  HEART: RRR; No murmurs, No rubs  ABDOMEN: +BS, ST/ND/NT  GENITOURINARY: Voiding, Bladder not distended  EXTREMITIES:  2+ Peripheral Pulses, No clubbing, No cyanosis, No tibial edema  MUSCULOSKELTAL: Joints normal ROM, No TTP, No effusion  SKIN: + rashes on LE  RECTAL: deferred, not indicated  BREAST: deferred                          9.9    6.11  )-----------( 146      ( 22 Feb 2021 08:04 )             29.1     02-22    142  |  109<H>  |  37<H>  ----------------------------<  104<H>  3.5   |  27  |  0.82    Ca    9.4      22 Feb 2021 08:04  Phos  2.8     02-22  Mg     2.2     02-22    TPro  6.1  /  Alb  2.6<L>  /  TBili  0.6  /  DBili  x   /  AST  24  /  ALT  44  /  AlkPhos  61  02-22    Vancomycin levels:   Cultures:     MEDICATIONS  (STANDING):  artificial  tears Solution 1 Drop(s) Both EYES three times a day  baclofen 10 milliGRAM(s) Oral two times a day  bethanechol 25 milliGRAM(s) Oral daily  calcium carbonate 1250 mG  + Vitamin D (OsCal 500 + D) 1 Tablet(s) Oral daily  chlorhexidine 0.12% Liquid 15 milliLiter(s) Oral Mucosa two times a day  citalopram 20 milliGRAM(s) Oral daily  cloNIDine 0.1 milliGRAM(s) Oral daily  cloNIDine 0.1 milliGRAM(s) Oral <User Schedule>  dexAMETHasone  Injectable 6 milliGRAM(s) IV Push daily  enoxaparin Injectable 40 milliGRAM(s) SubCutaneous daily  lamoTRIgine 100 milliGRAM(s) Oral two times a day  multivitamin 1 Tablet(s) Oral daily  piperacillin/tazobactam IVPB.. 3.375 Gram(s) IV Intermittent every 8 hours  polyethylene glycol 3350 17 Gram(s) Oral daily  tamsulosin Oral Tab/Cap - Peds 0.4 milliGRAM(s) Oral at bedtime      all labs reviewed  all imaging reviewed    a/p:      # Acute Hypoxemic respiratory failure  -likely secondary to pna  -Covid pcr negative  -s/p zithromax and Rocephin in the ED  -on  zosyn day#3 to cover for possible aspiration  likely change to oral Ceftin tomorrow     # Abdominal Pain  -likely secondary to constipation  -s/p fleet enema, c/w miralax       #Seizure disorder  -on lamotrigine    #Hx of Cerebral Palsy  -pt wheelchair bound at baseline  -supportive care      #HTN  -bp stable    #DVT ppx  -give lovenox    Dispo : d/c tomorrow to group home on Ceftin, Miralax

## 2021-02-23 NOTE — DIETITIAN INITIAL EVALUATION ADULT. - PERTINENT LABORATORY DATA
02-22    142  |  109<H>  |  37<H>  ----------------------------<  104<H>  3.5   |  27  |  0.82    Ca    9.4      22 Feb 2021 08:04  Phos  2.8     02-22  Mg     2.2     02-22    TPro  6.1  /  Alb  2.6<L>  /  TBili  0.6  /  DBili  x   /  AST  24  /  ALT  44  /  AlkPhos  61  02-22    BMI: BMI (kg/m2): 17.9 (02-21-21 @ 13:11)  HbA1c:   Glucose:   BP: 135/92 (02-23-21 @ 07:51) (102/74 - 143/96)  Lipid Panel:

## 2021-02-23 NOTE — DIETITIAN INITIAL EVALUATION ADULT. - OTHER INFO
55 year old male patient with pertinent history of Cerebral Palsy presented to the ED from his group home after complaining of diffuse abdominal pain and difficulty having bowel movements for the past two days. History limited secondary to patient baseline Disability. Patient does however endorse abdominal pain and not having bowel movements.    RD consulted 2/2 stage 2 pressure ulcer or greater. Pt is noted w/ multiple stage 2 pressure ulcers, details below. Noted nutrition assessment 8/2018 pt was on 1" cut up foods at group home and was on [facility] soft diet while admitted. Currently no diet from group home in chart. Pt current on regular consistency diet. Recommend change diet to mechanical soft and consult SLP for appropriate diet consistency. Pt admitted for abd pain and s/p CT in ED pt noted w/ large amount of stool. Pt currently on bowel regimen, miralax daily. Noted large BM x 3 2/22 and large BM 2/23. Continue to monitor bowel function. No noted hx of food allergies.

## 2021-02-23 NOTE — DISCHARGE NOTE PROVIDER - HOSPITAL COURSE
55 year old male patient with pertinent history of Cerebral Palsy presented to the ED from his group home after complaining of diffuse abdominal pain and difficulty having bowel movements for the past two days. History limited secondary to patient baseline Disability. Patient does however endorse abdominal pain and not having bowel movements.      In the ED patient found to be hypoxic on room air, with initial Lactate of 3.8. CT abdomen/pelvis noted for Diffusely distended colon containing a large amount of stool. In particular, the rectum is stool filled and dilated to 10.3 cm.  Bibasilar airspace opacities suspect for pneumonia with findings not typical for Covid 19.    2.22: no distress , wants to go home  2.23: wants to go home             REVIEW OF SYSTEMS:  unable to fully obtain, however no distress     All other review of systems is negative unless indicated above    Vital Signs Last 24 Hrs  T(C): 36.3 (23 Feb 2021 07:51), Max: 36.9 (22 Feb 2021 16:07)  T(F): 97.3 (23 Feb 2021 07:51), Max: 98.4 (22 Feb 2021 16:07)  HR: 83 (23 Feb 2021 07:51) (83 - 97)  BP: 135/92 (23 Feb 2021 07:51) (124/85 - 143/96)  RR: 18 (23 Feb 2021 07:51) (17 - 18)  SpO2: 94% (23 Feb 2021 07:51) (94% - 95%)    PHYSICAL EXAM:    GENERAL: NAD, contracted   HEENT:  NC/AT, EOMI, PERRLA, No scleral icterus, Moist mucous membranes  NECK: Supple, No JVD  CNS:  Alert & Oriented X1, flaccid LE  LUNG: Normal Breath sounds, Clear to auscultation bilaterally, No rales, No rhonchi, No wheezing  HEART: RRR; No murmurs, No rubs  ABDOMEN: +BS, ST/ND/NT  GENITOURINARY: Voiding, Bladder not distended  EXTREMITIES:  2+ Peripheral Pulses, No clubbing, No cyanosis, No tibial edema  MUSCULOSKELTAL: Joints normal ROM, No TTP, No effusion  SKIN: + rashes on LE  RECTAL: deferred, not indicated  BREAST: deferred             a/p:      # Acute Hypoxemic respiratory failure  -likely secondary to pna  -Covid pcr negative  -s/p zithromax and Rocephin in the ED  -on  zosyn day#3 to cover for possible aspiration  likely change to oral Ceftin tomorrow     # Abdominal Pain  -likely secondary to constipation  -s/p fleet enema, c/w miralax       #Seizure disorder  -on lamotrigine    #Hx of Cerebral Palsy  -pt wheelchair bound at baseline  -supportive care      #HTN  -bp stable    #DVT ppx  -give lovenox    Dispo : d/c tomorrow to group home on Ceftin, Miralax 55 year old male patient with pertinent history of Cerebral Palsy presented to the ED from his group home after complaining of diffuse abdominal pain and difficulty having bowel movements for the past two days. History limited secondary to patient baseline Disability. Patient does however endorse abdominal pain and not having bowel movements.    In the ED patient found to be hypoxic on room air, with initial Lactate of 3.8. CT abdomen/pelvis noted for Diffusely distended colon containing a large amount of stool. In particular, the rectum is stool filled and dilated to 10.3 cm.  Bibasilar airspace opacities suspect for pneumonia with findings not typical for Covid 19.He was admitted for the above. Treated with iv zosyn for aspiration pna. Given laxatives with improvement. He is now medically stable for dc back to group home. Of note, he was seen by speech and swallow and recommended dysphagia 3 diet with liquids.     for physical exam please see progress note from 2/24          LABS:               9.9    6.11  )-----------( 146      ( 22 Feb 2021 08:04 )             29.1     02-22    142  |  109<H>  |  37<H>  ----------------------------<  104<H>  3.5   |  27  |  0.82    Ca    9.4      22 Feb 2021 08:04  Phos  2.8     02-22  Mg     2.2     02-22    TPro  6.1  /  Alb  2.6<L>  /  TBili  0.6  /  DBili  x   /  AST  24  /  ALT  44  /  AlkPhos  61  02-22    Xray Chest 1 View- PORTABLE-Urgent (Xray Chest 1 View- PORTABLE-Urgent .) (02.21.21 @ 14:11) >  IMPRESSION: Bilateral infiltrates as above.    FINAL DIAGNOSIS:  1. Acute hypoxic respiratory failure d/t aspiration pneumonia.   2. Constipation  3. Seizure d/o.  4. Cerebral palsy  5. HTN    Time taken for dc 42 min  summary to be faxed to pcp.

## 2021-02-23 NOTE — DISCHARGE NOTE PROVIDER - NSDCCPCAREPLAN_GEN_ALL_CORE_FT
PRINCIPAL DISCHARGE DIAGNOSIS  Diagnosis: Abdominal distention  Assessment and Plan of Treatment:       SECONDARY DISCHARGE DIAGNOSES  Diagnosis: Constipation  Assessment and Plan of Treatment:     Diagnosis: Pneumonia  Assessment and Plan of Treatment:      PRINCIPAL DISCHARGE DIAGNOSIS  Diagnosis: Pneumonia  Assessment and Plan of Treatment: complete treatment with augmentin as prescribed.         SECONDARY DISCHARGE DIAGNOSES  Diagnosis: Constipation  Assessment and Plan of Treatment: take laxatives asa prescribed.    Diagnosis: Pneumonia  Assessment and Plan of Treatment:

## 2021-02-23 NOTE — DIETITIAN INITIAL EVALUATION ADULT. - NS FNS REASON FOR WEIGHT CHANG
requires total assistance w/ po intake and w/ increased nutrition needs 2/2 multiple stage 2 pressure ulcers.

## 2021-02-23 NOTE — DIETITIAN INITIAL EVALUATION ADULT. - PHYSCIAL ASSESSMENT
noted multiple stage 2 pressure ulcers (x5) : left heel, right heel, thoracic spine, coccyx, left malleolus; stage 1: right malleolus and right elbow. emaciated

## 2021-02-23 NOTE — PROGRESS NOTE ADULT - NUTRITIONAL ASSESSMENT
This patient has been assessed with a concern for Malnutrition and has been determined to have a diagnosis/diagnoses of Severe protein-calorie malnutrition.    This patient is being managed with:   Diet Regular-  Entered: Feb 21 2021  5:37PM

## 2021-02-23 NOTE — DIETITIAN INITIAL EVALUATION ADULT. - ADD RECOMMEND
1. change diet to mechanical soft and consult SLP for appropriate diet consistency 2. add ensure enlive BID ( w/ thickness as per SLP recs) and gelatein BID 3. provide total assistance w/ po intake and maintain aspiration precautions 4. weekly wt 5. add MVI w/ minerals daily 6. monitor bowel function and adjust bowel regimen PRN.

## 2021-02-23 NOTE — DISCHARGE NOTE PROVIDER - NSDCQMCOGNITION_NEU_ALL_CORE
Difficulty concentrating/Difficulty making decisions/Difficulty remembering Manual Repair Warning Statement: We plan on removing the manually selected variable below in favor of our much easier automatic structured text blocks found in the previous tab. We decided to do this to help make the flow better and give you the full power of structured data. Manual selection is never going to be ideal in our platform and I would encourage you to avoid using manual selection from this point on, especially since I will be sunsetting this feature. It is important that you do one of two things with the customized text below. First, you can save all of the text in a word file so you can have it for future reference. Second, transfer the text to the appropriate area in the Library tab. Lastly, if there is a flap or graft type which we do not have you need to let us know right away so I can add it in before the variable is hidden. No need to panic, we plan to give you roughly 6 months to make the change.

## 2021-02-24 ENCOUNTER — TRANSCRIPTION ENCOUNTER (OUTPATIENT)
Age: 56
End: 2021-02-24

## 2021-02-24 VITALS
RESPIRATION RATE: 18 BRPM | OXYGEN SATURATION: 94 % | SYSTOLIC BLOOD PRESSURE: 131 MMHG | HEART RATE: 80 BPM | DIASTOLIC BLOOD PRESSURE: 82 MMHG | TEMPERATURE: 97 F

## 2021-02-24 PROCEDURE — 99239 HOSP IP/OBS DSCHRG MGMT >30: CPT

## 2021-02-24 RX ORDER — LAMOTRIGINE 25 MG/1
1 TABLET, ORALLY DISINTEGRATING ORAL
Qty: 0 | Refills: 0 | DISCHARGE

## 2021-02-24 RX ORDER — CHLORHEXIDINE GLUCONATE 213 G/1000ML
15 SOLUTION TOPICAL
Qty: 0 | Refills: 0 | DISCHARGE

## 2021-02-24 RX ORDER — SODIUM FLUORIDE 1.1 G/100G
1 GEL ORAL
Qty: 0 | Refills: 0 | DISCHARGE

## 2021-02-24 RX ORDER — BACLOFEN 100 %
1 POWDER (GRAM) MISCELLANEOUS
Qty: 0 | Refills: 0 | DISCHARGE

## 2021-02-24 RX ORDER — CALCIPOTRIENE 50 UG/G
1 CREAM TOPICAL
Qty: 0 | Refills: 0 | DISCHARGE

## 2021-02-24 RX ORDER — TIZANIDINE 4 MG/1
2 TABLET ORAL
Qty: 0 | Refills: 0 | DISCHARGE

## 2021-02-24 RX ORDER — POLYETHYLENE GLYCOL 3350 17 G/17G
17 POWDER, FOR SOLUTION ORAL
Qty: 0 | Refills: 0 | DISCHARGE
Start: 2021-02-24

## 2021-02-24 RX ORDER — BETHANECHOL CHLORIDE 25 MG
1 TABLET ORAL
Qty: 0 | Refills: 0 | DISCHARGE

## 2021-02-24 RX ORDER — FLUOCINONIDE/EMOLLIENT BASE 0.05 %
1 CREAM (GRAM) TOPICAL
Qty: 0 | Refills: 0 | DISCHARGE

## 2021-02-24 RX ADMIN — Medication 10 MILLIGRAM(S): at 09:19

## 2021-02-24 RX ADMIN — Medication 1 TABLET(S): at 09:19

## 2021-02-24 RX ADMIN — ENOXAPARIN SODIUM 40 MILLIGRAM(S): 100 INJECTION SUBCUTANEOUS at 09:18

## 2021-02-24 RX ADMIN — CHLORHEXIDINE GLUCONATE 15 MILLILITER(S): 213 SOLUTION TOPICAL at 09:20

## 2021-02-24 RX ADMIN — Medication 1 DROP(S): at 05:22

## 2021-02-24 RX ADMIN — POLYETHYLENE GLYCOL 3350 17 GRAM(S): 17 POWDER, FOR SOLUTION ORAL at 09:18

## 2021-02-24 RX ADMIN — Medication 0.1 MILLIGRAM(S): at 09:23

## 2021-02-24 RX ADMIN — PIPERACILLIN AND TAZOBACTAM 25 GRAM(S): 4; .5 INJECTION, POWDER, LYOPHILIZED, FOR SOLUTION INTRAVENOUS at 05:22

## 2021-02-24 RX ADMIN — LAMOTRIGINE 100 MILLIGRAM(S): 25 TABLET, ORALLY DISINTEGRATING ORAL at 09:20

## 2021-02-24 RX ADMIN — Medication 25 MILLIGRAM(S): at 09:20

## 2021-02-24 RX ADMIN — CITALOPRAM 20 MILLIGRAM(S): 10 TABLET, FILM COATED ORAL at 09:19

## 2021-02-24 RX ADMIN — Medication 1 DROP(S): at 14:03

## 2021-02-24 RX ADMIN — Medication 1 TABLET(S): at 09:21

## 2021-02-24 NOTE — SWALLOW BEDSIDE ASSESSMENT ADULT - ORAL PHASE
immediate bolus formation: edvi Ap pulsion for thin liquid, oral management observable for puree and mastication functional but somewhat clumsy for small pieces of dry chewable.  Collectin and re-collection of masticated material WFL d

## 2021-02-24 NOTE — SWALLOW BEDSIDE ASSESSMENT ADULT - SLP GENERAL OBSERVATIONS
pt semi upright in bed; looks thin with prominent laryngeal strap tendons and muscles. Both hands in tight contracture; pt immediately asking for his lunch.  Was able to participate with following simple oral motor commands.  pt informed clinician in response to question, that he had dentures . Clinician found and placed pt's upper dental plate in his mouth .

## 2021-02-24 NOTE — SWALLOW BEDSIDE ASSESSMENT ADULT - PHARYNGEAL PHASE
Onset of pharyngeal swallow WTL: observable laryngeallift.  No overt s/s aspiration on thin liquids via straw, or on chewable bolus.

## 2021-02-24 NOTE — DISCHARGE NOTE NURSING/CASE MANAGEMENT/SOCIAL WORK - PATIENT PORTAL LINK FT
You can access the FollowMyHealth Patient Portal offered by [facility] by registering at the following website: [email or web address]. By joining [facility]’s FollowMyHealth portal, you will also be able to view your health information using other applications (apps) compatible with our system.

## 2021-02-24 NOTE — SWALLOW BEDSIDE ASSESSMENT ADULT - SWALLOW EVAL: ORAL MUSCULATURE
Facies is thin and naroow with narrow palate./unable to assess due to poor participation/comprehension

## 2021-02-24 NOTE — SWALLOW BEDSIDE ASSESSMENT ADULT - ORAL PREPARATORY PHASE
wide anticipatory mouth opening: closre not finely graded on cup or on spoon. Closure on straw.  Bitre size piece to be placed into his mouth; spoon needs to be placed  inot his mouth towards the back of the tongue for pt to make lip seal.

## 2021-02-24 NOTE — SWALLOW BEDSIDE ASSESSMENT ADULT - SWALLOW EVAL: RECOMMENDED FEEDING/EATING TECHNIQUES
allow for swallow between intakes/alternate food with liquid/check mouth frequently for oral residue/pocketing/crush medication (when feasible)/hard swallow w/ each bite or sip/maintain upright posture during/after eating for 30 mins/oral hygiene/position upright (90 degrees)/small sips/bites

## 2021-02-24 NOTE — SWALLOW BEDSIDE ASSESSMENT ADULT - NS SPL SWALLOW CLINIC TRIAL FT
Josiah sure pt has his dental plate in his upper jaw;  Use FLEXIBLE STRAW< pt has head in extension.  Trial Dysphagia 3 with thin liquids.  Tell pt what is presented, pt may ask for specific items from tray as he did at bedside evaluation.  Aspiration precautions: Cut food into bite size pieces.   Disc with Nsg.  Service will follow

## 2021-02-24 NOTE — SWALLOW BEDSIDE ASSESSMENT ADULT - COMMENTS
55 year old male patient with pertinent history of Cerebral Palsy presented to the ED from his group home after complaining of diffuse abdominal pain and difficulty having bowel movements for the past two days. History limited secondary to patient baseline Disability. Patient does however endorse abdominal pain and not having bowel movements.      In the ED patient found to be hypoxic on room air, with initial Lactate of 3.8. CT abdomen/pelvis noted for Diffusely distended colon containing a large amount of stool. In particular, the rectum is stool filled and dilated to 10.3 cm.  Bibasilar airspace opacities suspect for pneumonia with findings not typical for Covid 19.   Service is asked to evaluate pt for po intake and determine the level of diet consistency.

## 2021-02-24 NOTE — CDI QUERY NOTE - NSCDIOTHERTXTBX_GEN_ALL_CORE_HH
Documentation on chart of Pneumonia.    Pt. on Zoysn to cover for possible aspiration.    PMH: Cerebral Palsy    CXR: Bibasilar airspace opacities suspect for pneumonia with findings not typical for Covid 19    Please clarify the type of Pneumonia and necessitation of the use of Zoysn intravenous.   A) Aspiration pneumonia present on admission  B) Gram negative ninfa pneumonia present on admission  C) Unable to determine  D) Other ( Please specify condition)
Documentation on chart of Pneumonia.    Pt. on Zoysn to cover for possible aspiration.    PMH: Cerebral Palsy    CXR: Bibasilar airspace opacities suspect for pneumonia with findings not typical for Covid 19    Please clarify the type of Pneumonia and necessitation of the use of Zoysn intravenous.   A) Aspiration pneumonia present on admission  B) Gram negative ninfa pneumonia present on admission  C) Unable to determine  D) Other ( Please specify condition)

## 2021-02-24 NOTE — SWALLOW BEDSIDE ASSESSMENT ADULT - POSITIONING
but pt appears unable to flex head even into neutral position.  So head is in constant extension on.

## 2021-02-24 NOTE — SWALLOW BEDSIDE ASSESSMENT ADULT - DIET PRIOR TO ADMI
as per report from previous evaluation in 2018 at [place], Pt was managed at the Group Home on regular cut up and thin liquids. No information in the present shadow chart concerning his diet at the group Home..

## 2021-02-24 NOTE — PROGRESS NOTE ADULT - SUBJECTIVE AND OBJECTIVE BOX
C/C: constipation/pna    History of Present Illness:   55 year old male patient with pertinent history of Cerebral Palsy presented to the ED from his group home after complaining of diffuse abdominal pain and difficulty having bowel movements for the past two days. History limited secondary to patient baseline Disability. Patient does however endorse abdominal pain and not having bowel movements.    In the ED patient found to be hypoxic on room air, with initial Lactate of 3.8. CT abdomen/pelvis noted for Diffusely distended colon containing a large amount of stool. In particular, the rectum is stool filled and dilated to 10.3 cm.  Bibasilar airspace opacities suspect for pneumonia with findings not typical for Covid 19.He was admitted for the above. Treated with iv zosyn for aspiration pna. Given laxatives with improvement.     2/24: pt seen and examined. Wants to eat. no pain. no sob      REVIEW OF SYSTEMS: unable to obtain d/t cerebral palsy.       Vital Signs Last 24 Hrs  T(C): 35.9 (24 Feb 2021 07:35), Max: 36.4 (23 Feb 2021 16:44)  T(F): 96.6 (24 Feb 2021 07:35), Max: 97.5 (23 Feb 2021 16:44)  HR: 80 (24 Feb 2021 07:35) (80 - 88)  BP: 131/82 (24 Feb 2021 07:35) (129/85 - 150/94)  RR: 18 (24 Feb 2021 07:35) (18 - 18)  SpO2: 94% (24 Feb 2021 07:35) (94% - 100%)    PHYSICAL EXAM:    GENERAL: NAD, contracted   HEENT:  NC/AT, EOMI, PERRLA, No scleral icterus, Moist mucous membranes  NECK: Supple, No JVD  CNS:  Alert & Oriented X1, flaccid LE  LUNG: decreased bs at bases b/l  HEART: RRR; No murmurs, No rubs  ABDOMEN: +BS, ST/ND/NT  GENITOURINARY: Voiding, Bladder not distended  EXTREMITIES:  2+ Peripheral Pulses, No clubbing, No cyanosis, No tibial edema  MUSCULOSKELTAL: Joints normal ROM, No TTP, No effusion  SKIN: + rashes on LE    LABS: none today.     MEDICATIONS  (STANDING):  artificial  tears Solution 1 Drop(s) Both EYES three times a day  baclofen 10 milliGRAM(s) Oral two times a day  bethanechol 25 milliGRAM(s) Oral daily  calcium carbonate 1250 mG  + Vitamin D (OsCal 500 + D) 1 Tablet(s) Oral daily  chlorhexidine 0.12% Liquid 15 milliLiter(s) Oral Mucosa two times a day  citalopram 20 milliGRAM(s) Oral daily  cloNIDine 0.1 milliGRAM(s) Oral daily  cloNIDine 0.1 milliGRAM(s) Oral <User Schedule>  enoxaparin Injectable 40 milliGRAM(s) SubCutaneous daily  lamoTRIgine 100 milliGRAM(s) Oral two times a day  multivitamin 1 Tablet(s) Oral daily  piperacillin/tazobactam IVPB.. 3.375 Gram(s) IV Intermittent every 8 hours  polyethylene glycol 3350 17 Gram(s) Oral daily  tamsulosin Oral Tab/Cap - Peds 0.4 milliGRAM(s) Oral at bedtime    MEDICATIONS  (PRN):  acetaminophen   Tablet .. 650 milliGRAM(s) Oral every 4 hours PRN Mild Pain (1 - 3)  ondansetron Injectable 4 milliGRAM(s) IV Push every 6 hours PRN Nausea      ASSESSMENT AND PLAN:    # Acute Hypoxemic respiratory failure  -likely secondary to pna  -Covid pcr negative  -s/p zithromax and Rocephin in the ED  -on  zosyn day#4 to treat aspiration pna present on admission  -Change to augmentin to complete treatment.  -hypoxia improved    # Abdominal Pain  -likely secondary to constipation  -s/p fleet enema, c/w miralax       #Seizure disorder  -on lamotrigine    #Hx of Cerebral Palsy  -pt wheelchair bound at baseline  -supportive care      #HTN  -bp stable    #DVT ppx  -lovenox    Dispo :  dc today

## 2021-02-24 NOTE — PROGRESS NOTE ADULT - NUTRITIONAL ASSESSMENT
This patient has been assessed with a concern for Malnutrition and has been determined to have a diagnosis/diagnoses of Severe protein-calorie malnutrition.    This patient is being managed with:   Diet Dysphagia 2 Mechanical Soft-Thin Liquids-  Prosource Gelatein Plus     Qty per Day:  2x/day  Supplement Feeding Modality:  Oral  Ensure Enlive Servings Per Day:  1       Frequency:  Two Times a day  Entered: Feb 23 2021  2:08PM

## 2021-02-24 NOTE — SWALLOW BEDSIDE ASSESSMENT ADULT - SWALLOW EVAL: DIAGNOSIS
oral -pharyngeal dysphagia for regular cut up diet consistency, manages thin liquids..  pt has adaptive oral behavior for acceptance. needs careful feeding.   meds crushed in  puree: pt able to make his needs known verbally, despite dysarthria which reduces his intelligibility.

## 2021-02-24 NOTE — SWALLOW BEDSIDE ASSESSMENT ADULT - SWALLOW EVAL: RECOMMENDED DIET
trial first Dysphagia 3 with thin liquids; cut food up.  straw ok but use a FLEXIBLE STRAW> Pt does not have finely graded oral movements  and dose no flex head towards the utensil .

## 2021-03-02 DIAGNOSIS — Z99.3 DEPENDENCE ON WHEELCHAIR: ICD-10-CM

## 2021-03-02 DIAGNOSIS — I10 ESSENTIAL (PRIMARY) HYPERTENSION: ICD-10-CM

## 2021-03-02 DIAGNOSIS — L40.9 PSORIASIS, UNSPECIFIED: ICD-10-CM

## 2021-03-02 DIAGNOSIS — G40.909 EPILEPSY, UNSPECIFIED, NOT INTRACTABLE, WITHOUT STATUS EPILEPTICUS: ICD-10-CM

## 2021-03-02 DIAGNOSIS — N31.9 NEUROMUSCULAR DYSFUNCTION OF BLADDER, UNSPECIFIED: ICD-10-CM

## 2021-03-02 DIAGNOSIS — K59.00 CONSTIPATION, UNSPECIFIED: ICD-10-CM

## 2021-03-02 DIAGNOSIS — F79 UNSPECIFIED INTELLECTUAL DISABILITIES: ICD-10-CM

## 2021-03-02 DIAGNOSIS — G80.9 CEREBRAL PALSY, UNSPECIFIED: ICD-10-CM

## 2021-03-02 DIAGNOSIS — M48.00 SPINAL STENOSIS, SITE UNSPECIFIED: ICD-10-CM

## 2021-03-02 DIAGNOSIS — E43 UNSPECIFIED SEVERE PROTEIN-CALORIE MALNUTRITION: ICD-10-CM

## 2021-03-02 DIAGNOSIS — J69.0 PNEUMONITIS DUE TO INHALATION OF FOOD AND VOMIT: ICD-10-CM

## 2021-03-02 DIAGNOSIS — J18.9 PNEUMONIA, UNSPECIFIED ORGANISM: ICD-10-CM

## 2021-03-02 DIAGNOSIS — Z79.899 OTHER LONG TERM (CURRENT) DRUG THERAPY: ICD-10-CM

## 2021-03-02 DIAGNOSIS — J96.01 ACUTE RESPIRATORY FAILURE WITH HYPOXIA: ICD-10-CM

## 2021-04-29 ENCOUNTER — INPATIENT (INPATIENT)
Facility: HOSPITAL | Age: 56
LOS: 4 days | Discharge: HOME | DRG: 441 | End: 2021-05-04
Attending: INTERNAL MEDICINE | Admitting: INTERNAL MEDICINE
Payer: MEDICARE

## 2021-04-29 VITALS — HEIGHT: 70 IN | WEIGHT: 160.06 LBS

## 2021-04-29 DIAGNOSIS — R74.01 ELEVATION OF LEVELS OF LIVER TRANSAMINASE LEVELS: ICD-10-CM

## 2021-04-29 LAB
ALBUMIN SERPL ELPH-MCNC: 3.4 G/DL — SIGNIFICANT CHANGE UP (ref 3.3–5)
ALBUMIN SERPL ELPH-MCNC: 3.5 G/DL — SIGNIFICANT CHANGE UP (ref 3.3–5)
ALP SERPL-CCNC: 366 U/L — HIGH (ref 40–120)
ALP SERPL-CCNC: 380 U/L — HIGH (ref 40–120)
ALT FLD-CCNC: 3303 U/L — HIGH (ref 12–78)
ALT FLD-CCNC: 3421 U/L — HIGH (ref 12–78)
ANION GAP SERPL CALC-SCNC: 7 MMOL/L — SIGNIFICANT CHANGE UP (ref 5–17)
ANION GAP SERPL CALC-SCNC: 8 MMOL/L — SIGNIFICANT CHANGE UP (ref 5–17)
APPEARANCE UR: ABNORMAL
APTT BLD: 33.1 SEC — SIGNIFICANT CHANGE UP (ref 27.5–35.5)
AST SERPL-CCNC: 4106 U/L — HIGH (ref 15–37)
AST SERPL-CCNC: 4368 U/L — HIGH (ref 15–37)
BASOPHILS # BLD AUTO: 0.02 K/UL — SIGNIFICANT CHANGE UP (ref 0–0.2)
BASOPHILS NFR BLD AUTO: 0.2 % — SIGNIFICANT CHANGE UP (ref 0–2)
BILIRUB SERPL-MCNC: 1.2 MG/DL — SIGNIFICANT CHANGE UP (ref 0.2–1.2)
BILIRUB SERPL-MCNC: 1.4 MG/DL — HIGH (ref 0.2–1.2)
BILIRUB UR-MCNC: NEGATIVE — SIGNIFICANT CHANGE UP
BUN SERPL-MCNC: 76 MG/DL — HIGH (ref 7–23)
BUN SERPL-MCNC: 77 MG/DL — HIGH (ref 7–23)
CALCIUM SERPL-MCNC: 9.6 MG/DL — SIGNIFICANT CHANGE UP (ref 8.5–10.1)
CALCIUM SERPL-MCNC: 9.6 MG/DL — SIGNIFICANT CHANGE UP (ref 8.5–10.1)
CHLORIDE SERPL-SCNC: 103 MMOL/L — SIGNIFICANT CHANGE UP (ref 96–108)
CHLORIDE SERPL-SCNC: 103 MMOL/L — SIGNIFICANT CHANGE UP (ref 96–108)
CO2 SERPL-SCNC: 25 MMOL/L — SIGNIFICANT CHANGE UP (ref 22–31)
CO2 SERPL-SCNC: 28 MMOL/L — SIGNIFICANT CHANGE UP (ref 22–31)
COLOR SPEC: YELLOW — SIGNIFICANT CHANGE UP
CREAT SERPL-MCNC: 1.07 MG/DL — SIGNIFICANT CHANGE UP (ref 0.5–1.3)
CREAT SERPL-MCNC: 1.19 MG/DL — SIGNIFICANT CHANGE UP (ref 0.5–1.3)
DIFF PNL FLD: ABNORMAL
EOSINOPHIL # BLD AUTO: 0 K/UL — SIGNIFICANT CHANGE UP (ref 0–0.5)
EOSINOPHIL NFR BLD AUTO: 0 % — SIGNIFICANT CHANGE UP (ref 0–6)
GLUCOSE SERPL-MCNC: 38 MG/DL — CRITICAL LOW (ref 70–99)
GLUCOSE SERPL-MCNC: 79 MG/DL — SIGNIFICANT CHANGE UP (ref 70–99)
GLUCOSE UR QL: NEGATIVE MG/DL — SIGNIFICANT CHANGE UP
HCT VFR BLD CALC: 41.4 % — SIGNIFICANT CHANGE UP (ref 39–50)
HGB BLD-MCNC: 14.7 G/DL — SIGNIFICANT CHANGE UP (ref 13–17)
IMM GRANULOCYTES NFR BLD AUTO: 1.8 % — HIGH (ref 0–1.5)
INR BLD: 2.02 RATIO — HIGH (ref 0.88–1.16)
KETONES UR-MCNC: NEGATIVE — SIGNIFICANT CHANGE UP
LACTATE SERPL-SCNC: 1 MMOL/L — SIGNIFICANT CHANGE UP (ref 0.7–2)
LEUKOCYTE ESTERASE UR-ACNC: ABNORMAL
LIDOCAIN IGE QN: 59 U/L — LOW (ref 73–393)
LYMPHOCYTES # BLD AUTO: 1.8 K/UL — SIGNIFICANT CHANGE UP (ref 1–3.3)
LYMPHOCYTES # BLD AUTO: 21.2 % — SIGNIFICANT CHANGE UP (ref 13–44)
MCHC RBC-ENTMCNC: 31.4 PG — SIGNIFICANT CHANGE UP (ref 27–34)
MCHC RBC-ENTMCNC: 35.5 GM/DL — SIGNIFICANT CHANGE UP (ref 32–36)
MCV RBC AUTO: 88.5 FL — SIGNIFICANT CHANGE UP (ref 80–100)
MONOCYTES # BLD AUTO: 0.38 K/UL — SIGNIFICANT CHANGE UP (ref 0–0.9)
MONOCYTES NFR BLD AUTO: 4.5 % — SIGNIFICANT CHANGE UP (ref 2–14)
NEUTROPHILS # BLD AUTO: 6.13 K/UL — SIGNIFICANT CHANGE UP (ref 1.8–7.4)
NEUTROPHILS NFR BLD AUTO: 72.3 % — SIGNIFICANT CHANGE UP (ref 43–77)
NITRITE UR-MCNC: NEGATIVE — SIGNIFICANT CHANGE UP
PH UR: 8 — SIGNIFICANT CHANGE UP (ref 5–8)
PLATELET # BLD AUTO: 196 K/UL — SIGNIFICANT CHANGE UP (ref 150–400)
POTASSIUM SERPL-MCNC: 3.8 MMOL/L — SIGNIFICANT CHANGE UP (ref 3.5–5.3)
POTASSIUM SERPL-MCNC: 4.1 MMOL/L — SIGNIFICANT CHANGE UP (ref 3.5–5.3)
POTASSIUM SERPL-SCNC: 3.8 MMOL/L — SIGNIFICANT CHANGE UP (ref 3.5–5.3)
POTASSIUM SERPL-SCNC: 4.1 MMOL/L — SIGNIFICANT CHANGE UP (ref 3.5–5.3)
PROT SERPL-MCNC: 6.8 GM/DL — SIGNIFICANT CHANGE UP (ref 6–8.3)
PROT SERPL-MCNC: 6.9 GM/DL — SIGNIFICANT CHANGE UP (ref 6–8.3)
PROT UR-MCNC: 30 MG/DL
PROTHROM AB SERPL-ACNC: 22.9 SEC — HIGH (ref 10.6–13.6)
RAPID RVP RESULT: SIGNIFICANT CHANGE UP
RBC # BLD: 4.68 M/UL — SIGNIFICANT CHANGE UP (ref 4.2–5.8)
RBC # FLD: 12.2 % — SIGNIFICANT CHANGE UP (ref 10.3–14.5)
SARS-COV-2 RNA SPEC QL NAA+PROBE: SIGNIFICANT CHANGE UP
SODIUM SERPL-SCNC: 136 MMOL/L — SIGNIFICANT CHANGE UP (ref 135–145)
SODIUM SERPL-SCNC: 138 MMOL/L — SIGNIFICANT CHANGE UP (ref 135–145)
SP GR SPEC: 1.01 — SIGNIFICANT CHANGE UP (ref 1.01–1.02)
UROBILINOGEN FLD QL: NEGATIVE MG/DL — SIGNIFICANT CHANGE UP
WBC # BLD: 8.48 K/UL — SIGNIFICANT CHANGE UP (ref 3.8–10.5)
WBC # FLD AUTO: 8.48 K/UL — SIGNIFICANT CHANGE UP (ref 3.8–10.5)

## 2021-04-29 PROCEDURE — U0003: CPT

## 2021-04-29 PROCEDURE — 76705 ECHO EXAM OF ABDOMEN: CPT | Mod: 26

## 2021-04-29 PROCEDURE — 74176 CT ABD & PELVIS W/O CONTRAST: CPT

## 2021-04-29 PROCEDURE — 80061 LIPID PANEL: CPT

## 2021-04-29 PROCEDURE — 80053 COMPREHEN METABOLIC PANEL: CPT

## 2021-04-29 PROCEDURE — 93975 VASCULAR STUDY: CPT

## 2021-04-29 PROCEDURE — 36415 COLL VENOUS BLD VENIPUNCTURE: CPT

## 2021-04-29 PROCEDURE — 74181 MRI ABDOMEN W/O CONTRAST: CPT

## 2021-04-29 PROCEDURE — 99222 1ST HOSP IP/OBS MODERATE 55: CPT

## 2021-04-29 PROCEDURE — 86769 SARS-COV-2 COVID-19 ANTIBODY: CPT

## 2021-04-29 PROCEDURE — 85049 AUTOMATED PLATELET COUNT: CPT

## 2021-04-29 PROCEDURE — 85025 COMPLETE CBC W/AUTO DIFF WBC: CPT

## 2021-04-29 PROCEDURE — 74177 CT ABD & PELVIS W/CONTRAST: CPT | Mod: 26

## 2021-04-29 PROCEDURE — 85610 PROTHROMBIN TIME: CPT

## 2021-04-29 PROCEDURE — 82962 GLUCOSE BLOOD TEST: CPT

## 2021-04-29 PROCEDURE — 85027 COMPLETE CBC AUTOMATED: CPT

## 2021-04-29 PROCEDURE — 0225U NFCT DS DNA&RNA 21 SARSCOV2: CPT

## 2021-04-29 PROCEDURE — 93010 ELECTROCARDIOGRAM REPORT: CPT

## 2021-04-29 PROCEDURE — 80307 DRUG TEST PRSMV CHEM ANLYZR: CPT

## 2021-04-29 PROCEDURE — 99285 EMERGENCY DEPT VISIT HI MDM: CPT | Mod: CS

## 2021-04-29 PROCEDURE — 82977 ASSAY OF GGT: CPT

## 2021-04-29 PROCEDURE — U0005: CPT

## 2021-04-29 RX ORDER — TAMSULOSIN HYDROCHLORIDE 0.4 MG/1
0.4 CAPSULE ORAL AT BEDTIME
Refills: 0 | Status: DISCONTINUED | OUTPATIENT
Start: 2021-04-29 | End: 2021-05-04

## 2021-04-29 RX ORDER — BETHANECHOL CHLORIDE 25 MG
25 TABLET ORAL AT BEDTIME
Refills: 0 | Status: DISCONTINUED | OUTPATIENT
Start: 2021-04-29 | End: 2021-05-04

## 2021-04-29 RX ORDER — CEFTRIAXONE 500 MG/1
1000 INJECTION, POWDER, FOR SOLUTION INTRAMUSCULAR; INTRAVENOUS EVERY 24 HOURS
Refills: 0 | Status: DISCONTINUED | OUTPATIENT
Start: 2021-04-29 | End: 2021-04-29

## 2021-04-29 RX ORDER — SENNA PLUS 8.6 MG/1
2 TABLET ORAL AT BEDTIME
Refills: 0 | Status: DISCONTINUED | OUTPATIENT
Start: 2021-04-29 | End: 2021-05-04

## 2021-04-29 RX ORDER — OXYBUTYNIN CHLORIDE 5 MG
5 TABLET ORAL AT BEDTIME
Refills: 0 | Status: DISCONTINUED | OUTPATIENT
Start: 2021-04-29 | End: 2021-05-04

## 2021-04-29 RX ORDER — LAMOTRIGINE 25 MG/1
100 TABLET, ORALLY DISINTEGRATING ORAL
Refills: 0 | Status: DISCONTINUED | OUTPATIENT
Start: 2021-04-29 | End: 2021-05-04

## 2021-04-29 RX ORDER — CHLORHEXIDINE GLUCONATE 213 G/1000ML
15 SOLUTION TOPICAL
Refills: 0 | Status: DISCONTINUED | OUTPATIENT
Start: 2021-04-29 | End: 2021-05-04

## 2021-04-29 RX ORDER — LACTULOSE 10 G/15ML
10 SOLUTION ORAL THREE TIMES A DAY
Refills: 0 | Status: DISCONTINUED | OUTPATIENT
Start: 2021-04-29 | End: 2021-05-04

## 2021-04-29 RX ORDER — CITALOPRAM 10 MG/1
10 TABLET, FILM COATED ORAL DAILY
Refills: 0 | Status: DISCONTINUED | OUTPATIENT
Start: 2021-04-29 | End: 2021-05-04

## 2021-04-29 RX ORDER — ONDANSETRON 8 MG/1
4 TABLET, FILM COATED ORAL EVERY 6 HOURS
Refills: 0 | Status: DISCONTINUED | OUTPATIENT
Start: 2021-04-29 | End: 2021-05-04

## 2021-04-29 RX ORDER — DEXTROSE 50 % IN WATER 50 %
50 SYRINGE (ML) INTRAVENOUS ONCE
Refills: 0 | Status: COMPLETED | OUTPATIENT
Start: 2021-04-29 | End: 2021-04-29

## 2021-04-29 RX ORDER — CEFTRIAXONE 500 MG/1
1000 INJECTION, POWDER, FOR SOLUTION INTRAMUSCULAR; INTRAVENOUS EVERY 24 HOURS
Refills: 0 | Status: DISCONTINUED | OUTPATIENT
Start: 2021-04-29 | End: 2021-05-01

## 2021-04-29 RX ORDER — ENOXAPARIN SODIUM 100 MG/ML
40 INJECTION SUBCUTANEOUS DAILY
Refills: 0 | Status: DISCONTINUED | OUTPATIENT
Start: 2021-04-29 | End: 2021-05-04

## 2021-04-29 RX ORDER — POLYETHYLENE GLYCOL 3350 17 G/17G
17 POWDER, FOR SOLUTION ORAL DAILY
Refills: 0 | Status: DISCONTINUED | OUTPATIENT
Start: 2021-04-29 | End: 2021-05-04

## 2021-04-29 RX ORDER — BACLOFEN 100 %
5 POWDER (GRAM) MISCELLANEOUS
Refills: 0 | Status: DISCONTINUED | OUTPATIENT
Start: 2021-04-29 | End: 2021-05-04

## 2021-04-29 RX ORDER — TIZANIDINE 4 MG/1
2 TABLET ORAL
Qty: 0 | Refills: 0 | DISCHARGE

## 2021-04-29 RX ORDER — SODIUM CHLORIDE 9 MG/ML
500 INJECTION INTRAMUSCULAR; INTRAVENOUS; SUBCUTANEOUS ONCE
Refills: 0 | Status: COMPLETED | OUTPATIENT
Start: 2021-04-29 | End: 2021-04-29

## 2021-04-29 RX ORDER — SODIUM CHLORIDE 9 MG/ML
1000 INJECTION, SOLUTION INTRAVENOUS
Refills: 0 | Status: DISCONTINUED | OUTPATIENT
Start: 2021-04-29 | End: 2021-04-30

## 2021-04-29 RX ADMIN — SODIUM CHLORIDE 125 MILLILITER(S): 9 INJECTION, SOLUTION INTRAVENOUS at 18:58

## 2021-04-29 RX ADMIN — Medication 50 MILLILITER(S): at 14:34

## 2021-04-29 RX ADMIN — CEFTRIAXONE 1000 MILLIGRAM(S): 500 INJECTION, POWDER, FOR SOLUTION INTRAMUSCULAR; INTRAVENOUS at 22:52

## 2021-04-29 RX ADMIN — POLYETHYLENE GLYCOL 3350 17 GRAM(S): 17 POWDER, FOR SOLUTION ORAL at 22:52

## 2021-04-29 RX ADMIN — SODIUM CHLORIDE 1000 MILLILITER(S): 9 INJECTION INTRAMUSCULAR; INTRAVENOUS; SUBCUTANEOUS at 13:29

## 2021-04-29 NOTE — ED PROVIDER NOTE - GASTROINTESTINAL, MLM
Abdomen soft, non-tender, no guarding. Abdomen soft, no guarding. Abd diffusely distended. LLQ TTP. Decreased bowel sounds.

## 2021-04-29 NOTE — H&P ADULT - ASSESSMENT
Pt unable to give any history. Hx obtained from chart. 56 y/o male wheelchair bound with a PMHx of cerebral palsy, HTN, mental retardation, neurogenic bladder, psoriasis, spinal stenosis, seizures presents to the ED from a group home, brought in with aide for abd pain and distention x2 days. Aide reports pt has been having small BMs. No bloody/black stools, vomiting.     In ED. LFTs were elevated. No acute cholecystitis on imaging.   Also , fecal impaction was found and it was disimpacted by ED doc.     Also , he was hypoglycemic in ED.      Pt admitted with     1) Abd Pain + Elevated LFTs + cholelithiasis: no abd pain now  admit to med floor  NPO except meds  iv fluids  GI/Sx consults  hepatitis panel  no acute cholecystitis  pain meds prn  cmp in am    2) Fecal Impaction: s/p disimpaction in ED  add lactulose and senna to miralax  avoid constipation  titrate lactulose to 2-3 loose BM per day    3) Hypoglycemic episodes in ED:  check FS q 6 hrs  iv fluids     4) Right renal lesion: work up as out pt    5) Mesenteric/Small Bowel mass:  GI/Sx consults for further work up    6) HTN: cont clonidine    7) MR + CP: supportive care  swallow eval    8) DVT PPX: lovenox     Pt unable to give any history. Hx obtained from chart. 54 y/o male wheelchair bound with a PMHx of cerebral palsy, HTN, mental retardation, neurogenic bladder, psoriasis, spinal stenosis, seizures presents to the ED from a group home, brought in with aide for abd pain and distention x2 days. Aide reports pt has been having small BMs. No bloody/black stools, vomiting.     In ED. LFTs were elevated. No acute cholecystitis on imaging.   Also , fecal impaction was found and it was disimpacted by ED doc.     Also , he was hypoglycemic in ED.      Pt admitted with     1) Abd Pain + Elevated LFTs + cholelithiasis: no abd pain now  admit to med floor  NPO except meds  iv fluids  GI/Sx consults  hepatitis panel  no acute cholecystitis  pain meds prn  cmp in am    2) Fecal Impaction: s/p disimpaction in ED  add lactulose and senna to miralax  avoid constipation  titrate lactulose to 2-3 loose BM per day    3) Hypoglycemic episodes in ED:  check FS q 6 hrs  iv fluids     4) Right renal lesion: work up as out pt    5) Mesenteric/Small Bowel mass:  GI/Sx consults for further work up    6) UTI: iv ceftriaxone  urine cx    7) HTN: cont clonidine    8) MR + CP: supportive care  swallow eval    9) DVT PPX: lovenox

## 2021-04-29 NOTE — ED ADULT NURSE REASSESSMENT NOTE - NS ED NURSE REASSESS COMMENT FT1
Lab called back. Blood glucose ran again and result was 39. Will recheck finger stick. Dr. Cain aware.

## 2021-04-29 NOTE — ED PROVIDER NOTE - PROGRESS NOTE DETAILS
Discussed case with Dr. Arriaga of general surgery: will see in consult but no acute surgical intervention.  Will admit to medicine.  Vinny Cain, DO

## 2021-04-29 NOTE — H&P ADULT - NSHPLABSRESULTS_GEN_ALL_CORE
Lab Results:  CBC  CBC Full  -  ( 2021 13:24 )  WBC Count : 8.48 K/uL  RBC Count : 4.68 M/uL  Hemoglobin : 14.7 g/dL  Hematocrit : 41.4 %  Platelet Count - Automated : 196 K/uL  Mean Cell Volume : 88.5 fl  Mean Cell Hemoglobin : 31.4 pg  Mean Cell Hemoglobin Concentration : 35.5 gm/dL  Auto Neutrophil # : 6.13 K/uL  Auto Lymphocyte # : 1.80 K/uL  Auto Monocyte # : 0.38 K/uL  Auto Eosinophil # : 0.00 K/uL  Auto Basophil # : 0.02 K/uL  Auto Neutrophil % : 72.3 %  Auto Lymphocyte % : 21.2 %  Auto Monocyte % : 4.5 %  Auto Eosinophil % : 0.0 %  Auto Basophil % : 0.2 %    .		Differential:	[] Automated		[] Manual  Chemistry                        14.7   8.48  )-----------( 196      ( 2021 13:24 )             41.4     04-    136  |  103  |  77<H>  ----------------------------<  79  3.8   |  25  |  1.07    Ca    9.6      2021 16:46    TPro  6.8  /  Alb  3.4  /  TBili  1.4<H>  /  DBili  x   /  AST  4368<H>  /  ALT  3421<H>  /  AlkPhos  380<H>      LIVER FUNCTIONS - ( 2021 16:46 )  Alb: 3.4 g/dL / Pro: 6.8 gm/dL / ALK PHOS: 380 U/L / ALT: 3421 U/L / AST: 4368 U/L / GGT: x           PT/INR - ( 2021 16:46 )   PT: 22.9 sec;   INR: 2.02 ratio         PTT - ( 2021 16:46 )  PTT:33.1 sec  Urinalysis Basic - ( 2021 13:24 )    Color: Yellow / Appearance: Slightly Turbid / S.010 / pH: x  Gluc: x / Ketone: Negative  / Bili: Negative / Urobili: Negative mg/dL   Blood: x / Protein: 30 mg/dL / Nitrite: Negative   Leuk Esterase: Moderate / RBC: 3-5 /HPF / WBC 3-5   Sq Epi: x / Non Sq Epi: x / Bacteria: Moderate      RADIOLOGY RESULTS:    < from: US Abdomen Upper Quadrant Right (21 @ 15:34) >    IMPRESSION:    Limited study demonstrates cholelithiasis.    < end of copied text >    < from: CT Abdomen and Pelvis w/ IV Cont (21 @ 15:10) >    IMPRESSION:  Markedly distended colonwith stool and air including a markedly distended rectum measuring 10.9 cm in caliber.    Heterogeneous lesion in the left hemiabdomen measuring 2.5 x 2.9 cm, incompletely characterized on this study, possibly a mesenteric or small bowel mass; a CT enterography is recommended for further evaluation.    Indeterminate right renal lesion measuring 1.3 cm; noncontrast imaging could be obtained at the time of the enterography to assess the renal mass.    No focal hepatic lesion; liver slightly compressed by distended loops of colon located anteriorly.    < end of copied text >    MEDICATIONS  (STANDING):  baclofen 5 milliGRAM(s) Oral two times a day  bethanechol 25 milliGRAM(s) Oral at bedtime  chlorhexidine 0.12% Liquid 15 milliLiter(s) Oral Mucosa two times a day  citalopram 10 milliGRAM(s) Oral daily  cloNIDine 0.2 milliGRAM(s) Oral at bedtime  cloNIDine 0.1 milliGRAM(s) Oral daily  dextrose 5% + sodium chloride 0.9%. 1000 milliLiter(s) (125 mL/Hr) IV Continuous <Continuous>  lamoTRIgine 100 milliGRAM(s) Oral two times a day  oxybutynin 5 milliGRAM(s) Oral at bedtime  polyethylene glycol 3350 17 Gram(s) Oral daily  senna 2 Tablet(s) Oral at bedtime  tamsulosin 0.4 milliGRAM(s) Oral at bedtime    MEDICATIONS  (PRN):  ondansetron Injectable 4 milliGRAM(s) IV Push every 6 hours PRN Nausea

## 2021-04-29 NOTE — ED PROVIDER NOTE - CLINICAL SUMMARY MEDICAL DECISION MAKING FREE TEXT BOX
Pt presents with poorly described abd pain due to baseline mental status with abd distention and small BMs. Rectal exam shows fecal impaction which resolved with disimpaction. Plan: labs, CT with IV contrast.

## 2021-04-29 NOTE — ED STATDOCS - PROGRESS NOTE DETAILS
Brittney Kaufman for attending Dr. Knapp: 54 y/o male with PMHx of HTN, MR, cerebral palsy, seizure disorder, spinal stenosis presents to ED for abdominal pain, bloating x2 days. Per aid at bedside pt c/o abd pain, bloating potentially due to constipation. Last BM yesterday morning. Will send pt to main ED for further evaluation.

## 2021-04-29 NOTE — ED PROVIDER NOTE - MUSCULOSKELETAL, MLM
Spine appears normal, range of motion is not limited, no muscle or joint tenderness Spine appears normal. Paralysis to b/l LE and contracted b/l UE, baseline.

## 2021-04-29 NOTE — H&P ADULT - NSHPPHYSICALEXAM_GEN_ALL_CORE
PHYSICAL EXAM:    Daily Height in cm: 177.8 (29 Apr 2021 11:41)    Daily     ICU Vital Signs Last 24 Hrs  T(C): 37.1 (29 Apr 2021 19:26), Max: 37.1 (29 Apr 2021 19:26)  T(F): 98.7 (29 Apr 2021 19:26), Max: 98.7 (29 Apr 2021 19:26)  HR: 65 (29 Apr 2021 19:26) (52 - 65)  BP: 129/94 (29 Apr 2021 19:26) (103/74 - 129/94)  BP(mean): 106 (29 Apr 2021 19:26) (85 - 106)  ABP: --  ABP(mean): --  RR: 17 (29 Apr 2021 19:26) (16 - 17)  SpO2: 98% (29 Apr 2021 19:26) (98% - 100%)      Constitutional: Weak appearing  HEENT: Atraumatic, RIAN, Normal, No congestion  Respiratory: Breath Sounds normal, no rhonchi/wheeze  Cardiovascular: N S1S2;   Gastrointestinal: Abdomen soft, non tender, Bowel Sounds present  Extremities: No edema, peripheral pulses present; contracted  Neurological: AAO x 0, does not talk much  Skin: Non cellulitic, no rash, ulcers  Lymph Nodes: No lymphadenopathy noted  Back: No CVA tenderness   Musculoskeletal: non tender  Breasts: Deferred  Genitourinary: deferred  Rectal: Deferred

## 2021-04-29 NOTE — ED ADULT NURSE REASSESSMENT NOTE - NS ED NURSE REASSESS COMMENT FT1
Finger stick done after getting that critical number of 38 was 145. Spoke with Dania. She stated she will investigate.

## 2021-04-29 NOTE — ED PROVIDER NOTE - CARE PLAN
Principal Discharge DX:	Transaminitis  Secondary Diagnosis:	Abdominal pain, unspecified abdominal location

## 2021-04-29 NOTE — ED PROVIDER NOTE - OBJECTIVE STATEMENT
54 y/o male with a PMHx of cerebral palsy, HTN, mental retardation, neurogenic bladder, psoriasis, spinal stenosis, seizures presents to the ED c/o abd pain and bloating x2 days. 56 y/o male wheelchair bound with a PMHx of cerebral palsy, HTN, mental retardation, neurogenic bladder, psoriasis, spinal stenosis, seizures presents to the ED brought in with aide for abd pain and distention x2 days. No bloody/black stools, vomiting. 56 y/o male wheelchair bound with a PMHx of cerebral palsy, HTN, mental retardation, neurogenic bladder, psoriasis, spinal stenosis, seizures presents to the ED brought in with aide for abd pain and distention x2 days. Aide reports pt has been having small BMs. No bloody/black stools, vomiting.

## 2021-04-29 NOTE — H&P ADULT - HISTORY OF PRESENT ILLNESS
Pt unable to give any history. Hx obtained from chart. 56 y/o male wheelchair bound with a PMHx of cerebral palsy, HTN, mental retardation, neurogenic bladder, psoriasis, spinal stenosis, seizures presents to the ED from a group home, brought in with aide for abd pain and distention x2 days. Aide reports pt has been having small BMs. No bloody/black stools, vomiting.     In ED. LFTs were elevated. No acute cholecystitis on imaging.   Also , fecal impaction was found and it was disimpacted by ED doc.     Also , he was hypoglycemic in ED.

## 2021-04-29 NOTE — ED PROCEDURE NOTE - PROCEDURE ADDITIONAL DETAILS
Rectal exam with large amount of soft brown stool. Right index with Surgilube inserted into rectum and stool removed.

## 2021-04-29 NOTE — ED STATDOCS - NS_ ATTENDINGSCRIBEDETAILS _ED_A_ED_FT
I, Chauncey Knapp MD, performed the initial face to face bedside interview with this patient regarding history of present illness and determined that the patient should be seen in the main ED.  The history, was documented by the scribe in my presence and I attest to the accuracy of the documentation.

## 2021-04-30 LAB
ALBUMIN SERPL ELPH-MCNC: 2.9 G/DL — LOW (ref 3.3–5)
ALP SERPL-CCNC: 364 U/L — HIGH (ref 40–120)
ALT FLD-CCNC: 3408 U/L — HIGH (ref 12–78)
ANION GAP SERPL CALC-SCNC: 6 MMOL/L — SIGNIFICANT CHANGE UP (ref 5–17)
APAP SERPL-MCNC: <2 UG/ML — LOW (ref 10–30)
AST SERPL-CCNC: 3726 U/L — HIGH (ref 15–37)
BILIRUB SERPL-MCNC: 1.2 MG/DL — SIGNIFICANT CHANGE UP (ref 0.2–1.2)
BUN SERPL-MCNC: 62 MG/DL — HIGH (ref 7–23)
CALCIUM SERPL-MCNC: 9.3 MG/DL — SIGNIFICANT CHANGE UP (ref 8.5–10.1)
CHLORIDE SERPL-SCNC: 106 MMOL/L — SIGNIFICANT CHANGE UP (ref 96–108)
CO2 SERPL-SCNC: 26 MMOL/L — SIGNIFICANT CHANGE UP (ref 22–31)
COVID-19 SPIKE DOMAIN AB INTERP: POSITIVE
COVID-19 SPIKE DOMAIN ANTIBODY RESULT: >250 U/ML — HIGH
CREAT SERPL-MCNC: 1.11 MG/DL — SIGNIFICANT CHANGE UP (ref 0.5–1.3)
GLUCOSE SERPL-MCNC: 109 MG/DL — HIGH (ref 70–99)
HAV IGM SER-ACNC: SIGNIFICANT CHANGE UP
HBV CORE IGM SER-ACNC: SIGNIFICANT CHANGE UP
HBV SURFACE AG SER-ACNC: SIGNIFICANT CHANGE UP
HCT VFR BLD CALC: 36.5 % — LOW (ref 39–50)
HCV AB S/CO SERPL IA: 0.16 S/CO — SIGNIFICANT CHANGE UP (ref 0–0.99)
HCV AB SERPL-IMP: SIGNIFICANT CHANGE UP
HGB BLD-MCNC: 12.9 G/DL — LOW (ref 13–17)
MCHC RBC-ENTMCNC: 31.6 PG — SIGNIFICANT CHANGE UP (ref 27–34)
MCHC RBC-ENTMCNC: 35.3 GM/DL — SIGNIFICANT CHANGE UP (ref 32–36)
MCV RBC AUTO: 89.5 FL — SIGNIFICANT CHANGE UP (ref 80–100)
PLATELET # BLD AUTO: 138 K/UL — LOW (ref 150–400)
POTASSIUM SERPL-MCNC: 3.8 MMOL/L — SIGNIFICANT CHANGE UP (ref 3.5–5.3)
POTASSIUM SERPL-SCNC: 3.8 MMOL/L — SIGNIFICANT CHANGE UP (ref 3.5–5.3)
PROT SERPL-MCNC: 6.1 GM/DL — SIGNIFICANT CHANGE UP (ref 6–8.3)
RBC # BLD: 4.08 M/UL — LOW (ref 4.2–5.8)
RBC # FLD: 12.4 % — SIGNIFICANT CHANGE UP (ref 10.3–14.5)
SARS-COV-2 IGG+IGM SERPL QL IA: >250 U/ML — HIGH
SARS-COV-2 IGG+IGM SERPL QL IA: POSITIVE
SODIUM SERPL-SCNC: 138 MMOL/L — SIGNIFICANT CHANGE UP (ref 135–145)
WBC # BLD: 9.86 K/UL — SIGNIFICANT CHANGE UP (ref 3.8–10.5)
WBC # FLD AUTO: 9.86 K/UL — SIGNIFICANT CHANGE UP (ref 3.8–10.5)

## 2021-04-30 PROCEDURE — 99222 1ST HOSP IP/OBS MODERATE 55: CPT

## 2021-04-30 PROCEDURE — 99233 SBSQ HOSP IP/OBS HIGH 50: CPT

## 2021-04-30 PROCEDURE — 93975 VASCULAR STUDY: CPT | Mod: 26

## 2021-04-30 PROCEDURE — 74181 MRI ABDOMEN W/O CONTRAST: CPT | Mod: 26

## 2021-04-30 RX ORDER — ACETYLCYSTEINE 200 MG/ML
5 VIAL (ML) MISCELLANEOUS ONCE
Refills: 0 | Status: DISCONTINUED | OUTPATIENT
Start: 2021-04-30 | End: 2021-04-30

## 2021-04-30 RX ORDER — ACETYLCYSTEINE 200 MG/ML
8 VIAL (ML) MISCELLANEOUS ONCE
Refills: 0 | Status: COMPLETED | OUTPATIENT
Start: 2021-04-30 | End: 2021-04-30

## 2021-04-30 RX ORDER — ACETYLCYSTEINE 200 MG/ML
2.6 VIAL (ML) MISCELLANEOUS ONCE
Refills: 0 | Status: COMPLETED | OUTPATIENT
Start: 2021-04-30 | End: 2021-04-30

## 2021-04-30 RX ORDER — SODIUM CHLORIDE 9 MG/ML
1000 INJECTION, SOLUTION INTRAVENOUS
Refills: 0 | Status: DISCONTINUED | OUTPATIENT
Start: 2021-04-30 | End: 2021-05-01

## 2021-04-30 RX ADMIN — SENNA PLUS 2 TABLET(S): 8.6 TABLET ORAL at 22:04

## 2021-04-30 RX ADMIN — LACTULOSE 10 GRAM(S): 10 SOLUTION ORAL at 05:35

## 2021-04-30 RX ADMIN — Medication 0.2 MILLIGRAM(S): at 22:04

## 2021-04-30 RX ADMIN — CITALOPRAM 10 MILLIGRAM(S): 10 TABLET, FILM COATED ORAL at 11:41

## 2021-04-30 RX ADMIN — Medication 5 MILLIGRAM(S): at 22:01

## 2021-04-30 RX ADMIN — Medication 5 MILLIGRAM(S): at 22:05

## 2021-04-30 RX ADMIN — Medication 128.25 GRAM(S): at 22:00

## 2021-04-30 RX ADMIN — SODIUM CHLORIDE 80 MILLILITER(S): 9 INJECTION, SOLUTION INTRAVENOUS at 16:59

## 2021-04-30 RX ADMIN — LACTULOSE 10 GRAM(S): 10 SOLUTION ORAL at 00:26

## 2021-04-30 RX ADMIN — Medication 25 MILLIGRAM(S): at 22:02

## 2021-04-30 RX ADMIN — Medication 5 MILLIGRAM(S): at 11:41

## 2021-04-30 RX ADMIN — TAMSULOSIN HYDROCHLORIDE 0.4 MILLIGRAM(S): 0.4 CAPSULE ORAL at 00:27

## 2021-04-30 RX ADMIN — TAMSULOSIN HYDROCHLORIDE 0.4 MILLIGRAM(S): 0.4 CAPSULE ORAL at 22:04

## 2021-04-30 RX ADMIN — Medication 0.1 MILLIGRAM(S): at 11:41

## 2021-04-30 RX ADMIN — CHLORHEXIDINE GLUCONATE 15 MILLILITER(S): 213 SOLUTION TOPICAL at 11:39

## 2021-04-30 RX ADMIN — CEFTRIAXONE 1000 MILLIGRAM(S): 500 INJECTION, POWDER, FOR SOLUTION INTRAMUSCULAR; INTRAVENOUS at 22:06

## 2021-04-30 RX ADMIN — LAMOTRIGINE 100 MILLIGRAM(S): 25 TABLET, ORALLY DISINTEGRATING ORAL at 11:42

## 2021-04-30 RX ADMIN — LACTULOSE 10 GRAM(S): 10 SOLUTION ORAL at 22:05

## 2021-04-30 RX ADMIN — Medication 290 GRAM(S): at 17:38

## 2021-04-30 RX ADMIN — Medication 0.2 MILLIGRAM(S): at 00:27

## 2021-04-30 RX ADMIN — SENNA PLUS 2 TABLET(S): 8.6 TABLET ORAL at 00:27

## 2021-04-30 RX ADMIN — ENOXAPARIN SODIUM 40 MILLIGRAM(S): 100 INJECTION SUBCUTANEOUS at 11:40

## 2021-04-30 RX ADMIN — LAMOTRIGINE 100 MILLIGRAM(S): 25 TABLET, ORALLY DISINTEGRATING ORAL at 00:27

## 2021-04-30 RX ADMIN — CHLORHEXIDINE GLUCONATE 15 MILLILITER(S): 213 SOLUTION TOPICAL at 22:03

## 2021-04-30 RX ADMIN — Medication 5 MILLIGRAM(S): at 00:27

## 2021-04-30 RX ADMIN — Medication 25 MILLIGRAM(S): at 00:27

## 2021-04-30 RX ADMIN — CHLORHEXIDINE GLUCONATE 15 MILLILITER(S): 213 SOLUTION TOPICAL at 00:26

## 2021-04-30 RX ADMIN — Medication 5 MILLIGRAM(S): at 00:28

## 2021-04-30 RX ADMIN — LAMOTRIGINE 100 MILLIGRAM(S): 25 TABLET, ORALLY DISINTEGRATING ORAL at 22:02

## 2021-04-30 NOTE — DIETITIAN INITIAL EVALUATION ADULT. - ENERGY INTAKE
Fair (>50%) As per group home pt w/ very good appetite/intake however unsure if pt is receiving nutrition/protein supplements to promote wound healing.

## 2021-04-30 NOTE — DIETITIAN INITIAL EVALUATION ADULT. - MALNUTRITION
Pt meets criteria for severe malnutrition in the context of chronic illness AEB severe fat depletion, wt loss of 7.2% BW in 2 months, and 3+ edema

## 2021-04-30 NOTE — DIETITIAN INITIAL EVALUATION ADULT. - OTHER INFO
Pt unable to give any history. Hx obtained from chart. 54 y/o male wheelchair bound with a PMHx of cerebral palsy, HTN, mental retardation, neurogenic bladder, psoriasis, spinal stenosis, seizures presents to the ED from a group home, brought in with aide for abd pain and distention x2 days. Aide reports pt has been having small BMs. No bloody/black stools, vomiting.     RD consulted 2/2 stage 2 pressure ulcer or greater. During visit pt keeps saying he's hungry and wants to order breakfast and lunch. Pt admitted w/ abd pain and elevated LFT and cholelithiasis. Pt found w/ fecal impaction and was disimpacted in the ED and bowl regimen ordered. Noted plan for MRCP given gallstone and elevated lfts to r/o cbd stones and plan for surgical c/s for SB mass. SLP evaluation ordered for evaluations for dysphagia. No noted food allergies.

## 2021-04-30 NOTE — DIETITIAN INITIAL EVALUATION ADULT. - ORAL INTAKE PTA/DIET HISTORY
Pt unable to provide any hx at this time. As per RN who spoke with group home who reports pt has a very good appetite, RN also reports pt is normally on [facility] soft and thin liquid diet at group home. RN unsure of group home name. Unknown if pt receiving ONS or protein supplements at group home.

## 2021-04-30 NOTE — CONSULT NOTE ADULT - ATTENDING COMMENTS
55 year old man with abdominal pain and UTI on ceftriaxone with profoundly elevated LFT's.     Highest on ddx is drug induced liver injury (DILI) from ceftriaxone as this is hepatocellular and he has no known liver disease and previous lft's from 2021 were normal. No evidence of hypotension but if he was septic then this could be ischemic hepatopathy. Doubt obstructive. Significant injury as INR above 2.  Obtian dopplers to r/o PVT.   F/u MRCP.   Would stop ceftriaxone, chose a different antibiotic and start  NAC (N-Acetylcysteine) for the DILI protocol (72 hour infusion) NAC at initial loading dose of 150mg/kg over an hour, then 12.5mg/kg for four hours, then 6.25mg/kg. If there is any issue with the pharmacy please refer them to the landmark paper that recommended this Nicolas et al 2009 N-Acetylcisteine prolongs transplant free survival in non-acetimophen induced liver failure.     Of note his abd pain could be from his hepatitis vs UTI.

## 2021-04-30 NOTE — DIETITIAN NUTRITION RISK NOTIFICATION - ADDITIONAL COMMENTS/DIETITIAN RECOMMENDATIONS
1. advance diet as per SLP recommendations 2. add ensure enlive BID (w/ thickener PRN) and gelatein 1x/day 3.provide maximum assistance w/ po intake and maintain aspiration precautions 4. add MVI w/minerals daily, 500 mg vitamin c BID, and 225 mg zinc BID x 10days to promote wound healing 5. weekly wt

## 2021-04-30 NOTE — SWALLOW BEDSIDE ASSESSMENT ADULT - COMMENTS
REQUEST FOR SWALLOW CONSULT RECEIVED. CHART REVIEWED AND D/C NP. PT CURRENTLY NPO IN SETTING OF ELEVATED LFT'S AND IS AWAITING MRCP. PT TO BE NPO FOR GI REASONS AT THIS TIME. AS SUCH, HE IS NOT A CANDIDATE FOR SWALLOW TESTING AT THIS JUNCTURE IN TIME. THUS, SWALLOW CONSULT CANCELLED. WILL NOT ACTIVELY FOLLOW. CONSULT IN FUTURE SHOULD STATUS CHANGE/CONDITION WARRANT.

## 2021-04-30 NOTE — CONSULT NOTE ADULT - ASSESSMENT
56yo male with hx of CP and MR.     Admitted with abdominal pain noted to have elevated lfts and a 3.2cm gallstone without any evidence of acute nyasia.    Also ct scan noted small bowel mass?/fecal impaction s/p disimpaction in ED w/ rectum dilated to 10.9cm.     Patient now clinically improved, less abdominal pain but still with mild RUQ tenderness on PE.     Plan:  Keep NPO and will get a MRCP given gallstone and elevated lfts to r/o cbd stones.   Acute hepatitis panel noted.   Continue strict bowel regimen to avoid constipation.   Surgery consulted for ?sb mass.     Discussed with 5E/NP.  54yo male with hx of CP and MR.     Admitted with abdominal pain noted to have elevated lfts and a 3.2cm gallstone without any evidence of acute nyasia.    Also ct scan noted small bowel mass?/large spleen mass/fecal impaction s/p disimpaction in ED w/ rectum dilated to 10.9cm.     Patient now clinically improved, less abdominal pain but still with mild RUQ tenderness on PE.     LFTs are significantly elevated which would be less likely due to stone; hepatitis panel unremarkable, doubt shock liver, doubt medication induced but can't fully exclude, possibly viral?    Plan:  Keep NPO and will get a MRCP given gallstone and elevated lfts to r/o cbd stones.   Acute hepatitis panel noted.   Continue strict bowel regimen to avoid constipation.   Surgery consulted for ?sb mass.     Discussed with 5E/NP.  54yo male with hx of CP and MR.     Admitted with abdominal pain noted to have elevated lfts and a 3.2cm gallstone without any evidence of acute nyasia.    Also ct scan noted small bowel mass?/large spleen mass/fecal impaction s/p disimpaction in ED w/ rectum dilated to 10.9cm.     Patient now clinically improved, less abdominal pain but still with mild RUQ tenderness on PE.     LFTs are significantly elevated which were unremarkable several weeks ago.  So this is likely acute but less likely due to stone as lfts are significantly up and bilirubin not impressive; hepatitis panel unremarkable, doubt shock liver, possibly medication induced, possibly viral?    Plan:  Keep NPO and will get a MRCP given gallstone and elevated lfts to r/o cbd stones.   Acute hepatitis panel noted.   Continue strict bowel regimen to avoid constipation.   Surgery consulted for ?sb mass.     Discussed with 5E/NP.  56yo male with hx of CP and MR.     Admitted with abdominal pain noted to have elevated lfts and a 3.2cm gallstone without any evidence of acute nyasia.    Also ct scan noted small bowel mass?/large spleen mass/fecal impaction s/p disimpaction in ED w/ rectum dilated to 10.9cm.     Patient now clinically improved, less abdominal pain but still with mild RUQ tenderness on PE.     LFTs are significantly elevated which were unremarkable several weeks ago.  So this is likely acute but less likely due to stone as lfts are significantly up and bilirubin not impressive; hepatitis panel unremarkable, doubt shock liver, possibly medication induced, possibly viral, and will need to r/o PVT as elevated INR in the 2s.     Plan:  Keep NPO and will get a MRCP given gallstone and elevated lfts to r/o cbd stones, must r/o PVT with abdominal dopplers.   Stop IV ceftriaxone and change iv abx now and start NAC-discussed with NP.   Acute hepatitis panel noted.   Continue strict bowel regimen to avoid constipation.   Surgery consulted for ?sb mass.     Discussed with 5E/NP.  54yo male with hx of CP and MR.     Admitted with abdominal pain noted to have elevated lfts and a 3.2cm gallstone without any evidence of acute nyasia.    Also ct scan noted small bowel mass?/large spleen mass/fecal impaction s/p disimpaction in ED w/ rectum dilated to 10.9cm.     Patient now clinically improved, less abdominal pain but still with mild RUQ tenderness on PE.     LFTs are significantly elevated which were unremarkable several weeks ago.  So this is likely acute but less likely due to stone as lfts are significantly up and bilirubin not impressive; hepatitis panel unremarkable, doubt shock liver, possibly medication induced, possibly viral, and will need to r/o PVT as elevated INR in the 2s.     Plan:  Keep NPO and will get a MRCP given gallstone and elevated lfts to r/o cbd stones, must r/o PVT with abdominal dopplers.   Stop IV ceftriaxone and change iv abx now and start NAC, but doubt this is the case because the mediation was started after intial labs-discussed with NP.   Acute hepatitis panel noted, act. unremarkable.   Continue strict bowel regimen to avoid constipation.   Surgery consulted for ?sb mass.     Discussed with 5E/NP.  54yo male with hx of CP and MR.     Admitted with abdominal pain noted to have elevated lfts and a 3.2cm gallstone without any evidence of acute nyasia.    Also ct scan noted small bowel mass?/large spleen mass/fecal impaction s/p disimpaction in ED w/ rectum dilated to 10.9cm.     Patient now clinically improved, less abdominal pain but still with mild RUQ tenderness on PE.     LFTs are significantly elevated which were unremarkable several weeks ago.  So this is likely acute but less likely due to stone as lfts are significantly up and bilirubin not impressive; hepatitis panel unremarkable, doubt shock liver, possibly medication induced, possibly viral, and will need to r/o PVT as elevated INR in the 2s.     Plan:  Keep NPO and will get a MRCP given gallstone and elevated lfts to r/o cbd stones, must r/o PVT with abdominal dopplers.   Stop IV ceftriaxone and change iv abx now but doubt this is the case because the mediation was started after initial labs-discussed with NP.   Acute hepatitis panel noted, acetaminophen serum - unremarkable.   Start NAC after discussing with Dr. Art but not due to acetaminophen levels as they are fine.    Continue strict bowel regimen to avoid constipation.   Surgery consulted for ?sb mass.     Discussed with 5E/NP/Dr. Gill/Dr. Art.  54yo male with hx of CP and MR.     Admitted with abdominal pain noted to have signicicantly elevated lfts and a 3.2cm gallstone without any evidence of acute nyasia.    Also ct scan noted small bowel mass?/large spleen mass/fecal impaction s/p disimpaction in ED w/ rectum dilated to 10.9cm.     Patient now clinically improved, less abdominal pain but still with mild RUQ tenderness on PE.     LFTs are significantly elevated which were unremarkable several weeks ago.  So this is likely acute but less likely due to stone as lfts are significantly up and bilirubin not impressive; hepatitis panel unremarkable, doubt shock liver, possibly medication induced, possibly viral, and will need to r/o PVT as elevated INR in the 2s.     Plan:  Keep NPO and will get a MRCP given gallstone and elevated lfts to r/o cbd stones, must r/o PVT with abdominal dopplers.   Stop IV ceftriaxone and change iv abx now but doubt this is the case because the mediation was started after initial labs-discussed with NP.   Acute hepatitis panel noted, acetaminophen serum - unremarkable.   Start NAC after discussing with Dr. Art but not due to acetaminophen levels as they are fine.    Continue strict bowel regimen to avoid constipation.   Surgery consulted for ?sb mass.     Discussed with 5E/NP/Dr. Gill/Dr. Art.

## 2021-04-30 NOTE — PROGRESS NOTE ADULT - ASSESSMENT
Pt unable to give any history. Hx obtained from chart. 54 y/o male wheelchair bound with a PMHx of cerebral palsy, HTN, mental retardation, neurogenic bladder, psoriasis, spinal stenosis, seizures presents to the ED from a group home, brought in with aide for abd pain and distention x2 days. Aide reports pt has been having small BMs. No bloody/black stools, vomiting.     In ED. LFTs were elevated. No acute cholecystitis on imaging with fecal impaction. Disimpacted by ED physician   Also , he was hypoglycemic in ED.      1) Abd Pain + Elevated LFTs + cholelithiasis:   -s/p manual disimpaction of stool in ED.  med rec - tizanidine PO - approx 6% risk of causing elevated LFTs. (last refill last month), not given while in house. no other known agents causing elevated LFTs. no hypotension or evidence of shock/shock liver.   NPO except meds  MRCP pending   possibly causing hypoglycemia   iv fluids  GI/Sx consults  trend LFTs   pain meds prn    2) Fecal Impaction: s/p disimpaction in ED  add lactulose and senna to miralax  titrate lactulose to 2-3 loose BM per day    3) Hypoglycemic episodes in ED:  check FS q 6 hrs  iv fluids     4) Right renal lesion: work up as out pt    5) Mesenteric/Small Bowel mass:  GI/Sx consults for further work up    6) UTI: iv ceftriaxone  urine cx     7) HTN: cont clonidine    8) MR + CP: supportive care  swallow eval    9) DVT PPX: lovenox   Pt unable to give any history. Hx obtained from chart. 54 y/o male wheelchair bound with a PMHx of cerebral palsy, HTN, mental retardation, neurogenic bladder, psoriasis, spinal stenosis, seizures presents to the ED from a group home, brought in with aide for abd pain and distention x2 days. Aide reports pt has been having small BMs. No bloody/black stools, vomiting.     In ED. LFTs were elevated. No acute cholecystitis on imaging with fecal impaction. Disimpacted by ED physician   Also , he was hypoglycemic in ED.      1) Abd Pain + Elevated LFTs + cholelithiasis:   -s/p manual disimpaction of stool in ED.  med rec - tizanidine PO - approx 6% risk of causing elevated LFTs. (last refill last month), not given while in house. no other known agents causing elevated LFTs. no hypotension or evidence of shock/shock liver.  ?ceftriaxone may cause elevated LFT however, timeline does not correlate   NPO except meds  MRCP negative for choledocholithiases or biliary dilation   US abdomen - pending   possibly causing hypoglycemia and high INR   iv fluids  GI/Sx consults  trend LFTs   pain meds prn    2) Fecal Impaction: s/p disimpaction in ED  add lactulose and senna to miralax  titrate lactulose to 2-3 loose BM per day    3) Hypoglycemic episodes in ED:  check FS q 6 hrs  iv fluids     4) Right renal lesion: work up as out pt    5) Mesenteric/Small Bowel mass:  GI/Sx consults for further work up    6) UTI: iv ceftriaxone  urine cx     7) HTN: cont clonidine    8) MR + CP: supportive care  swallow eval    9) DVT PPX: lovenox   Pt unable to give any history. Hx obtained from chart. 56 y/o male wheelchair bound with a PMHx of cerebral palsy, HTN, mental retardation, neurogenic bladder, psoriasis, spinal stenosis, seizures presents to the ED from a group home, brought in with aide for abd pain and distention x2 days. Aide reports pt has been having small BMs. No bloody/black stools, vomiting.     In ED. LFTs were elevated. No acute cholecystitis on imaging with fecal impaction. Disimpacted by ED physician   Also , he was hypoglycemic in ED.      1) Abd Pain + Elevated LFTs + cholelithiasis:   -s/p manual disimpaction of stool in ED.  med rec - tizanidine PO - approx 6% risk of causing elevated LFTs. (last refill last month), not given while in house. no other known agents causing elevated LFTs. no hypotension or evidence of shock/shock liver.  ?ceftriaxone may cause elevated LFT however, timeline does not correlate   NPO except meds  MRCP negative for choledocholithiases or biliary dilation   US abdomen - pending   possibly causing hypoglycemia and high INR   iv fluids  GI/Sx consults - start n-acetylcysteine IV protocol for non tylenol induced liver failure   trend LFTs   pain meds prn    2) Fecal Impaction: s/p disimpaction in ED  add lactulose and senna to miralax  titrate lactulose to 2-3 loose BM per day    3) Hypoglycemic episodes in ED:  check FS q 6 hrs  iv fluids     4) Right renal lesion: work up as out pt    5) Mesenteric/Small Bowel mass:  GI/Sx consults for further work up    6) UTI: iv ceftriaxone  urine cx     7) HTN: cont clonidine    8) MR + CP: supportive care  swallow eval    9) DVT PPX: lovenox   Pt unable to give any history. Hx obtained from chart. 54 y/o male wheelchair bound with a PMHx of cerebral palsy, HTN, mental retardation, neurogenic bladder, psoriasis, spinal stenosis, seizures presents to the ED from a group home, brought in with aide for abd pain and distention x2 days. Aide reports pt has been having small BMs. No bloody/black stools, vomiting.     In ED. LFTs were elevated. No acute cholecystitis on imaging with fecal impaction. Disimpacted by ED physician   Also , he was hypoglycemic in ED.      1) Abd Pain + Elevated LFTs transaminitis / acute liver failure   -s/p manual disimpaction of stool in ED.  med rec - tizanidine PO - approx 6% risk of causing elevated LFTs. (last refill last month), not given while in house. no other known agents causing elevated LFTs. no hypotension or evidence of shock/shock liver.  ?ceftriaxone may cause elevated LFT however, timeline does not correlate   MRCP negative for choledocholithiases or biliary dilation   US abdomen doppler - pending   possibly causing hypoglycemia and high INR   iv fluids  GI/Sx consults - start n-acetylcysteine IV protocol for non tylenol induced liver failure   trend LFTs in AM   pain meds prn    2) Fecal Impaction: s/p disimpaction in ED  add lactulose and senna to miralax  titrate lactulose to 2-3 loose BM per day    3) Hypoglycemic episodes in ED:  check FS q 6 hrs  iv fluids     4) Right renal lesion: work up as out pt    5) Mesenteric/Small Bowel mass:  GI/Sx consults for further work up    6) UTI: iv ceftriaxone  urine cx     7) HTN: cont clonidine    8) MR + CP: supportive care  swallow eval    9) DVT PPX: lovenox

## 2021-04-30 NOTE — CONSULT NOTE ADULT - SUBJECTIVE AND OBJECTIVE BOX
Patient is a 55y old  Male who presents with a chief complaint of abd pain (29 Apr 2021 21:22)    HPI:  56yo male with CP/MR.  Unable to obtain accurate information.     PAST MEDICAL & SURGICAL HISTORY:  Mental retardation    Hypertension    Seizure    Neurogenic bladder    Psoriasis    Spinal stenosis    Neurogenic bladder    Spinal stenosis    Seizure disorder    No significant past surgical history      MEDICATIONS  (STANDING):  baclofen 5 milliGRAM(s) Oral two times a day  bethanechol 25 milliGRAM(s) Oral at bedtime  cefTRIAXone Injectable. 1000 milliGRAM(s) IV Push every 24 hours  chlorhexidine 0.12% Liquid 15 milliLiter(s) Oral Mucosa two times a day  citalopram 10 milliGRAM(s) Oral daily  cloNIDine 0.2 milliGRAM(s) Oral at bedtime  cloNIDine 0.1 milliGRAM(s) Oral daily  dextrose 5% + sodium chloride 0.9%. 1000 milliLiter(s) (125 mL/Hr) IV Continuous <Continuous>  enoxaparin Injectable 40 milliGRAM(s) SubCutaneous daily  lactulose Syrup 10 Gram(s) Oral three times a day  lamoTRIgine 100 milliGRAM(s) Oral two times a day  oxybutynin 5 milliGRAM(s) Oral at bedtime  polyethylene glycol 3350 17 Gram(s) Oral daily  senna 2 Tablet(s) Oral at bedtime  tamsulosin 0.4 milliGRAM(s) Oral at bedtime    MEDICATIONS  (PRN):  ondansetron Injectable 4 milliGRAM(s) IV Push every 6 hours PRN Nausea    Allergies    No Known Allergies    Intolerances      SOCIAL HISTORY:  FAMILY HISTORY:  Family history of breast cancer (Grandparent)  Grandmother on mother&#x27;s side    Family history of MI (myocardial infarction) (Father)  Father      REVIEW OF SYSTEMS:  Per HPI.    Vital Signs Last 24 Hrs  T(C): 36.1 (30 Apr 2021 07:33), Max: 37.1 (29 Apr 2021 19:26)  T(F): 97 (30 Apr 2021 07:33), Max: 98.7 (29 Apr 2021 19:26)  HR: 63 (30 Apr 2021 07:33) (52 - 72)  BP: 106/69 (30 Apr 2021 07:33) (103/74 - 129/94)  BP(mean): 106 (29 Apr 2021 19:26) (85 - 106)  RR: 17 (30 Apr 2021 07:33) (16 - 17)  SpO2: 100% (30 Apr 2021 07:33) (98% - 100%)    PHYSICAL EXAM:  Constitutional: Middle aged male with CP and MR appears comfortable in bed, in NAD.   HEENT: MMM  Neck: No LAD  Respiratory: CTAB  Cardiovascular: S1 and S2, RRR, no M/G/R  Gastrointestinal: + mild RUQ abdominal pain, ND, +BS  Extremities: No peripheral edema  Vascular: 2+ peripheral pulses  Neurological: mental retardation, CP.     LABS:                        12.9   9.86  )-----------( 138      ( 30 Apr 2021 08:53 )             36.5     04-29    136  |  103  |  77<H>  ----------------------------<  79  3.8   |  25  |  1.07    Ca    9.6      29 Apr 2021 16:46    TPro  6.8  /  Alb  3.4  /  TBili  1.4<H>  /  DBili  x   /  AST  4368<H>  /  ALT  3421<H>  /  AlkPhos  380<H>  04-29    PT/INR - ( 29 Apr 2021 16:46 )   PT: 22.9 sec;   INR: 2.02 ratio         PTT - ( 29 Apr 2021 16:46 )  PTT:33.1 sec  LIVER FUNCTIONS - ( 29 Apr 2021 16:46 )  Alb: 3.4 g/dL / Pro: 6.8 gm/dL / ALK PHOS: 380 U/L / ALT: 3421 U/L / AST: 4368 U/L / GGT: x             RADIOLOGY & ADDITIONAL STUDIES:

## 2021-04-30 NOTE — DIETITIAN INITIAL EVALUATION ADULT. - PERTINENT LABORATORY DATA
04-30    138  |  106  |  62<H>  ----------------------------<  109<H>  3.8   |  26  |  1.11    Ca    9.3      30 Apr 2021 08:53    TPro  6.1  /  Alb  2.9<L>  /  TBili  1.2  /  DBili  x   /  AST  3726<H>  /  ALT  3408<H>  /  AlkPhos  364<H>  04-30    BMI: BMI (kg/m2): 16.6 (04-29-21 @ 23:12)  HbA1c:   Glucose: POCT Blood Glucose.: 117 mg/dL (04-30-21 @ 12:04)    BP: 106/69 (04-30-21 @ 07:33) (103/74 - 129/94)  Lipid Panel:

## 2021-04-30 NOTE — DIETITIAN INITIAL EVALUATION ADULT. - PERTINENT MEDS FT
MEDICATIONS  (STANDING):  baclofen 5 milliGRAM(s) Oral two times a day  bethanechol 25 milliGRAM(s) Oral at bedtime  cefTRIAXone Injectable. 1000 milliGRAM(s) IV Push every 24 hours  chlorhexidine 0.12% Liquid 15 milliLiter(s) Oral Mucosa two times a day  citalopram 10 milliGRAM(s) Oral daily  cloNIDine 0.2 milliGRAM(s) Oral at bedtime  cloNIDine 0.1 milliGRAM(s) Oral daily  dextrose 5% + sodium chloride 0.9%. 1000 milliLiter(s) (80 mL/Hr) IV Continuous <Continuous>  enoxaparin Injectable 40 milliGRAM(s) SubCutaneous daily  lactulose Syrup 10 Gram(s) Oral three times a day  lamoTRIgine 100 milliGRAM(s) Oral two times a day  oxybutynin 5 milliGRAM(s) Oral at bedtime  polyethylene glycol 3350 17 Gram(s) Oral daily  senna 2 Tablet(s) Oral at bedtime  tamsulosin 0.4 milliGRAM(s) Oral at bedtime    MEDICATIONS  (PRN):  ondansetron Injectable 4 milliGRAM(s) IV Push every 6 hours PRN Nausea

## 2021-05-01 LAB
ALBUMIN SERPL ELPH-MCNC: 2.5 G/DL — LOW (ref 3.3–5)
ALP SERPL-CCNC: 341 U/L — HIGH (ref 40–120)
ALT FLD-CCNC: 2874 U/L — HIGH (ref 12–78)
ANION GAP SERPL CALC-SCNC: 8 MMOL/L — SIGNIFICANT CHANGE UP (ref 5–17)
AST SERPL-CCNC: 2185 U/L — HIGH (ref 15–37)
BASOPHILS # BLD AUTO: 0 K/UL — SIGNIFICANT CHANGE UP (ref 0–0.2)
BASOPHILS NFR BLD AUTO: 0 % — SIGNIFICANT CHANGE UP (ref 0–2)
BILIRUB SERPL-MCNC: 1.1 MG/DL — SIGNIFICANT CHANGE UP (ref 0.2–1.2)
BUN SERPL-MCNC: 39 MG/DL — HIGH (ref 7–23)
CALCIUM SERPL-MCNC: 8.7 MG/DL — SIGNIFICANT CHANGE UP (ref 8.5–10.1)
CHLORIDE SERPL-SCNC: 105 MMOL/L — SIGNIFICANT CHANGE UP (ref 96–108)
CHOLEST SERPL-MCNC: 150 MG/DL — SIGNIFICANT CHANGE UP
CO2 SERPL-SCNC: 26 MMOL/L — SIGNIFICANT CHANGE UP (ref 22–31)
CREAT SERPL-MCNC: 0.87 MG/DL — SIGNIFICANT CHANGE UP (ref 0.5–1.3)
EOSINOPHIL # BLD AUTO: 0.03 K/UL — SIGNIFICANT CHANGE UP (ref 0–0.5)
EOSINOPHIL NFR BLD AUTO: 0.6 % — SIGNIFICANT CHANGE UP (ref 0–6)
GGT SERPL-CCNC: 142 U/L — HIGH (ref 9–50)
GLUCOSE SERPL-MCNC: 147 MG/DL — HIGH (ref 70–99)
HCT VFR BLD CALC: 33.3 % — LOW (ref 39–50)
HDLC SERPL-MCNC: 113 MG/DL — SIGNIFICANT CHANGE UP
HGB BLD-MCNC: 11.7 G/DL — LOW (ref 13–17)
IMM GRANULOCYTES NFR BLD AUTO: 1 % — SIGNIFICANT CHANGE UP (ref 0–1.5)
INR BLD: 1.89 RATIO — HIGH (ref 0.88–1.16)
LIPID PNL WITH DIRECT LDL SERPL: 25 MG/DL — SIGNIFICANT CHANGE UP
LYMPHOCYTES # BLD AUTO: 0.73 K/UL — LOW (ref 1–3.3)
LYMPHOCYTES # BLD AUTO: 14.8 % — SIGNIFICANT CHANGE UP (ref 13–44)
MCHC RBC-ENTMCNC: 32 PG — SIGNIFICANT CHANGE UP (ref 27–34)
MCHC RBC-ENTMCNC: 35.1 GM/DL — SIGNIFICANT CHANGE UP (ref 32–36)
MCV RBC AUTO: 91 FL — SIGNIFICANT CHANGE UP (ref 80–100)
MONOCYTES # BLD AUTO: 0.21 K/UL — SIGNIFICANT CHANGE UP (ref 0–0.9)
MONOCYTES NFR BLD AUTO: 4.3 % — SIGNIFICANT CHANGE UP (ref 2–14)
NEUTROPHILS # BLD AUTO: 3.9 K/UL — SIGNIFICANT CHANGE UP (ref 1.8–7.4)
NEUTROPHILS NFR BLD AUTO: 79.3 % — HIGH (ref 43–77)
NON HDL CHOLESTEROL: 37 MG/DL — SIGNIFICANT CHANGE UP
PLATELET # BLD AUTO: 106 K/UL — LOW (ref 150–400)
POTASSIUM SERPL-MCNC: 2.9 MMOL/L — CRITICAL LOW (ref 3.5–5.3)
POTASSIUM SERPL-SCNC: 2.9 MMOL/L — CRITICAL LOW (ref 3.5–5.3)
PROT SERPL-MCNC: 5.6 GM/DL — LOW (ref 6–8.3)
PROTHROM AB SERPL-ACNC: 21.4 SEC — HIGH (ref 10.6–13.6)
RBC # BLD: 3.66 M/UL — LOW (ref 4.2–5.8)
RBC # FLD: 12.4 % — SIGNIFICANT CHANGE UP (ref 10.3–14.5)
SODIUM SERPL-SCNC: 139 MMOL/L — SIGNIFICANT CHANGE UP (ref 135–145)
TRIGL SERPL-MCNC: 58 MG/DL — SIGNIFICANT CHANGE UP
WBC # BLD: 4.92 K/UL — SIGNIFICANT CHANGE UP (ref 3.8–10.5)
WBC # FLD AUTO: 4.92 K/UL — SIGNIFICANT CHANGE UP (ref 3.8–10.5)

## 2021-05-01 PROCEDURE — 99232 SBSQ HOSP IP/OBS MODERATE 35: CPT

## 2021-05-01 RX ORDER — POTASSIUM CHLORIDE 20 MEQ
40 PACKET (EA) ORAL ONCE
Refills: 0 | Status: COMPLETED | OUTPATIENT
Start: 2021-05-01 | End: 2021-05-01

## 2021-05-01 RX ORDER — POTASSIUM CHLORIDE 20 MEQ
10 PACKET (EA) ORAL
Refills: 0 | Status: COMPLETED | OUTPATIENT
Start: 2021-05-01 | End: 2021-05-01

## 2021-05-01 RX ADMIN — Medication 5 MILLIGRAM(S): at 22:23

## 2021-05-01 RX ADMIN — CHLORHEXIDINE GLUCONATE 15 MILLILITER(S): 213 SOLUTION TOPICAL at 09:58

## 2021-05-01 RX ADMIN — CITALOPRAM 10 MILLIGRAM(S): 10 TABLET, FILM COATED ORAL at 09:59

## 2021-05-01 RX ADMIN — Medication 25 MILLIGRAM(S): at 22:20

## 2021-05-01 RX ADMIN — Medication 0.1 MILLIGRAM(S): at 09:59

## 2021-05-01 RX ADMIN — LAMOTRIGINE 100 MILLIGRAM(S): 25 TABLET, ORALLY DISINTEGRATING ORAL at 22:22

## 2021-05-01 RX ADMIN — Medication 100 MILLIEQUIVALENT(S): at 12:29

## 2021-05-01 RX ADMIN — LACTULOSE 10 GRAM(S): 10 SOLUTION ORAL at 14:10

## 2021-05-01 RX ADMIN — Medication 0.2 MILLIGRAM(S): at 22:21

## 2021-05-01 RX ADMIN — LAMOTRIGINE 100 MILLIGRAM(S): 25 TABLET, ORALLY DISINTEGRATING ORAL at 09:59

## 2021-05-01 RX ADMIN — SENNA PLUS 2 TABLET(S): 8.6 TABLET ORAL at 22:24

## 2021-05-01 RX ADMIN — POLYETHYLENE GLYCOL 3350 17 GRAM(S): 17 POWDER, FOR SOLUTION ORAL at 09:59

## 2021-05-01 RX ADMIN — Medication 40 MILLIEQUIVALENT(S): at 09:59

## 2021-05-01 RX ADMIN — Medication 5 MILLIGRAM(S): at 22:19

## 2021-05-01 RX ADMIN — ENOXAPARIN SODIUM 40 MILLIGRAM(S): 100 INJECTION SUBCUTANEOUS at 09:59

## 2021-05-01 RX ADMIN — TAMSULOSIN HYDROCHLORIDE 0.4 MILLIGRAM(S): 0.4 CAPSULE ORAL at 22:23

## 2021-05-01 RX ADMIN — LACTULOSE 10 GRAM(S): 10 SOLUTION ORAL at 22:22

## 2021-05-01 RX ADMIN — LACTULOSE 10 GRAM(S): 10 SOLUTION ORAL at 05:40

## 2021-05-01 RX ADMIN — Medication 100 MILLIEQUIVALENT(S): at 10:27

## 2021-05-01 RX ADMIN — Medication 5 MILLIGRAM(S): at 09:59

## 2021-05-01 RX ADMIN — Medication 100 MILLIEQUIVALENT(S): at 11:28

## 2021-05-01 RX ADMIN — CHLORHEXIDINE GLUCONATE 15 MILLILITER(S): 213 SOLUTION TOPICAL at 22:19

## 2021-05-01 NOTE — PROGRESS NOTE ADULT - ASSESSMENT
56yo male with multiple medical issues with profond elevation of transaminases  ?due to abx ceftriaxone    transaminases and inr decreasing today   - will continue to follow    abx changed

## 2021-05-02 LAB
ALBUMIN SERPL ELPH-MCNC: 2.2 G/DL — LOW (ref 3.3–5)
ALP SERPL-CCNC: 300 U/L — HIGH (ref 40–120)
ALT FLD-CCNC: 2003 U/L — HIGH (ref 12–78)
ANION GAP SERPL CALC-SCNC: 3 MMOL/L — LOW (ref 5–17)
AST SERPL-CCNC: 1262 U/L — HIGH (ref 15–37)
BILIRUB SERPL-MCNC: 1 MG/DL — SIGNIFICANT CHANGE UP (ref 0.2–1.2)
BUN SERPL-MCNC: 23 MG/DL — SIGNIFICANT CHANGE UP (ref 7–23)
CALCIUM SERPL-MCNC: 8.9 MG/DL — SIGNIFICANT CHANGE UP (ref 8.5–10.1)
CHLORIDE SERPL-SCNC: 105 MMOL/L — SIGNIFICANT CHANGE UP (ref 96–108)
CO2 SERPL-SCNC: 32 MMOL/L — HIGH (ref 22–31)
CREAT SERPL-MCNC: 0.61 MG/DL — SIGNIFICANT CHANGE UP (ref 0.5–1.3)
GLUCOSE SERPL-MCNC: 90 MG/DL — SIGNIFICANT CHANGE UP (ref 70–99)
INR BLD: 1.57 RATIO — HIGH (ref 0.88–1.16)
POTASSIUM SERPL-MCNC: 3.5 MMOL/L — SIGNIFICANT CHANGE UP (ref 3.5–5.3)
POTASSIUM SERPL-SCNC: 3.5 MMOL/L — SIGNIFICANT CHANGE UP (ref 3.5–5.3)
PROT SERPL-MCNC: 5.1 GM/DL — LOW (ref 6–8.3)
PROTHROM AB SERPL-ACNC: 17.8 SEC — HIGH (ref 10.6–13.6)
SODIUM SERPL-SCNC: 140 MMOL/L — SIGNIFICANT CHANGE UP (ref 135–145)

## 2021-05-02 PROCEDURE — 99232 SBSQ HOSP IP/OBS MODERATE 35: CPT

## 2021-05-02 PROCEDURE — 99231 SBSQ HOSP IP/OBS SF/LOW 25: CPT

## 2021-05-02 RX ADMIN — LAMOTRIGINE 100 MILLIGRAM(S): 25 TABLET, ORALLY DISINTEGRATING ORAL at 22:27

## 2021-05-02 RX ADMIN — LACTULOSE 10 GRAM(S): 10 SOLUTION ORAL at 05:24

## 2021-05-02 RX ADMIN — POLYETHYLENE GLYCOL 3350 17 GRAM(S): 17 POWDER, FOR SOLUTION ORAL at 09:44

## 2021-05-02 RX ADMIN — Medication 0.1 MILLIGRAM(S): at 09:39

## 2021-05-02 RX ADMIN — CITALOPRAM 10 MILLIGRAM(S): 10 TABLET, FILM COATED ORAL at 09:39

## 2021-05-02 RX ADMIN — Medication 0.2 MILLIGRAM(S): at 22:28

## 2021-05-02 RX ADMIN — TAMSULOSIN HYDROCHLORIDE 0.4 MILLIGRAM(S): 0.4 CAPSULE ORAL at 22:27

## 2021-05-02 RX ADMIN — CHLORHEXIDINE GLUCONATE 15 MILLILITER(S): 213 SOLUTION TOPICAL at 09:39

## 2021-05-02 RX ADMIN — CHLORHEXIDINE GLUCONATE 15 MILLILITER(S): 213 SOLUTION TOPICAL at 22:27

## 2021-05-02 RX ADMIN — ENOXAPARIN SODIUM 40 MILLIGRAM(S): 100 INJECTION SUBCUTANEOUS at 09:39

## 2021-05-02 RX ADMIN — Medication 5 MILLIGRAM(S): at 22:28

## 2021-05-02 RX ADMIN — Medication 5 MILLIGRAM(S): at 09:39

## 2021-05-02 RX ADMIN — Medication 25 MILLIGRAM(S): at 22:28

## 2021-05-02 RX ADMIN — Medication 5 MILLIGRAM(S): at 22:27

## 2021-05-02 RX ADMIN — SENNA PLUS 2 TABLET(S): 8.6 TABLET ORAL at 22:27

## 2021-05-02 RX ADMIN — LACTULOSE 10 GRAM(S): 10 SOLUTION ORAL at 22:26

## 2021-05-02 RX ADMIN — LACTULOSE 10 GRAM(S): 10 SOLUTION ORAL at 13:55

## 2021-05-02 RX ADMIN — LAMOTRIGINE 100 MILLIGRAM(S): 25 TABLET, ORALLY DISINTEGRATING ORAL at 09:39

## 2021-05-02 NOTE — PROGRESS NOTE ADULT - ASSESSMENT
56 yo male with presumed drug hepatitis. Patient is improving off ceftriaxone. Would continue to monitor.

## 2021-05-03 LAB
-  AMIKACIN: SIGNIFICANT CHANGE UP
-  AMOXICILLIN/CLAVULANIC ACID: SIGNIFICANT CHANGE UP
-  AMPICILLIN/SULBACTAM: SIGNIFICANT CHANGE UP
-  AMPICILLIN: SIGNIFICANT CHANGE UP
-  AZTREONAM: SIGNIFICANT CHANGE UP
-  CEFAZOLIN: SIGNIFICANT CHANGE UP
-  CEFEPIME: SIGNIFICANT CHANGE UP
-  CEFOXITIN: SIGNIFICANT CHANGE UP
-  CEFTRIAXONE: SIGNIFICANT CHANGE UP
-  CIPROFLOXACIN: SIGNIFICANT CHANGE UP
-  ERTAPENEM: SIGNIFICANT CHANGE UP
-  GENTAMICIN: SIGNIFICANT CHANGE UP
-  LEVOFLOXACIN: SIGNIFICANT CHANGE UP
-  MEROPENEM: SIGNIFICANT CHANGE UP
-  NITROFURANTOIN: SIGNIFICANT CHANGE UP
-  PIPERACILLIN/TAZOBACTAM: SIGNIFICANT CHANGE UP
-  TOBRAMYCIN: SIGNIFICANT CHANGE UP
-  TRIMETHOPRIM/SULFAMETHOXAZOLE: SIGNIFICANT CHANGE UP
ALBUMIN SERPL ELPH-MCNC: 2.5 G/DL — LOW (ref 3.3–5)
ALP SERPL-CCNC: 310 U/L — HIGH (ref 40–120)
ALT FLD-CCNC: 1469 U/L — HIGH (ref 12–78)
ANION GAP SERPL CALC-SCNC: 4 MMOL/L — LOW (ref 5–17)
AST SERPL-CCNC: 607 U/L — HIGH (ref 15–37)
BILIRUB SERPL-MCNC: 1.3 MG/DL — HIGH (ref 0.2–1.2)
BUN SERPL-MCNC: 17 MG/DL — SIGNIFICANT CHANGE UP (ref 7–23)
CALCIUM SERPL-MCNC: 9.2 MG/DL — SIGNIFICANT CHANGE UP (ref 8.5–10.1)
CHLORIDE SERPL-SCNC: 102 MMOL/L — SIGNIFICANT CHANGE UP (ref 96–108)
CO2 SERPL-SCNC: 32 MMOL/L — HIGH (ref 22–31)
CREAT SERPL-MCNC: 0.75 MG/DL — SIGNIFICANT CHANGE UP (ref 0.5–1.3)
CULTURE RESULTS: SIGNIFICANT CHANGE UP
GLUCOSE SERPL-MCNC: 130 MG/DL — HIGH (ref 70–99)
METHOD TYPE: SIGNIFICANT CHANGE UP
ORGANISM # SPEC MICROSCOPIC CNT: SIGNIFICANT CHANGE UP
ORGANISM # SPEC MICROSCOPIC CNT: SIGNIFICANT CHANGE UP
POTASSIUM SERPL-MCNC: 3.5 MMOL/L — SIGNIFICANT CHANGE UP (ref 3.5–5.3)
POTASSIUM SERPL-SCNC: 3.5 MMOL/L — SIGNIFICANT CHANGE UP (ref 3.5–5.3)
PROT SERPL-MCNC: 5.7 GM/DL — LOW (ref 6–8.3)
SARS-COV-2 RNA SPEC QL NAA+PROBE: SIGNIFICANT CHANGE UP
SODIUM SERPL-SCNC: 138 MMOL/L — SIGNIFICANT CHANGE UP (ref 135–145)
SPECIMEN SOURCE: SIGNIFICANT CHANGE UP

## 2021-05-03 PROCEDURE — 99232 SBSQ HOSP IP/OBS MODERATE 35: CPT

## 2021-05-03 PROCEDURE — 99231 SBSQ HOSP IP/OBS SF/LOW 25: CPT

## 2021-05-03 RX ADMIN — POLYETHYLENE GLYCOL 3350 17 GRAM(S): 17 POWDER, FOR SOLUTION ORAL at 10:04

## 2021-05-03 RX ADMIN — Medication 5 MILLIGRAM(S): at 10:02

## 2021-05-03 RX ADMIN — Medication 0.1 MILLIGRAM(S): at 10:03

## 2021-05-03 RX ADMIN — CITALOPRAM 10 MILLIGRAM(S): 10 TABLET, FILM COATED ORAL at 10:03

## 2021-05-03 RX ADMIN — LACTULOSE 10 GRAM(S): 10 SOLUTION ORAL at 05:43

## 2021-05-03 RX ADMIN — LAMOTRIGINE 100 MILLIGRAM(S): 25 TABLET, ORALLY DISINTEGRATING ORAL at 21:10

## 2021-05-03 RX ADMIN — TAMSULOSIN HYDROCHLORIDE 0.4 MILLIGRAM(S): 0.4 CAPSULE ORAL at 21:11

## 2021-05-03 RX ADMIN — Medication 5 MILLIGRAM(S): at 21:10

## 2021-05-03 RX ADMIN — CHLORHEXIDINE GLUCONATE 15 MILLILITER(S): 213 SOLUTION TOPICAL at 21:12

## 2021-05-03 RX ADMIN — LACTULOSE 10 GRAM(S): 10 SOLUTION ORAL at 21:16

## 2021-05-03 RX ADMIN — SENNA PLUS 2 TABLET(S): 8.6 TABLET ORAL at 21:10

## 2021-05-03 RX ADMIN — LAMOTRIGINE 100 MILLIGRAM(S): 25 TABLET, ORALLY DISINTEGRATING ORAL at 10:04

## 2021-05-03 RX ADMIN — LACTULOSE 10 GRAM(S): 10 SOLUTION ORAL at 14:50

## 2021-05-03 RX ADMIN — CHLORHEXIDINE GLUCONATE 15 MILLILITER(S): 213 SOLUTION TOPICAL at 10:06

## 2021-05-03 RX ADMIN — Medication 0.2 MILLIGRAM(S): at 21:10

## 2021-05-03 RX ADMIN — Medication 25 MILLIGRAM(S): at 21:10

## 2021-05-03 RX ADMIN — ENOXAPARIN SODIUM 40 MILLIGRAM(S): 100 INJECTION SUBCUTANEOUS at 10:05

## 2021-05-03 NOTE — PROGRESS NOTE ADULT - NUTRITIONAL ASSESSMENT
This patient has been assessed with a concern for Malnutrition and has been determined to have a diagnosis/diagnoses of Severe protein-calorie malnutrition.    This patient is being managed with:   Diet Mechanical Soft-  Entered: Apr 30 2021  4:38PM    

## 2021-05-03 NOTE — PROGRESS NOTE ADULT - ASSESSMENT
56yo male with hx of CP and MR.     Admitted with abdominal pain noted to have signicicantly elevated lfts and a 3.2cm gallstone without any evidence of acute nyasia.    Also ct scan noted small bowel mass?/large spleen mass/fecal impaction s/p disimpaction in ED w/ rectum dilated to 10.9cm.     Patient now clinically improved without any abdominal pain and LFTs are trending down.  Unclear etiology of elevated lfts but so far work up has been negative and lfts are improving, unclear if medication vs. viral induced??    Surgery consult noted.     Discussed with NP, Dr. Rico.      54yo male with hx of CP and MR.     Admitted with abdominal pain noted to have signicicantly elevated lfts and a 3.2cm gallstone without any evidence of acute nyasia.    Also ct scan noted small bowel mass?/large spleen mass/fecal impaction s/p disimpaction in ED w/ rectum dilated to 10.9cm.     Patient now clinically improved without any abdominal pain and LFTs are trending down.  Unclear etiology of elevated lfts but so far work up has been negative and lfts are improving, unclear if medication vs. viral induced??    Surgery consult is still pending for small bowel mass/large spleen mass?     Discussed with NP, Dr. Rico.

## 2021-05-04 ENCOUNTER — TRANSCRIPTION ENCOUNTER (OUTPATIENT)
Age: 56
End: 2021-05-04

## 2021-05-04 VITALS
HEART RATE: 96 BPM | SYSTOLIC BLOOD PRESSURE: 104 MMHG | OXYGEN SATURATION: 98 % | TEMPERATURE: 98 F | RESPIRATION RATE: 18 BRPM | DIASTOLIC BLOOD PRESSURE: 73 MMHG

## 2021-05-04 LAB
ALBUMIN SERPL ELPH-MCNC: 2.8 G/DL — LOW (ref 3.3–5)
ALP SERPL-CCNC: 321 U/L — HIGH (ref 40–120)
ALT FLD-CCNC: 1203 U/L — HIGH (ref 12–78)
ANION GAP SERPL CALC-SCNC: 2 MMOL/L — LOW (ref 5–17)
AST SERPL-CCNC: 323 U/L — HIGH (ref 15–37)
BILIRUB SERPL-MCNC: 1.1 MG/DL — SIGNIFICANT CHANGE UP (ref 0.2–1.2)
BUN SERPL-MCNC: 25 MG/DL — HIGH (ref 7–23)
CALCIUM SERPL-MCNC: 9.7 MG/DL — SIGNIFICANT CHANGE UP (ref 8.5–10.1)
CHLORIDE SERPL-SCNC: 102 MMOL/L — SIGNIFICANT CHANGE UP (ref 96–108)
CO2 SERPL-SCNC: 34 MMOL/L — HIGH (ref 22–31)
CREAT SERPL-MCNC: 0.77 MG/DL — SIGNIFICANT CHANGE UP (ref 0.5–1.3)
GLUCOSE SERPL-MCNC: 101 MG/DL — HIGH (ref 70–99)
HCT VFR BLD CALC: 39.5 % — SIGNIFICANT CHANGE UP (ref 39–50)
HGB BLD-MCNC: 13.5 G/DL — SIGNIFICANT CHANGE UP (ref 13–17)
INR BLD: 1.04 RATIO — SIGNIFICANT CHANGE UP (ref 0.88–1.16)
MCHC RBC-ENTMCNC: 31.7 PG — SIGNIFICANT CHANGE UP (ref 27–34)
MCHC RBC-ENTMCNC: 34.2 GM/DL — SIGNIFICANT CHANGE UP (ref 32–36)
MCV RBC AUTO: 92.7 FL — SIGNIFICANT CHANGE UP (ref 80–100)
PLATELET # BLD AUTO: 56 K/UL — LOW (ref 150–400)
PLATELET # BLD AUTO: 59 K/UL — LOW (ref 150–400)
POTASSIUM SERPL-MCNC: 4.2 MMOL/L — SIGNIFICANT CHANGE UP (ref 3.5–5.3)
POTASSIUM SERPL-SCNC: 4.2 MMOL/L — SIGNIFICANT CHANGE UP (ref 3.5–5.3)
PROT SERPL-MCNC: 6.4 GM/DL — SIGNIFICANT CHANGE UP (ref 6–8.3)
PROTHROM AB SERPL-ACNC: 12 SEC — SIGNIFICANT CHANGE UP (ref 10.6–13.6)
RBC # BLD: 4.26 M/UL — SIGNIFICANT CHANGE UP (ref 4.2–5.8)
RBC # FLD: 12.3 % — SIGNIFICANT CHANGE UP (ref 10.3–14.5)
SODIUM SERPL-SCNC: 138 MMOL/L — SIGNIFICANT CHANGE UP (ref 135–145)
WBC # BLD: 6.32 K/UL — SIGNIFICANT CHANGE UP (ref 3.8–10.5)
WBC # FLD AUTO: 6.32 K/UL — SIGNIFICANT CHANGE UP (ref 3.8–10.5)

## 2021-05-04 PROCEDURE — 99239 HOSP IP/OBS DSCHRG MGMT >30: CPT

## 2021-05-04 PROCEDURE — 74176 CT ABD & PELVIS W/O CONTRAST: CPT | Mod: 26

## 2021-05-04 PROCEDURE — 99231 SBSQ HOSP IP/OBS SF/LOW 25: CPT

## 2021-05-04 RX ORDER — SENNA PLUS 8.6 MG/1
1 TABLET ORAL
Qty: 30 | Refills: 0
Start: 2021-05-04 | End: 2021-06-02

## 2021-05-04 RX ORDER — CHLORHEXIDINE GLUCONATE 213 G/1000ML
15 SOLUTION TOPICAL
Qty: 0 | Refills: 0 | DISCHARGE

## 2021-05-04 RX ORDER — LACTULOSE 10 G/15ML
15 SOLUTION ORAL
Qty: 450 | Refills: 0
Start: 2021-05-04 | End: 2021-06-02

## 2021-05-04 RX ORDER — POLYETHYLENE GLYCOL 3350 17 G/17G
17 POWDER, FOR SOLUTION ORAL
Qty: 510 | Refills: 0
Start: 2021-05-04 | End: 2021-06-02

## 2021-05-04 RX ORDER — TIZANIDINE 4 MG/1
1 TABLET ORAL
Qty: 0 | Refills: 0 | DISCHARGE

## 2021-05-04 RX ORDER — CHLORHEXIDINE GLUCONATE 213 G/1000ML
15 SOLUTION TOPICAL
Qty: 0 | Refills: 0 | DISCHARGE
Start: 2021-05-04

## 2021-05-04 RX ADMIN — LACTULOSE 10 GRAM(S): 10 SOLUTION ORAL at 05:30

## 2021-05-04 RX ADMIN — Medication 5 MILLIGRAM(S): at 11:30

## 2021-05-04 RX ADMIN — POLYETHYLENE GLYCOL 3350 17 GRAM(S): 17 POWDER, FOR SOLUTION ORAL at 11:30

## 2021-05-04 RX ADMIN — CHLORHEXIDINE GLUCONATE 15 MILLILITER(S): 213 SOLUTION TOPICAL at 11:31

## 2021-05-04 RX ADMIN — LAMOTRIGINE 100 MILLIGRAM(S): 25 TABLET, ORALLY DISINTEGRATING ORAL at 11:32

## 2021-05-04 RX ADMIN — LACTULOSE 10 GRAM(S): 10 SOLUTION ORAL at 13:41

## 2021-05-04 RX ADMIN — Medication 0.1 MILLIGRAM(S): at 11:32

## 2021-05-04 RX ADMIN — CITALOPRAM 10 MILLIGRAM(S): 10 TABLET, FILM COATED ORAL at 11:32

## 2021-05-04 NOTE — DISCHARGE NOTE PROVIDER - NSDCCPCAREPLAN_GEN_ALL_CORE_FT
PRINCIPAL DISCHARGE DIAGNOSIS  Diagnosis: Transaminitis  Assessment and Plan of Treatment: you were found to have elevation in your liver enzymes (transaminitis). this can occur if you are very sick (shock liver), you have taken medications that are toxic to your liver.   your liver enzymes have improved after we stopped Tziadine. please follow up with your primary care provider to determine if you should restart this medication.   please follow up with your primary care provider to have your liver enzymes re-checked in 1 week.   Please monitor for the following signs/symptoms and contact your primary care provider if they occur:   -you notice yellowing to your skin/eyes.   -you notice swelling to your abdomen.   -you have severe pain in your abdomen or youre nauseous or vomiting.      SECONDARY DISCHARGE DIAGNOSES  Diagnosis: Abdominal pain, unspecified abdominal location  Assessment and Plan of Treatment: you were found to have a large amount of stool in your abdomen.   -please continue with miralax, magnesium citrate and metamucil.   -***Monitor for the following signs/symptoms and contact your primary care provider if they occur or go to the ER if they are severe  - you have increasing swelling in your abdomen   - you do not have a bowel movement for 1 week  - you have blood in your stool   - you have nausea or vomiting     PRINCIPAL DISCHARGE DIAGNOSIS  Diagnosis: Transaminitis  Assessment and Plan of Treatment: Acute liver injury - Due to rocephine (ceftriaxone) antibiotic.  you were found to have elevation in your liver enzymes (transaminitis). this can occur if you are very sick (shock liver), you have taken medications that are toxic to your liver.   your liver enzymes have improved after we stopped Tziadine. please follow up with your primary care provider to determine if you should restart this medication.   please follow up with your primary care provider to have your liver enzymes re-checked in 1 week.   Please monitor for the following signs/symptoms and contact your primary care provider if they occur:   -you notice yellowing to your skin/eyes.   -you notice swelling to your abdomen.   -you have severe pain in your abdomen or youre nauseous or vomiting.  follow up with repeat blood with 5-7 days to check your liver function and platelet count which is currently low due to liver injury.      SECONDARY DISCHARGE DIAGNOSES  Diagnosis: Abdominal pain, unspecified abdominal location  Assessment and Plan of Treatment: you were found to have a large amount of stool in your abdomen.   -please continue with miralax, magnesium citrate and metamucil.   -***Monitor for the following signs/symptoms and contact your primary care provider if they occur or go to the ER if they are severe  - you have increasing swelling in your abdomen   - you do not have a bowel movement for 1 week  - you have blood in your stool   - you have nausea or vomiting

## 2021-05-04 NOTE — DISCHARGE NOTE PROVIDER - NSDCMRMEDTOKEN_GEN_ALL_CORE_FT
baclofen 10 mg oral tablet: 0.5 tab(s) orally 2 times a day  bethanechol 25 mg oral tablet: 1 tab(s) orally once a day (at bedtime)  chlorhexidine 0.12% mucous membrane liquid: 15 milliliter(s) mucous membrane 2 times a day  citalopram 10 mg oral tablet: 1 tab(s) orally once a day  cloNIDine 0.1 mg oral tablet: 2 tab(s) orally once a day (at bedtime)  cloNIDine 0.1 mg oral tablet: 1 tab(s) orally once a day (in the morning)   mg oral tablet: 1 tab(s) orally 3 times a day  lactulose 10 g/15 mL oral syrup: 15 milliliter(s) orally once a day  lamoTRIgine 100 mg oral tablet: 1 tab(s) orally 2 times a day  MiraLax oral powder for reconstitution: 17 gram(s) orally once a day (at bedtime)   oxybutynin 5 mg oral tablet: 1 tab(s) orally once a day (at bedtime)  Oysco 500 with D 500 mg-200 intl units (5 mcg) oral tablet: 2 tab(s) orally once a day  Refresh Liquigel ophthalmic gel: 1 day(s) to each affected eye once a day  Senna 8.6 mg oral tablet: 1 tab(s) orally once a day   Tab-A-Heaven oral tablet: 1 tab(s) orally once a day  tamsulosin 0.4 mg oral capsule: 1 cap(s) orally once a day (at bedtime)

## 2021-05-04 NOTE — PROGRESS NOTE ADULT - SUBJECTIVE AND OBJECTIVE BOX
Patient is a 55y old  Male who presents with a chief complaint of abd pain (03 May 2021 13:44)    HPI:  Pt unable to give any history with hx of MR/CP.   Denies any complaints, just requesting for his breakfast.   + stools per RN.     PAST MEDICAL & SURGICAL HISTORY:  Mental retardation    Hypertension    Seizure    Neurogenic bladder    Psoriasis    Spinal stenosis    Neurogenic bladder    Spinal stenosis    Seizure disorder    No significant past surgical history      MEDICATIONS  (STANDING):  baclofen 5 milliGRAM(s) Oral two times a day  bethanechol 25 milliGRAM(s) Oral at bedtime  chlorhexidine 0.12% Liquid 15 milliLiter(s) Oral Mucosa two times a day  citalopram 10 milliGRAM(s) Oral daily  cloNIDine 0.2 milliGRAM(s) Oral at bedtime  cloNIDine 0.1 milliGRAM(s) Oral daily  lactulose Syrup 10 Gram(s) Oral three times a day  lamoTRIgine 100 milliGRAM(s) Oral two times a day  oxybutynin 5 milliGRAM(s) Oral at bedtime  polyethylene glycol 3350 17 Gram(s) Oral daily  senna 2 Tablet(s) Oral at bedtime  tamsulosin 0.4 milliGRAM(s) Oral at bedtime    MEDICATIONS  (PRN):  ondansetron Injectable 4 milliGRAM(s) IV Push every 6 hours PRN Nausea    Allergies    No Known Allergies    Intolerances      SOCIAL HISTORY:  FAMILY HISTORY:  Family history of breast cancer (Grandparent)  Grandmother on mother&#x27;s side    Family history of MI (myocardial infarction) (Father)  Father      REVIEW OF SYSTEMS:  Per HPI.     Vital Signs Last 24 Hrs  T(C): 36.7 (04 May 2021 07:58), Max: 36.9 (03 May 2021 21:06)  T(F): 98.1 (04 May 2021 07:58), Max: 98.5 (03 May 2021 21:06)  HR: 98 (04 May 2021 07:58) (81 - 98)  BP: 134/94 (04 May 2021 07:58) (126/95 - 134/94)  BP(mean): --  RR: 18 (04 May 2021 07:58) (18 - 18)  SpO2: 97% (04 May 2021 07:58) (97% - 97%)    PHYSICAL EXAM:  Constitutional: Middle aged male with MR/CP.  Respiratory: CTAB  Cardiovascular: S1 and S2, RRR, no M/G/R  Gastrointestinal: BS+, soft, NT/ND.    LABS:                        13.5   6.32  )-----------( 56       ( 04 May 2021 07:32 )             39.5     05-04    138  |  102  |  25<H>  ----------------------------<  101<H>  4.2   |  34<H>  |  0.77    Ca    9.7      04 May 2021 07:32    TPro  6.4  /  Alb  2.8<L>  /  TBili  1.1  /  DBili  x   /  AST  323<H>  /  ALT  1203<H>  /  AlkPhos  321<H>  05-04    PT/INR - ( 04 May 2021 07:32 )   PT: 12.0 sec;   INR: 1.04 ratio           LIVER FUNCTIONS - ( 04 May 2021 07:32 )  Alb: 2.8 g/dL / Pro: 6.4 gm/dL / ALK PHOS: 321 U/L / ALT: 1203 U/L / AST: 323 U/L / GGT: x             RADIOLOGY & ADDITIONAL STUDIES:
CC: abd pain    SUBJECTIVE:   HPI:  Pt unable to give any history. Hx obtained from chart. 56 y/o male wheelchair bound with a PMHx of cerebral palsy, HTN, mental retardation, neurogenic bladder, psoriasis, spinal stenosis, seizures presents to the ED from a group home, brought in with aide for abd pain and distention x2 days. Aide reports pt has been having small BMs. No bloody/black stools, vomiting.  In ED. LFTs were elevated. No acute cholecystitis on imaging.   Also , fecal impaction was found and it was disimpacted by ED doc.  Also , he was hypoglycemic in ED.   (29 Apr 2021 21:22)    5/1: Lying in bed, alert, no complaints.  Pt wants food.  No fever, chills, n, v.    REVIEW OF SYSTEMS: All other review of systems is negative unless indicated above.    Vital Signs Last 24 Hrs  T(C): 36.1 (01 May 2021 16:19), Max: 36.3 (30 Apr 2021 21:20)  T(F): 96.9 (01 May 2021 16:19), Max: 97.4 (30 Apr 2021 21:20)  HR: 90 (01 May 2021 16:19) (67 - 90)  BP: 124/82 (01 May 2021 16:19) (120/79 - 124/86)  BP(mean): --  RR: 18 (01 May 2021 16:19) (17 - 18)  SpO2: 98% (01 May 2021 16:19) (98% - 99%)    PHYSICAL EXAM:    Constitutional: NAD, awake and alert, thin   HEENT: PERR, EOMI, Normal Hearing, MMM  Neck: Soft and supple, No LAD, No JVD  Respiratory: Breath sounds are clear bilaterally, No wheezing, rales or rhonchi  Cardiovascular: S1 and S2, regular rate and rhythm, no Murmurs, gallops or rubs  Gastrointestinal: Bowel Sounds present, soft, nontender, nondistended, no guarding, no rebound  Extremities: No peripheral edema  Vascular: 2+ peripheral pulses  Neurological: A/O x 2, baseline confusion no focal deficits  Musculoskeletal: 5/5 strength b/l upper and lower extremities  Skin: multiple areas of skin break down on spine and  No rashes    MEDICATIONS  (STANDING):  acetylcysteine 5 Gram(s),dextrose 5% in water 1000 milliLiter(s) 5 Gram(s) (64.06 mL/Hr) IV Continuous <Continuous>  baclofen 5 milliGRAM(s) Oral two times a day  bethanechol 25 milliGRAM(s) Oral at bedtime  chlorhexidine 0.12% Liquid 15 milliLiter(s) Oral Mucosa two times a day  citalopram 10 milliGRAM(s) Oral daily  cloNIDine 0.2 milliGRAM(s) Oral at bedtime  cloNIDine 0.1 milliGRAM(s) Oral daily  dextrose 5% + sodium chloride 0.9%. 1000 milliLiter(s) (80 mL/Hr) IV Continuous <Continuous>  enoxaparin Injectable 40 milliGRAM(s) SubCutaneous daily  lactulose Syrup 10 Gram(s) Oral three times a day  lamoTRIgine 100 milliGRAM(s) Oral two times a day  oxybutynin 5 milliGRAM(s) Oral at bedtime  polyethylene glycol 3350 17 Gram(s) Oral daily  senna 2 Tablet(s) Oral at bedtime  tamsulosin 0.4 milliGRAM(s) Oral at bedtime    MEDICATIONS  (PRN):  ondansetron Injectable 4 milliGRAM(s) IV Push every 6 hours PRN Nausea                              11.7   4.92  )-----------( 106      ( 01 May 2021 08:03 )             33.3     05-01    139  |  105  |  39<H>  ----------------------------<  147<H>  2.9<LL>   |  26  |  0.87    Ca    8.7      01 May 2021 08:03    TPro  5.6<L>  /  Alb  2.5<L>  /  TBili  1.1  /  DBili  x   /  AST  2185<H>  /  ALT  2874<H>  /  AlkPhos  341<H>  05-01    CAPILLARY BLOOD GLUCOSE      POCT Blood Glucose.: 140 mg/dL (30 Apr 2021 21:58)  POCT Blood Glucose.: 82 mg/dL (30 Apr 2021 17:12)    LIVER FUNCTIONS - ( 01 May 2021 08:03 )  Alb: 2.5 g/dL / Pro: 5.6 gm/dL / ALK PHOS: 341 U/L / ALT: 2874 U/L / AST: 2185 U/L / GGT: 142 U/L       PT/INR - ( 01 May 2021 08:03 )   PT: 21.4 sec;   INR: 1.89 ratio            Assessment and Plan:     1) Abd Pain + Elevated LFTs transaminitis / acute liver failure   -stop ceftriaxone as potential agent  -c/w NAC  -LFTs trending down  -MRCP negative   -GI following      2) Fecal Impaction: s/p disimpaction in ED  add lactulose and senna to miralax  titrate lactulose to 2-3 loose BM per day      4) Right renal lesion: work up as out pt    5) Mesenteric/Small Bowel mass:  GI/Sx consults for further work up    7) HTN: cont clonidine    8) MR + CP: supportive care  swallow eval    9) DVT PPX: lovenox        
On review of chart I noticed patient still ordered for cetriaxone. He is on NAC infusion for drug induced liver injury so team agrees with our consult recommendations and since I think the liver injury is from his ceftriaxone, I have d/c'd it tonight; therefore he isn't ordered for any antibiotics for his UTI.     I explained this to the nurse and intsrructed her to discuss with primary team in AM about alternative antibiotic. I have also signed this out to my coverage since I don't want the patient to not have antibiotics coverage, I just think ceftriaxone is not the appropriate antibiotic as I believe it has cuased his liver injury.     Continue NAC infusion. 
Patient is a 55y old  Male who presents with a chief complaint of abd pain (01 May 2021 16:41)      HPI:  Pt unable to give any history. Hx obtained from chart. 56 y/o male wheelchair bound with a PMHx of cerebral palsy, HTN, mental retardation, neurogenic bladder, psoriasis, spinal stenosis, seizures presents to the ED from a group home, brought in with aide for abd pain and distention x2 days. Aide reports pt has been having small BMs. No bloody/black stools, vomiting.     In ED. LFTs were elevated. No acute cholecystitis on imaging.   Also , fecal impaction was found and it was disimpacted by ED doc.     Also , he was hypoglycemic in ED.   (29 Apr 2021 21:22)    Liver enzymes continue to improve. No other complaints.       PAST MEDICAL & SURGICAL HISTORY:  Mental retardation    Hypertension    Seizure    Neurogenic bladder    Psoriasis    Spinal stenosis    Neurogenic bladder    Spinal stenosis    Seizure disorder    No significant past surgical history        MEDICATIONS  (STANDING):  acetylcysteine 5 Gram(s),dextrose 5% in water 1000 milliLiter(s) 5 Gram(s) (64.06 mL/Hr) IV Continuous <Continuous>  baclofen 5 milliGRAM(s) Oral two times a day  bethanechol 25 milliGRAM(s) Oral at bedtime  chlorhexidine 0.12% Liquid 15 milliLiter(s) Oral Mucosa two times a day  citalopram 10 milliGRAM(s) Oral daily  cloNIDine 0.2 milliGRAM(s) Oral at bedtime  cloNIDine 0.1 milliGRAM(s) Oral daily  enoxaparin Injectable 40 milliGRAM(s) SubCutaneous daily  lactulose Syrup 10 Gram(s) Oral three times a day  lamoTRIgine 100 milliGRAM(s) Oral two times a day  oxybutynin 5 milliGRAM(s) Oral at bedtime  polyethylene glycol 3350 17 Gram(s) Oral daily  senna 2 Tablet(s) Oral at bedtime  tamsulosin 0.4 milliGRAM(s) Oral at bedtime    MEDICATIONS  (PRN):  ondansetron Injectable 4 milliGRAM(s) IV Push every 6 hours PRN Nausea      Allergies    No Known Allergies    Intolerances        Vital Signs Last 24 Hrs  T(C): 36.7 (02 May 2021 07:29), Max: 37.3 (01 May 2021 22:16)  T(F): 98 (02 May 2021 07:29), Max: 99.2 (01 May 2021 22:16)  HR: 83 (02 May 2021 07:29) (83 - 90)  BP: 109/78 (02 May 2021 07:29) (109/78 - 124/82)  BP(mean): --  RR: 18 (02 May 2021 07:29) (18 - 18)  SpO2: 97% (02 May 2021 07:29) (97% - 98%)    PHYSICAL EXAM:    chronically ill male  Respiratory: CTAB  Cardiovascular: S1 and S2, RRR, no M/G/R  Gastrointestinal: BS+, soft, NT/ND    LABS:                        11.7   4.92  )-----------( 106      ( 01 May 2021 08:03 )             33.3     05-02    140  |  105  |  23  ----------------------------<  90  3.5   |  32<H>  |  0.61    Ca    8.9      02 May 2021 06:48    TPro  5.1<L>  /  Alb  2.2<L>  /  TBili  1.0  /  DBili  x   /  AST  1262<H>  /  ALT  2003<H>  /  AlkPhos  300<H>  05-02    PT/INR - ( 02 May 2021 06:48 )   PT: 17.8 sec;   INR: 1.57 ratio           LIVER FUNCTIONS - ( 02 May 2021 06:48 )  Alb: 2.2 g/dL / Pro: 5.1 gm/dL / ALK PHOS: 300 U/L / ALT: 2003 U/L / AST: 1262 U/L / GGT: x             RADIOLOGY & ADDITIONAL STUDIES:
CC: abd pain    SUBJECTIVE:   HPI:  Pt unable to give any history. Hx obtained from chart. 54 y/o male wheelchair bound with a PMHx of cerebral palsy, HTN, mental retardation, neurogenic bladder, psoriasis, spinal stenosis, seizures presents to the ED from a group home, brought in with aide for abd pain and distention x2 days. Aide reports pt has been having small BMs. No bloody/black stools, vomiting.  In ED. LFTs were elevated. No acute cholecystitis on imaging.   Also , fecal impaction was found and it was disimpacted by ED doc.  Also , he was hypoglycemic in ED.   (29 Apr 2021 21:22)    5/1: Lying in bed, alert, no complaints.  Pt wants food.  No fever, chills, n, v.  05/02/21: Patient seen and examined. No new complaints. LFTs improving.     REVIEW OF SYSTEMS: All other review of systems is negative unless indicated above.      Vital Signs Last 24 Hrs  T(C): 36.7 (02 May 2021 07:29), Max: 37.3 (01 May 2021 22:16)  T(F): 98 (02 May 2021 07:29), Max: 99.2 (01 May 2021 22:16)  HR: 83 (02 May 2021 07:29) (83 - 90)  BP: 109/78 (02 May 2021 07:29) (109/78 - 124/82)  BP(mean): --  RR: 18 (02 May 2021 07:29) (18 - 18)  SpO2: 97% (02 May 2021 07:29) (97% - 98%)        PHYSICAL EXAM:    Constitutional: NAD, awake and alert, thin   HEENT: PERR, EOMI, Normal Hearing, MMM  Neck: Soft and supple, No LAD, No JVD  Respiratory: Breath sounds are clear bilaterally, No wheezing, rales or rhonchi  Cardiovascular: S1 and S2, regular rate and rhythm, no Murmurs, gallops or rubs  Gastrointestinal: Bowel Sounds present, soft, nontender, nondistended, no guarding, no rebound  Extremities: No peripheral edema  Vascular: 2+ peripheral pulses  Neurological: A/O x 2, baseline confusion no focal deficits  Musculoskeletal: 5/5 strength b/l upper and lower extremities  Skin: multiple areas of skin break down on spine and  No rashes    MEDICATIONS  (STANDING):  acetylcysteine 5 Gram(s),dextrose 5% in water 1000 milliLiter(s) 5 Gram(s) (64.06 mL/Hr) IV Continuous <Continuous>  baclofen 5 milliGRAM(s) Oral two times a day  bethanechol 25 milliGRAM(s) Oral at bedtime  chlorhexidine 0.12% Liquid 15 milliLiter(s) Oral Mucosa two times a day  citalopram 10 milliGRAM(s) Oral daily  cloNIDine 0.2 milliGRAM(s) Oral at bedtime  cloNIDine 0.1 milliGRAM(s) Oral daily  dextrose 5% + sodium chloride 0.9%. 1000 milliLiter(s) (80 mL/Hr) IV Continuous <Continuous>  enoxaparin Injectable 40 milliGRAM(s) SubCutaneous daily  lactulose Syrup 10 Gram(s) Oral three times a day  lamoTRIgine 100 milliGRAM(s) Oral two times a day  oxybutynin 5 milliGRAM(s) Oral at bedtime  polyethylene glycol 3350 17 Gram(s) Oral daily  senna 2 Tablet(s) Oral at bedtime  tamsulosin 0.4 milliGRAM(s) Oral at bedtime    MEDICATIONS  (PRN):  ondansetron Injectable 4 milliGRAM(s) IV Push every 6 hours PRN Nausea                                               11.7   4.92  )-----------( 106      ( 01 May 2021 08:03 )             33.3     02 May 2021 06:48    140    |  105    |  23     ----------------------------<  90     3.5     |  32     |  0.61     Ca    8.9        02 May 2021 06:48    TPro  5.1    /  Alb  2.2    /  TBili  1.0    /  DBili  x      /  AST  1262   /  ALT  2003   /  AlkPhos  300    02 May 2021 06:48    LIVER FUNCTIONS - ( 02 May 2021 06:48 )  Alb: 2.2 g/dL / Pro: 5.1 gm/dL / ALK PHOS: 300 U/L / ALT: 2003 U/L / AST: 1262 U/L / GGT: x           PT/INR - ( 02 May 2021 06:48 )   PT: 17.8 sec;   INR: 1.57 ratio           Assessment and Plan:     1) Abd Pain + Elevated LFTs transaminitis / acute liver failure   -stop ceftriaxone as potential agent. Patient presented abnormal LFTS prior starting to rocephin  -c/w NAC fro one more day  -LFTs trending down  -MRCP negative   -GI following      2) Fecal Impaction: s/p disimpaction in ED  add lactulose and senna to miralax  titrate lactulose to 2-3 loose BM per day      4) Right renal lesion: work up as out pt    5) Mesenteric/Small Bowel mass:  GI/Sx consults for further work up    7) HTN: cont clonidine    8) MR + CP: supportive care  swallow eval    9) DVT PPX: lovenox      
Patient is a 55y old  Male who presents with a chief complaint of abd pain (02 May 2021 12:54)    HPI:  56yo male reports being hungry and wants to eat-can make some needs known.   No complaints but difficult time obtaining any information from patient due to MR and CP.     PAST MEDICAL & SURGICAL HISTORY:  Mental retardation    Hypertension    Seizure    Neurogenic bladder    Psoriasis    Spinal stenosis    Neurogenic bladder    Spinal stenosis    Seizure disorder    No significant past surgical history      MEDICATIONS  (STANDING):  acetylcysteine 5 Gram(s),dextrose 5% in water 1000 milliLiter(s) 5 Gram(s) (64.06 mL/Hr) IV Continuous <Continuous>  baclofen 5 milliGRAM(s) Oral two times a day  bethanechol 25 milliGRAM(s) Oral at bedtime  chlorhexidine 0.12% Liquid 15 milliLiter(s) Oral Mucosa two times a day  citalopram 10 milliGRAM(s) Oral daily  cloNIDine 0.2 milliGRAM(s) Oral at bedtime  cloNIDine 0.1 milliGRAM(s) Oral daily  enoxaparin Injectable 40 milliGRAM(s) SubCutaneous daily  lactulose Syrup 10 Gram(s) Oral three times a day  lamoTRIgine 100 milliGRAM(s) Oral two times a day  oxybutynin 5 milliGRAM(s) Oral at bedtime  polyethylene glycol 3350 17 Gram(s) Oral daily  senna 2 Tablet(s) Oral at bedtime  tamsulosin 0.4 milliGRAM(s) Oral at bedtime    MEDICATIONS  (PRN):  ondansetron Injectable 4 milliGRAM(s) IV Push every 6 hours PRN Nausea    Allergies    No Known Allergies    Intolerances      SOCIAL HISTORY:  FAMILY HISTORY:  Family history of breast cancer (Grandparent)  Grandmother on mother&#x27;s side    Family history of MI (myocardial infarction) (Father)  Father      REVIEW OF SYSTEMS:  Per HPI.   hx of MR and CP.     Vital Signs Last 24 Hrs  T(C): 36.8 (03 May 2021 07:37), Max: 37 (02 May 2021 17:09)  T(F): 98.3 (03 May 2021 07:37), Max: 98.6 (02 May 2021 17:09)  HR: 91 (03 May 2021 07:37) (84 - 99)  BP: 125/87 (03 May 2021 07:37) (111/69 - 127/88)  BP(mean): --  RR: 18 (03 May 2021 07:37) (17 - 18)  SpO2: 95% (03 May 2021 07:37) (95% - 99%)    PHYSICAL EXAM:  Constitutional: Middle aged male in NAD.   Respiratory: CTAB  Cardiovascular: S1 and S2, RRR, no M/G/R  Gastrointestinal: BS+, soft, NT/ND    LABS:    05-03    138  |  102  |  17  ----------------------------<  130<H>  3.5   |  32<H>  |  0.75    Ca    9.2      03 May 2021 08:42    TPro  5.7<L>  /  Alb  2.5<L>  /  TBili  1.3<H>  /  DBili  x   /  AST  607<H>  /  ALT  1469<H>  /  AlkPhos  310<H>  05-03    PT/INR - ( 02 May 2021 06:48 )   PT: 17.8 sec;   INR: 1.57 ratio           LIVER FUNCTIONS - ( 03 May 2021 08:42 )  Alb: 2.5 g/dL / Pro: 5.7 gm/dL / ALK PHOS: 310 U/L / ALT: 1469 U/L / AST: 607 U/L / GGT: x             RADIOLOGY & ADDITIONAL STUDIES:
Patient is a 55y old  Male who presents with a chief complaint of abd pain (30 Apr 2021 09:33)      SUBJECTIVE:   HPI:  Pt unable to give any history. Hx obtained from chart. 54 y/o male wheelchair bound with a PMHx of cerebral palsy, HTN, mental retardation, neurogenic bladder, psoriasis, spinal stenosis, seizures presents to the ED from a group home, brought in with aide for abd pain and distention x2 days. Aide reports pt has been having small BMs. No bloody/black stools, vomiting.     In ED. LFTs were elevated. No acute cholecystitis on imaging.   Also , fecal impaction was found and it was disimpacted by ED doc.     Also , he was hypoglycemic in ED.   (29 Apr 2021 21:22)        REVIEW OF SYSTEMS:    CONSTITUTIONAL: No weakness, fevers or chills  EYES/ENT: No visual changes;  No vertigo or throat pain   NECK: No pain or stiffness  RESPIRATORY: No cough, wheezing, hemoptysis; No shortness of breath  CARDIOVASCULAR: No chest pain or palpitations  GASTROINTESTINAL: No abdominal or epigastric pain. No nausea, vomiting, or hematemesis; No diarrhea or constipation. No melena or hematochezia.  GENITOURINARY: No dysuria, frequency or hematuria  NEUROLOGICAL: No numbness or weakness  SKIN: No itching, burning, rashes, or lesions   All other review of systems is negative unless indicated above      Weight (kg): 52.5 (04-29 @ 23:12)    Vital Signs Last 24 Hrs  T(C): 36.1 (30 Apr 2021 07:33), Max: 37.1 (29 Apr 2021 19:26)  T(F): 97 (30 Apr 2021 07:33), Max: 98.7 (29 Apr 2021 19:26)  HR: 63 (30 Apr 2021 07:33) (63 - 72)  BP: 106/69 (30 Apr 2021 07:33) (106/69 - 129/94)  BP(mean): 106 (29 Apr 2021 19:26) (106 - 106)  RR: 17 (30 Apr 2021 07:33) (17 - 17)  SpO2: 100% (30 Apr 2021 07:33) (98% - 100%)    I&O's Summary      CAPILLARY BLOOD GLUCOSE      POCT Blood Glucose.: 117 mg/dL (30 Apr 2021 12:04)  POCT Blood Glucose.: 125 mg/dL (30 Apr 2021 05:57)  POCT Blood Glucose.: 167 mg/dL (29 Apr 2021 23:26)  POCT Blood Glucose.: 80 mg/dL (29 Apr 2021 19:07)  POCT Blood Glucose.: 52 mg/dL (29 Apr 2021 18:38)      PHYSICAL EXAM:    Constitutional: NAD, awake and alert, thin   HEENT: PERR, EOMI, Normal Hearing, MMM  Neck: Soft and supple, No LAD, No JVD  Respiratory: Breath sounds are clear bilaterally, No wheezing, rales or rhonchi  Cardiovascular: S1 and S2, regular rate and rhythm, no Murmurs, gallops or rubs  Gastrointestinal: Bowel Sounds present, soft, nontender, nondistended, no guarding, no rebound  Extremities: No peripheral edema  Vascular: 2+ peripheral pulses  Neurological: A/O x 2, baseline confusion no focal deficits  Musculoskeletal: 5/5 strength b/l upper and lower extremities  Skin: multiple areas of skin break down on spine and  No rashes    MEDICATIONS:  MEDICATIONS  (STANDING):  baclofen 5 milliGRAM(s) Oral two times a day  bethanechol 25 milliGRAM(s) Oral at bedtime  cefTRIAXone Injectable. 1000 milliGRAM(s) IV Push every 24 hours  chlorhexidine 0.12% Liquid 15 milliLiter(s) Oral Mucosa two times a day  citalopram 10 milliGRAM(s) Oral daily  cloNIDine 0.2 milliGRAM(s) Oral at bedtime  cloNIDine 0.1 milliGRAM(s) Oral daily  dextrose 5% + sodium chloride 0.9%. 1000 milliLiter(s) (125 mL/Hr) IV Continuous <Continuous>  enoxaparin Injectable 40 milliGRAM(s) SubCutaneous daily  lactulose Syrup 10 Gram(s) Oral three times a day  lamoTRIgine 100 milliGRAM(s) Oral two times a day  oxybutynin 5 milliGRAM(s) Oral at bedtime  polyethylene glycol 3350 17 Gram(s) Oral daily  senna 2 Tablet(s) Oral at bedtime  tamsulosin 0.4 milliGRAM(s) Oral at bedtime      LABS: All Labs Reviewed:                        12.9   9.86  )-----------( 138      ( 30 Apr 2021 08:53 )             36.5     04-30    138  |  106  |  62<H>  ----------------------------<  109<H>  3.8   |  26  |  1.11    Ca    9.3      30 Apr 2021 08:53    TPro  6.1  /  Alb  2.9<L>  /  TBili  1.2  /  DBili  x   /  AST  3726<H>  /  ALT  3408<H>  /  AlkPhos  364<H>  04-30    PT/INR - ( 29 Apr 2021 16:46 )   PT: 22.9 sec;   INR: 2.02 ratio    PTT - ( 29 Apr 2021 16:46 )  PTT:33.1 sec      RADIOLOGY/EKG:  < from: US Abdomen Upper Quadrant Right (04.29.21 @ 15:34) >  IMPRESSION:    Limited study demonstrates cholelithiasis.    < end of copied text >          
CC: abd pain    SUBJECTIVE:   HPI:  Pt unable to give any history. Hx obtained from chart. 54 y/o male wheelchair bound with a PMHx of cerebral palsy, HTN, mental retardation, neurogenic bladder, psoriasis, spinal stenosis, seizures presents to the ED from a group home, brought in with aide for abd pain and distention x2 days. Aide reports pt has been having small BMs. No bloody/black stools, vomiting.  In ED. LFTs were elevated. No acute cholecystitis on imaging.   Also , fecal impaction was found and it was disimpacted by ED doc.  Also , he was hypoglycemic in ED.   (29 Apr 2021 21:22)    5/1: Lying in bed, alert, no complaints.  Pt wants food.  No fever, chills, n, v.  05/02/21: Patient seen and examined. No new complaints. LFTs improving.   5/3/21: lying in bed. no issues or complaints. LFT improved. plan for dc tomorrow.     REVIEW OF SYSTEMS: All other review of systems is negative unless indicated above.      Vital Signs Last 24 Hrs  T(C): 36.8 (03 May 2021 07:37), Max: 37 (02 May 2021 17:09)  T(F): 98.3 (03 May 2021 07:37), Max: 98.6 (02 May 2021 17:09)  HR: 91 (03 May 2021 07:37) (84 - 99)  BP: 125/87 (03 May 2021 07:37) (111/69 - 127/88)  BP(mean): --  RR: 18 (03 May 2021 07:37) (17 - 18)  SpO2: 95% (03 May 2021 07:37) (95% - 99%)     PHYSICAL EXAM:    Constitutional: NAD, awake and alert, thin   HEENT: PERR, EOMI, Normal Hearing, MMM  Neck: Soft and supple, No LAD, No JVD  Respiratory: Breath sounds are clear bilaterally, No wheezing, rales or rhonchi  Cardiovascular: S1 and S2, regular rate and rhythm, no Murmurs, gallops or rubs  Gastrointestinal: Bowel Sounds present, soft, nontender, nondistended, no guarding, no rebound  Extremities: No peripheral edema  Vascular: 2+ peripheral pulses  Neurological: A/O x 2, baseline confusion no focal deficits  Musculoskeletal: 5/5 strength b/l upper and lower extremities  Skin: multiple areas of skin break down on spine and  No rashes    MEDICATIONS  (STANDING):  acetylcysteine 5 Gram(s),dextrose 5% in water 1000 milliLiter(s) 5 Gram(s) (64.06 mL/Hr) IV Continuous <Continuous>  baclofen 5 milliGRAM(s) Oral two times a day  bethanechol 25 milliGRAM(s) Oral at bedtime  chlorhexidine 0.12% Liquid 15 milliLiter(s) Oral Mucosa two times a day  citalopram 10 milliGRAM(s) Oral daily  cloNIDine 0.2 milliGRAM(s) Oral at bedtime  cloNIDine 0.1 milliGRAM(s) Oral daily  dextrose 5% + sodium chloride 0.9%. 1000 milliLiter(s) (80 mL/Hr) IV Continuous <Continuous>  enoxaparin Injectable 40 milliGRAM(s) SubCutaneous daily  lactulose Syrup 10 Gram(s) Oral three times a day  lamoTRIgine 100 milliGRAM(s) Oral two times a day  oxybutynin 5 milliGRAM(s) Oral at bedtime  polyethylene glycol 3350 17 Gram(s) Oral daily  senna 2 Tablet(s) Oral at bedtime  tamsulosin 0.4 milliGRAM(s) Oral at bedtime    MEDICATIONS  (PRN):  ondansetron Injectable 4 milliGRAM(s) IV Push every 6 hours PRN Nausea      LABS     05-03    138  |  102  |  17  ----------------------------<  130<H>  3.5   |  32<H>  |  0.75    Ca    9.2      03 May 2021 08:42    TPro  5.7<L>  /  Alb  2.5<L>  /  TBili  1.3<H>  /  DBili  x   /  AST  607<H>  /  ALT  1469<H>  /  AlkPhos  310<H>  05-03        LIVER FUNCTIONS - ( 03 May 2021 08:42 )  Alb: 2.5 g/dL / Pro: 5.7 gm/dL / ALK PHOS: 310 U/L / ALT: 1469 U/L / AST: 607 U/L / GGT: x           PT/INR - ( 02 May 2021 06:48 )   PT: 17.8 sec;   INR: 1.57 ratio           Assessment and Plan:     1) Abd Pain + Elevated LFTs transaminitis / acute liver failure   -stop ceftriaxone as potential agent. Patient presented abnormal LFTS prior starting to rocephin  -s/p NAC protocol for non Apap liver failure   -LFTs trending down  -MRCP negative   -GI following  -incidental finding of multiple cyst on MRI - out pt follow up upon DC     2) Fecal Impaction: s/p disimpaction in ED  add lactulose and senna to miralax  titrate lactulose to 2-3 loose BM per day    4) Right renal lesion: work up as out pt    5) Mesenteric/Small Bowel mass:  GI/Sx consults for further work up    7) HTN: cont clonidine    8) MR + CP: supportive care  swallow eval    9) DVT PPX: lovenox    dispo- dc to [facility] home tomorrow.     
Patient is a 55y old  Male who presents with a chief complaint of abd pain (01 May 2021 00:51)      HPI:  pt feeling ok no complaints      PAST MEDICAL & SURGICAL HISTORY:  Mental retardation    Hypertension    Seizure    Neurogenic bladder    Psoriasis    Spinal stenosis    Neurogenic bladder    Spinal stenosis    Seizure disorder    No significant past surgical history        MEDICATIONS  (STANDING):  acetylcysteine 5 Gram(s),dextrose 5% in water 1000 milliLiter(s) 5 Gram(s) (64.06 mL/Hr) IV Continuous <Continuous>  baclofen 5 milliGRAM(s) Oral two times a day  bethanechol 25 milliGRAM(s) Oral at bedtime  chlorhexidine 0.12% Liquid 15 milliLiter(s) Oral Mucosa two times a day  citalopram 10 milliGRAM(s) Oral daily  cloNIDine 0.2 milliGRAM(s) Oral at bedtime  cloNIDine 0.1 milliGRAM(s) Oral daily  dextrose 5% + sodium chloride 0.9%. 1000 milliLiter(s) (80 mL/Hr) IV Continuous <Continuous>  enoxaparin Injectable 40 milliGRAM(s) SubCutaneous daily  lactulose Syrup 10 Gram(s) Oral three times a day  lamoTRIgine 100 milliGRAM(s) Oral two times a day  oxybutynin 5 milliGRAM(s) Oral at bedtime  polyethylene glycol 3350 17 Gram(s) Oral daily  potassium chloride  10 mEq/100 mL IVPB 10 milliEquivalent(s) IV Intermittent every 1 hour  senna 2 Tablet(s) Oral at bedtime  tamsulosin 0.4 milliGRAM(s) Oral at bedtime    MEDICATIONS  (PRN):  ondansetron Injectable 4 milliGRAM(s) IV Push every 6 hours PRN Nausea      Allergies    No Known Allergies    Intolerances        REVIEW OF SYSTEMS:  no complaints but unable to provide detailed history    Vital Signs Last 24 Hrs  T(C): 36.2 (01 May 2021 07:22), Max: 36.3 (30 Apr 2021 21:20)  T(F): 97.1 (01 May 2021 07:22), Max: 97.4 (30 Apr 2021 21:20)  HR: 73 (01 May 2021 07:22) (67 - 73)  BP: 124/86 (01 May 2021 07:22) (120/79 - 124/86)  BP(mean): --  RR: 17 (01 May 2021 07:22) (17 - 17)  SpO2: 99% (01 May 2021 07:22) (99% - 99%)    PHYSICAL EXAM:  Constitutional: NAD  Respiratory: CTAB  Cardiovascular: S1 and S2  Gastrointestinal: BS+, soft, NT/ND      LABS:                        11.7   4.92  )-----------( 106      ( 01 May 2021 08:03 )             33.3     05-01    139  |  105  |  39<H>  ----------------------------<  147<H>  2.9<LL>   |  26  |  0.87    Ca    8.7      01 May 2021 08:03    TPro  5.6<L>  /  Alb  2.5<L>  /  TBili  1.1  /  DBili  x   /  AST  2185<H>  /  ALT  2874<H>  /  AlkPhos  341<H>  05-01    PT/INR - ( 01 May 2021 08:03 )   PT: 21.4 sec;   INR: 1.89 ratio         PTT - ( 29 Apr 2021 16:46 )  PTT:33.1 sec  LIVER FUNCTIONS - ( 01 May 2021 08:03 )  Alb: 2.5 g/dL / Pro: 5.6 gm/dL / ALK PHOS: 341 U/L / ALT: 2874 U/L / AST: 2185 U/L / GGT: x             RADIOLOGY & ADDITIONAL STUDIES:

## 2021-05-04 NOTE — PROGRESS NOTE ADULT - PROVIDER SPECIALTY LIST ADULT
Gastroenterology
Hospitalist
Gastroenterology
Gastroenterology
Hospitalist
Hospitalist
Gastroenterology
Hospitalist
Gastroenterology

## 2021-05-04 NOTE — PROGRESS NOTE ADULT - ASSESSMENT
56y/o male with hx of CP and MR.     Admitted with abdominal pain noted to have signicicantly elevated lfts and a 3.2cm gallstone without any evidence of acute nyasia.    Also ct scan noted small bowel mass?/large spleen mass/fecal impaction s/p disimpaction in ED w/ rectum dilated to 10.9cm.     Patient now clinically improved without any abdominal pain and LFTs are trending down.  Unclear etiology of elevated lfts but so far work up has been negative and lfts are improving, unclear if medication vs. viral induced??    Surgery consult is still pending for small bowel mass/large spleen mass?     - Ok from gi standpoint to d.c. home and continue strict bowel regimen.     Discussed with NP, Dr. Rico, Dr. Brunson.

## 2021-05-04 NOTE — DISCHARGE NOTE PROVIDER - HOSPITAL COURSE
pt is a 56 y/o male w/ pmhx of cerebral palsy (wheelchair bound), HTN, developmentally delayed, neurogenic bladder, psoriasis spinal stenosis and seizure presented to ED on 4/29 for abd distension and abd pain x 2 days. noted to have constipation, and elevated LFTs. Pt was disimpacted in ED.   pt evaled by GI - NP Bassam under Dr. Atr and Dr. Rico for elevated LFTS - no clear cause for elevated LFTs, ceftrixaone dc'd, tizanidine PO held while in house. pt given NAC for non acetaminophen induced liver failure. pt LFTs slowly improved.   pt is without pain or discomfort and can be dc back to group home today.     Vital Signs Last 24 Hrs  T(C): 36.7 (04 May 2021 07:58), Max: 36.9 (03 May 2021 21:06)  T(F): 98.1 (04 May 2021 07:58), Max: 98.5 (03 May 2021 21:06)  HR: 98 (04 May 2021 07:58) (81 - 98)  BP: 134/94 (04 May 2021 07:58) (126/95 - 134/94)  BP(mean): --  RR: 18 (04 May 2021 07:58) (18 - 18)  SpO2: 97% (04 May 2021 07:58) (97% - 97%) --- room air       LABS                         x      x     )-----------( 59       ( 04 May 2021 12:09 )             x        05-04    138  |  102  |  25<H>  ----------------------------<  101<H>  4.2   |  34<H>  |  0.77    Ca    9.7      04 May 2021 07:32    TPro  6.4  /  Alb  2.8<L>  /  TBili  1.1  /  DBili  x   /  AST  323<H>  /  ALT  1203<H>  /  AlkPhos  321<H>  05-04        LIVER FUNCTIONS - ( 04 May 2021 07:32 )  Alb: 2.8 g/dL / Pro: 6.4 gm/dL / ALK PHOS: 321 U/L / ALT: 1203 U/L / AST: 323 U/L / GGT: x           PT/INR - ( 04 May 2021 07:32 )   PT: 12.0 sec;   INR: 1.04 ratio       PHYSICAL EXAM:    Constitutional: NAD, awake and alert, thin   HEENT: PERR, EOMI, Normal Hearing, MMM  Neck: Soft and supple, No LAD, No JVD  Respiratory: Breath sounds are clear bilaterally, No wheezing, rales or rhonchi  Cardiovascular: S1 and S2, regular rate and rhythm, no Murmurs, gallops or rubs  Gastrointestinal: Bowel Sounds present, soft, nontender, nondistended, no guarding, no rebound  Extremities: No peripheral edema  Vascular: 2+ peripheral pulses  Neurological: A/O x 2, baseline confusion no focal deficits  Musculoskeletal: 5/5 strength b/l upper and lower extremities  Skin: multiple areas of skin break down on spine and  No rashes      A&P   1) Abd Pain + Elevated LFTs transaminitis / acute liver failure   -s/p manual disimpaction of stool in ED.  med rec - tizanidine PO - approx 6% risk of causing elevated LFTs. (last refill last month), not given while in house. no other known agents causing elevated LFTs. no hypotension or evidence of shock/shock liver.  ?ceftriaxone may cause elevated LFT however, timeline does not correlate   MRCP negative for choledocholithiases or biliary dilation   US abdomen doppler large cystic lesion in the spleen measuring up to 8.4 cm.  possibly causing hypoglycemia and high INR and low platelets  s/p iv fluids  GI/Sx consults - s/p n-acetylcysteine IV protocol for non tylenol induced liver failure   trend LFTs in AM improved   pain meds prn    2) Fecal Impaction: s/p disimpaction in ED  add lactulose and senna to miralax  s/p tap water enema and   titrate lactulose to 2-3 loose BM per day    3) Hypoglycemic episodes in ED:  s/p FSG   s/p iv fluids     4) Right renal lesion: work up as out pt    5) Mesenteric/Small Bowel mass:  out pt consults for further work up    6) UTI: s/p iv ceftriaxone    7) HTN: cont clonidine    8) MR + CP: supportive care  swallow eval    9) DVT PPX: lovenox      above plan discussed with pt, bedside RN and MD Brunson   time spent preparing dc 40 mins CC: Abd pain  HPI:  pt is a 54 y/o male w/ pmhx of cerebral palsy (wheelchair bound), HTN, developmentally delayed, neurogenic bladder, psoriasis spinal stenosis and seizure presented to ED on 4/29 for abd distension and abd pain x 2 days. noted to have constipation, and elevated LFTs. Pt was disimpacted in ED.   pt evaled by GI - NP Bassam under Dr. Art and Dr. Rico for elevated LFTS - no clear cause for elevated LFTs, ceftrixaone dc'd, tizanidine PO held while in house. pt given NAC for non acetaminophen induced liver failure. pt LFTs slowly improved.   pt is without pain or discomfort and can be dc back to group home today.     REVIEW OF SYSTEMS: All other review of systems is negative unless indicated above.    Vital Signs Last 24 Hrs  T(C): 36.7 (04 May 2021 07:58), Max: 36.9 (03 May 2021 21:06)  T(F): 98.1 (04 May 2021 07:58), Max: 98.5 (03 May 2021 21:06)  HR: 98 (04 May 2021 07:58) (81 - 98)  BP: 134/94 (04 May 2021 07:58) (126/95 - 134/94)  BP(mean): --  RR: 18 (04 May 2021 07:58) (18 - 18)  SpO2: 97% (04 May 2021 07:58) (97% - 97%) --- room air       LABS                         x      x     )-----------( 59       ( 04 May 2021 12:09 )             x        05-04    138  |  102  |  25<H>  ----------------------------<  101<H>  4.2   |  34<H>  |  0.77    Ca    9.7      04 May 2021 07:32    TPro  6.4  /  Alb  2.8<L>  /  TBili  1.1  /  DBili  x   /  AST  323<H>  /  ALT  1203<H>  /  AlkPhos  321<H>  05-04        LIVER FUNCTIONS - ( 04 May 2021 07:32 )  Alb: 2.8 g/dL / Pro: 6.4 gm/dL / ALK PHOS: 321 U/L / ALT: 1203 U/L / AST: 323 U/L / GGT: x           PT/INR - ( 04 May 2021 07:32 )   PT: 12.0 sec;   INR: 1.04 ratio       PHYSICAL EXAM:    Constitutional: NAD, awake and alert, thin   HEENT: PERR, EOMI, Normal Hearing, MMM  Neck: Soft and supple, No LAD, No JVD  Respiratory: Breath sounds are clear bilaterally, No wheezing, rales or rhonchi  Cardiovascular: S1 and S2, regular rate and rhythm, no Murmurs, gallops or rubs  Gastrointestinal: Bowel Sounds present, soft, nontender, nondistended, no guarding, no rebound  Extremities: No peripheral edema  Vascular: 2+ peripheral pulses  Neurological: A/O x 2, baseline confusion no focal deficits  Musculoskeletal: 5/5 strength b/l upper and lower extremities  Skin: multiple areas of skin break down on spine and  No rashes      A&P   1) Abd Pain + Elevated LFTs transaminitis / acute liver failure   -s/p manual disimpaction of stool in ED.  med rec - tizanidine PO - approx 6% risk of causing elevated LFTs. (last refill last month), not given while in house. no other known agents causing elevated LFTs. no hypotension or evidence of shock/shock liver.  ?ceftriaxone may cause elevated LFT however, timeline does not correlate   MRCP negative for choledocholithiases or biliary dilation   US abdomen doppler large cystic lesion in the spleen measuring up to 8.4 cm.  possibly causing hypoglycemia and high INR and low platelets  s/p iv fluids  GI/Sx consults - s/p n-acetylcysteine IV protocol for non tylenol induced liver failure   trend LFTs in AM improved   pain meds prn    2) Fecal Impaction: s/p disimpaction in ED  add lactulose and senna to miralax  s/p tap water enema and   titrate lactulose to 2-3 loose BM per day    3) Hypoglycemic episodes in ED:  s/p FSG   s/p iv fluids     4) Right renal lesion: work up as out pt    5) Mesenteric/Small Bowel mass:  out pt consults for further work up    6) UTI: s/p iv ceftriaxone    7) HTN: cont clonidine    8) MR + CP: supportive care  swallow eval    above plan discussed with pt, bedside RN and MD Brunson   time spent preparing dc 40 mins     Attending Attestation:  Pt seen and examined with ANGLE Valenzuela. I personally had a face to face encounter with the patient, examined the patient myself and reviewed the plan of care with the patient and said ANGLE Valenzuela. I agree with the assessment and plan of ANGLE Valenzuela as stated and discussed.  Pt LFTs and coagulopathy resolving.  Abd distended due to urinary retention and stool impaction.  s/p straight cath and water enema.  Pt also with low platelets due to liver injury which should be watched on discharge.

## 2021-05-04 NOTE — DISCHARGE NOTE PROVIDER - CARE PROVIDER_API CALL
Cornelio Rico)  Gastroenterology; Internal Medicine  [address] [place] [address]  Phone: [phone]  Fax: [phone]  Follow Up Time: 2 weeks

## 2021-05-04 NOTE — DISCHARGE NOTE NURSING/CASE MANAGEMENT/SOCIAL WORK - PATIENT PORTAL LINK FT
You can access the FollowMyHealth Patient Portal offered by [facility] by registering at the following website: [email or web address]. By joining [employer]’s FollowMyHealth portal, you will also be able to view your health information using other applications (apps) compatible with our system.

## 2021-05-16 DIAGNOSIS — D73.9 DISEASE OF SPLEEN, UNSPECIFIED: ICD-10-CM

## 2021-05-16 DIAGNOSIS — E43 UNSPECIFIED SEVERE PROTEIN-CALORIE MALNUTRITION: ICD-10-CM

## 2021-05-16 DIAGNOSIS — M48.00 SPINAL STENOSIS, SITE UNSPECIFIED: ICD-10-CM

## 2021-05-16 DIAGNOSIS — F79 UNSPECIFIED INTELLECTUAL DISABILITIES: ICD-10-CM

## 2021-05-16 DIAGNOSIS — N31.9 NEUROMUSCULAR DYSFUNCTION OF BLADDER, UNSPECIFIED: ICD-10-CM

## 2021-05-16 DIAGNOSIS — K59.00 CONSTIPATION, UNSPECIFIED: ICD-10-CM

## 2021-05-16 DIAGNOSIS — E16.2 HYPOGLYCEMIA, UNSPECIFIED: ICD-10-CM

## 2021-05-16 DIAGNOSIS — G80.9 CEREBRAL PALSY, UNSPECIFIED: ICD-10-CM

## 2021-05-16 DIAGNOSIS — G40.909 EPILEPSY, UNSPECIFIED, NOT INTRACTABLE, WITHOUT STATUS EPILEPTICUS: ICD-10-CM

## 2021-05-16 DIAGNOSIS — N28.9 DISORDER OF KIDNEY AND URETER, UNSPECIFIED: ICD-10-CM

## 2021-05-16 DIAGNOSIS — Z20.822 CONTACT WITH AND (SUSPECTED) EXPOSURE TO COVID-19: ICD-10-CM

## 2021-05-16 DIAGNOSIS — T42.8X5A ADVERSE EFFECT OF ANTIPARKINSONISM DRUGS AND OTHER CENTRAL MUSCLE-TONE DEPRESSANTS, INITIAL ENCOUNTER: ICD-10-CM

## 2021-05-16 DIAGNOSIS — Z99.3 DEPENDENCE ON WHEELCHAIR: ICD-10-CM

## 2021-05-16 DIAGNOSIS — I10 ESSENTIAL (PRIMARY) HYPERTENSION: ICD-10-CM

## 2021-05-16 DIAGNOSIS — K71.10 TOXIC LIVER DISEASE WITH HEPATIC NECROSIS, WITHOUT COMA: ICD-10-CM

## 2021-05-16 DIAGNOSIS — K80.20 CALCULUS OF GALLBLADDER WITHOUT CHOLECYSTITIS WITHOUT OBSTRUCTION: ICD-10-CM

## 2021-05-16 DIAGNOSIS — L40.9 PSORIASIS, UNSPECIFIED: ICD-10-CM

## 2021-05-16 DIAGNOSIS — N39.0 URINARY TRACT INFECTION, SITE NOT SPECIFIED: ICD-10-CM

## 2021-05-16 DIAGNOSIS — R14.0 ABDOMINAL DISTENSION (GASEOUS): ICD-10-CM

## 2021-05-18 ENCOUNTER — OUTPATIENT (OUTPATIENT)
Dept: OUTPATIENT SERVICES | Facility: HOSPITAL | Age: 56
LOS: 1 days | End: 2021-05-18
Payer: MEDICARE

## 2021-05-18 DIAGNOSIS — R56.9 UNSPECIFIED CONVULSIONS: ICD-10-CM

## 2021-05-18 DIAGNOSIS — Z91.81 HISTORY OF FALLING: ICD-10-CM

## 2021-05-18 DIAGNOSIS — J32.9 CHRONIC SINUSITIS, UNSPECIFIED: ICD-10-CM

## 2021-05-18 DIAGNOSIS — S31.000A UNSPECIFIED OPEN WOUND OF LOWER BACK AND PELVIS WITHOUT PENETRATION INTO RETROPERITONEUM, INITIAL ENCOUNTER: ICD-10-CM

## 2021-05-18 DIAGNOSIS — M62.81 MUSCLE WEAKNESS (GENERALIZED): ICD-10-CM

## 2021-05-18 DIAGNOSIS — L89.106: ICD-10-CM

## 2021-05-18 DIAGNOSIS — Z72.4 INAPPROPRIATE DIET AND EATING HABITS: ICD-10-CM

## 2021-05-18 DIAGNOSIS — N18.6 END STAGE RENAL DISEASE: ICD-10-CM

## 2021-05-18 DIAGNOSIS — Z74.1 NEED FOR ASSISTANCE WITH PERSONAL CARE: ICD-10-CM

## 2021-05-18 DIAGNOSIS — N40.0 BENIGN PROSTATIC HYPERPLASIA WITHOUT LOWER URINARY TRACT SYMPTOMS: ICD-10-CM

## 2021-05-18 DIAGNOSIS — L89.156 PRESSURE-INDUCED DEEP TISSUE DAMAGE OF SACRAL REGION: ICD-10-CM

## 2021-05-18 DIAGNOSIS — I13.11 HYPERTENSIVE HEART AND CHRONIC KIDNEY DISEASE WITHOUT HEART FAILURE, WITH STAGE 5 CHRONIC KIDNEY DISEASE, OR END STAGE RENAL DISEASE: ICD-10-CM

## 2021-05-18 DIAGNOSIS — R39.81 FUNCTIONAL URINARY INCONTINENCE: ICD-10-CM

## 2021-05-18 DIAGNOSIS — Z79.899 OTHER LONG TERM (CURRENT) DRUG THERAPY: ICD-10-CM

## 2021-05-18 DIAGNOSIS — N31.9 NEUROMUSCULAR DYSFUNCTION OF BLADDER, UNSPECIFIED: ICD-10-CM

## 2021-05-18 PROCEDURE — 97602 WOUND(S) CARE NON-SELECTIVE: CPT

## 2021-05-18 PROCEDURE — G0463: CPT

## 2021-05-19 DIAGNOSIS — S31.000A UNSPECIFIED OPEN WOUND OF LOWER BACK AND PELVIS WITHOUT PENETRATION INTO RETROPERITONEUM, INITIAL ENCOUNTER: ICD-10-CM

## 2021-05-24 ENCOUNTER — INPATIENT (INPATIENT)
Facility: HOSPITAL | Age: 56
LOS: 3 days | Discharge: HOME | DRG: 391 | End: 2021-05-28
Attending: HOSPITALIST | Admitting: HOSPITALIST
Payer: MEDICARE

## 2021-05-24 VITALS — HEIGHT: 70 IN | WEIGHT: 139.99 LBS

## 2021-05-24 DIAGNOSIS — E46 UNSPECIFIED PROTEIN-CALORIE MALNUTRITION: ICD-10-CM

## 2021-05-24 LAB
ALBUMIN SERPL ELPH-MCNC: 2.7 G/DL — LOW (ref 3.3–5)
ALP SERPL-CCNC: 158 U/L — HIGH (ref 40–120)
ALT FLD-CCNC: 117 U/L — HIGH (ref 12–78)
ANION GAP SERPL CALC-SCNC: 4 MMOL/L — LOW (ref 5–17)
APPEARANCE UR: CLEAR — SIGNIFICANT CHANGE UP
AST SERPL-CCNC: 66 U/L — HIGH (ref 15–37)
BASOPHILS # BLD AUTO: 0.01 K/UL — SIGNIFICANT CHANGE UP (ref 0–0.2)
BASOPHILS NFR BLD AUTO: 0.1 % — SIGNIFICANT CHANGE UP (ref 0–2)
BILIRUB SERPL-MCNC: 0.9 MG/DL — SIGNIFICANT CHANGE UP (ref 0.2–1.2)
BILIRUB UR-MCNC: NEGATIVE — SIGNIFICANT CHANGE UP
BUN SERPL-MCNC: 24 MG/DL — HIGH (ref 7–23)
CALCIUM SERPL-MCNC: 9.5 MG/DL — SIGNIFICANT CHANGE UP (ref 8.5–10.1)
CHLORIDE SERPL-SCNC: 105 MMOL/L — SIGNIFICANT CHANGE UP (ref 96–108)
CO2 SERPL-SCNC: 32 MMOL/L — HIGH (ref 22–31)
COLOR SPEC: YELLOW — SIGNIFICANT CHANGE UP
CREAT SERPL-MCNC: 0.56 MG/DL — SIGNIFICANT CHANGE UP (ref 0.5–1.3)
DIFF PNL FLD: ABNORMAL
EOSINOPHIL # BLD AUTO: 0.01 K/UL — SIGNIFICANT CHANGE UP (ref 0–0.5)
EOSINOPHIL NFR BLD AUTO: 0.1 % — SIGNIFICANT CHANGE UP (ref 0–6)
GLUCOSE SERPL-MCNC: 62 MG/DL — LOW (ref 70–99)
GLUCOSE UR QL: NEGATIVE MG/DL — SIGNIFICANT CHANGE UP
HCT VFR BLD CALC: 37.2 % — LOW (ref 39–50)
HGB BLD-MCNC: 13.1 G/DL — SIGNIFICANT CHANGE UP (ref 13–17)
IMM GRANULOCYTES NFR BLD AUTO: 0.3 % — SIGNIFICANT CHANGE UP (ref 0–1.5)
KETONES UR-MCNC: NEGATIVE — SIGNIFICANT CHANGE UP
LEUKOCYTE ESTERASE UR-ACNC: ABNORMAL
LIDOCAIN IGE QN: 195 U/L — SIGNIFICANT CHANGE UP (ref 73–393)
LYMPHOCYTES # BLD AUTO: 0.88 K/UL — LOW (ref 1–3.3)
LYMPHOCYTES # BLD AUTO: 10.1 % — LOW (ref 13–44)
MCHC RBC-ENTMCNC: 31.7 PG — SIGNIFICANT CHANGE UP (ref 27–34)
MCHC RBC-ENTMCNC: 35.2 GM/DL — SIGNIFICANT CHANGE UP (ref 32–36)
MCV RBC AUTO: 90.1 FL — SIGNIFICANT CHANGE UP (ref 80–100)
MONOCYTES # BLD AUTO: 0.26 K/UL — SIGNIFICANT CHANGE UP (ref 0–0.9)
MONOCYTES NFR BLD AUTO: 3 % — SIGNIFICANT CHANGE UP (ref 2–14)
NEUTROPHILS # BLD AUTO: 7.56 K/UL — HIGH (ref 1.8–7.4)
NEUTROPHILS NFR BLD AUTO: 86.4 % — HIGH (ref 43–77)
NITRITE UR-MCNC: NEGATIVE — SIGNIFICANT CHANGE UP
PH UR: 6 — SIGNIFICANT CHANGE UP (ref 5–8)
PLATELET # BLD AUTO: 265 K/UL — SIGNIFICANT CHANGE UP (ref 150–400)
POTASSIUM SERPL-MCNC: 4 MMOL/L — SIGNIFICANT CHANGE UP (ref 3.5–5.3)
POTASSIUM SERPL-SCNC: 4 MMOL/L — SIGNIFICANT CHANGE UP (ref 3.5–5.3)
PROT SERPL-MCNC: 6.3 GM/DL — SIGNIFICANT CHANGE UP (ref 6–8.3)
PROT UR-MCNC: 30 MG/DL
RBC # BLD: 4.13 M/UL — LOW (ref 4.2–5.8)
RBC # FLD: 13 % — SIGNIFICANT CHANGE UP (ref 10.3–14.5)
SARS-COV-2 RNA SPEC QL NAA+PROBE: SIGNIFICANT CHANGE UP
SODIUM SERPL-SCNC: 141 MMOL/L — SIGNIFICANT CHANGE UP (ref 135–145)
SP GR SPEC: 1.02 — SIGNIFICANT CHANGE UP (ref 1.01–1.02)
UROBILINOGEN FLD QL: 4 MG/DL
WBC # BLD: 8.75 K/UL — SIGNIFICANT CHANGE UP (ref 3.8–10.5)
WBC # FLD AUTO: 8.75 K/UL — SIGNIFICANT CHANGE UP (ref 3.8–10.5)

## 2021-05-24 PROCEDURE — 73630 X-RAY EXAM OF FOOT: CPT | Mod: LT

## 2021-05-24 PROCEDURE — 36415 COLL VENOUS BLD VENIPUNCTURE: CPT

## 2021-05-24 PROCEDURE — 92523 SPEECH SOUND LANG COMPREHEN: CPT | Mod: GN

## 2021-05-24 PROCEDURE — 80053 COMPREHEN METABOLIC PANEL: CPT

## 2021-05-24 PROCEDURE — 86769 SARS-COV-2 COVID-19 ANTIBODY: CPT

## 2021-05-24 PROCEDURE — 85610 PROTHROMBIN TIME: CPT

## 2021-05-24 PROCEDURE — 82962 GLUCOSE BLOOD TEST: CPT

## 2021-05-24 PROCEDURE — 92526 ORAL FUNCTION THERAPY: CPT | Mod: GN

## 2021-05-24 PROCEDURE — 99223 1ST HOSP IP/OBS HIGH 75: CPT

## 2021-05-24 PROCEDURE — 71045 X-RAY EXAM CHEST 1 VIEW: CPT | Mod: 26

## 2021-05-24 PROCEDURE — 92507 TX SP LANG VOICE COMM INDIV: CPT | Mod: GN

## 2021-05-24 PROCEDURE — 85730 THROMBOPLASTIN TIME PARTIAL: CPT

## 2021-05-24 PROCEDURE — 85025 COMPLETE CBC W/AUTO DIFF WBC: CPT

## 2021-05-24 PROCEDURE — 92610 EVALUATE SWALLOWING FUNCTION: CPT | Mod: GN

## 2021-05-24 PROCEDURE — 83735 ASSAY OF MAGNESIUM: CPT

## 2021-05-24 PROCEDURE — 99285 EMERGENCY DEPT VISIT HI MDM: CPT | Mod: CS

## 2021-05-24 PROCEDURE — 93010 ELECTROCARDIOGRAM REPORT: CPT

## 2021-05-24 PROCEDURE — 83605 ASSAY OF LACTIC ACID: CPT

## 2021-05-24 RX ORDER — POLYETHYLENE GLYCOL 3350 17 G/17G
17 POWDER, FOR SOLUTION ORAL AT BEDTIME
Refills: 0 | Status: DISCONTINUED | OUTPATIENT
Start: 2021-05-24 | End: 2021-05-28

## 2021-05-24 RX ORDER — SODIUM CHLORIDE 9 MG/ML
1000 INJECTION INTRAMUSCULAR; INTRAVENOUS; SUBCUTANEOUS
Refills: 0 | Status: DISCONTINUED | OUTPATIENT
Start: 2021-05-24 | End: 2021-05-26

## 2021-05-24 RX ORDER — CEFTRIAXONE 500 MG/1
1000 INJECTION, POWDER, FOR SOLUTION INTRAMUSCULAR; INTRAVENOUS ONCE
Refills: 0 | Status: COMPLETED | OUTPATIENT
Start: 2021-05-24 | End: 2021-05-24

## 2021-05-24 RX ORDER — CHLORHEXIDINE GLUCONATE 213 G/1000ML
15 SOLUTION TOPICAL
Refills: 0 | Status: DISCONTINUED | OUTPATIENT
Start: 2021-05-24 | End: 2021-05-28

## 2021-05-24 RX ORDER — LAMOTRIGINE 25 MG/1
100 TABLET, ORALLY DISINTEGRATING ORAL
Refills: 0 | Status: DISCONTINUED | OUTPATIENT
Start: 2021-05-24 | End: 2021-05-28

## 2021-05-24 RX ORDER — BETHANECHOL CHLORIDE 25 MG
25 TABLET ORAL AT BEDTIME
Refills: 0 | Status: DISCONTINUED | OUTPATIENT
Start: 2021-05-24 | End: 2021-05-28

## 2021-05-24 RX ORDER — TAMSULOSIN HYDROCHLORIDE 0.4 MG/1
0.4 CAPSULE ORAL AT BEDTIME
Refills: 0 | Status: DISCONTINUED | OUTPATIENT
Start: 2021-05-24 | End: 2021-05-28

## 2021-05-24 RX ORDER — CITALOPRAM 10 MG/1
10 TABLET, FILM COATED ORAL DAILY
Refills: 0 | Status: DISCONTINUED | OUTPATIENT
Start: 2021-05-24 | End: 2021-05-28

## 2021-05-24 RX ORDER — SENNA PLUS 8.6 MG/1
2 TABLET ORAL AT BEDTIME
Refills: 0 | Status: DISCONTINUED | OUTPATIENT
Start: 2021-05-24 | End: 2021-05-28

## 2021-05-24 RX ORDER — CEFTRIAXONE 500 MG/1
1000 INJECTION, POWDER, FOR SOLUTION INTRAMUSCULAR; INTRAVENOUS ONCE
Refills: 0 | Status: DISCONTINUED | OUTPATIENT
Start: 2021-05-24 | End: 2021-05-24

## 2021-05-24 RX ORDER — BACLOFEN 100 %
5 POWDER (GRAM) MISCELLANEOUS
Refills: 0 | Status: DISCONTINUED | OUTPATIENT
Start: 2021-05-24 | End: 2021-05-28

## 2021-05-24 RX ORDER — OXYBUTYNIN CHLORIDE 5 MG
5 TABLET ORAL AT BEDTIME
Refills: 0 | Status: DISCONTINUED | OUTPATIENT
Start: 2021-05-24 | End: 2021-05-28

## 2021-05-24 RX ORDER — SODIUM CHLORIDE 9 MG/ML
1000 INJECTION INTRAMUSCULAR; INTRAVENOUS; SUBCUTANEOUS ONCE
Refills: 0 | Status: COMPLETED | OUTPATIENT
Start: 2021-05-24 | End: 2021-05-24

## 2021-05-24 RX ADMIN — Medication 5 MILLIGRAM(S): at 23:01

## 2021-05-24 RX ADMIN — TAMSULOSIN HYDROCHLORIDE 0.4 MILLIGRAM(S): 0.4 CAPSULE ORAL at 23:01

## 2021-05-24 RX ADMIN — Medication 0.2 MILLIGRAM(S): at 23:07

## 2021-05-24 RX ADMIN — Medication 5 MILLIGRAM(S): at 23:04

## 2021-05-24 RX ADMIN — SODIUM CHLORIDE 1000 MILLILITER(S): 9 INJECTION INTRAMUSCULAR; INTRAVENOUS; SUBCUTANEOUS at 18:38

## 2021-05-24 RX ADMIN — Medication 0.1 MILLIGRAM(S): at 23:06

## 2021-05-24 RX ADMIN — LAMOTRIGINE 100 MILLIGRAM(S): 25 TABLET, ORALLY DISINTEGRATING ORAL at 23:01

## 2021-05-24 RX ADMIN — SODIUM CHLORIDE 150 MILLILITER(S): 9 INJECTION INTRAMUSCULAR; INTRAVENOUS; SUBCUTANEOUS at 23:14

## 2021-05-24 RX ADMIN — SODIUM CHLORIDE 1000 MILLILITER(S): 9 INJECTION INTRAMUSCULAR; INTRAVENOUS; SUBCUTANEOUS at 19:38

## 2021-05-24 RX ADMIN — POLYETHYLENE GLYCOL 3350 17 GRAM(S): 17 POWDER, FOR SOLUTION ORAL at 23:04

## 2021-05-24 RX ADMIN — CEFTRIAXONE 1000 MILLIGRAM(S): 500 INJECTION, POWDER, FOR SOLUTION INTRAMUSCULAR; INTRAVENOUS at 20:05

## 2021-05-24 NOTE — ED ADULT NURSE REASSESSMENT NOTE - NS ED NURSE REASSESS COMMENT FT1
Pt received at 1900. Pt AA&Ox3. Pt c/o sacrum pain due to pressure ulcer. Pt repositioned. Staff member from facility at the bedside, Will's phone number [phone].

## 2021-05-24 NOTE — H&P ADULT - HISTORY OF PRESENT ILLNESS
54 y/o M w/ PMH of cerebral palsy, HTN, developmentally delayed, neurogenic bladder, psoriasis, spinal stenosis, seizure disorder, p/w dysphagia. Patient states he choked yesterday, but unable to give any elaborate history. As per chart, patient has been having difficulty eating and has been having increasing number of choking episodes.       malnutrition / cachexia / difficulty eating / increasing choking episodes     PSH: irrigation and closure of triceps wound     Social Hx: None x 3, group home     Family Hx: Father - MI, grandparent - breast cancer    54 y/o M w/ PMH of cerebral palsy, HTN, developmentally delayed, neurogenic bladder, psoriasis, spinal stenosis, seizure disorder, p/w dysphagia. Patient states he choked yesterday, but unable to give any elaborate history. As per chart, patient has been having difficulty eating and has been having increasing number of choking episodes. Patient denies pain or any other complaints. Denies abdominal pain, nausea, vomiting, fever, chills, cough, runny nose, sore throat, CP, SOB.          PSH: irrigation and closure of triceps wound     Social Hx: None x 3, group home     Family Hx: Father - MI, grandparent - breast cancer

## 2021-05-24 NOTE — H&P ADULT - ASSESSMENT
56 y/o M w/ PMH of cerebral palsy, HTN, developmentally delayed, neurogenic bladder, psoriasis, spinal stenosis, seizure disorder, p/w dysphagia.    *Dysphagia / frequent choking / dehydration   -NPO for now  -GI consult  -Speech and swallow consult  -Aspiration precautions   -FS monitoring   -Elevated BUN / Creatinine ratio and dry oral mucosa -> will give IVF     *Transaminitis / Elevated alk phos  -Improved since previous labs  -F/u GI     *Constipation  -Enema  -Miralax / Lactulose / Senna  -F/u GI     *H/o HTN / neurogenic bladder / psoriasis / spinal stenosis / seizure disorder  -C/w home meds and f/u outpatient for further management if conditions remain stable during hospitalization     *DVT ppx   -SCDs      54 y/o M w/ PMH of cerebral palsy, HTN, developmentally delayed, neurogenic bladder, psoriasis, spinal stenosis, seizure disorder, p/w dysphagia.    *Dysphagia / frequent choking / dehydration   -NPO for now  -GI consult  -Speech and swallow consult  -Aspiration precautions   -FS monitoring   -Elevated BUN / Creatinine ratio and dry oral mucosa -> will give IVF     *Transaminitis / Elevated alk phos  -Improved since previous labs  -F/u GI   -As per previous documentation from GI, may be 2/2 previous use of ceftriaxone    *Constipation  -Enema  -Miralax /  Senna  -F/u GI     *H/o HTN / neurogenic bladder / psoriasis / spinal stenosis / seizure disorder  -C/w home meds and f/u outpatient for further management if conditions remain stable during hospitalization     *DVT ppx   -SCDs         IMPROVE VTE Individual Risk Assessment    RISK                                                                Points    [  ] Previous VTE                                                  3    [  ] Thrombophilia                                               2    [  ] Lower limb paralysis                                      2        (unable to hold up >15 seconds)      [  ] Current Cancer                                              2         (within 6 months)    [ 1 ] Immobilization > 24 hrs                                1    [  ] ICU/CCU stay > 24 hours                              1    [  ] Age > 60                                                      1    IMPROVE VTE Score __1_______    IMPROVE Score 0-1: Low Risk, No VTE prophylaxis required for most patients, encourage ambulation.   IMPROVE Score 2-3: At risk, pharmacologic VTE prophylaxis is indicated for most patients (in the absence of a contraindication)  IMPROVE Score > or = 4: High Risk, pharmacologic VTE prophylaxis is indicated for most patients (in the absence of a contraindication)

## 2021-05-24 NOTE — ED ADULT TRIAGE NOTE - CHIEF COMPLAINT QUOTE
pt presents to ED with complaints of being sent by PMD Joel for eval of protein calorie deficiency. per aide, pt has been having deficiency for 2 months, but has been getting worse. PMD told staff to bring pt to ED for further work up. pt is minimally verbal from [facility]. pt received Moderna vaccine with second dose 04/2021.

## 2021-05-24 NOTE — ED PROVIDER NOTE - CONSTITUTIONAL, MLM
Well appearing, awake, alert, oriented to person, place, time/situation and in no apparent distress. normal... cachectic, thin, frail elderly male

## 2021-05-24 NOTE — ED PROVIDER NOTE - SKIN, MLM
Skin normal color for race, warm, dry and intact. No evidence of rash. 1+ pitting edema b/l lower extremities, +sacral decubitus

## 2021-05-24 NOTE — ED ADULT NURSE NOTE - OBJECTIVE STATEMENT
Patient is a 55 yr old male with developmental delay  presents to the ED from group home by PCP for evaluation of malnutrition. Aide at the bedside.  Unstageable on sacrum

## 2021-05-24 NOTE — ED PROVIDER NOTE - NS_ ATTENDINGSCRIBEDETAILS _ED_A_ED_FT
I, Dat Wolf MD,  performed the initial face to face bedside interview with this patient regarding history of present illness, review of symptoms and relevant past medical, social and family history.  I completed an independent physical examination.    The history, relevant review of systems, past medical and surgical history, medical decision making, and physical examination was documented by the scribe in my presence and I attest to the accuracy of the documentation.

## 2021-05-24 NOTE — ED PROVIDER NOTE - OBJECTIVE STATEMENT
54 y/o M with PMHx of cerebral palsy (wheelchair bound), HTN, developmentally delayed, neurogenic bladder, psoriasis spinal stenosis, seizure, and s/p recent admission to  4/29-5/4 for abd pain presents to the ED BIB group home aide sent by PCP for evaluation of malnutrition and cachexia. Aide reports pt has been having difficulty eating 2/2 worsening b/l UE contractions. Also reports pt has been having increasing number of choking episodes. No fever. NKDA. PCP: Dr. Jsesie Maldonado. Pt is poor historian 2/2 baseline developmental delay. 56 y/o M with PMHx of cerebral palsy (wheelchair bound), HTN, developmentally delayed, neurogenic bladder, psoriasis spinal stenosis, seizure, and s/p recent admission to  4/29-5/4 for abd pain presents to the ED BIB group home aide sent by PCP for evaluation of malnutrition and cachexia. Aide reports pt has been having difficulty eating 2/2 worsening b/l UE contractions. Also reports pt has been having increasing number of choking episodes. No fever. Aide states the group home is unable to care for the pt the way he needs to be cared for at this point. NKDA. PCP: Dr. Jessie Maldonado. Pt is poor historian 2/2 baseline developmental delay.

## 2021-05-25 LAB
ALBUMIN SERPL ELPH-MCNC: 2.1 G/DL — LOW (ref 3.3–5)
ALP SERPL-CCNC: 132 U/L — HIGH (ref 40–120)
ALT FLD-CCNC: 107 U/L — HIGH (ref 12–78)
ANION GAP SERPL CALC-SCNC: 2 MMOL/L — LOW (ref 5–17)
APTT BLD: 31.9 SEC — SIGNIFICANT CHANGE UP (ref 27.5–35.5)
AST SERPL-CCNC: 74 U/L — HIGH (ref 15–37)
BASOPHILS # BLD AUTO: 0.01 K/UL — SIGNIFICANT CHANGE UP (ref 0–0.2)
BASOPHILS NFR BLD AUTO: 0.2 % — SIGNIFICANT CHANGE UP (ref 0–2)
BILIRUB SERPL-MCNC: 0.6 MG/DL — SIGNIFICANT CHANGE UP (ref 0.2–1.2)
BUN SERPL-MCNC: 23 MG/DL — SIGNIFICANT CHANGE UP (ref 7–23)
CALCIUM SERPL-MCNC: 8.1 MG/DL — LOW (ref 8.5–10.1)
CHLORIDE SERPL-SCNC: 105 MMOL/L — SIGNIFICANT CHANGE UP (ref 96–108)
CO2 SERPL-SCNC: 31 MMOL/L — SIGNIFICANT CHANGE UP (ref 22–31)
COVID-19 SPIKE DOMAIN AB INTERP: POSITIVE
COVID-19 SPIKE DOMAIN ANTIBODY RESULT: 142 U/ML — HIGH
CREAT SERPL-MCNC: 0.4 MG/DL — LOW (ref 0.5–1.3)
EOSINOPHIL # BLD AUTO: 0.03 K/UL — SIGNIFICANT CHANGE UP (ref 0–0.5)
EOSINOPHIL NFR BLD AUTO: 0.6 % — SIGNIFICANT CHANGE UP (ref 0–6)
GLUCOSE SERPL-MCNC: 64 MG/DL — LOW (ref 70–99)
HCT VFR BLD CALC: 28.7 % — LOW (ref 39–50)
HGB BLD-MCNC: 10 G/DL — LOW (ref 13–17)
IMM GRANULOCYTES NFR BLD AUTO: 0.6 % — SIGNIFICANT CHANGE UP (ref 0–1.5)
INR BLD: 1.27 RATIO — HIGH (ref 0.88–1.16)
LACTATE SERPL-SCNC: 0.7 MMOL/L — SIGNIFICANT CHANGE UP (ref 0.7–2)
LYMPHOCYTES # BLD AUTO: 0.8 K/UL — LOW (ref 1–3.3)
LYMPHOCYTES # BLD AUTO: 16.4 % — SIGNIFICANT CHANGE UP (ref 13–44)
MAGNESIUM SERPL-MCNC: 1.7 MG/DL — SIGNIFICANT CHANGE UP (ref 1.6–2.6)
MCHC RBC-ENTMCNC: 31.7 PG — SIGNIFICANT CHANGE UP (ref 27–34)
MCHC RBC-ENTMCNC: 34.8 GM/DL — SIGNIFICANT CHANGE UP (ref 32–36)
MCV RBC AUTO: 91.1 FL — SIGNIFICANT CHANGE UP (ref 80–100)
MONOCYTES # BLD AUTO: 0.24 K/UL — SIGNIFICANT CHANGE UP (ref 0–0.9)
MONOCYTES NFR BLD AUTO: 4.9 % — SIGNIFICANT CHANGE UP (ref 2–14)
NEUTROPHILS # BLD AUTO: 3.76 K/UL — SIGNIFICANT CHANGE UP (ref 1.8–7.4)
NEUTROPHILS NFR BLD AUTO: 77.3 % — HIGH (ref 43–77)
PLATELET # BLD AUTO: 130 K/UL — LOW (ref 150–400)
POTASSIUM SERPL-MCNC: 3.3 MMOL/L — LOW (ref 3.5–5.3)
POTASSIUM SERPL-SCNC: 3.3 MMOL/L — LOW (ref 3.5–5.3)
PROT SERPL-MCNC: 4.8 GM/DL — LOW (ref 6–8.3)
PROTHROM AB SERPL-ACNC: 14.7 SEC — HIGH (ref 10.6–13.6)
RBC # BLD: 3.15 M/UL — LOW (ref 4.2–5.8)
RBC # FLD: 12.9 % — SIGNIFICANT CHANGE UP (ref 10.3–14.5)
SARS-COV-2 IGG+IGM SERPL QL IA: 142 U/ML — HIGH
SARS-COV-2 IGG+IGM SERPL QL IA: POSITIVE
SODIUM SERPL-SCNC: 138 MMOL/L — SIGNIFICANT CHANGE UP (ref 135–145)
WBC # BLD: 4.87 K/UL — SIGNIFICANT CHANGE UP (ref 3.8–10.5)
WBC # FLD AUTO: 4.87 K/UL — SIGNIFICANT CHANGE UP (ref 3.8–10.5)

## 2021-05-25 PROCEDURE — 99232 SBSQ HOSP IP/OBS MODERATE 35: CPT

## 2021-05-25 PROCEDURE — 99222 1ST HOSP IP/OBS MODERATE 55: CPT

## 2021-05-25 RX ORDER — POTASSIUM CHLORIDE 20 MEQ
40 PACKET (EA) ORAL ONCE
Refills: 0 | Status: DISCONTINUED | OUTPATIENT
Start: 2021-05-25 | End: 2021-05-25

## 2021-05-25 RX ORDER — POTASSIUM CHLORIDE 20 MEQ
40 PACKET (EA) ORAL ONCE
Refills: 0 | Status: COMPLETED | OUTPATIENT
Start: 2021-05-25 | End: 2021-05-25

## 2021-05-25 RX ADMIN — Medication 1 DROP(S): at 12:41

## 2021-05-25 RX ADMIN — CHLORHEXIDINE GLUCONATE 15 MILLILITER(S): 213 SOLUTION TOPICAL at 10:37

## 2021-05-25 RX ADMIN — Medication 5 MILLIGRAM(S): at 10:35

## 2021-05-25 RX ADMIN — CHLORHEXIDINE GLUCONATE 15 MILLILITER(S): 213 SOLUTION TOPICAL at 21:02

## 2021-05-25 RX ADMIN — Medication 25 MILLIGRAM(S): at 21:02

## 2021-05-25 RX ADMIN — LAMOTRIGINE 100 MILLIGRAM(S): 25 TABLET, ORALLY DISINTEGRATING ORAL at 21:02

## 2021-05-25 RX ADMIN — POLYETHYLENE GLYCOL 3350 17 GRAM(S): 17 POWDER, FOR SOLUTION ORAL at 21:02

## 2021-05-25 RX ADMIN — SENNA PLUS 2 TABLET(S): 8.6 TABLET ORAL at 21:01

## 2021-05-25 RX ADMIN — Medication 1 TABLET(S): at 10:36

## 2021-05-25 RX ADMIN — Medication 0.1 MILLIGRAM(S): at 10:37

## 2021-05-25 RX ADMIN — LAMOTRIGINE 100 MILLIGRAM(S): 25 TABLET, ORALLY DISINTEGRATING ORAL at 10:37

## 2021-05-25 RX ADMIN — Medication 5 MILLIGRAM(S): at 21:01

## 2021-05-25 RX ADMIN — TAMSULOSIN HYDROCHLORIDE 0.4 MILLIGRAM(S): 0.4 CAPSULE ORAL at 21:02

## 2021-05-25 RX ADMIN — Medication 40 MILLIEQUIVALENT(S): at 18:24

## 2021-05-25 RX ADMIN — Medication 1 TABLET(S): at 10:38

## 2021-05-25 RX ADMIN — Medication 1 ENEMA: at 00:01

## 2021-05-25 RX ADMIN — CITALOPRAM 10 MILLIGRAM(S): 10 TABLET, FILM COATED ORAL at 10:37

## 2021-05-25 NOTE — DIETITIAN INITIAL EVALUATION ADULT. - PERTINENT MEDS FT
MEDICATIONS  (STANDING):  artificial  tears Solution 1 Drop(s) Both EYES daily  baclofen 5 milliGRAM(s) Oral two times a day  bethanechol 25 milliGRAM(s) Oral at bedtime  calcium carbonate 1250 mG  + Vitamin D (OsCal 500 + D) 1 Tablet(s) Oral daily  chlorhexidine 0.12% Liquid 15 milliLiter(s) Oral Mucosa two times a day  citalopram 10 milliGRAM(s) Oral daily  cloNIDine 0.2 milliGRAM(s) Oral at bedtime  cloNIDine 0.1 milliGRAM(s) Oral daily  lamoTRIgine 100 milliGRAM(s) Oral two times a day  multivitamin 1 Tablet(s) Oral daily  oxybutynin 5 milliGRAM(s) Oral at bedtime  polyethylene glycol 3350 17 Gram(s) Oral at bedtime  senna 2 Tablet(s) Oral at bedtime  sodium chloride 0.9%. 1000 milliLiter(s) (150 mL/Hr) IV Continuous <Continuous>  tamsulosin 0.4 milliGRAM(s) Oral at bedtime    MEDICATIONS  (PRN):

## 2021-05-25 NOTE — DIETITIAN INITIAL EVALUATION ADULT. - PERTINENT LABORATORY DATA
05-25    138  |  105  |  23  ----------------------------<  64<L>  3.3<L>   |  31  |  0.40<L>    Ca    8.1<L>      25 May 2021 06:15  Mg     1.7     05-25    TPro  4.8<L>  /  Alb  2.1<L>  /  TBili  0.6  /  DBili  x   /  AST  74<H>  /  ALT  107<H>  /  AlkPhos  132<H>  05-25

## 2021-05-25 NOTE — SWALLOW BEDSIDE ASSESSMENT ADULT - ASR SWALLOW LINGUAL MOBILITY
Limited probes revealed that pt with some level of a generalized reduction in lingual strength/agility during reflexive oral movements.

## 2021-05-25 NOTE — PROGRESS NOTE ADULT - ASSESSMENT
6 y/o M w/ PMH of cerebral palsy, HTN, developmentally delayed, neurogenic bladder, psoriasis, spinal stenosis, seizure disorder, p/w dysphagia.    #Dysphagia / frequent choking / dehydration   -NPO for now  -GI consult  -Speech and swallow consulted  GI consulted   speech and swallow consultation at this time, no plans for emergent EGD.   If continues to have dysphagia and cleared by s/s, can obtain esophagram for further evaluation if indicated.   Also hx of elevated lfts, trending down at this time likely medication induced from previous admission.       -Aspiration precautions   -FS monitoring   -Elevated BUN / Creatinine ratio and dry oral mucosa -> will give IVF     #Transaminitis / Elevated alk phos  -Improved since previous labs  -F/u GI   -As per previous documentation from GI, may be 2/2 previous use of ceftriaxone    #Constipation  -Enema  -Miralax /  Senna  -F/u GI     *H/o HTN / neurogenic bladder / psoriasis / spinal stenosis / seizure disorder  -C/w home meds and f/u outpatient for further management if conditions remain stable during hospitalization     *DVT ppx   -SCDs        6 y/o M w/ PMH of cerebral palsy, HTN, developmentally delayed, neurogenic bladder, psoriasis, spinal stenosis, seizure disorder, p/w dysphagia.    #Dysphagia / frequent choking / dehydration   -NPO , SS start with Soft diet   -GI consult  -Speech and swallow consulted  GI consulted   speech and swallow consultation at this time, no plans for emergent EGD.   If continues to have dysphagia and cleared by s/s, can obtain esophagram for further evaluation if indicated.   Also hx of elevated lfts, trending down at this time likely medication induced from previous admission.     -Aspiration precautions   -FS monitoring   -Elevated BUN / Creatinine ratio and dry oral mucosa -> will give IVF     #Transaminitis / Elevated alk phos  trending down   -Improved since previous labs  -F/u GI   -As per previous documentation from GI, may be 2/2 previous use of ceftriaxone    #Constipation  -Enema  -Miralax /  Senna  -F/u GI     *H/o HTN / neurogenic bladder / psoriasis / spinal stenosis / seizure disorder  -C/w home meds and f/u outpatient for further management if conditions remain stable during hospitalization     *DVT ppx   -SCDs

## 2021-05-25 NOTE — SWALLOW BEDSIDE ASSESSMENT ADULT - PHARYNGEAL PHASE
Swallow triggered in a grossly acceptable time frame for age. Laryngeal lift on palpation during swallowing trials was very mildly decreased but felt to be functional. NO behavioral aspiration signs exhibited. Odynophagia was denied.

## 2021-05-25 NOTE — DIETITIAN INITIAL EVALUATION ADULT. - PHYSCIAL ASSESSMENT
underweight/emaciated/debilitated/contracted Paul scale- 10 (high risk for further skin breakdown)  Skin: Right elbow stage 2, Left Elbow stage 1, B/L knees stage 1, Right heel stage 1, Sacrum unstageable.

## 2021-05-25 NOTE — SWALLOW BEDSIDE ASSESSMENT ADULT - ASR SWALLOW LABIAL MOBILITY
Limited probes revealed that pt with some level of a generalized reduction in labial strength/agility during reflexive oral movements.

## 2021-05-25 NOTE — PROGRESS NOTE ADULT - SUBJECTIVE AND OBJECTIVE BOX
Subjective   Patient seen and examined at bedside       Objective   Vital Signs Last 24 Hrs  T(C): 36.2 (25 May 2021 16:12), Max: 36.7 (24 May 2021 20:07)  T(F): 97.2 (25 May 2021 16:12), Max: 98 (24 May 2021 20:07)  HR: 58 (25 May 2021 16:12) (55 - 72)  BP: 115/74 (25 May 2021 16:12) (114/84 - 133/91)  BP(mean): 103 (24 May 2021 20:07) (91 - 103)  RR: 18 (25 May 2021 16:12) (16 - 18)  SpO2: 99% (25 May 2021 16:12) (96% - 100%)  PHYSICAL EXAM:  GENERAL: NAD, lying in bed comfortably  HEAD:  Atraumatic, Normocephalic  EYES: EOMI, PERRLA, conjunctiva and sclera clear  ENT: Moist mucous membranes  NECK: Supple, No JVD  CHEST/LUNG: Clear to auscultation bilaterally; No rales, rhonchi, wheezing, or rubs. Unlabored respirations  HEART: Regular rate and rhythm; No murmurs, rubs, or gallops  ABDOMEN: Bowel sounds present; Soft, Nontender, Nondistended. No hepatomegally  EXTREMITIES:  2+ Peripheral Pulses, brisk capillary refill. No clubbing, cyanosis, or edema  NERVOUS SYSTEM:  Alert & Oriented X3, speech clear. No deficits   MSK: FROM all 4 extremities, full and equal strength  SKIN: No rashes or lesions      138  |  105  |  23  ----------------------------<  64<L>  3.3<L>   |  31  |  0.40<L>    Ca    8.1<L>      25 May 2021 06:15  Mg     1.7         TPro  4.8<L>  /  Alb  2.1<L>  /  TBili  0.6  /  DBili  x   /  AST  74<H>  /  ALT  107<H>  /  AlkPhos  132<H>                              10.0   4.87  )-----------( 130      ( 25 May 2021 06:15 )             28.7             LIVER FUNCTIONS - ( 25 May 2021 06:15 )  Alb: 2.1 g/dL / Pro: 4.8 gm/dL / ALK PHOS: 132 U/L / ALT: 107 U/L / AST: 74 U/L / GGT: x             PT/INR - ( 25 May 2021 06:15 )   PT: 14.7 sec;   INR: 1.27 ratio         PTT - ( 25 May 2021 06:15 )  PTT:31.9 sec    Urinalysis Basic - ( 24 May 2021 18:18 )    Color: Yellow / Appearance: Clear / S.020 / pH: x  Gluc: x / Ketone: Negative  / Bili: Negative / Urobili: 4 mg/dL   Blood: x / Protein: 30 mg/dL / Nitrite: Negative   Leuk Esterase: Trace / RBC: 0-2 /HPF / WBC 0-2   Sq Epi: x / Non Sq Epi: Occasional / Bacteria: Occasional    MEDICATIONS  (STANDING):  artificial  tears Solution 1 Drop(s) Both EYES daily  baclofen 5 milliGRAM(s) Oral two times a day  bethanechol 25 milliGRAM(s) Oral at bedtime  calcium carbonate 1250 mG  + Vitamin D (OsCal 500 + D) 1 Tablet(s) Oral daily  chlorhexidine 0.12% Liquid 15 milliLiter(s) Oral Mucosa two times a day  citalopram 10 milliGRAM(s) Oral daily  cloNIDine 0.2 milliGRAM(s) Oral at bedtime  cloNIDine 0.1 milliGRAM(s) Oral daily  lamoTRIgine 100 milliGRAM(s) Oral two times a day  multivitamin 1 Tablet(s) Oral daily  oxybutynin 5 milliGRAM(s) Oral at bedtime  polyethylene glycol 3350 17 Gram(s) Oral at bedtime  senna 2 Tablet(s) Oral at bedtime  sodium chloride 0.9%. 1000 milliLiter(s) (150 mL/Hr) IV Continuous <Continuous>  tamsulosin 0.4 milliGRAM(s) Oral at bedtime    MEDICATIONS  (PRN):   Subjective   Patient seen and examined at bedside   Patient with no active complaints     Objective   Vital Signs Last 24 Hrs  T(C): 36.2 (25 May 2021 16:12), Max: 36.7 (24 May 2021 20:07)  T(F): 97.2 (25 May 2021 16:12), Max: 98 (24 May 2021 20:07)  HR: 58 (25 May 2021 16:12) (55 - 72)  BP: 115/74 (25 May 2021 16:12) (114/84 - 133/91)  BP(mean): 103 (24 May 2021 20:07) (91 - 103)  RR: 18 (25 May 2021 16:12) (16 - 18)  SpO2: 99% (25 May 2021 16:12) (96% - 100%)  PHYSICAL EXAM:  GENERAL: NAD, lying in bed comfortably  HEAD:  Atraumatic, Normocephalic  EYES: EOMI, PERRLA, conjunctiva and sclera clear  ENT: Moist mucous membranes  NECK: Supple, No JVD  CHEST/LUNG: Clear to auscultation bilaterally; No rales, rhonchi, wheezing, or rubs. Unlabored respirations  HEART: Regular rate and rhythm; No murmurs, rubs, or gallops  ABDOMEN: Bowel sounds present; Soft, Nontender, Nondistended. No hepatomegally  EXTREMITIES:  2+ Peripheral Pulses, brisk capillary refill. No clubbing, cyanosis, or edema  NERVOUS SYSTEM:  Alert & Oriented X3, speech clear. No deficits   MSK: FROM all 4 extremities, full and equal strength  SKIN: No rashes or lesions      138  |  105  |  23  ----------------------------<  64<L>  3.3<L>   |  31  |  0.40<L>    Ca    8.1<L>      25 May 2021 06:15  Mg     1.7         TPro  4.8<L>  /  Alb  2.1<L>  /  TBili  0.6  /  DBili  x   /  AST  74<H>  /  ALT  107<H>  /  AlkPhos  132<H>                              10.0   4.87  )-----------( 130      ( 25 May 2021 06:15 )             28.7             LIVER FUNCTIONS - ( 25 May 2021 06:15 )  Alb: 2.1 g/dL / Pro: 4.8 gm/dL / ALK PHOS: 132 U/L / ALT: 107 U/L / AST: 74 U/L / GGT: x             PT/INR - ( 25 May 2021 06:15 )   PT: 14.7 sec;   INR: 1.27 ratio         PTT - ( 25 May 2021 06:15 )  PTT:31.9 sec    Urinalysis Basic - ( 24 May 2021 18:18 )    Color: Yellow / Appearance: Clear / S.020 / pH: x  Gluc: x / Ketone: Negative  / Bili: Negative / Urobili: 4 mg/dL   Blood: x / Protein: 30 mg/dL / Nitrite: Negative   Leuk Esterase: Trace / RBC: 0-2 /HPF / WBC 0-2   Sq Epi: x / Non Sq Epi: Occasional / Bacteria: Occasional    MEDICATIONS  (STANDING):  artificial  tears Solution 1 Drop(s) Both EYES daily  baclofen 5 milliGRAM(s) Oral two times a day  bethanechol 25 milliGRAM(s) Oral at bedtime  calcium carbonate 1250 mG  + Vitamin D (OsCal 500 + D) 1 Tablet(s) Oral daily  chlorhexidine 0.12% Liquid 15 milliLiter(s) Oral Mucosa two times a day  citalopram 10 milliGRAM(s) Oral daily  cloNIDine 0.2 milliGRAM(s) Oral at bedtime  cloNIDine 0.1 milliGRAM(s) Oral daily  lamoTRIgine 100 milliGRAM(s) Oral two times a day  multivitamin 1 Tablet(s) Oral daily  oxybutynin 5 milliGRAM(s) Oral at bedtime  polyethylene glycol 3350 17 Gram(s) Oral at bedtime  senna 2 Tablet(s) Oral at bedtime  sodium chloride 0.9%. 1000 milliLiter(s) (150 mL/Hr) IV Continuous <Continuous>  tamsulosin 0.4 milliGRAM(s) Oral at bedtime    MEDICATIONS  (PRN):

## 2021-05-25 NOTE — CONSULT NOTE ADULT - SUPERVISING ATTENDING
Speech and swallow eval, and will need esopahgram +/- EGD (EGD can be in or outpatient pending previous results)

## 2021-05-25 NOTE — DIETITIAN NUTRITION RISK NOTIFICATION - ADDITIONAL COMMENTS/DIETITIAN RECOMMENDATIONS
· Additional Recommendations  1) Advance po diet when feasible pending SLP evaluation. 2) initiate nutrition support if unable to consume adequate nutrition to meet needs. 3) Maintain aspiration precautions, back of bed >35 degrees. 4) Add po supplements when po diet is advanced. 5) monitor I & O's 6) monitor lytes and hydration replete (high risk for refeeding syndrome at this time).

## 2021-05-25 NOTE — SWALLOW BEDSIDE ASSESSMENT ADULT - SLP GENERAL OBSERVATIONS
On encounter, a loss of bulk was evident in strap muscle regions. Pt was alert/interactive but distractible as well as impulsive at times. He was able to verbalize during communicative probes. At these times, his speech output was marked by articulatory slurring c/w Dysarthria. Additionally, his verbalizations were linguistically intact but variable reflective of decreased memory/insight/reasoning c/w concomitant Cognitive Dysfunction. Above in setting of encephalopathy due to acute illness/MR/CP. On encounter, a loss of bulk was evident in strap muscle regions. Pt was alert/interactive but distractible as well as impulsive at times. He was able to verbalize during communicative probes. At these times, his speech output was marked by articulatory slurring c/w Dysarthria. Additionally, his verbalizations were linguistically intact but variably reflective of decreased memory/insight/reasoning c/w concomitant Cognitive Dysfunction. Above in setting of encephalopathy due to acute illness/MR/CP.

## 2021-05-25 NOTE — SWALLOW BEDSIDE ASSESSMENT ADULT - COMMENTS
The pt was admitted to  from a Group Home after a choking episode. Hospital course is notable for transaminitis, elevated alk phos, dehydration, and constipation. This profile is superimposed upon a h/o an Intellectual Disability, Cerebral Palsy, functional Oropharyngeal Dysphagia for modified food textures, seizure disorder, neurogenic bladder, spinal stenosis, psoriasis, HTN, and constipation. The pt was admitted to  from a Group Home after a choking episode. Hospital course is notable for transaminitis, elevated alk phos, dehydration, elevated BUN and constipation. This profile is superimposed upon a h/o an Intellectual Disability, Cerebral Palsy, functional Oropharyngeal Dysphagia for modified food textures, seizure disorder, neurogenic bladder, spinal stenosis, psoriasis, HTN, and constipation. The patient was admitted to  from a Group Home after a choking episode. Hospital course is notable for transaminitis, elevated alk phos, dehydration, elevated BUN and constipation. This profile is superimposed upon a h/o an Intellectual Disability, Cerebral Palsy, functional Oropharyngeal Dysphagia for modified food textures, seizure disorder, neurogenic bladder, spinal stenosis, psoriasis, HTN, and constipation.

## 2021-05-25 NOTE — SWALLOW BEDSIDE ASSESSMENT ADULT - SWALLOW EVAL: SECRETION MANAGEMENT
No drooling noted. Of not is that strength of non nutritive cough was mildly reduced. No drooling noted. Of note is that strength of non nutritive cough was mildly reduced.

## 2021-05-25 NOTE — SWALLOW BEDSIDE ASSESSMENT ADULT - SWALLOW EVAL: RECOMMENDED DIET
SUGGEST A MECHANICAL SOFT TEXTURE DIET WITH THIN LIQUID CONSISTENCIES AS THE PATIENT TOLERATED THESE FOOD CONSISTENCIES FROM AN OROPHARYNGEAL SWALLOWING STANCE ON EXAM AND FOOD TEXTURES ON THIS DIET ACCOMMODATES PT'S DYSPHAGIC PROFILE.

## 2021-05-25 NOTE — SWALLOW BEDSIDE ASSESSMENT ADULT - ORAL PREPARATORY PHASE
Pt was distractible and impulsive when PO was offered. He eagerly accepted PO. Labial grading on utensils was functional.

## 2021-05-25 NOTE — DIETITIAN INITIAL EVALUATION ADULT. - ADD RECOMMEND
1) Advance po diet when feasible pending SLP evaluation. 2) initiate nutrition support if unable to consume adequate nutrition to meet needs. 3) Maintain aspiration precautions, back of bed >35 degrees. 4) Add po supplements when po diet is advanced. 5) monitor I & O's 6) monitor lytes and hydration replete (high risk for refeeding syndrome at this time).

## 2021-05-25 NOTE — SWALLOW BEDSIDE ASSESSMENT ADULT - ADDITIONAL RECOMMENDATIONS
1) NUTRITION F/U    2) ENSURE MAXILLARY DENTURE IS WELL FASTENED WHEN EATING. DENTURE ADHESIVE CAN BE USED AS NEEDED.

## 2021-05-25 NOTE — SWALLOW BEDSIDE ASSESSMENT ADULT - ASR SWALLOW DENTITION
Pt with full maxillary denture that is loose. He has natural mandibular dentition but is missing some teeth, Pt with full maxillary denture that is loose. He has natural mandibular dentition in place but is missing some lower teeth,

## 2021-05-25 NOTE — DIETITIAN INITIAL EVALUATION ADULT. - OTHER INFO
56 y/o M w/ PMH of cerebral palsy, HTN, developmentally delayed, neurogenic bladder, psoriasis, spinal stenosis, seizure disorder, p/w dysphagia. Patient states he choked yesterday, but unable to give any elaborate history. As per chart, patient has been having difficulty eating and has been having increasing number of choking episodes. Patient denies pain or any other complaints. Denies abdominal pain, nausea, vomiting, fever, chills, cough, runny nose, sore throat, CP, SOB.  Multiple skin breakdown noted. Pt appears cachectic, thin, bony, frail, and weak. BMI <17.  Past hospital weight 2/23/21 125# Current weight 116.6# indicating a non clinically significant wt loss of 8.4#/6.7% x 3 months. NFPE indicates total body severe muscle/fat loss. Current advanced directives: Full Code. As per RN gave patient medication via pudding and patient needed multiple prompts to swallow however did eventually swallow. Pending SLP evaluation for appropriate modification of diet. Pt may need additional nutrition support via alternate route (ie: [place]) 2/2 extreme severe malnutrition status. Currently on IVF (1/2 normal saline) +Hypokalemia noted. High risk for refeeding syndrome/Wernicke's due to inadequate nutrition. No bowel movement documented since admission x 1 day. Bowel regimen - Senna and Miralax. Will continue to monitor and follow up prn.

## 2021-05-25 NOTE — SWALLOW BEDSIDE ASSESSMENT ADULT - SWALLOW EVAL: DIAGNOSIS
1) Pt appears to enjoy oral feeding. He exhibits Oropharyngeal Dysphagia of mild to moderate severity which subjectively appeared to be a functional condition with a restricted inventory of modified food textures. While Dysphagia may place pt at a relatively heightened risk for episodic aspiration, he did not appear to be aspirating on clinical exam. Dysphagia in setting of acute medical deconditioning(i.e transaminitis, elevated alk phos, dehydration), presence of a loose maxillary denture, Cerebral palsy & an Intellectual Disability.  2) Pt was alert/interactive but distractible as well as impulsive at times. He was able to verbalize during communicative probes. At these times, his speech output was marked by articulatory slurring c/w Dysarthria. Additionally, his verbalizations were linguistically intact but variable reflective of decreased memory/insight/reasoning c/w concomitant Cognitive Dysfunction. Above in setting of encephalopathy due to acute illness/MR/CP. 1) Pt appears to enjoy oral feeding. He exhibited Oropharyngeal Dysphagia of mild to moderate severity which subjectively appeared to be a functional condition with a restricted inventory of modified food textures. While Dysphagia may place pt at a relatively heightened risk for episodic aspiration, he did not appear to be aspirating on clinical exam. Dysphagia in setting of acute medical deconditioning(i.e transaminitis, elevated alk phos, dehydration), presence of a loose maxillary denture, Cerebral palsy & an Intellectual Disability.  2) Pt was alert/interactive but distractible as well as impulsive at times. He was able to verbalize during communicative probes. At these times, his speech output was marked by articulatory slurring c/w Dysarthria. Additionally, his verbalizations were linguistically intact but variably reflective of decreased memory/insight/reasoning c/w concomitant Cognitive Dysfunction. Above in setting of encephalopathy due to acute illness/MR/CP.

## 2021-05-26 PROCEDURE — 99221 1ST HOSP IP/OBS SF/LOW 40: CPT

## 2021-05-26 PROCEDURE — 99232 SBSQ HOSP IP/OBS MODERATE 35: CPT

## 2021-05-26 RX ORDER — COLLAGENASE CLOSTRIDIUM HIST. 250 UNIT/G
1 OINTMENT (GRAM) TOPICAL DAILY
Refills: 0 | Status: DISCONTINUED | OUTPATIENT
Start: 2021-05-26 | End: 2021-05-28

## 2021-05-26 RX ADMIN — Medication 1 APPLICATION(S): at 18:29

## 2021-05-26 RX ADMIN — Medication 5 MILLIGRAM(S): at 21:42

## 2021-05-26 RX ADMIN — Medication 5 MILLIGRAM(S): at 10:40

## 2021-05-26 RX ADMIN — CITALOPRAM 10 MILLIGRAM(S): 10 TABLET, FILM COATED ORAL at 10:41

## 2021-05-26 RX ADMIN — LAMOTRIGINE 100 MILLIGRAM(S): 25 TABLET, ORALLY DISINTEGRATING ORAL at 10:41

## 2021-05-26 RX ADMIN — Medication 1 DROP(S): at 10:40

## 2021-05-26 RX ADMIN — Medication 1 TABLET(S): at 10:39

## 2021-05-26 RX ADMIN — LAMOTRIGINE 100 MILLIGRAM(S): 25 TABLET, ORALLY DISINTEGRATING ORAL at 21:42

## 2021-05-26 RX ADMIN — TAMSULOSIN HYDROCHLORIDE 0.4 MILLIGRAM(S): 0.4 CAPSULE ORAL at 21:42

## 2021-05-26 RX ADMIN — SENNA PLUS 2 TABLET(S): 8.6 TABLET ORAL at 21:42

## 2021-05-26 RX ADMIN — Medication 0.1 MILLIGRAM(S): at 10:41

## 2021-05-26 RX ADMIN — CHLORHEXIDINE GLUCONATE 15 MILLILITER(S): 213 SOLUTION TOPICAL at 21:43

## 2021-05-26 RX ADMIN — Medication 1 TABLET(S): at 10:41

## 2021-05-26 RX ADMIN — CHLORHEXIDINE GLUCONATE 15 MILLILITER(S): 213 SOLUTION TOPICAL at 10:40

## 2021-05-26 RX ADMIN — Medication 25 MILLIGRAM(S): at 21:42

## 2021-05-26 RX ADMIN — POLYETHYLENE GLYCOL 3350 17 GRAM(S): 17 POWDER, FOR SOLUTION ORAL at 21:31

## 2021-05-26 NOTE — PROGRESS NOTE ADULT - SUBJECTIVE AND OBJECTIVE BOX
Subjective   Patient seen and examined at bedside   Patient with no active complaints     Objective   Vital Signs Last 24 Hrs  T(C): 36.4 (26 May 2021 07:45), Max: 36.4 (25 May 2021 21:00)  T(F): 97.5 (26 May 2021 07:45), Max: 97.5 (25 May 2021 21:00)  HR: 64 (26 May 2021 07:45) (58 - 81)  BP: 131/91 (26 May 2021 07:45) (113/70 - 131/91)  BP(mean): --  RR: 18 (26 May 2021 07:45) (18 - 18)  SpO2: 100% (26 May 2021 07:45) (99% - 100%)  PHYSICAL EXAM:  GENERAL: NAD, lying in bed comfortably  HEAD:  Atraumatic, Normocephalic  EYES: EOMI, PERRLA, conjunctiva and sclera clear  ENT: Moist mucous membranes  NECK: Supple, No JVD  CHEST/LUNG: Clear to auscultation bilaterally; No rales, rhonchi, wheezing, or rubs. Unlabored respirations  HEART: Regular rate and rhythm; No murmurs, rubs, or gallops  ABDOMEN: Bowel sounds present; Soft, Nontender, Nondistended. No hepatomegally  EXTREMITIES:  2+ Peripheral Pulses, brisk capillary refill. No clubbing, cyanosis, or edema  NERVOUS SYSTEM:  Alert & Oriented X3, speech clear. No deficits   MSK: FROM all 4 extremities, full and equal strength  SKIN: No rashes or lesions      138  |  105  |  23  ----------------------------<  64<L>  3.3<L>   |  31  |  0.40<L>    Ca    8.1<L>      25 May 2021 06:15  Mg     1.7         TPro  4.8<L>  /  Alb  2.1<L>  /  TBili  0.6  /  DBili  x   /  AST  74<H>  /  ALT  107<H>  /  AlkPhos  132<H>                            10.0   4.87  )-----------( 130      ( 25 May 2021 06:15 )             28.7     LIVER FUNCTIONS - ( 25 May 2021 06:15 )  Alb: 2.1 g/dL / Pro: 4.8 gm/dL / ALK PHOS: 132 U/L / ALT: 107 U/L / AST: 74 U/L / GGT: x         PT/INR - ( 25 May 2021 06:15 )   PT: 14.7 sec;   INR: 1.27 ratio      PTT - ( 25 May 2021 06:15 )  PTT:31.9 sec    Urinalysis Basic - ( 24 May 2021 18:18 )    Color: Yellow / Appearance: Clear / S.020 / pH: x  Gluc: x / Ketone: Negative  / Bili: Negative / Urobili: 4 mg/dL   Blood: x / Protein: 30 mg/dL / Nitrite: Negative   Leuk Esterase: Trace / RBC: 0-2 /HPF / WBC 0-2   Sq Epi: x / Non Sq Epi: Occasional / Bacteria: Occasional    MEDICATIONS  (STANDING):  artificial  tears Solution 1 Drop(s) Both EYES daily  baclofen 5 milliGRAM(s) Oral two times a day  bethanechol 25 milliGRAM(s) Oral at bedtime  calcium carbonate 1250 mG  + Vitamin D (OsCal 500 + D) 1 Tablet(s) Oral daily  chlorhexidine 0.12% Liquid 15 milliLiter(s) Oral Mucosa two times a day  citalopram 10 milliGRAM(s) Oral daily  cloNIDine 0.1 milliGRAM(s) Oral daily  cloNIDine 0.2 milliGRAM(s) Oral at bedtime  collagenase Ointment 1 Application(s) Topical daily  lamoTRIgine 100 milliGRAM(s) Oral two times a day  multivitamin 1 Tablet(s) Oral daily  oxybutynin 5 milliGRAM(s) Oral at bedtime  polyethylene glycol 3350 17 Gram(s) Oral at bedtime  senna 2 Tablet(s) Oral at bedtime  sodium chloride 0.9%. 1000 milliLiter(s) (150 mL/Hr) IV Continuous <Continuous>  tamsulosin 0.4 milliGRAM(s) Oral at bedtime    MEDICATIONS  (PRN):   Subjective   Patient seen and examined at bedside   Patient with no active complaints   tolerating diet     Objective   Vital Signs Last 24 Hrs  T(C): 36.4 (26 May 2021 07:45), Max: 36.4 (25 May 2021 21:00)  T(F): 97.5 (26 May 2021 07:45), Max: 97.5 (25 May 2021 21:00)  HR: 64 (26 May 2021 07:45) (58 - 81)  BP: 131/91 (26 May 2021 07:45) (113/70 - 131/91)  BP(mean): --  RR: 18 (26 May 2021 07:45) (18 - 18)  SpO2: 100% (26 May 2021 07:45) (99% - 100%)  PHYSICAL EXAM:  GENERAL: NAD, lying in bed comfortably  HEAD:  Atraumatic, Normocephalic  EYES: EOMI, PERRLA, conjunctiva and sclera clear  ENT: Moist mucous membranes  NECK: Supple, No JVD  CHEST/LUNG: Clear to auscultation bilaterally; No rales, rhonchi, wheezing, or rubs. Unlabored respirations  HEART: Regular rate and rhythm; No murmurs, rubs, or gallops  ABDOMEN: Bowel sounds present; Soft, Nontender, Nondistended. No hepatomegally  EXTREMITIES:  2+ Peripheral Pulses, brisk capillary refill. No clubbing, cyanosis, or edema  NERVOUS SYSTEM:  Alert & Oriented X3, speech clear. No deficits   MSK: FROM all 4 extremities, full and equal strength  SKIN: No rashes or lesions except   unstageable pressure injury to left heel and sacrum        138  |  105  |  23  ----------------------------<  64<L>  3.3<L>   |  31  |  0.40<L>    Ca    8.1<L>      25 May 2021 06:15  Mg     1.7         TPro  4.8<L>  /  Alb  2.1<L>  /  TBili  0.6  /  DBili  x   /  AST  74<H>  /  ALT  107<H>  /  AlkPhos  132<H>                            10.0   4.87  )-----------( 130      ( 25 May 2021 06:15 )             28.7     LIVER FUNCTIONS - ( 25 May 2021 06:15 )  Alb: 2.1 g/dL / Pro: 4.8 gm/dL / ALK PHOS: 132 U/L / ALT: 107 U/L / AST: 74 U/L / GGT: x         PT/INR - ( 25 May 2021 06:15 )   PT: 14.7 sec;   INR: 1.27 ratio      PTT - ( 25 May 2021 06:15 )  PTT:31.9 sec    Urinalysis Basic - ( 24 May 2021 18:18 )    Color: Yellow / Appearance: Clear / S.020 / pH: x  Gluc: x / Ketone: Negative  / Bili: Negative / Urobili: 4 mg/dL   Blood: x / Protein: 30 mg/dL / Nitrite: Negative   Leuk Esterase: Trace / RBC: 0-2 /HPF / WBC 0-2   Sq Epi: x / Non Sq Epi: Occasional / Bacteria: Occasional    MEDICATIONS  (STANDING):  artificial  tears Solution 1 Drop(s) Both EYES daily  baclofen 5 milliGRAM(s) Oral two times a day  bethanechol 25 milliGRAM(s) Oral at bedtime  calcium carbonate 1250 mG  + Vitamin D (OsCal 500 + D) 1 Tablet(s) Oral daily  chlorhexidine 0.12% Liquid 15 milliLiter(s) Oral Mucosa two times a day  citalopram 10 milliGRAM(s) Oral daily  cloNIDine 0.1 milliGRAM(s) Oral daily  cloNIDine 0.2 milliGRAM(s) Oral at bedtime  collagenase Ointment 1 Application(s) Topical daily  lamoTRIgine 100 milliGRAM(s) Oral two times a day  multivitamin 1 Tablet(s) Oral daily  oxybutynin 5 milliGRAM(s) Oral at bedtime  polyethylene glycol 3350 17 Gram(s) Oral at bedtime  senna 2 Tablet(s) Oral at bedtime  sodium chloride 0.9%. 1000 milliLiter(s) (150 mL/Hr) IV Continuous <Continuous>  tamsulosin 0.4 milliGRAM(s) Oral at bedtime    MEDICATIONS  (PRN):

## 2021-05-26 NOTE — ADVANCED PRACTICE NURSE CONSULT - RECOMMEDATIONS
1) Continue to turn and position every 2 hours  2) Continue to elevate heels off mattress  3) Low air loss mattress ordered 5/25/2021 for patient for pressure redistribution and moisture management  4) Change dressings to sacrum and left heel daily with collagenase and foam dressing  5) Would consult podiatry for left heel wound  6) Would consult surgery for evaluation for debridement of wound  7) Albumin- 2.1 on 5/25/2021. Registered dietician following patient with the current recommendations:    · Additional Recommendations  1) Advance po diet when feasible pending SLP evaluation. 2) initiate nutrition support if unable to consume adequate nutrition to meet needs. 3) Maintain aspiration precautions, back of bed >35 degrees. 4) Add po supplements when po diet is advanced. 5) monitor I & O's 6) monitor lytes and hydration replete (high risk for refeeding syndrome at this time).

## 2021-05-26 NOTE — ADVANCED PRACTICE NURSE CONSULT - ASSESSMENT
HPI:  This is a 55 year old male that was admitted to the hospital on 5/24/2021 for eating difficulties.  PMH- cerebral palsy, HTN, developmentally delayed, neurogenic bladder, psoriasis, spinal stenosis, seizure disorder, p/w dysphagia. Patient states he choked yesterday, but unable to give any elaborate history. As per chart, patient has been having difficulty eating and has been having increasing number of choking episodes. Patient denies pain or any other complaints. Denies abdominal pain, nausea, vomiting, fever, chills, cough, runny nose, sore throat, CP, SOB.    Consulted to evaluate patient’s skin- sacrum and left heel. Patient presents on an [employer] 9000 MRS on his left side with heels elevated off mattress.   Both elbows assessed to have abrasions from patient moving elbows back and forth- these are not related to pressure.  Assisted patient with ordering lunch and dinner for the day as he was very excited to have his meals ordered.  Left heel assessed to have an unstageable pressure injury measuring 2cmx1.5cmx0.2cm. Wound with 100% tan moist eschar. No odor, periwound intact. Extremity warm to touch. Positive pedal pulse. Edema noted. Wound irrigated with normal saline. Cavilon skin prep applied to periwound. Foam dressing placed and RN to place collagenase for enzymatic debridement when it arrives from pharmacy.  Right heel assessed to be blanchable and intact. Extremity warm to touch with positive pedal pulse.  Sacral/coccyx area with a stage 4 pressure injury measuring [id number]. Wound with 80% black moist eschar and 20% yellow slough. Mild odor when dressing removed. Periwound intact. Wound is on a wale coccyx area as patient has had a weight loss noted by his group home caregivers. Wound irrigated with normal saline. Cavilon skin prep applied to periwound. Foam dressing placed and RN to place collagenase for enzymatic debridement when it arrives from pharmacy.  Patient positioned to his right side when assessment and treatment completed. Both heels remain elevated off mattress.    Patient was seen 5/18/2021 at the [place] [facility] as well. Follow-up appointment was scheduled for today.  PAST MEDICAL & SURGICAL HISTORY:  Mental retardation  Hypertension  Seizure  Neurogenic bladder  Psoriasis  Spinal stenosis  Neurogenic bladder  Spinal stenosis  Seizure disorder  No significant past surgical history  REVIEW OF SYSTEMS  Respiratory and Thorax: denies shortness of breath  Cardiovascular:	denies chest pain  MEDICATIONS  (STANDING):  artificial  tears Solution 1 Drop(s) Both EYES daily  baclofen 5 milliGRAM(s) Oral two times a day  bethanechol 25 milliGRAM(s) Oral at bedtime  calcium carbonate 1250 mG  + Vitamin D (OsCal 500 + D) 1 Tablet(s) Oral daily  chlorhexidine 0.12% Liquid 15 milliLiter(s) Oral Mucosa two times a day  citalopram 10 milliGRAM(s) Oral daily  cloNIDine 0.1 milliGRAM(s) Oral daily  cloNIDine 0.2 milliGRAM(s) Oral at bedtime  collagenase Ointment 1 Application(s) Topical daily  lamoTRIgine 100 milliGRAM(s) Oral two times a day  multivitamin 1 Tablet(s) Oral daily  oxybutynin 5 milliGRAM(s) Oral at bedtime  polyethylene glycol 3350 17 Gram(s) Oral at bedtime  senna 2 Tablet(s) Oral at bedtime  sodium chloride 0.9%. 1000 milliLiter(s) (150 mL/Hr) IV Continuous <Continuous>  tamsulosin 0.4 milliGRAM(s) Oral at bedtime  Allergies  No Known Allergies  Intolerances  SOCIAL HISTORY:  FAMILY HISTORY:  Family history of breast cancer (Grandparent)  Grandmother on mother&#x27;s side  Family history of MI (myocardial infarction) (Father)  Vital Signs Last 24 Hrs  T(C): 36.4 (26 May 2021 07:45), Max: 36.4 (25 May 2021 21:00)  T(F): 97.5 (26 May 2021 07:45), Max: 97.5 (25 May 2021 21:00)  HR: 64 (26 May 2021 07:45) (58 - 81)  BP: 131/91 (26 May 2021 07:45) (113/70 - 131/91)  RR: 18 (26 May 2021 07:45) (18 - 18)  SpO2: 100% (26 May 2021 07:45) (99% - 100%)  PHYSICAL EXAM:  Constitutional:  Eyes: conjunctiva and sclera clear  ENMT: Moist mucous membranes  Respiratory: respirations even and unlabored  Cardiovascular:  Gastrointestinal:  Genitourinary:  Rectal:  Extremities:  Vascular:  Neurological: Alert and oriented x 2  Skin: unstageable pressure injury to left heel and sacrum  LABS:                        10.0   4.87  )-----------( 130      ( 25 May 2021 06:15 )             28.7        HPI:  This is a 55 year old male that was admitted to the hospital on 5/24/2021 for eating difficulties.  PMH- cerebral palsy, HTN, developmentally delayed, neurogenic bladder, psoriasis, spinal stenosis, seizure disorder, p/w dysphagia. Patient states he choked yesterday, but unable to give any elaborate history. As per chart, patient has been having difficulty eating and has been having increasing number of choking episodes. Patient denies pain or any other complaints. Denies abdominal pain, nausea, vomiting, fever, chills, cough, runny nose, sore throat, CP, SOB.    Consulted to evaluate patient’s skin- sacrum and left heel. Patient presents on an [employer] 9000 MRS on his left side with heels elevated off mattress.   Both elbows assessed to have abrasions from patient moving elbows back and forth- these are not related to pressure.  Assisted patient with ordering lunch and dinner for the day as he was very excited to have his meals ordered.  Left heel assessed to have an unstageable pressure injury measuring 2cmx1.5cmx0.2cm. Wound with 100% tan moist eschar. No odor, periwound intact. Extremity warm to touch. Positive pedal pulse. Edema noted. Wound irrigated with normal saline. Cavilon skin prep applied to periwound. Foam dressing placed and RN to place collagenase for enzymatic debridement when it arrives from pharmacy.  Right heel assessed to be blanchable and intact. Extremity warm to touch with positive pedal pulse.  Sacral/coccyx area with a stage 4 pressure injury measuring [id number]. Wound with 80% black moist eschar and 20% yellow slough. Mild odor when dressing removed. Positive probe to the bone. Periwound intact. Wound is on a wale coccyx area as patient has had a weight loss noted by his group home caregivers. Wound irrigated with normal saline. Cavilon skin prep applied to periwound. Foam dressing placed and RN to place collagenase for enzymatic debridement when it arrives from pharmacy.  Patient positioned to his right side when assessment and treatment completed. Both heels remain elevated off mattress.    Patient was seen 5/18/2021 at the [place] [facility] as well. Follow-up appointment was scheduled for today.  PAST MEDICAL & SURGICAL HISTORY:  Mental retardation  Hypertension  Seizure  Neurogenic bladder  Psoriasis  Spinal stenosis  Neurogenic bladder  Spinal stenosis  Seizure disorder  No significant past surgical history  REVIEW OF SYSTEMS  Respiratory and Thorax: denies shortness of breath  Cardiovascular:	denies chest pain  MEDICATIONS  (STANDING):  artificial  tears Solution 1 Drop(s) Both EYES daily  baclofen 5 milliGRAM(s) Oral two times a day  bethanechol 25 milliGRAM(s) Oral at bedtime  calcium carbonate 1250 mG  + Vitamin D (OsCal 500 + D) 1 Tablet(s) Oral daily  chlorhexidine 0.12% Liquid 15 milliLiter(s) Oral Mucosa two times a day  citalopram 10 milliGRAM(s) Oral daily  cloNIDine 0.1 milliGRAM(s) Oral daily  cloNIDine 0.2 milliGRAM(s) Oral at bedtime  collagenase Ointment 1 Application(s) Topical daily  lamoTRIgine 100 milliGRAM(s) Oral two times a day  multivitamin 1 Tablet(s) Oral daily  oxybutynin 5 milliGRAM(s) Oral at bedtime  polyethylene glycol 3350 17 Gram(s) Oral at bedtime  senna 2 Tablet(s) Oral at bedtime  sodium chloride 0.9%. 1000 milliLiter(s) (150 mL/Hr) IV Continuous <Continuous>  tamsulosin 0.4 milliGRAM(s) Oral at bedtime  Allergies  No Known Allergies  Intolerances  SOCIAL HISTORY:  FAMILY HISTORY:  Family history of breast cancer (Grandparent)  Grandmother on mother&#x27;s side  Family history of MI (myocardial infarction) (Father)  Vital Signs Last 24 Hrs  T(C): 36.4 (26 May 2021 07:45), Max: 36.4 (25 May 2021 21:00)  T(F): 97.5 (26 May 2021 07:45), Max: 97.5 (25 May 2021 21:00)  HR: 64 (26 May 2021 07:45) (58 - 81)  BP: 131/91 (26 May 2021 07:45) (113/70 - 131/91)  RR: 18 (26 May 2021 07:45) (18 - 18)  SpO2: 100% (26 May 2021 07:45) (99% - 100%)  PHYSICAL EXAM:  Constitutional:  Eyes: conjunctiva and sclera clear  ENMT: Moist mucous membranes  Respiratory: respirations even and unlabored  Cardiovascular:  Gastrointestinal:  Genitourinary:  Rectal:  Extremities:  Vascular:  Neurological: Alert and oriented x 2  Skin: unstageable pressure injury to left heel and sacrum  LABS:                        10.0   4.87  )-----------( 130      ( 25 May 2021 06:15 )             28.7

## 2021-05-26 NOTE — PROGRESS NOTE ADULT - ASSESSMENT
4 y/o M w/ PMH of cerebral palsy, HTN, developmentally delayed, neurogenic bladder, psoriasis, spinal stenosis, seizure disorder, p/w dysphagia.    #Dysphagia / frequent choking / dehydration   -start with Soft diet   -GI consult  -Speech and swallow consulted  GI consulted   speech and swallow consultation at this time, no plans for emergent EGD.   If continues to have dysphagia and cleared by s/s, can obtain esophagram for further evaluation if indicated.   Also hx of elevated lfts, trending down at this time likely medication induced from previous admission.     -Aspiration precautions   -FS monitoring   -Elevated BUN / Creatinine ratio and dry oral mucosa -> will give IVF     #Transaminitis / Elevated alk phos  trending down   -Improved since previous labs  -F/u GI   -As per previous documentation from GI, may be 2/2 previous use of ceftriaxone    #Constipation  -Enema  -Miralax /  Senna  -F/u GI     *H/o HTN / neurogenic bladder / psoriasis / spinal stenosis / seizure disorder  -C/w home meds and f/u outpatient for further management if conditions remain stable during hospitalization     *DVT ppx   -SCDs        4 y/o M w/ PMH of cerebral palsy, HTN, developmentally delayed, neurogenic bladder, psoriasis, spinal stenosis, seizure disorder, p/w dysphagia.    #Dysphagia / frequent choking / dehydration     -GI consult  -Speech and swallow consulted  Cerebral Palsy, functional Oropharyngeal Dysphagia for modified food textures, seizure disorder,   -start with Soft diet   GI consulted   speech and swallow consultation at this time, no plans for emergent EGD.   If continues to have dysphagia and cleared by s/s, can obtain esophagram for further evaluation if indicated.   Also hx of elevated lfts, trending down at this time likely medication induced from previous admission.     -Aspiration precautions   -FS monitoring   -Elevated BUN / Creatinine ratio and dry oral mucosa -> will give IVF     #Transaminitis / Elevated alk phos  trending down   -Improved since previous labs  -F/u GI   -As per previous documentation from GI, may be 2/2 previous use of ceftriaxone    #Constipation  -Enema  -Miralax /  Senna  -F/u GI     *H/o HTN / neurogenic bladder / psoriasis / spinal stenosis / seizure disorder  -C/w home meds and f/u outpatient for further management if conditions remain stable during hospitalization     # Moderate to severe malnutrition  start ENsure      *DVT ppx   -SCDs        6 y/o M w/ PMH of cerebral palsy, HTN, developmentally delayed, neurogenic bladder, psoriasis, spinal stenosis, seizure disorder, p/w dysphagia.    #Dysphagia / frequent choking / dehydration     -GI consult  -Speech and swallow consulted  Cerebral Palsy, functional Oropharyngeal Dysphagia for modified food textures, seizure disorder,   -start with Soft diet   GI consulted   speech and swallow consultation at this time, no plans for emergent EGD.   If continues to have dysphagia and cleared by s/s, can obtain esophagram for further evaluation if indicated.   Also hx of elevated lfts, trending down at this time likely medication induced from previous admission.     -Aspiration precautions   -FS monitoring   -Elevated BUN / Creatinine ratio and dry oral mucosa -> will give IVF     #Transaminitis / Elevated alk phos  trending down   -Improved since previous labs  -F/u GI   -As per previous documentation from GI, may be 2/2 previous use of ceftriaxone         #Constipation  -Enema  -Miralax /  Senna  -F/u GI     *H/o HTN / neurogenic bladder / psoriasis / spinal stenosis / seizure disorder  -C/w home meds and f/u outpatient for further management if conditions remain stable during hospitalization     # Moderate to severe malnutrition  start Ensure    # unstageable pressure injury to left heel and sacrum present on admission   Continue to turn and position every 2 hours  -Continue to elevate heels off mattress  - Low air loss mattress ordered 5/25/2021 for patient for pressure redistribution and moisture management  - Change dressings to sacrum and left heel daily with collagenase and foam dressing  -Would consult podiatry for left heel wound  -Would consult surgery for evaluation for debridement of wound          *DVT ppx   -SCDs

## 2021-05-27 LAB
ALBUMIN SERPL ELPH-MCNC: 2.3 G/DL — LOW (ref 3.3–5)
ALP SERPL-CCNC: 172 U/L — HIGH (ref 40–120)
ALT FLD-CCNC: 195 U/L — HIGH (ref 12–78)
ANION GAP SERPL CALC-SCNC: 5 MMOL/L — SIGNIFICANT CHANGE UP (ref 5–17)
AST SERPL-CCNC: 159 U/L — HIGH (ref 15–37)
BASOPHILS # BLD AUTO: 0 K/UL — SIGNIFICANT CHANGE UP (ref 0–0.2)
BASOPHILS NFR BLD AUTO: 0 % — SIGNIFICANT CHANGE UP (ref 0–2)
BILIRUB SERPL-MCNC: 0.5 MG/DL — SIGNIFICANT CHANGE UP (ref 0.2–1.2)
BUN SERPL-MCNC: 28 MG/DL — HIGH (ref 7–23)
CALCIUM SERPL-MCNC: 9 MG/DL — SIGNIFICANT CHANGE UP (ref 8.5–10.1)
CHLORIDE SERPL-SCNC: 106 MMOL/L — SIGNIFICANT CHANGE UP (ref 96–108)
CO2 SERPL-SCNC: 31 MMOL/L — SIGNIFICANT CHANGE UP (ref 22–31)
CREAT SERPL-MCNC: 0.62 MG/DL — SIGNIFICANT CHANGE UP (ref 0.5–1.3)
EOSINOPHIL # BLD AUTO: 0.08 K/UL — SIGNIFICANT CHANGE UP (ref 0–0.5)
EOSINOPHIL NFR BLD AUTO: 1.5 % — SIGNIFICANT CHANGE UP (ref 0–6)
GLUCOSE SERPL-MCNC: 100 MG/DL — HIGH (ref 70–99)
HCT VFR BLD CALC: 32.6 % — LOW (ref 39–50)
HGB BLD-MCNC: 11.3 G/DL — LOW (ref 13–17)
IMM GRANULOCYTES NFR BLD AUTO: 0.6 % — SIGNIFICANT CHANGE UP (ref 0–1.5)
LYMPHOCYTES # BLD AUTO: 0.95 K/UL — LOW (ref 1–3.3)
LYMPHOCYTES # BLD AUTO: 17.7 % — SIGNIFICANT CHANGE UP (ref 13–44)
MCHC RBC-ENTMCNC: 31.7 PG — SIGNIFICANT CHANGE UP (ref 27–34)
MCHC RBC-ENTMCNC: 34.7 GM/DL — SIGNIFICANT CHANGE UP (ref 32–36)
MCV RBC AUTO: 91.6 FL — SIGNIFICANT CHANGE UP (ref 80–100)
MONOCYTES # BLD AUTO: 0.37 K/UL — SIGNIFICANT CHANGE UP (ref 0–0.9)
MONOCYTES NFR BLD AUTO: 6.9 % — SIGNIFICANT CHANGE UP (ref 2–14)
NEUTROPHILS # BLD AUTO: 3.94 K/UL — SIGNIFICANT CHANGE UP (ref 1.8–7.4)
NEUTROPHILS NFR BLD AUTO: 73.3 % — SIGNIFICANT CHANGE UP (ref 43–77)
PLATELET # BLD AUTO: 143 K/UL — LOW (ref 150–400)
POTASSIUM SERPL-MCNC: 3.7 MMOL/L — SIGNIFICANT CHANGE UP (ref 3.5–5.3)
POTASSIUM SERPL-SCNC: 3.7 MMOL/L — SIGNIFICANT CHANGE UP (ref 3.5–5.3)
PROT SERPL-MCNC: 5.4 GM/DL — LOW (ref 6–8.3)
RBC # BLD: 3.56 M/UL — LOW (ref 4.2–5.8)
RBC # FLD: 12.8 % — SIGNIFICANT CHANGE UP (ref 10.3–14.5)
SODIUM SERPL-SCNC: 142 MMOL/L — SIGNIFICANT CHANGE UP (ref 135–145)
WBC # BLD: 5.37 K/UL — SIGNIFICANT CHANGE UP (ref 3.8–10.5)
WBC # FLD AUTO: 5.37 K/UL — SIGNIFICANT CHANGE UP (ref 3.8–10.5)

## 2021-05-27 PROCEDURE — 73630 X-RAY EXAM OF FOOT: CPT | Mod: 26,LT

## 2021-05-27 PROCEDURE — 99232 SBSQ HOSP IP/OBS MODERATE 35: CPT

## 2021-05-27 RX ADMIN — Medication 5 MILLIGRAM(S): at 23:51

## 2021-05-27 RX ADMIN — LAMOTRIGINE 100 MILLIGRAM(S): 25 TABLET, ORALLY DISINTEGRATING ORAL at 23:50

## 2021-05-27 RX ADMIN — TAMSULOSIN HYDROCHLORIDE 0.4 MILLIGRAM(S): 0.4 CAPSULE ORAL at 23:52

## 2021-05-27 RX ADMIN — Medication 1 DROP(S): at 10:03

## 2021-05-27 RX ADMIN — Medication 1 TABLET(S): at 10:03

## 2021-05-27 RX ADMIN — Medication 1 TABLET(S): at 10:08

## 2021-05-27 RX ADMIN — Medication 25 MILLIGRAM(S): at 23:52

## 2021-05-27 RX ADMIN — CITALOPRAM 10 MILLIGRAM(S): 10 TABLET, FILM COATED ORAL at 10:03

## 2021-05-27 RX ADMIN — POLYETHYLENE GLYCOL 3350 17 GRAM(S): 17 POWDER, FOR SOLUTION ORAL at 23:52

## 2021-05-27 RX ADMIN — CHLORHEXIDINE GLUCONATE 15 MILLILITER(S): 213 SOLUTION TOPICAL at 23:57

## 2021-05-27 RX ADMIN — Medication 0.1 MILLIGRAM(S): at 10:03

## 2021-05-27 RX ADMIN — LAMOTRIGINE 100 MILLIGRAM(S): 25 TABLET, ORALLY DISINTEGRATING ORAL at 10:03

## 2021-05-27 RX ADMIN — CHLORHEXIDINE GLUCONATE 15 MILLILITER(S): 213 SOLUTION TOPICAL at 10:03

## 2021-05-27 RX ADMIN — Medication 5 MILLIGRAM(S): at 10:03

## 2021-05-27 RX ADMIN — Medication 1 APPLICATION(S): at 10:02

## 2021-05-27 RX ADMIN — SENNA PLUS 2 TABLET(S): 8.6 TABLET ORAL at 23:50

## 2021-05-27 NOTE — PROGRESS NOTE ADULT - NUTRITIONAL ASSESSMENT
This patient has been assessed with a concern for Malnutrition and has been determined to have a diagnosis/diagnoses of Severe protein-calorie malnutrition and Underweight (BMI < 19).    This patient is being managed with:   Diet Mechanical Soft-  Entered: May 25 2021  5:05PM    
This patient has been assessed with a concern for Malnutrition and has been determined to have a diagnosis/diagnoses of Severe protein-calorie malnutrition and Underweight (BMI < 19).    This patient is being managed with:   Diet NPO-  Except Medications  Entered: May 24 2021 10:41PM    
This patient has been assessed with a concern for Malnutrition and has been determined to have a diagnosis/diagnoses of Severe protein-calorie malnutrition and Underweight (BMI < 19).    This patient is being managed with:   Diet Mechanical Soft-  Entered: May 25 2021  5:05PM

## 2021-05-27 NOTE — PROGRESS NOTE ADULT - SUBJECTIVE AND OBJECTIVE BOX
Subjective   Patient seen and examined at bedside   Patient with no active complaints   tolerating diet     Objective   Vital Signs Last 24 Hrs  T(C): 36.4 (27 May 2021 08:12), Max: 36.6 (26 May 2021 21:15)  T(F): 97.6 (27 May 2021 08:12), Max: 97.8 (26 May 2021 21:15)  HR: 97 (27 May 2021 08:12) (91 - 97)  BP: 122/87 (27 May 2021 08:12) (114/85 - 122/87)  BP(mean): --  RR: 18 (27 May 2021 08:12) (18 - 18)  SpO2: 100% (27 May 2021 08:12) (98% - 100%)  PHYSICAL EXAM:  GENERAL: NAD, lying in bed comfortably  HEAD:  Atraumatic, Normocephalic  EYES: EOMI, PERRLA, conjunctiva and sclera clear  ENT: Moist mucous membranes  NECK: Supple, No JVD  CHEST/LUNG: Clear to auscultation bilaterally; No rales, rhonchi, wheezing, or rubs. Unlabored respirations  HEART: Regular rate and rhythm; No murmurs, rubs, or gallops  ABDOMEN: Bowel sounds present; Soft, Nontender, Nondistended. No hepatomegally  EXTREMITIES:  2+ Peripheral Pulses, brisk capillary refill. No clubbing, cyanosis, or edema  NERVOUS SYSTEM:  Alert & Oriented X3, speech clear. No deficits   MSK: FROM all 4 extremities, full and equal strength  SKIN: No rashes or lesions except   unstageable pressure injury  sacrum  unstageable pressure injury  left heel   05-27    142  |  106  |  28<H>  ----------------------------<  100<H>  3.7   |  31  |  0.62    Ca    9.0      27 May 2021 06:44    TPro  5.4<L>  /  Alb  2.3<L>  /  TBili  0.5  /  DBili  x   /  AST  159<H>  /  ALT  195<H>  /  AlkPhos  172<H>  05-27                         11.3   5.37  )-----------( 143      ( 27 May 2021 06:44 )             32.6     LIVER FUNCTIONS - ( 27 May 2021 06:44 )  Alb: 2.3 g/dL / Pro: 5.4 gm/dL / ALK PHOS: 172 U/L / ALT: 195 U/L / AST: 159 U/L / GGT: x           MEDICATIONS  (STANDING):  artificial  tears Solution 1 Drop(s) Both EYES daily  baclofen 5 milliGRAM(s) Oral two times a day  bethanechol 25 milliGRAM(s) Oral at bedtime  calcium carbonate 1250 mG  + Vitamin D (OsCal 500 + D) 1 Tablet(s) Oral daily  chlorhexidine 0.12% Liquid 15 milliLiter(s) Oral Mucosa two times a day  citalopram 10 milliGRAM(s) Oral daily  cloNIDine 0.1 milliGRAM(s) Oral daily  cloNIDine 0.2 milliGRAM(s) Oral at bedtime  collagenase Ointment 1 Application(s) Topical daily  lamoTRIgine 100 milliGRAM(s) Oral two times a day  multivitamin 1 Tablet(s) Oral daily  oxybutynin 5 milliGRAM(s) Oral at bedtime  polyethylene glycol 3350 17 Gram(s) Oral at bedtime  senna 2 Tablet(s) Oral at bedtime  tamsulosin 0.4 milliGRAM(s) Oral at bedtime    MEDICATIONS  (PRN):

## 2021-05-27 NOTE — PROGRESS NOTE ADULT - ASSESSMENT
6 y/o M w/ PMH of cerebral palsy, HTN, developmentally delayed, neurogenic bladder, psoriasis, spinal stenosis, seizure disorder, p/w dysphagia.    #Dysphagia / frequent choking / dehydration     -GI consult  -Speech and swallow consulted  Cerebral Palsy, functional Oropharyngeal Dysphagia for modified food textures, seizure disorder,   -start with Soft diet   GI consulted   speech and swallow consultation at this time, no plans for emergent EGD.   If continues to have dysphagia and cleared by s/s, can obtain esophagram for further evaluation if indicated.   Also hx of elevated lfts, trending down at this time likely medication induced from previous admission.     -Aspiration precautions   -FS monitoring   -Elevated BUN / Creatinine ratio and dry oral mucosa -> will give IVF     #Transaminitis / Elevated alk phos  trending down   -Improved since previous labs  -F/u GI   -As per previous documentation from GI, may be 2/2 previous use of ceftriaxone       #Constipation  -Enema  -Miralax /  Senna  -F/u GI     *H/o HTN / neurogenic bladder / psoriasis / spinal stenosis / seizure disorder  -C/w home meds and f/u outpatient for further management if conditions remain stable during hospitalization     # Moderate to severe malnutrition  start Ensure    # unstageable pressure injury to left heel and sacrum present on admission   Continue to turn and position every 2 hours  -Continue to elevate heels off mattress  - Low air loss mattress ordered 5/25/2021 for patient for pressure redistribution and moisture management  - Change dressings to sacrum and left heel daily with collagenase and foam dressing  -Would consult podiatry for left heel wound  -Would consult surgery for evaluation for debridement of wound      DVT ppx   -SCDs

## 2021-05-27 NOTE — PROVIDER CONTACT NOTE (OTHER) - DATE AND TIME:
25-May-2021 07:40
27-May-2021 11:29
27-May-2021 11:32
Radial scar with martha  confirmed by detector
Specimen radiograph confirmed inclusion of the sought after marking clip and martha

## 2021-05-28 ENCOUNTER — TRANSCRIPTION ENCOUNTER (OUTPATIENT)
Age: 56
End: 2021-05-28

## 2021-05-28 VITALS
SYSTOLIC BLOOD PRESSURE: 109 MMHG | OXYGEN SATURATION: 99 % | HEART RATE: 102 BPM | TEMPERATURE: 99 F | DIASTOLIC BLOOD PRESSURE: 81 MMHG | RESPIRATION RATE: 18 BRPM

## 2021-05-28 PROCEDURE — 99239 HOSP IP/OBS DSCHRG MGMT >30: CPT

## 2021-05-28 RX ORDER — SENNA PLUS 8.6 MG/1
2 TABLET ORAL
Qty: 0 | Refills: 0 | DISCHARGE
Start: 2021-05-28

## 2021-05-28 RX ORDER — COLLAGENASE CLOSTRIDIUM HIST. 250 UNIT/G
1 OINTMENT (GRAM) TOPICAL
Qty: 100 | Refills: 0
Start: 2021-05-28

## 2021-05-28 RX ADMIN — Medication 1 TABLET(S): at 09:48

## 2021-05-28 RX ADMIN — Medication 0.1 MILLIGRAM(S): at 09:47

## 2021-05-28 RX ADMIN — Medication 1 TABLET(S): at 09:53

## 2021-05-28 RX ADMIN — CHLORHEXIDINE GLUCONATE 15 MILLILITER(S): 213 SOLUTION TOPICAL at 09:48

## 2021-05-28 RX ADMIN — Medication 1 APPLICATION(S): at 09:48

## 2021-05-28 RX ADMIN — Medication 5 MILLIGRAM(S): at 09:47

## 2021-05-28 RX ADMIN — CITALOPRAM 10 MILLIGRAM(S): 10 TABLET, FILM COATED ORAL at 09:48

## 2021-05-28 RX ADMIN — LAMOTRIGINE 100 MILLIGRAM(S): 25 TABLET, ORALLY DISINTEGRATING ORAL at 09:47

## 2021-05-28 RX ADMIN — Medication 1 DROP(S): at 09:49

## 2021-05-28 NOTE — DISCHARGE NOTE NURSING/CASE MANAGEMENT/SOCIAL WORK - PATIENT PORTAL LINK FT
You can access the FollowMyHealth Patient Portal offered by [facility] by registering at the following website: [email or web address]. By joining [facility]’s FollowMyHealth portal, you will also be able to view your health information using other applications (apps) compatible with our system.

## 2021-05-28 NOTE — DISCHARGE NOTE PROVIDER - CARE PROVIDER_API CALL
Geovanna Wallace (UJSTIN)  Surgery Orthopaedic Surgery  [address], [address]  [place] [address]  Phone: [phone]  Fax: [phone]  Established Patient  Follow Up Time: 1 week    Cornelio Lott)  Surgery  [address], [address]  [place] [address]  Phone: [phone]  Fax: [phone]  Follow Up Time: 2 weeks

## 2021-05-28 NOTE — DISCHARGE NOTE PROVIDER - NSDCFUADDINST_GEN_ALL_CORE_FT
Turn patient Frequent  Sacrum   Continue to turn and position every 2 hours  Continue to elevate heels off mattress  Sacral Wound clean with Normal Saline daily   Apply Collagenase   Foam dressing  to sacrum   Follow up with Wound care as outpatient   Left heel clean daily with normal saline with Collagenase and foam dressing   Please apply Collagenase Ointment to left heel   - Cover with Alyven pad   - Secure with Tape if necessary  Off Load Boots  Follow up with Podiatry as outpatient.

## 2021-05-28 NOTE — DISCHARGE NOTE PROVIDER - NSDCCPCAREPLAN_GEN_ALL_CORE_FT
PRINCIPAL DISCHARGE DIAGNOSIS  Diagnosis: Malnutrition  Assessment and Plan of Treatment:       SECONDARY DISCHARGE DIAGNOSES  Diagnosis: Dysphagia  Assessment and Plan of Treatment:     Diagnosis: Sacral pressure ulcer  Assessment and Plan of Treatment: unstageable , present on admission    Diagnosis: Constipation  Assessment and Plan of Treatment:     Diagnosis: Transaminitis  Assessment and Plan of Treatment:

## 2021-05-28 NOTE — DISCHARGE NOTE PROVIDER - PROVIDER TOKENS
PROVIDER:[TOKEN:[[id number]],FOLLOWUP:[1 week],ESTABLISHEDPATIENT:[T]],PROVIDER:[TOKEN:[[id number]],FOLLOWUP:[2 weeks]]

## 2021-05-28 NOTE — CONSULT NOTE ADULT - SUBJECTIVE AND OBJECTIVE BOX
Pt is a 56 y/o M w/ PMH of cerebral palsy, HTN, developmentally delayed, neurogenic bladder, psoriasis, spinal stenosis, seizure disorder, p/w dysphagia. Patient states he choked yesterday, but unable to give any elaborate history. As per chart, patient has been having difficulty eating and has been having increasing number of choking episodes. Patient denies pain or any other complaints. Pt found to have a 6x6cm decubitus ulcer. Non draining. Denies abdominal pain, nausea, vomiting, fever, chills, cough, runny nose, sore throat, CP, SOB.      Vital Signs Last 24 Hrs  T(C): 36.7 (27 May 2021 22:19), Max: 36.7 (27 May 2021 22:19)  T(F): 98 (27 May 2021 22:19), Max: 98 (27 May 2021 22:19)  HR: 100 (27 May 2021 22:19) (88 - 100)  BP: 111/78 (27 May 2021 22:19) (111/78 - 126/86)  BP(mean): --  RR: 18 (27 May 2021 22:19) (18 - 18)  SpO2: 98% (27 May 2021 22:19) (96% - 100%)    PHYSICAL EXAM:  Constitutional: NAD, GCS: 15/15  AOX3  Eyes:  WNL  ENMT:  WNL  Neck:  WNL, non tender  Back: Non tender  Respiratory: CTABL  Cardiovascular:  S1+S2+0  Gastrointestinal: Soft, ND , NT  Genitourinary:  WNL  Extremities: NV intact  Vascular:  Intact  Neurological: No focal neurological deficit,  CN, motor and sensory system grossly intact.  Skin: 6x6cm decubital ulcer, non draining, dry  Musculoskeletal: WNL  Psychiatric: Grossly WNL                          11.3   5.37  )-----------( 143      ( 27 May 2021 06:44 )             32.6     05-27    142  |  106  |  28<H>  ----------------------------<  100<H>  3.7   |  31  |  0.62    Ca    9.0      27 May 2021 06:44    TPro  5.4<L>  /  Alb  2.3<L>  /  TBili  0.5  /  DBili  x   /  AST  159<H>  /  ALT  195<H>  /  AlkPhos  172<H>  05-27  
Date of service: 5/27/2021  CC: Left heel wound     HPI: 54 y/o mentally challenged M Pt who is admitted to  for the management of dysphagia. Pt has been seen at bedside by podiatry team for the evaluation of Lt heel ulcerative lesion. Pt noted to be resting comfortable in bed and in NAD. Pt is confused at baseline and not able to provide detailed HPI. All the HPI has been obtained from the chart. Pt has PMH of cerebral palsy and been in prolonged recumbency. Pt suffers pressure ulcers to the sacrum and to the Lt heel as well. Patient denies pedal pain or any other complaints at this time, although he expressed tenderness to the Lt heel upon examination.     REVIEW OF SYSTEMS:  Unable to obtain because of the patient's developmental delay.    PAST MEDICAL & SURGICAL HISTORY:  Mental retardation  Cerebral palsy  Hypertension  Seizure  Neurogenic bladder  Psoriasis  Spinal stenosis    No significant past surgical history   Irrigation and closure of triceps wound     Allergies  No Known Allergies    MEDICATIONS  (STANDING):  artificial  tears Solution 1 Drop(s) Both EYES daily  baclofen 5 milliGRAM(s) Oral two times a day  bethanechol 25 milliGRAM(s) Oral at bedtime  calcium carbonate 1250 mG  + Vitamin D (OsCal 500 + D) 1 Tablet(s) Oral daily  chlorhexidine 0.12% Liquid 15 milliLiter(s) Oral Mucosa two times a day  citalopram 10 milliGRAM(s) Oral daily  cloNIDine 0.1 milliGRAM(s) Oral daily  cloNIDine 0.2 milliGRAM(s) Oral at bedtime  collagenase Ointment 1 Application(s) Topical daily  lamoTRIgine 100 milliGRAM(s) Oral two times a day  multivitamin 1 Tablet(s) Oral daily  oxybutynin 5 milliGRAM(s) Oral at bedtime  polyethylene glycol 3350 17 Gram(s) Oral at bedtime  senna 2 Tablet(s) Oral at bedtime  tamsulosin 0.4 milliGRAM(s) Oral at bedtime    FAMILY HISTORY:  Family history of breast cancer (Grandparent)  Grandmother on mother&#x27;s side  Family history of MI (myocardial infarction) (Father)     SOCIAL HISTORY:  None x 3, group home     VITALS:  Vital Signs Last 24 Hrs  T(C): 36.4 (05-27-21 @ 08:12), Max: 36.8 (05-26-21 @ 15:35)  T(F): 97.6 (05-27-21 @ 08:12), Max: 98.3 (05-26-21 @ 15:35)  HR: 97 (05-27-21 @ 08:12) (91 - 97)  BP: 122/87 (05-27-21 @ 08:12) (114/85 - 122/87)  RR: 18 (05-27-21 @ 08:12) (18 - 18)  SpO2: 100% (05-27-21 @ 08:12) (96% - 100%)    Physical Examination:  Constitutional: AAOx3, non-focal; NAD, comfortable resting   Focused LE exam:  Vascular: Temperature gradient is warm to warm b/l, palpable pulses, DP/PT is 2/4 b/l, capillary re-fill time is brisk and instantaneous to digits 1-5 b/l. Marked +3 pitting edema to the dorsum of the foot b/l extending proximally to the lower leg.  Neuro: Intact protective sensation to the foot and digits 1-5 b/l.  Derm:  Skin is stretched, shiny over the foot b/l, noted a partial thickness ulcerative lesion to the posterior aspect of the Lt heel, measuring about 1.5 x 1.8 x 0.2 cm, with fibronecrotic base, surrounded by minimal fibrotic edge, - probing to the bone, - undermining, - tunneling, and with no clinical signs of acute infection, no drainage, no IDM. Noted another pre-ulcerative lesion to the posterior aspect of the Rt heel, with no open lesion and no signs of acute infection.   Musculoskeletal: Unable to assess at this time, because of the patient's condition. TTP to the posterior aspect of the Lt heel.    LABS:             11.3   5.37  )-----------( 143      ( 27 May 2021 06:44 )             32.6     05-27  142  |  106  |  28<H>  ----------------------------<  100<H>  3.7   |  31  |  0.62    Ca    9.0      27 May 2021 06:44    TPro  5.4<L>  /  Alb  2.3<L>  /  TBili  0.5  /  DBili  x   /  AST  159<H>  /  ALT  195<H>  /  AlkPhos  172<H>  05-27    .Blood None  05-24 @ 18:17   No growth to date.  --  --    .Urine None  04-29 @ 13:24   >100,000 CFU/ml Proteus mirabilis  --  Proteus mirabilis          
Patient is a 55y old  Male who presents with a chief complaint of dysphagia (24 May 2021 22:06)    HPI:  56yo male w/ hx of MR, CP and is developmentally delayed reports he is hungry and wants to eat.    Poor historian and unclear the details of his dysphagia.  No abdominal pain, n/v.     PSH: irrigation and closure of triceps wound     Social Hx: None x 3, group home     Family Hx: Father - MI, grandparent - breast cancer    (24 May 2021 22:06)    PAST MEDICAL & SURGICAL HISTORY:  Mental retardation    Hypertension    Seizure    Neurogenic bladder    Psoriasis    Spinal stenosis    Neurogenic bladder    Spinal stenosis    Seizure disorder    No significant past surgical history      MEDICATIONS  (STANDING):  artificial  tears Solution 1 Drop(s) Both EYES daily  baclofen 5 milliGRAM(s) Oral two times a day  bethanechol 25 milliGRAM(s) Oral at bedtime  calcium carbonate 1250 mG  + Vitamin D (OsCal 500 + D) 1 Tablet(s) Oral daily  chlorhexidine 0.12% Liquid 15 milliLiter(s) Oral Mucosa two times a day  citalopram 10 milliGRAM(s) Oral daily  cloNIDine 0.2 milliGRAM(s) Oral at bedtime  cloNIDine 0.1 milliGRAM(s) Oral daily  lamoTRIgine 100 milliGRAM(s) Oral two times a day  multivitamin 1 Tablet(s) Oral daily  oxybutynin 5 milliGRAM(s) Oral at bedtime  polyethylene glycol 3350 17 Gram(s) Oral at bedtime  senna 2 Tablet(s) Oral at bedtime  sodium chloride 0.9%. 1000 milliLiter(s) (150 mL/Hr) IV Continuous <Continuous>  tamsulosin 0.4 milliGRAM(s) Oral at bedtime    MEDICATIONS  (PRN):    Allergies    No Known Allergies    Intolerances      SOCIAL HISTORY:  FAMILY HISTORY:  Family history of breast cancer (Grandparent)  Grandmother on mother&#x27;s side    Family history of MI (myocardial infarction) (Father)  Father      REVIEW OF SYSTEMS:  Per HPI.     Vital Signs Last 24 Hrs  T(C): 36.1 (25 May 2021 08:05), Max: 36.7 (24 May 2021 20:07)  T(F): 97 (25 May 2021 08:05), Max: 98 (24 May 2021 20:07)  HR: 55 (25 May 2021 08:05) (55 - 72)  BP: 121/79 (25 May 2021 08:05) (114/84 - 133/91)  BP(mean): 103 (24 May 2021 20:07) (91 - 103)  RR: 18 (25 May 2021 08:05) (16 - 18)  SpO2: 100% (25 May 2021 08:05) (96% - 100%)    PHYSICAL EXAM:  Constitutional: Middle aged male with hx of MR, CP, and DD, in NAD.   HEENT: MMM  Neck: No LAD  Respiratory: CTAB  Cardiovascular: S1 and S2, RRR, no M/G/R  Gastrointestinal: BS+, soft, NT/ND  Extremities: No peripheral edema  Vascular: 2+ peripheral pulses  Neurological: hx MR/CP/DD  Psychiatric: Normal mood, normal affect  Skin: No rashes    LABS:                        10.0   4.87  )-----------( 130      ( 25 May 2021 06:15 )             28.7     05-25    138  |  105  |  23  ----------------------------<  64<L>  3.3<L>   |  31  |  0.40<L>    Ca    8.1<L>      25 May 2021 06:15  Mg     1.7     05-25    TPro  4.8<L>  /  Alb  2.1<L>  /  TBili  0.6  /  DBili  x   /  AST  74<H>  /  ALT  107<H>  /  AlkPhos  132<H>  05-25    PT/INR - ( 25 May 2021 06:15 )   PT: 14.7 sec;   INR: 1.27 ratio         PTT - ( 25 May 2021 06:15 )  PTT:31.9 sec  LIVER FUNCTIONS - ( 25 May 2021 06:15 )  Alb: 2.1 g/dL / Pro: 4.8 gm/dL / ALK PHOS: 132 U/L / ALT: 107 U/L / AST: 74 U/L / GGT: x             RADIOLOGY & ADDITIONAL STUDIES:

## 2021-05-28 NOTE — DISCHARGE NOTE PROVIDER - HOSPITAL COURSE
· Assessment    56 y/o M w/ PMH of cerebral palsy, HTN, developmentally delayed, neurogenic bladder, psoriasis, spinal stenosis, seizure disorder, p/w dysphagia.    #Dysphagia / frequent choking / dehydration   Admitted to medical floor  -GI consult  -Speech and swallow consulted  Cerebral Palsy, functional Oropharyngeal Dysphagia for modified food textures, seizure disorder,   -start with Soft diet   GI consulted   speech and swallow consultation at this time, no plans for emergent EGD.   If continues to have dysphagia and cleared by s/s, can obtain esophagram for further evaluation if indicated.   Also hx of elevated lfts, trending down at this time likely medication induced from previous admission.     -Aspiration precautions   -FS monitoring   -Elevated BUN / Creatinine ratio and dry oral mucosa -> will give IVF     #Transaminitis / Elevated alk phos  trending down   -Improved since previous labs  -F/u GI   -As per previous documentation from GI, may be 2/2 previous use of ceftriaxone     #Constipation  -Enema  -Miralax /  Senna  -F/u GI     *H/o HTN / neurogenic bladder / psoriasis / spinal stenosis / seizure disorder  -C/w home meds and f/u outpatient for further management if conditions remain stable during hospitalization     # Moderate to severe malnutrition  start Ensure    # unstageable pressure injury to left heel and sacrum present on admission   Consulted Wound care   Continue to turn and position every 2 hours  -Continue to elevate heels off mattress  - Low air loss mattress ordered 5/25/2021 for patient for pressure redistribution and moisture management  - Change dressings to sacrum and left heel daily with collagenase and foam dressing  -consulted podiatry for left heel wound  -consult surgery for evaluation for debridement of wound    DVT ppx   -SCDs   Vital Signs Last 24 Hrs  T(C): 36.9 (28 May 2021 08:11), Max: 36.9 (28 May 2021 08:11)  T(F): 98.5 (28 May 2021 08:11), Max: 98.5 (28 May 2021 08:11)  HR: 97 (28 May 2021 08:11) (88 - 100)  BP: 123/88 (28 May 2021 08:11) (111/78 - 126/86)  BP(mean): --  RR: 18 (28 May 2021 08:11) (18 - 18)  SpO2: 99% (28 May 2021 08:11) (96% - 99%)  PHYSICAL EXAM:  GENERAL: NAD, lying in bed comfortably  HEAD:  Atraumatic, Normocephalic  EYES: EOMI, PERRLA, conjunctiva and sclera clear  ENT: Moist mucous membranes  NECK: Supple, No JVD  CHEST/LUNG: Clear to auscultation bilaterally; No rales, rhonchi, wheezing, or rubs. Unlabored respirations  HEART: Regular rate and rhythm; No murmurs, rubs, or gallops  ABDOMEN: Bowel sounds present; Soft, Nontender, Nondistended. No hepatomegally  EXTREMITIES:  2+ Peripheral Pulses, brisk capillary refill. No clubbing, cyanosis, or edema  NERVOUS SYSTEM:  Alert & Oriented X3, speech clear. No deficits   MSK: FROM all 4 extremities, full and equal strength  SKIN: No rashes or lesions · Assessment    56 y/o M w/ PMH of cerebral palsy, HTN, developmentally delayed, neurogenic bladder, psoriasis, spinal stenosis, seizure disorder, p/w dysphagia.    #Dysphagia / frequent choking / dehydration   Admitted to medical floor  -GI consult  -Speech and swallow consulted  Cerebral Palsy, functional Oropharyngeal Dysphagia for modified food textures, seizure disorder,   -start with Soft diet   GI consulted   speech and swallow consultation at this time, no plans for emergent EGD.   If continues to have dysphagia and cleared by s/s, can obtain esophagram for further evaluation if indicated.   Also hx of elevated lfts, trending down at this time likely medication induced from previous admission.     -Aspiration precautions   -FS monitoring   -Elevated BUN / Creatinine ratio and dry oral mucosa -> will give IVF     #Transaminitis / Elevated alk phos  trending down   -Improved since previous labs  -F/u GI   -As per previous documentation from GI, may be 2/2 previous use of ceftriaxone     #Constipation  -Enema  -Miralax /  Senna  -F/u GI     *H/o HTN / neurogenic bladder / psoriasis / spinal stenosis / seizure disorder  -C/w home meds and f/u outpatient for further management if conditions remain stable during hospitalization     # Moderate to severe malnutrition  start Ensure    # unstageable pressure injury to left heel and sacrum present on admission   Consulted Wound care   Continue to turn and position every 2 hours  -Continue to elevate heels off mattress  - Low air loss mattress ordered 5/25/2021 for patient for pressure redistribution and moisture management  - Change dressings to sacrum and left heel daily with collagenase and foam dressing  -consulted podiatry for left heel wound  -consult surgery for evaluation for debridement of wound    DVT ppx   -SCDs   Vital Signs Last 24 Hrs  T(C): 36.9 (28 May 2021 08:11), Max: 36.9 (28 May 2021 08:11)  T(F): 98.5 (28 May 2021 08:11), Max: 98.5 (28 May 2021 08:11)  HR: 97 (28 May 2021 08:11) (88 - 100)  BP: 123/88 (28 May 2021 08:11) (111/78 - 126/86)  BP(mean): --  RR: 18 (28 May 2021 08:11) (18 - 18)  SpO2: 99% (28 May 2021 08:11) (96% - 99%)  PHYSICAL EXAM:  GENERAL: NAD, lying in bed comfortably  HEAD:  Atraumatic, Normocephalic  EYES: EOMI, PERRLA, conjunctiva and sclera clear  ENT: Moist mucous membranes  NECK: Supple, No JVD  CHEST/LUNG: Clear to auscultation bilaterally; No rales, rhonchi, wheezing, or rubs. Unlabored respirations  HEART: Regular rate and rhythm; No murmurs, rubs, or gallops  ABDOMEN: Bowel sounds present; Soft, Nontender, Nondistended. No hepatomegally  EXTREMITIES:  2+ Peripheral Pulses, brisk capillary refill. No clubbing, cyanosis, or edema  NERVOUS SYSTEM:  Alert & Oriented X1, speech clear. No deficits   MSK: FROM all 4 extremities, full and equal strength  SKIN: unstageable pressure injury  sacrum  unstageable pressure injury  left heel   05-27    142  |  106  |  28<H>  ----------------------------<  100<H>  3.7   |  31  |  0.62    Ca    9.0      27 May 2021 06:44    TPro  5.4<L>  /  Alb  2.3<L>  /  TBili  0.5  /  DBili  x   /  AST  159<H>  /  ALT  195<H>  /  AlkPhos  172<H>  05-27                         11.3   5.37  )-----------( 143      ( 27 May 2021 06:44 )             32.6   LIVER FUNCTIONS - ( 27 May 2021 06:44 )  Alb: 2.3 g/dL / Pro: 5.4 gm/dL / ALK PHOS: 172 U/L / ALT: 195 U/L / AST: 159 U/L / GGT: x

## 2021-05-28 NOTE — DISCHARGE NOTE PROVIDER - DETAILS OF MALNUTRITION DIAGNOSIS/DIAGNOSES
This patient has been assessed with a concern for Malnutrition and was treated during this hospitalization for the following Nutrition diagnosis/diagnoses:     -  05/25/2021: Severe protein-calorie malnutrition   -  05/25/2021: Underweight (BMI < 19)

## 2021-05-28 NOTE — PROGRESS NOTE ADULT - ASSESSMENT
Assessment: 54 y/o mentally challenged Male pt been seen and examined by podiatry team for the evaluation of;  - Partial thickness pressure ulcerative lesion to the Lt heel  - Pain to the Lt heel.     Plan:  - Pt is evaluated and chart reviewed.  - Reviewed the chart, lab results and imaging.  - Applied betadine soaked dressing to the Lt heel pressure ulcer, then covered with Allyven pad.  - Ordered Santyl cream to be applied daily to the Lt heel ulcerative lesion.   - Continue Offloading both heels in Z-flex boots at all times, while the pt is bedbound.  - There is no cardinal clinical signs of acute infection to the Lt foot at this time. Plan per podiatry is to continue local wound care.  - Care per medicine, appreciated.  - Podiatry will follow the case in house. Assessment: 54 y/o mentally challenged Male pt been seen and examined by podiatry team for the evaluation of;  - Partial thickness pressure ulcerative lesion to the Lt heel  - Pain to the Lt heel.     Plan:  - Pt is evaluated and chart reviewed.  - Reviewed the chart, lab results and imaging.  - Applied betadine soaked dressing to the Lt heel pressure ulcer, then covered with Allyven pad.  - Ordered Santyl cream to be applied daily to the Lt heel ulcerative lesion.   - Continue Offloading both heels in Z-flex boots at all times, while the pt is bedbound.  - There is no cardinal clinical signs of acute infection to the Lt foot at this time. Plan per podiatry is to continue local wound care.  - Care per medicine, appreciated.  - Podiatry will follow the case in house.    DAILY LOCAL WOUND CARE:  - Please apply Collagenase Ointment to left heel   - Cover with Alyven pad   - Secure with Tape if necessary

## 2021-05-28 NOTE — PROGRESS NOTE ADULT - SUBJECTIVE AND OBJECTIVE BOX
Date of service: 5/28/2021  CC: Left heel wound     HPI: 56 y/o mentally challenged M Pt who is admitted to  for the management of dysphagia. Pt has been seen at bedside by podiatry team for the evaluation of Lt heel ulcerative lesion. Pt noted to be resting comfortable in bed and in NAD. Pt is confused at baseline and not able to provide detailed HPI. All the HPI has been obtained from the chart. Pt has PMH of cerebral palsy and been in prolonged recumbency. Pt suffers pressure ulcers to the sacrum and to the Lt heel as well. Patient denies pedal pain or any other complaints at this time, although he expressed tenderness to the Lt heel upon examination.     5/28: Pt has been seen at bedside by podiatry team, with the attending present. Pt noted to be resting comfortable in bed and in NAD. Pt is still confused at baseline. Pt states that he feels better to his heel wound, but expressed his concerns regarding his back wound. Pt denies any new pedal complaints at this time, and denies F/N/V/C/SOB.    REVIEW OF SYSTEMS:  Unable to obtain because of the patient's developmental delay.    PAST MEDICAL & SURGICAL HISTORY:  Mental retardation  Cerebral palsy  Hypertension  Seizure  Neurogenic bladder  Psoriasis  Spinal stenosis    No significant past surgical history   Irrigation and closure of triceps wound     Allergies  No Known Allergies    FAMILY HISTORY:  Family history of breast cancer (Grandparent)  Grandmother on mother&#x27;s side  Family history of MI (myocardial infarction) (Father)    SOCIAL HISTORY:  None x 3, group home     MEDICATIONS  (STANDING):  artificial  tears Solution 1 Drop(s) Both EYES daily  baclofen 5 milliGRAM(s) Oral two times a day  bethanechol 25 milliGRAM(s) Oral at bedtime  calcium carbonate 1250 mG  + Vitamin D (OsCal 500 + D) 1 Tablet(s) Oral daily  chlorhexidine 0.12% Liquid 15 milliLiter(s) Oral Mucosa two times a day  citalopram 10 milliGRAM(s) Oral daily  cloNIDine 0.1 milliGRAM(s) Oral daily  cloNIDine 0.2 milliGRAM(s) Oral at bedtime  collagenase Ointment 1 Application(s) Topical daily  lamoTRIgine 100 milliGRAM(s) Oral two times a day  multivitamin 1 Tablet(s) Oral daily  oxybutynin 5 milliGRAM(s) Oral at bedtime  polyethylene glycol 3350 17 Gram(s) Oral at bedtime  senna 2 Tablet(s) Oral at bedtime  tamsulosin 0.4 milliGRAM(s) Oral at bedtime    VITALS:  Vital Signs Last 24 Hrs  T(C): 36.9 (28 May 2021 08:11), Max: 36.9 (28 May 2021 08:11)  T(F): 98.5 (28 May 2021 08:11), Max: 98.5 (28 May 2021 08:11)  HR: 97 (28 May 2021 08:11) (88 - 100)  BP: 123/88 (28 May 2021 08:11) (111/78 - 126/86)  RR: 18 (28 May 2021 08:11) (18 - 18)  SpO2: 99% (28 May 2021 08:11) (96% - 99%)    Physical Examination:  Constitutional: AAOx3, non-focal; NAD, comfortable resting   Focused LE exam:  Vascular: Temperature gradient is warm to warm b/l, palpable pulses, DP/PT is 2/4 b/l, capillary re-fill time is brisk and instantaneous to digits 1-5 b/l. Marked +3 pitting edema to the dorsum of the foot b/l extending proximally to the lower leg.  Neuro: Intact protective sensation to the foot and digits 1-5 b/l.  Derm:  Skin is stretched, shiny over the foot b/l, noted a partial thickness ulcerative lesion to the posterior aspect of the Lt heel, measuring about 1.5 x 1.8 x 0.2 cm, with fibronecrotic base, surrounded by minimal fibrotic edge, - probing to the bone, - undermining, - tunneling, and with no clinical signs of acute infection, no drainage, no IDM. Noted another pre-ulcerative lesion to the posterior aspect of the Rt heel, with no open lesion and no signs of acute infection.   Musculoskeletal: Unable to assess at this time, because of the patient's condition. TTP to the posterior aspect of the Lt heel.    LABS:             11.3   5.37  )-----------( 143      ( 27 May 2021 06:44 )             32.6     05-27  142  |  106  |  28<H>  ----------------------------<  100<H>  3.7   |  31  |  0.62    Ca    9.0      27 May 2021 06:44    TPro  5.4<L>  /  Alb  2.3<L>  /  TBili  0.5  /  DBili  x   /  AST  159<H>  /  ALT  195<H>  /  AlkPhos  172<H>  05-27    .Blood None  05-24 @ 18:17   No growth to date.  --  --    .Urine None  04-29 @ 13:24   >100,000 CFU/ml Proteus mirabilis  --  Proteus mirabilis    Radiology:  < from: Xray Foot AP + Lateral + Oblique, Left (05.27.21 @ 15:00) >   Three views of the left foot show no evidence of fracture nor destructive change. The joint spaces show similar arthritic changes of the fifth proximal interphalangeal joint. Soft tissue calcifications are similar. Soft tissues thinning near the calcaneus may indicate skin ulceration.  IMPRESSION: No evidence of bony destruction.  < end of copied text >

## 2021-05-28 NOTE — DISCHARGE NOTE PROVIDER - NSDCMRMEDTOKEN_GEN_ALL_CORE_FT
baclofen 10 mg oral tablet: 0.5 tab(s) orally 2 times a day  bethanechol 25 mg oral tablet: 1 tab(s) orally once a day (at bedtime)  chlorhexidine 0.12% mucous membrane liquid: 15 milliliter(s) mucous membrane 2 times a day  citalopram 10 mg oral tablet: 1 tab(s) orally once a day  cloNIDine 0.1 mg oral tablet: 2 tab(s) orally once a day (at bedtime)  collagenase 250 units/g topical ointment: 1 application topically once a day on the sacrum    mg oral tablet: 1 tab(s) orally 3 times a day  lamoTRIgine 100 mg oral tablet: 1 tab(s) orally 2 times a day  MiraLax oral powder for reconstitution: 17 gram(s) orally once a day (at bedtime)   oxybutynin 5 mg oral tablet: 1 tab(s) orally once a day (at bedtime)  Oysco 500 with D 500 mg-200 intl units (5 mcg) oral tablet: 2 tab(s) orally once a day  Refresh Liquigel ophthalmic gel: 1 day(s) to each affected eye once a day  senna oral tablet: 2 tab(s) orally once a day (at bedtime)  SF 5000 Plus 1.1% topical cream: Use as directed once daily  Tab-A-Heaven oral tablet: 1 tab(s) orally once a day  tamsulosin 0.4 mg oral capsule: 1 cap(s) orally once a day (at bedtime)  tiZANidine 2 mg oral capsule: 1 cap(s) orally 3 times a day

## 2021-05-28 NOTE — CONSULT NOTE ADULT - ASSESSMENT
Assessment: 54 y/o mentally challenged Male pt been seen and examined by podiatry team for the evaluation of;  - Partial thickness pressure ulcerative lesion to the Lt heel  - Pain to the Lt heel.     Plan:  - Pt is evaluated and chart reviewed.  - Reviewed the chart and discussed the case with Dr. Wallace.  - Ordered Lt foot X-rays in 3 views,  - Applied betadine soaked dressing to the Lt heel pressure ulcer, and wrapped in DSD.  - Offload both heels in Z-flex boots at all times, while the pt is bedbound.  - There is no cardinal clinical signs of acute infection to the Lt foot at this time. Plan per podiatry is to continue local wound care.  - Care per medicine, appreciated.  - Podiatry will follow the case in house.
Pt is a 55m with multiple medical comorbidities found to have a 6x6cm decubitus ulcer, dry base    -No acute surgical intervention at this time  -Rec local wound care  -Continue medical care per primary team    Discussed plan with Dr. Lott, surgery attending  [place] PGY4
56yo male admitted with dysphagia, per documentation was coughing during mealtime ?aspiration.   Agree with speech and swallow consultation at this time, no plans for emergent EGD.   If continues to have dysphagia and cleared by s/s, can obtain esophagram for further evaluation if indicated.     Also hx of elevated lfts, trending down at this time likely medication induced from previous admission.     Discussed with Dr. Art.

## 2021-06-03 DIAGNOSIS — M48.00 SPINAL STENOSIS, SITE UNSPECIFIED: ICD-10-CM

## 2021-06-03 DIAGNOSIS — K59.00 CONSTIPATION, UNSPECIFIED: ICD-10-CM

## 2021-06-03 DIAGNOSIS — G80.9 CEREBRAL PALSY, UNSPECIFIED: ICD-10-CM

## 2021-06-03 DIAGNOSIS — L40.9 PSORIASIS, UNSPECIFIED: ICD-10-CM

## 2021-06-03 DIAGNOSIS — I10 ESSENTIAL (PRIMARY) HYPERTENSION: ICD-10-CM

## 2021-06-03 DIAGNOSIS — L89.150 PRESSURE ULCER OF SACRAL REGION, UNSTAGEABLE: ICD-10-CM

## 2021-06-03 DIAGNOSIS — R62.50 UNSPECIFIED LACK OF EXPECTED NORMAL PHYSIOLOGICAL DEVELOPMENT IN CHILDHOOD: ICD-10-CM

## 2021-06-03 DIAGNOSIS — E43 UNSPECIFIED SEVERE PROTEIN-CALORIE MALNUTRITION: ICD-10-CM

## 2021-06-03 DIAGNOSIS — G40.909 EPILEPSY, UNSPECIFIED, NOT INTRACTABLE, WITHOUT STATUS EPILEPTICUS: ICD-10-CM

## 2021-06-03 DIAGNOSIS — L89.620 PRESSURE ULCER OF LEFT HEEL, UNSTAGEABLE: ICD-10-CM

## 2021-06-03 DIAGNOSIS — E86.0 DEHYDRATION: ICD-10-CM

## 2021-06-03 DIAGNOSIS — R74.01 ELEVATION OF LEVELS OF LIVER TRANSAMINASE LEVELS: ICD-10-CM

## 2021-06-03 DIAGNOSIS — R13.12 DYSPHAGIA, OROPHARYNGEAL PHASE: ICD-10-CM

## 2021-06-03 DIAGNOSIS — N31.9 NEUROMUSCULAR DYSFUNCTION OF BLADDER, UNSPECIFIED: ICD-10-CM

## 2021-06-17 NOTE — SWALLOW BEDSIDE ASSESSMENT ADULT - ORAL PHASE
2
Bolus formation/transfer were mildly to moderately prolonged/disorganized but mechanically functional with some of the above modified food textures. Piecemeal deglutition was evident. Scattered tongue debris noted with coarse solids.

## 2021-07-18 NOTE — ED STATDOCS - NSFOLLOWUPINSTRUCTIONS_ED_ALL_ED_FT
Name band; HE MAY RETURN TO HIS NORMAL ACTIVITIES AND PROGRAMS.  THERE IS A HEALING FRACTURE OF HIS RIGHT TIBIA AND FIBULA THAT SHOULD BE SEEN BY AN ORTHOPEDIST.  IF HE HAS PAIN HE MAY TAKE TYLENOL 650MG EVERY 4-6 HOURS.      Contusion in Adults    WHAT YOU NEED TO KNOW:    A contusion is a bruise that appears on your skin after an injury. A bruise happens when small blood vessels tear but skin does not. When blood vessels tear, blood leaks into nearby tissue, such as soft tissue or muscle.    DISCHARGE INSTRUCTIONS:    Return to the emergency department if:     You have new trouble moving the injured area.      You have tingling or numbness in or near the injured area.      Your hand or foot below the bruise gets cold or turns pale.     Contact your healthcare provider if:     You find a new lump in the injured area.      Your symptoms do not improve with treatment after 4 to 5 days.      You have questions or concerns about your condition or care.    Medicines: You may need any of the following:     NSAIDs help decrease swelling and pain or fever. This medicine is available with or without a doctor's order. NSAIDs can cause stomach bleeding or kidney problems in certain people. If you take blood thinner medicine, always ask your healthcare provider if NSAIDs are safe for you. Always read the medicine label and follow directions.      Prescription pain medicine may be given. Do not wait until the pain is severe before you take your medicine.      Take your medicine as directed. Contact your healthcare provider if you think your medicine is not helping or if you have side effects. Tell him of her if you are allergic to any medicine. Keep a list of the medicines, vitamins, and herbs you take. Include the amounts, and when and why you take them. Bring the list or the pill bottles to follow-up visits. Carry your medicine list with you in case of an emergency.    Follow up with your healthcare provider as directed: You may need to return within a week to check your injury again. Write down your questions so you remember to ask them during your visits.    Help a contusion heal:     Rest the injured area or use it less than usual. If you bruised your leg or foot, you may need crutches or a cane to help you walk. This will help you keep weight off your injured body part.       Apply ice to decrease swelling and pain. Ice may also help prevent tissue damage. Use an ice pack, or put crushed ice in a plastic bag. Cover it with a towel and place it on your bruise for 15 to 20 minutes every hour or as directed.      Use compression to support the area and decrease swelling. Wrap an elastic bandage around the area over the bruised muscle. Make sure the bandage is not too tight. You should be able to fit 1 finger between the bandage and your skin.      Elevate (raise) your injured body part above the level of your heart to help decrease pain and swelling. Use pillows, blankets, or rolled towels to elevate the area as often as you can.      Do not drink alcohol as directed. Alcohol may slow healing.      Do not stretch injured muscles right after your injury. Ask your healthcare provider when and how you may safely stretch after your injury. Gentle stretches can help increase your flexibility.      Do not massage the area or put heating pads on the bruise right after your injury. Heat and massage may slow healing. Your healthcare provider may tell you to apply heat after several days. At that time, heat will start to help the injury heal.    Prevent another contusion:     Stretch and warm up before you play sports or exercise.      Wear protective gear when you play sports. Examples are shin guards and padding.       If you begin a new physical activity, start slowly to give your body a chance to adjust. Regular

## 2021-08-18 ENCOUNTER — INPATIENT (INPATIENT)
Facility: HOSPITAL | Age: 56
LOS: 11 days | Discharge: NURSING, REHAB OR LONG-TERM CARE | DRG: 539 | End: 2021-08-30
Attending: INTERNAL MEDICINE | Admitting: STUDENT IN AN ORGANIZED HEALTH CARE EDUCATION/TRAINING PROGRAM
Payer: MEDICARE

## 2021-08-18 VITALS
TEMPERATURE: 98 F | HEIGHT: 70 IN | DIASTOLIC BLOOD PRESSURE: 106 MMHG | OXYGEN SATURATION: 99 % | WEIGHT: 149.91 LBS | RESPIRATION RATE: 15 BRPM | SYSTOLIC BLOOD PRESSURE: 169 MMHG | HEART RATE: 81 BPM

## 2021-08-18 LAB
ALBUMIN SERPL ELPH-MCNC: 3.1 G/DL — LOW (ref 3.3–5)
ALP SERPL-CCNC: 93 U/L — SIGNIFICANT CHANGE UP (ref 40–120)
ALT FLD-CCNC: 22 U/L — SIGNIFICANT CHANGE UP (ref 12–78)
ANION GAP SERPL CALC-SCNC: 4 MMOL/L — LOW (ref 5–17)
APTT BLD: 31.8 SEC — SIGNIFICANT CHANGE UP (ref 27.5–35.5)
AST SERPL-CCNC: 19 U/L — SIGNIFICANT CHANGE UP (ref 15–37)
BASOPHILS # BLD AUTO: 0.02 K/UL — SIGNIFICANT CHANGE UP (ref 0–0.2)
BASOPHILS NFR BLD AUTO: 0.3 % — SIGNIFICANT CHANGE UP (ref 0–2)
BILIRUB SERPL-MCNC: 0.3 MG/DL — SIGNIFICANT CHANGE UP (ref 0.2–1.2)
BUN SERPL-MCNC: 29 MG/DL — HIGH (ref 7–23)
CALCIUM SERPL-MCNC: 9.5 MG/DL — SIGNIFICANT CHANGE UP (ref 8.5–10.1)
CHLORIDE SERPL-SCNC: 103 MMOL/L — SIGNIFICANT CHANGE UP (ref 96–108)
CO2 SERPL-SCNC: 28 MMOL/L — SIGNIFICANT CHANGE UP (ref 22–31)
CREAT SERPL-MCNC: 0.76 MG/DL — SIGNIFICANT CHANGE UP (ref 0.5–1.3)
EOSINOPHIL # BLD AUTO: 0.11 K/UL — SIGNIFICANT CHANGE UP (ref 0–0.5)
EOSINOPHIL NFR BLD AUTO: 1.6 % — SIGNIFICANT CHANGE UP (ref 0–6)
GLUCOSE SERPL-MCNC: 102 MG/DL — HIGH (ref 70–99)
HCT VFR BLD CALC: 37.1 % — LOW (ref 39–50)
HGB BLD-MCNC: 12.6 G/DL — LOW (ref 13–17)
IMM GRANULOCYTES NFR BLD AUTO: 0.3 % — SIGNIFICANT CHANGE UP (ref 0–1.5)
INR BLD: 1.21 RATIO — HIGH (ref 0.88–1.16)
LACTATE SERPL-SCNC: 0.7 MMOL/L — SIGNIFICANT CHANGE UP (ref 0.7–2)
LYMPHOCYTES # BLD AUTO: 1.18 K/UL — SIGNIFICANT CHANGE UP (ref 1–3.3)
LYMPHOCYTES # BLD AUTO: 16.8 % — SIGNIFICANT CHANGE UP (ref 13–44)
MCHC RBC-ENTMCNC: 30 PG — SIGNIFICANT CHANGE UP (ref 27–34)
MCHC RBC-ENTMCNC: 34 GM/DL — SIGNIFICANT CHANGE UP (ref 32–36)
MCV RBC AUTO: 88.3 FL — SIGNIFICANT CHANGE UP (ref 80–100)
MONOCYTES # BLD AUTO: 0.55 K/UL — SIGNIFICANT CHANGE UP (ref 0–0.9)
MONOCYTES NFR BLD AUTO: 7.8 % — SIGNIFICANT CHANGE UP (ref 2–14)
NEUTROPHILS # BLD AUTO: 5.14 K/UL — SIGNIFICANT CHANGE UP (ref 1.8–7.4)
NEUTROPHILS NFR BLD AUTO: 73.2 % — SIGNIFICANT CHANGE UP (ref 43–77)
PLATELET # BLD AUTO: 241 K/UL — SIGNIFICANT CHANGE UP (ref 150–400)
POTASSIUM SERPL-MCNC: 3.8 MMOL/L — SIGNIFICANT CHANGE UP (ref 3.5–5.3)
POTASSIUM SERPL-SCNC: 3.8 MMOL/L — SIGNIFICANT CHANGE UP (ref 3.5–5.3)
PROT SERPL-MCNC: 7.1 GM/DL — SIGNIFICANT CHANGE UP (ref 6–8.3)
PROTHROM AB SERPL-ACNC: 14 SEC — HIGH (ref 10.6–13.6)
RBC # BLD: 4.2 M/UL — SIGNIFICANT CHANGE UP (ref 4.2–5.8)
RBC # FLD: 12.3 % — SIGNIFICANT CHANGE UP (ref 10.3–14.5)
SODIUM SERPL-SCNC: 135 MMOL/L — SIGNIFICANT CHANGE UP (ref 135–145)
WBC # BLD: 7.02 K/UL — SIGNIFICANT CHANGE UP (ref 3.8–10.5)
WBC # FLD AUTO: 7.02 K/UL — SIGNIFICANT CHANGE UP (ref 3.8–10.5)

## 2021-08-18 PROCEDURE — 71045 X-RAY EXAM CHEST 1 VIEW: CPT | Mod: 26

## 2021-08-18 PROCEDURE — 93010 ELECTROCARDIOGRAM REPORT: CPT

## 2021-08-18 PROCEDURE — 99285 EMERGENCY DEPT VISIT HI MDM: CPT

## 2021-08-18 RX ORDER — SODIUM CHLORIDE 9 MG/ML
500 INJECTION INTRAMUSCULAR; INTRAVENOUS; SUBCUTANEOUS ONCE
Refills: 0 | Status: COMPLETED | OUTPATIENT
Start: 2021-08-18 | End: 2021-08-18

## 2021-08-18 RX ADMIN — SODIUM CHLORIDE 500 MILLILITER(S): 9 INJECTION INTRAMUSCULAR; INTRAVENOUS; SUBCUTANEOUS at 22:04

## 2021-08-18 NOTE — ED PROVIDER NOTE - ENMT, MLM
Airway patent, oral pharynx clear. Mouth with normal mucosa. Throat has no vesicles, no oropharyngeal exudates and uvula is midline.

## 2021-08-18 NOTE — ED PROVIDER NOTE - CONSTITUTIONAL, MLM
Thin frail white male adult, alert, no respiratory discomfort, chronically ill, non toxic. normal...

## 2021-08-18 NOTE — ED PROVIDER NOTE - UNABLE TO OBTAIN
Severe Illness/Injury Hx is limited pt with developmental delay. Hx obtained from aide at bedside. ROS is limited due to pt with developmental delay

## 2021-08-18 NOTE — ED PROVIDER NOTE - CARE PLAN
1 Principal Discharge DX:	Decubitus ulcer of sacral area  Secondary Diagnosis:	Cerebral palsy  Secondary Diagnosis:	Developmental delay

## 2021-08-18 NOTE — ED PROVIDER NOTE - CLINICAL SUMMARY MEDICAL DECISION MAKING FREE TEXT BOX
54 y/o M with a PMHx of cerebral palsy, HTN, developmentally delayed, neurogenic bladder, psoriasis, spinal stenosis, seizure disorder, presents to the ED BIBA from group home for evaluation of wound on sacral decubitus ulcer when pt was brought back to them from nursing facility. staff states ulcer getting larger and deeper and some discharge. they are unable to manage lesion at group home. Pt does not meet sepsis criteria. Plan; ekg, cxr, labs including covid swab, blood cultures, lactate, coags, ivf, fall precautions, pt will require admission for suspected infected sacral decubitus ulcer, cannot be managed at group home.

## 2021-08-18 NOTE — ED PROVIDER NOTE - OBJECTIVE STATEMENT
54 y/o M with a PMHx of cerebral palsy, HTN, developmentally delayed, bedbound/wheelchair bound, neurogenic bladder, psoriasis, spinal stenosis, seizure disorder, presents to the ED BIBA from group home for evaluation of wound on sacrum. Aide states pt was in a rehab and 2 weeks ago returned to group home with open wound to sacrum and wound appears to be getting larger and deeper. States pt had possibly yellow discharge a few days ago but aides hasn't seen wound since. Pt with chronic local discomfort, no recent worsening. No fever. No urinary or bowel incontinence.

## 2021-08-18 NOTE — ED ADULT TRIAGE NOTE - CHIEF COMPLAINT QUOTE
wound on saccrum wound on saccrum that needs to be checked, they state its not healing at group home.

## 2021-08-19 DIAGNOSIS — L89.159 PRESSURE ULCER OF SACRAL REGION, UNSPECIFIED STAGE: ICD-10-CM

## 2021-08-19 LAB
-  COAGULASE NEGATIVE STAPHYLOCOCCUS: SIGNIFICANT CHANGE UP
ALBUMIN SERPL ELPH-MCNC: 3.2 G/DL — LOW (ref 3.3–5)
ALP SERPL-CCNC: 97 U/L — SIGNIFICANT CHANGE UP (ref 40–120)
ALT FLD-CCNC: 26 U/L — SIGNIFICANT CHANGE UP (ref 12–78)
ANION GAP SERPL CALC-SCNC: 6 MMOL/L — SIGNIFICANT CHANGE UP (ref 5–17)
AST SERPL-CCNC: 20 U/L — SIGNIFICANT CHANGE UP (ref 15–37)
BILIRUB SERPL-MCNC: 0.3 MG/DL — SIGNIFICANT CHANGE UP (ref 0.2–1.2)
BUN SERPL-MCNC: 20 MG/DL — SIGNIFICANT CHANGE UP (ref 7–23)
CALCIUM SERPL-MCNC: 9.7 MG/DL — SIGNIFICANT CHANGE UP (ref 8.5–10.1)
CHLORIDE SERPL-SCNC: 105 MMOL/L — SIGNIFICANT CHANGE UP (ref 96–108)
CO2 SERPL-SCNC: 27 MMOL/L — SIGNIFICANT CHANGE UP (ref 22–31)
COVID-19 SPIKE DOMAIN AB INTERP: POSITIVE
COVID-19 SPIKE DOMAIN ANTIBODY RESULT: 153 U/ML — HIGH
CREAT SERPL-MCNC: 0.69 MG/DL — SIGNIFICANT CHANGE UP (ref 0.5–1.3)
CRP SERPL-MCNC: 3 MG/L — SIGNIFICANT CHANGE UP
ERYTHROCYTE [SEDIMENTATION RATE] IN BLOOD: 14 MM/HR — SIGNIFICANT CHANGE UP (ref 0–20)
GLUCOSE SERPL-MCNC: 106 MG/DL — HIGH (ref 70–99)
GRAM STN FLD: SIGNIFICANT CHANGE UP
HCT VFR BLD CALC: 37.2 % — LOW (ref 39–50)
HGB BLD-MCNC: 13.1 G/DL — SIGNIFICANT CHANGE UP (ref 13–17)
MCHC RBC-ENTMCNC: 30.6 PG — SIGNIFICANT CHANGE UP (ref 27–34)
MCHC RBC-ENTMCNC: 35.2 GM/DL — SIGNIFICANT CHANGE UP (ref 32–36)
MCV RBC AUTO: 86.9 FL — SIGNIFICANT CHANGE UP (ref 80–100)
METHOD TYPE: SIGNIFICANT CHANGE UP
PLATELET # BLD AUTO: 275 K/UL — SIGNIFICANT CHANGE UP (ref 150–400)
POTASSIUM SERPL-MCNC: 3.9 MMOL/L — SIGNIFICANT CHANGE UP (ref 3.5–5.3)
POTASSIUM SERPL-SCNC: 3.9 MMOL/L — SIGNIFICANT CHANGE UP (ref 3.5–5.3)
PROT SERPL-MCNC: 7.6 GM/DL — SIGNIFICANT CHANGE UP (ref 6–8.3)
RBC # BLD: 4.28 M/UL — SIGNIFICANT CHANGE UP (ref 4.2–5.8)
RBC # FLD: 12.4 % — SIGNIFICANT CHANGE UP (ref 10.3–14.5)
SARS-COV-2 IGG+IGM SERPL QL IA: 153 U/ML — HIGH
SARS-COV-2 IGG+IGM SERPL QL IA: POSITIVE
SODIUM SERPL-SCNC: 138 MMOL/L — SIGNIFICANT CHANGE UP (ref 135–145)
SPECIMEN SOURCE: SIGNIFICANT CHANGE UP
WBC # BLD: 7.28 K/UL — SIGNIFICANT CHANGE UP (ref 3.8–10.5)
WBC # FLD AUTO: 7.28 K/UL — SIGNIFICANT CHANGE UP (ref 3.8–10.5)

## 2021-08-19 PROCEDURE — 80053 COMPREHEN METABOLIC PANEL: CPT

## 2021-08-19 PROCEDURE — 72220 X-RAY EXAM SACRUM TAILBONE: CPT

## 2021-08-19 PROCEDURE — 86140 C-REACTIVE PROTEIN: CPT

## 2021-08-19 PROCEDURE — U0003: CPT

## 2021-08-19 PROCEDURE — 83735 ASSAY OF MAGNESIUM: CPT

## 2021-08-19 PROCEDURE — C1894: CPT

## 2021-08-19 PROCEDURE — 99223 1ST HOSP IP/OBS HIGH 75: CPT

## 2021-08-19 PROCEDURE — 85027 COMPLETE CBC AUTOMATED: CPT

## 2021-08-19 PROCEDURE — 87040 BLOOD CULTURE FOR BACTERIA: CPT

## 2021-08-19 PROCEDURE — 80202 ASSAY OF VANCOMYCIN: CPT

## 2021-08-19 PROCEDURE — 72220 X-RAY EXAM SACRUM TAILBONE: CPT | Mod: 26

## 2021-08-19 PROCEDURE — C1751: CPT

## 2021-08-19 PROCEDURE — 85652 RBC SED RATE AUTOMATED: CPT

## 2021-08-19 PROCEDURE — 93306 TTE W/DOPPLER COMPLETE: CPT

## 2021-08-19 PROCEDURE — 86769 SARS-COV-2 COVID-19 ANTIBODY: CPT

## 2021-08-19 PROCEDURE — 80048 BASIC METABOLIC PNL TOTAL CA: CPT

## 2021-08-19 PROCEDURE — 84100 ASSAY OF PHOSPHORUS: CPT

## 2021-08-19 PROCEDURE — 36573 INSJ PICC RS&I 5 YR+: CPT

## 2021-08-19 PROCEDURE — 36415 COLL VENOUS BLD VENIPUNCTURE: CPT

## 2021-08-19 PROCEDURE — U0005: CPT

## 2021-08-19 RX ORDER — PIPERACILLIN AND TAZOBACTAM 4; .5 G/20ML; G/20ML
3.38 INJECTION, POWDER, LYOPHILIZED, FOR SOLUTION INTRAVENOUS ONCE
Refills: 0 | Status: COMPLETED | OUTPATIENT
Start: 2021-08-19 | End: 2021-08-19

## 2021-08-19 RX ORDER — OXYBUTYNIN CHLORIDE 5 MG
5 TABLET ORAL AT BEDTIME
Refills: 0 | Status: DISCONTINUED | OUTPATIENT
Start: 2021-08-19 | End: 2021-08-30

## 2021-08-19 RX ORDER — BACLOFEN 100 %
5 POWDER (GRAM) MISCELLANEOUS
Refills: 0 | Status: DISCONTINUED | OUTPATIENT
Start: 2021-08-19 | End: 2021-08-30

## 2021-08-19 RX ORDER — CEFEPIME 1 G/1
1000 INJECTION, POWDER, FOR SOLUTION INTRAMUSCULAR; INTRAVENOUS EVERY 12 HOURS
Refills: 0 | Status: DISCONTINUED | OUTPATIENT
Start: 2021-08-19 | End: 2021-08-19

## 2021-08-19 RX ORDER — CEFEPIME 1 G/1
1000 INJECTION, POWDER, FOR SOLUTION INTRAMUSCULAR; INTRAVENOUS ONCE
Refills: 0 | Status: DISCONTINUED | OUTPATIENT
Start: 2021-08-19 | End: 2021-08-19

## 2021-08-19 RX ORDER — CHLORHEXIDINE GLUCONATE 213 G/1000ML
15 SOLUTION TOPICAL
Refills: 0 | Status: DISCONTINUED | OUTPATIENT
Start: 2021-08-19 | End: 2021-08-30

## 2021-08-19 RX ORDER — ENOXAPARIN SODIUM 100 MG/ML
40 INJECTION SUBCUTANEOUS DAILY
Refills: 0 | Status: DISCONTINUED | OUTPATIENT
Start: 2021-08-19 | End: 2021-08-30

## 2021-08-19 RX ORDER — POLYETHYLENE GLYCOL 3350 17 G/17G
17 POWDER, FOR SOLUTION ORAL AT BEDTIME
Refills: 0 | Status: DISCONTINUED | OUTPATIENT
Start: 2021-08-19 | End: 2021-08-30

## 2021-08-19 RX ORDER — SODIUM CHLORIDE 9 MG/ML
500 INJECTION INTRAMUSCULAR; INTRAVENOUS; SUBCUTANEOUS
Refills: 0 | Status: DISCONTINUED | OUTPATIENT
Start: 2021-08-19 | End: 2021-08-27

## 2021-08-19 RX ORDER — CEFEPIME 1 G/1
INJECTION, POWDER, FOR SOLUTION INTRAMUSCULAR; INTRAVENOUS
Refills: 0 | Status: DISCONTINUED | OUTPATIENT
Start: 2021-08-19 | End: 2021-08-19

## 2021-08-19 RX ORDER — BETHANECHOL CHLORIDE 25 MG
25 TABLET ORAL AT BEDTIME
Refills: 0 | Status: DISCONTINUED | OUTPATIENT
Start: 2021-08-19 | End: 2021-08-30

## 2021-08-19 RX ORDER — ACETAMINOPHEN 500 MG
650 TABLET ORAL EVERY 6 HOURS
Refills: 0 | Status: DISCONTINUED | OUTPATIENT
Start: 2021-08-19 | End: 2021-08-30

## 2021-08-19 RX ORDER — AMLODIPINE BESYLATE 2.5 MG/1
5 TABLET ORAL DAILY
Refills: 0 | Status: DISCONTINUED | OUTPATIENT
Start: 2021-08-19 | End: 2021-08-27

## 2021-08-19 RX ORDER — TAMSULOSIN HYDROCHLORIDE 0.4 MG/1
0.4 CAPSULE ORAL AT BEDTIME
Refills: 0 | Status: DISCONTINUED | OUTPATIENT
Start: 2021-08-19 | End: 2021-08-30

## 2021-08-19 RX ORDER — VANCOMYCIN HCL 1 G
1000 VIAL (EA) INTRAVENOUS ONCE
Refills: 0 | Status: COMPLETED | OUTPATIENT
Start: 2021-08-19 | End: 2021-08-19

## 2021-08-19 RX ORDER — CITALOPRAM 10 MG/1
1 TABLET, FILM COATED ORAL
Qty: 0 | Refills: 0 | DISCHARGE

## 2021-08-19 RX ORDER — ONDANSETRON 8 MG/1
4 TABLET, FILM COATED ORAL EVERY 8 HOURS
Refills: 0 | Status: DISCONTINUED | OUTPATIENT
Start: 2021-08-19 | End: 2021-08-30

## 2021-08-19 RX ORDER — LANOLIN ALCOHOL/MO/W.PET/CERES
3 CREAM (GRAM) TOPICAL AT BEDTIME
Refills: 0 | Status: DISCONTINUED | OUTPATIENT
Start: 2021-08-19 | End: 2021-08-30

## 2021-08-19 RX ORDER — LAMOTRIGINE 25 MG/1
100 TABLET, ORALLY DISINTEGRATING ORAL
Refills: 0 | Status: DISCONTINUED | OUTPATIENT
Start: 2021-08-19 | End: 2021-08-30

## 2021-08-19 RX ORDER — SODIUM FLUORIDE 1.1 G/100G
1 GEL ORAL
Qty: 0 | Refills: 0 | DISCHARGE

## 2021-08-19 RX ORDER — SENNA PLUS 8.6 MG/1
2 TABLET ORAL AT BEDTIME
Refills: 0 | Status: DISCONTINUED | OUTPATIENT
Start: 2021-08-19 | End: 2021-08-30

## 2021-08-19 RX ORDER — TIZANIDINE 4 MG/1
1 TABLET ORAL
Qty: 0 | Refills: 0 | DISCHARGE

## 2021-08-19 RX ORDER — CEFEPIME 1 G/1
2000 INJECTION, POWDER, FOR SOLUTION INTRAMUSCULAR; INTRAVENOUS EVERY 12 HOURS
Refills: 0 | Status: DISCONTINUED | OUTPATIENT
Start: 2021-08-19 | End: 2021-08-30

## 2021-08-19 RX ORDER — VANCOMYCIN HCL 1 G
1000 VIAL (EA) INTRAVENOUS EVERY 12 HOURS
Refills: 0 | Status: DISCONTINUED | OUTPATIENT
Start: 2021-08-19 | End: 2021-08-26

## 2021-08-19 RX ADMIN — CHLORHEXIDINE GLUCONATE 15 MILLILITER(S): 213 SOLUTION TOPICAL at 11:37

## 2021-08-19 RX ADMIN — SODIUM CHLORIDE 125 MILLILITER(S): 9 INJECTION INTRAMUSCULAR; INTRAVENOUS; SUBCUTANEOUS at 01:25

## 2021-08-19 RX ADMIN — LAMOTRIGINE 100 MILLIGRAM(S): 25 TABLET, ORALLY DISINTEGRATING ORAL at 11:34

## 2021-08-19 RX ADMIN — ENOXAPARIN SODIUM 40 MILLIGRAM(S): 100 INJECTION SUBCUTANEOUS at 18:20

## 2021-08-19 RX ADMIN — Medication 1 TABLET(S): at 11:35

## 2021-08-19 RX ADMIN — SENNA PLUS 2 TABLET(S): 8.6 TABLET ORAL at 22:07

## 2021-08-19 RX ADMIN — LAMOTRIGINE 100 MILLIGRAM(S): 25 TABLET, ORALLY DISINTEGRATING ORAL at 22:06

## 2021-08-19 RX ADMIN — Medication 1 TABLET(S): at 11:41

## 2021-08-19 RX ADMIN — CHLORHEXIDINE GLUCONATE 15 MILLILITER(S): 213 SOLUTION TOPICAL at 23:13

## 2021-08-19 RX ADMIN — Medication 1 DROP(S): at 11:37

## 2021-08-19 RX ADMIN — Medication 5 MILLIGRAM(S): at 11:35

## 2021-08-19 RX ADMIN — AMLODIPINE BESYLATE 5 MILLIGRAM(S): 2.5 TABLET ORAL at 18:20

## 2021-08-19 RX ADMIN — Medication 0.2 MILLIGRAM(S): at 22:07

## 2021-08-19 RX ADMIN — PIPERACILLIN AND TAZOBACTAM 200 GRAM(S): 4; .5 INJECTION, POWDER, LYOPHILIZED, FOR SOLUTION INTRAVENOUS at 00:57

## 2021-08-19 RX ADMIN — Medication 250 MILLIGRAM(S): at 01:24

## 2021-08-19 RX ADMIN — Medication 25 MILLIGRAM(S): at 22:06

## 2021-08-19 RX ADMIN — Medication 250 MILLIGRAM(S): at 22:03

## 2021-08-19 RX ADMIN — CEFEPIME 100 MILLIGRAM(S): 1 INJECTION, POWDER, FOR SOLUTION INTRAMUSCULAR; INTRAVENOUS at 23:12

## 2021-08-19 RX ADMIN — Medication 5 MILLIGRAM(S): at 22:06

## 2021-08-19 RX ADMIN — POLYETHYLENE GLYCOL 3350 17 GRAM(S): 17 POWDER, FOR SOLUTION ORAL at 22:07

## 2021-08-19 RX ADMIN — TAMSULOSIN HYDROCHLORIDE 0.4 MILLIGRAM(S): 0.4 CAPSULE ORAL at 22:06

## 2021-08-19 RX ADMIN — CEFEPIME 1000 MILLIGRAM(S): 1 INJECTION, POWDER, FOR SOLUTION INTRAMUSCULAR; INTRAVENOUS at 11:47

## 2021-08-19 RX ADMIN — Medication 5 MILLIGRAM(S): at 22:03

## 2021-08-19 NOTE — DIETITIAN INITIAL EVALUATION ADULT. - PERTINENT MEDS FT
MEDICATIONS  (STANDING):  artificial  tears Solution 1 Drop(s) Both EYES daily  baclofen 5 milliGRAM(s) Oral two times a day  bethanechol 25 milliGRAM(s) Oral at bedtime  calcium carbonate 1250 mG  + Vitamin D (OsCal 500 + D) 1 Tablet(s) Oral daily  cefepime   IVPB      chlorhexidine 0.12% Liquid 15 milliLiter(s) Oral Mucosa two times a day  cloNIDine 0.2 milliGRAM(s) Oral at bedtime  lamoTRIgine 100 milliGRAM(s) Oral two times a day  multivitamin 1 Tablet(s) Oral daily  oxybutynin 5 milliGRAM(s) Oral at bedtime  polyethylene glycol 3350 17 Gram(s) Oral at bedtime  senna 2 Tablet(s) Oral at bedtime  sodium chloride 0.9%. 500 milliLiter(s) (125 mL/Hr) IV Continuous <Continuous>  tamsulosin 0.4 milliGRAM(s) Oral at bedtime  vancomycin  IVPB 1000 milliGRAM(s) IV Intermittent every 12 hours    MEDICATIONS  (PRN):  acetaminophen   Tablet .. 650 milliGRAM(s) Oral every 6 hours PRN Temp greater or equal to 38.5C (101.3F), Mild Pain (1 - 3)  aluminum hydroxide/magnesium hydroxide/simethicone Suspension 30 milliLiter(s) Oral every 4 hours PRN Dyspepsia  melatonin 3 milliGRAM(s) Oral at bedtime PRN Insomnia  ondansetron Injectable 4 milliGRAM(s) IV Push every 8 hours PRN Nausea and/or Vomiting

## 2021-08-19 NOTE — CONSULT NOTE ADULT - SUBJECTIVE AND OBJECTIVE BOX
Patient is a 55y old  Male who presents with a chief complaint of Sacral wound    HPI:  56 y/o Male with h/o cerebral palsy, HTN, developmentally disability, neurogenic bladder, psoriasis, spinal stenosis, seizure disorder, dysphagia was admitted on 8/19 from group home with worsening sacral ulcer with discharge. Patient with baseline confusion, denies any acute complaints. Aide states pt was in a rehab and 2 weeks PTA returned to group home with open wound to sacrum and wound appears to be getting larger and deeper. States pt had yellow discharge a few days ago but aides hasn't seen wound since then. No fever reported. In ER he received vancomycin IV and cefepime.     PMH: as above  PSH: as above  Meds: per reconciliation sheet, noted below  MEDICATIONS  (STANDING):  artificial  tears Solution 1 Drop(s) Both EYES daily  baclofen 5 milliGRAM(s) Oral two times a day  bethanechol 25 milliGRAM(s) Oral at bedtime  calcium carbonate 1250 mG  + Vitamin D (OsCal 500 + D) 1 Tablet(s) Oral daily  cefepime  Injectable. 1000 milliGRAM(s) IV Push every 12 hours  chlorhexidine 0.12% Liquid 15 milliLiter(s) Oral Mucosa two times a day  cloNIDine 0.2 milliGRAM(s) Oral at bedtime  enoxaparin Injectable 40 milliGRAM(s) SubCutaneous daily  lamoTRIgine 100 milliGRAM(s) Oral two times a day  multivitamin 1 Tablet(s) Oral daily  oxybutynin 5 milliGRAM(s) Oral at bedtime  polyethylene glycol 3350 17 Gram(s) Oral at bedtime  senna 2 Tablet(s) Oral at bedtime  sodium chloride 0.9%. 500 milliLiter(s) (125 mL/Hr) IV Continuous <Continuous>  tamsulosin 0.4 milliGRAM(s) Oral at bedtime  vancomycin  IVPB 1000 milliGRAM(s) IV Intermittent every 12 hours    MEDICATIONS  (PRN):  acetaminophen   Tablet .. 650 milliGRAM(s) Oral every 6 hours PRN Temp greater or equal to 38.5C (101.3F), Mild Pain (1 - 3)  aluminum hydroxide/magnesium hydroxide/simethicone Suspension 30 milliLiter(s) Oral every 4 hours PRN Dyspepsia  melatonin 3 milliGRAM(s) Oral at bedtime PRN Insomnia  ondansetron Injectable 4 milliGRAM(s) IV Push every 8 hours PRN Nausea and/or Vomiting    Allergies    No Known Allergies    Intolerances      Social: no smoking, no alcohol, no illegal drugs; no recent travel, no exposure to TB  FAMILY HISTORY:  Family history of breast cancer (Grandparent)  Grandmother on mother&#x27;s side    Family history of MI (myocardial infarction) (Father)  Father      no history of premature cardiovascular disease in first degree relatives    ROS: the patient is confused; unobtainable  All other systems reviewed and are negative    Vital Signs Last 24 Hrs  T(C): 36.5 (19 Aug 2021 07:51), Max: 36.8 (18 Aug 2021 17:30)  T(F): 97.7 (19 Aug 2021 07:51), Max: 98.3 (18 Aug 2021 17:30)  HR: 96 (19 Aug 2021 07:51) (72 - 96)  BP: 139/84 (19 Aug 2021 07:51) (139/84 - 169/106)  BP(mean): --  RR: 19 (19 Aug 2021 07:51) (15 - 19)  SpO2: 98% (19 Aug 2021 07:51) (98% - 99%)  Daily Height in cm: 177.8 (18 Aug 2021 17:30)    Daily     PE:    Constitutional:  No acute distress  HEENT: NC/AT, EOMI, PERRLA, conjunctivae clear; ears and nose atraumatic; pharynx benign  Neck: supple; thyroid not palpable  Back: no tenderness  Respiratory: respiratory effort normal; decreased BS at bases  Cardiovascular: S1S2 regular, no murmurs  Abdomen: soft, not tender, not distended, positive BS; no liver or spleen organomegaly  Genitourinary: no suprapubic tenderness  Lymphatic: no LN palpable  Musculoskeletal: no muscle tenderness, no joint swelling or tenderness  Extremities: no pedal edema  Neurological/ Psychiatric: AxOx3, judgement and insight normal; moving all extremities  Skin: no rashes; no palpable lesions    Labs: all available labs reviewed                        12.6   7.02  )-----------( 241      ( 18 Aug 2021 21:27 )             37.1     08-18    135  |  103  |  29<H>  ----------------------------<  102<H>  3.8   |  28  |  0.76    Ca    9.5      18 Aug 2021 21:27    TPro  7.1  /  Alb  3.1<L>  /  TBili  0.3  /  DBili  x   /  AST  19  /  ALT  22  /  AlkPhos  93  08-18     LIVER FUNCTIONS - ( 18 Aug 2021 21:27 )  Alb: 3.1 g/dL / Pro: 7.1 gm/dL / ALK PHOS: 93 U/L / ALT: 22 U/L / AST: 19 U/L / GGT: x           COVID-19 PCR: Simi (08-18-21 @ 21:43)      Radiology: all available radiological tests reviewed        Advanced directives addressed: full resuscitation Patient is a 55y old  Male who presents with a chief complaint of Sacral wound    HPI:  56 y/o Male with h/o cerebral palsy, HTN, developmentally disability, neurogenic bladder, psoriasis, spinal stenosis, seizure disorder, dysphagia was admitted on 8/19 from group home with worsening sacral ulcer with discharge. Patient with baseline confusion, denies any acute complaints. Aide states pt was in a rehab and 2 weeks PTA returned to group home with open wound to sacrum and wound appears to be getting larger and deeper. States pt had yellow discharge a few days ago but aides hasn't seen wound since then. No fever reported. In ER he received vancomycin IV and cefepime.     PMH: as above  PSH: as above  Meds: per reconciliation sheet, noted below  MEDICATIONS  (STANDING):  artificial  tears Solution 1 Drop(s) Both EYES daily  baclofen 5 milliGRAM(s) Oral two times a day  bethanechol 25 milliGRAM(s) Oral at bedtime  calcium carbonate 1250 mG  + Vitamin D (OsCal 500 + D) 1 Tablet(s) Oral daily  cefepime  Injectable. 1000 milliGRAM(s) IV Push every 12 hours  chlorhexidine 0.12% Liquid 15 milliLiter(s) Oral Mucosa two times a day  cloNIDine 0.2 milliGRAM(s) Oral at bedtime  enoxaparin Injectable 40 milliGRAM(s) SubCutaneous daily  lamoTRIgine 100 milliGRAM(s) Oral two times a day  multivitamin 1 Tablet(s) Oral daily  oxybutynin 5 milliGRAM(s) Oral at bedtime  polyethylene glycol 3350 17 Gram(s) Oral at bedtime  senna 2 Tablet(s) Oral at bedtime  sodium chloride 0.9%. 500 milliLiter(s) (125 mL/Hr) IV Continuous <Continuous>  tamsulosin 0.4 milliGRAM(s) Oral at bedtime  vancomycin  IVPB 1000 milliGRAM(s) IV Intermittent every 12 hours    MEDICATIONS  (PRN):  acetaminophen   Tablet .. 650 milliGRAM(s) Oral every 6 hours PRN Temp greater or equal to 38.5C (101.3F), Mild Pain (1 - 3)  aluminum hydroxide/magnesium hydroxide/simethicone Suspension 30 milliLiter(s) Oral every 4 hours PRN Dyspepsia  melatonin 3 milliGRAM(s) Oral at bedtime PRN Insomnia  ondansetron Injectable 4 milliGRAM(s) IV Push every 8 hours PRN Nausea and/or Vomiting    Allergies    No Known Allergies    Intolerances      Social: no smoking, no alcohol, no illegal drugs; no recent travel, no exposure to TB  FAMILY HISTORY:  Family history of breast cancer (Grandparent)  Grandmother on mother&#x27;s side    Family history of MI (myocardial infarction) (Father)  Father      no history of premature cardiovascular disease in first degree relatives    ROS: the patient is confused; unobtainable  All other systems reviewed and are negative    Vital Signs Last 24 Hrs  T(C): 36.5 (19 Aug 2021 07:51), Max: 36.8 (18 Aug 2021 17:30)  T(F): 97.7 (19 Aug 2021 07:51), Max: 98.3 (18 Aug 2021 17:30)  HR: 96 (19 Aug 2021 07:51) (72 - 96)  BP: 139/84 (19 Aug 2021 07:51) (139/84 - 169/106)  BP(mean): --  RR: 19 (19 Aug 2021 07:51) (15 - 19)  SpO2: 98% (19 Aug 2021 07:51) (98% - 99%)  Daily Height in cm: 177.8 (18 Aug 2021 17:30)    Daily     PE:    Constitutional:  No acute distress  HEENT: NC/AT, EOMI, PERRLA, conjunctivae clear; ears and nose atraumatic; pharynx benign  Neck: supple; thyroid not palpable  Back: no tenderness  Respiratory: respiratory effort normal; decreased BS at bases  Cardiovascular: S1S2 regular, no murmurs  Abdomen: soft, not tender, not distended, positive BS; no liver or spleen organomegaly  Genitourinary: no suprapubic tenderness  Lymphatic: no LN palpable  Musculoskeletal: no muscle tenderness, no joint swelling or tenderness  Large sacral decubitus ulcer, surrounded by skin with raised margins; periwound erythema; bone palpable  Extremities: no pedal edema  Neurological/ Psychiatric: confused, judgement and insight impaired; moving all extremities  Skin: no rashes; no palpable lesions    Labs: all available labs reviewed                        12.6   7.02  )-----------( 241      ( 18 Aug 2021 21:27 )             37.1     08-18    135  |  103  |  29<H>  ----------------------------<  102<H>  3.8   |  28  |  0.76    Ca    9.5      18 Aug 2021 21:27    TPro  7.1  /  Alb  3.1<L>  /  TBili  0.3  /  DBili  x   /  AST  19  /  ALT  22  /  AlkPhos  93  08-18     LIVER FUNCTIONS - ( 18 Aug 2021 21:27 )  Alb: 3.1 g/dL / Pro: 7.1 gm/dL / ALK PHOS: 93 U/L / ALT: 22 U/L / AST: 19 U/L / GGT: x           COVID-19 PCR: NotDetec (08-18-21 @ 21:43)      Radiology: all available radiological tests reviewed        Advanced directives addressed: full resuscitation

## 2021-08-19 NOTE — H&P ADULT - NSHPLABSRESULTS_GEN_ALL_CORE
LABS: All Labs Reviewed:                        12.6   7.02  )-----------( 241      ( 18 Aug 2021 21:27 )             37.1     08-18    135  |  103  |  29<H>  ----------------------------<  102<H>  3.8   |  28  |  0.76    Ca    9.5      18 Aug 2021 21:27    TPro  7.1  /  Alb  3.1<L>  /  TBili  0.3  /  DBili  x   /  AST  19  /  ALT  22  /  AlkPhos  93  08-18    PT/INR - ( 18 Aug 2021 21:27 )   PT: 14.0 sec;   INR: 1.21 ratio         PTT - ( 18 Aug 2021 21:27 )  PTT:31.8 sec

## 2021-08-19 NOTE — DIETITIAN INITIAL EVALUATION ADULT. - ADD RECOMMEND
1) continue with mechanical soft diet (ground) and encourage protein enriched foods with all meals 2) continue with MVI w/ minerals and add vitamin C 500mg po BID and Zinc sulfate 220mg po BID x 10 days to promote wound healing 3) monitor lytes and hydration replete as needed 4) monitor daily weights and track trends. 5) monitor BM if none x > 3 days initiate additional bowel meds

## 2021-08-19 NOTE — DIETITIAN INITIAL EVALUATION ADULT. - PERTINENT LABORATORY DATA
08-18    135  |  103  |  29<H>  ----------------------------<  102<H>  3.8   |  28  |  0.76    Ca    9.5      18 Aug 2021 21:27    TPro  7.1  /  Alb  3.1<L>  /  TBili  0.3  /  DBili  x   /  AST  19  /  ALT  22  /  AlkPhos  93  08-18  BMI: BMI (kg/m2): 21.5 (08-18-21 @ 17:30)  HbA1c:   Glucose: POCT Blood Glucose.: 90 mg/dL (05-25-21 @ 12:31)    BP: 139/84 (08-19-21 @ 07:51) (139/84 - 169/106)  Lipid Panel: Date/Time: 05-01-21 @ 08:03  Cholesterol, Serum: 150  Direct LDL: --  HDL Cholesterol, Serum: 113  Total Cholesterol/HDL Ration Measurement: --  Triglycerides, Serum: 58

## 2021-08-19 NOTE — H&P ADULT - HISTORY OF PRESENT ILLNESS
54 y/o M w/ PMH of cerebral palsy, HTN, developmentally delayed, neurogenic bladder, psoriasis, spinal stenosis, seizure disorder, dysphagia presents from group home with worsening sacral ulcer with discharge.  Patient with baseline confusion, denies any acute complaints. Aide states pt was in a rehab and 2 weeks ago returned to group home with open wound to sacrum and wound appears to be getting larger and deeper.   States pt had possibly yellow discharge a few days ago but aides hasn't seen wound since.No fever.

## 2021-08-19 NOTE — H&P ADULT - NSHPPHYSICALEXAM_GEN_ALL_CORE
Vital Signs Last 24 Hrs  T(C): 36.5 (19 Aug 2021 07:51), Max: 36.8 (18 Aug 2021 17:30)  T(F): 97.7 (19 Aug 2021 07:51), Max: 98.3 (18 Aug 2021 17:30)  HR: 96 (19 Aug 2021 07:51) (72 - 96)  BP: 139/84 (19 Aug 2021 07:51) (139/84 - 169/106)  BP(mean): --  RR: 19 (19 Aug 2021 07:51) (15 - 19)  SpO2: 98% (19 Aug 2021 07:51) (98% - 99%)    I&O's Summary    19 Aug 2021 07:01  -  19 Aug 2021 11:38  --------------------------------------------------------  IN: 0 mL / OUT: 1100 mL / NET: -1100 mL  PHYSICAL EXAM:  Constitutional: NAD, awake and alert, frail  HEENT: PERR, EOMI, Normal Hearing, MMM  Neck: Soft and supple, No LAD, No JVD  Respiratory: Breath sounds are clear bilaterally, No wheezing, rales or rhonchi  Cardiovascular: S1 and S2, regular rate and rhythm, + Murmurs, gallops or rubs  Gastrointestinal: Bowel Sounds present, soft, nontender, nondistended, no guarding, no rebound  Extremities: No peripheral edema  Vascular: 2+ peripheral pulses  Neurological: A/O x 1, no focal deficits  Musculoskeletal: 5/5 strength b/l upper and lower extremities, slight contractures  Skin: Large sacral wound with discharge, no flutuance, Stage IV

## 2021-08-19 NOTE — DIETITIAN NUTRITION RISK NOTIFICATION - ADDITIONAL COMMENTS/DIETITIAN RECOMMENDATIONS
· Additional Recommendations  1) continue with mechanical soft diet (ground) and encourage protein enriched foods with all meals 2) continue with MVI w/ minerals and add vitamin C 500mg po BID and Zinc sulfate 220mg po BID x 10 days to promote wound healing 3) monitor lytes and hydration replete as needed 4) monitor daily weights and track trends. 5) monitor BM if none x > 3 days initiate additional bowel meds

## 2021-08-19 NOTE — PATIENT PROFILE ADULT - NSPROGENSOURCEINFO_GEN_A_NUR
patient/lacks capacity and has no surrogate decision maker Unable to accurately assess due to cognitive limitations/patient

## 2021-08-19 NOTE — H&P ADULT - ASSESSMENT
54 y/o M w/ PMH of cerebral palsy, HTN, developmentally delayed, neurogenic bladder, psoriasis, spinal stenosis, seizure disorder, dysphagia presents from group home with worsening sacral ulcer with discharge.    1) Sacral Decubitus Infection r/o Osteomyelitis    - DC zosyn    - Start Cefepime and Vanco    - Check trough    - Fu Culture    - Wound Care eval and ID eval    - Check ESR CRP    - Xray Sacrum    2) History of HTN, Neurogenic bladder, psoriasis, spinal stenosis and seizure disorder     - all stable, continue current care    3) DVT prophylaxis     - Lovenox

## 2021-08-19 NOTE — CONSULT NOTE ADULT - ASSESSMENT
56 y/o Male with h/o cerebral palsy, HTN, developmentally disability, neurogenic bladder, psoriasis, spinal stenosis, seizure disorder, dysphagia was admitted on 8/19 from group home with worsening sacral ulcer with discharge. Patient with baseline confusion, denies any acute complaints. Aide states pt was in a rehab and 2 weeks PTA returned to group home with open wound to sacrum and wound appears to be getting larger and deeper. States pt had yellow discharge a few days ago but aides hasn't seen wound since then. No fever reported. In ER he received vancomycin IV and cefepime.     1. Sacral decubitus ulcer. Probable acute on chronic sacral OM.    54 y/o Male with h/o cerebral palsy, HTN, developmentally disability, neurogenic bladder, psoriasis, spinal stenosis, seizure disorder, dysphagia was admitted on 8/19 from group home with worsening sacral ulcer with discharge. Patient with baseline confusion, denies any acute complaints. Aide states pt was in a rehab and 2 weeks PTA returned to group home with open wound to sacrum and wound appears to be getting larger and deeper. States pt had yellow discharge a few days ago but aides hasn't seen wound since then. No fever reported. In ER he received vancomycin IV and cefepime.     1. Sacral decubitus ulcer. Probable acute on chronic sacral OM. Cerebral palsy. Functionally quadriplegic.   -no clinical evidence of sepsis  -large sacral wound noted  -start vancomycin 1 gm IV q12h and cefepime 2 gm IV q12h  -reason for abx use and side effects reviewed with patient; monitor BMP and vancomycin trough levels   -local wound care  -consider surgical evaluation  -old chart reviewed to assess prior cultures  -monitor temps  -f/u CBC  -supportive care  2. Other issues:   -care per medicine

## 2021-08-19 NOTE — DIETITIAN INITIAL EVALUATION ADULT. - MALNUTRITION
severe protein/calorie malnutrition in context of chronic illness AEB increased demand of protein 2/2 non healing wound/infection. severe protein/calorie malnutrition in context of chronic illness

## 2021-08-19 NOTE — DIETITIAN INITIAL EVALUATION ADULT. - OTHER INFO
56 y/o M w/ PMH of cerebral palsy, HTN, developmentally delayed, neurogenic bladder, psoriasis, spinal stenosis, seizure disorder, p/w dysphagia. No bowel movement documented since admission x 1 day. Bowel meds: polyethylene glycol 3350 17 Gram(s) Oral at bedtime, senna 2 Tablet(s) Oral at bedtime. Admitted with chronic sacral ulcer ?infection? Pt with fair po intake total assistance needed with all meals. Current diet with modified consistency- high risk for aspiration. NFPE indicates severe muscle/fat wasting. Pt appears extremely thin and frail. Admission weight inaccurate during visit for nutrition assessment RD took bedscale weight on 8/19- 114# indicating a non clinical weight loss x last hospitalization- 2.3% (21/2 months). Pt willing to consume Ensure shake in between meals for additional protein. Pt meets criteria for severe protein/calorie malnutrition in context of chronic illness AEB increased demand of protein 2/2 non healing wound/infection.

## 2021-08-19 NOTE — DIETITIAN NUTRITION RISK NOTIFICATION - TREATMENT: THE FOLLOWING DIET HAS BEEN RECOMMENDED
Diet, Mechanical Soft:   Prosource Gelatein Plus     Qty per Day:  TID (08-19-21 @ 09:00) [Active]

## 2021-08-20 LAB
CULTURE RESULTS: SIGNIFICANT CHANGE UP
GRAM STN FLD: SIGNIFICANT CHANGE UP
ORGANISM # SPEC MICROSCOPIC CNT: SIGNIFICANT CHANGE UP
ORGANISM # SPEC MICROSCOPIC CNT: SIGNIFICANT CHANGE UP
SPECIMEN SOURCE: SIGNIFICANT CHANGE UP

## 2021-08-20 PROCEDURE — 99233 SBSQ HOSP IP/OBS HIGH 50: CPT

## 2021-08-20 RX ADMIN — LAMOTRIGINE 100 MILLIGRAM(S): 25 TABLET, ORALLY DISINTEGRATING ORAL at 21:25

## 2021-08-20 RX ADMIN — Medication 1 TABLET(S): at 09:34

## 2021-08-20 RX ADMIN — Medication 5 MILLIGRAM(S): at 21:25

## 2021-08-20 RX ADMIN — Medication 250 MILLIGRAM(S): at 09:37

## 2021-08-20 RX ADMIN — ENOXAPARIN SODIUM 40 MILLIGRAM(S): 100 INJECTION SUBCUTANEOUS at 09:36

## 2021-08-20 RX ADMIN — SENNA PLUS 2 TABLET(S): 8.6 TABLET ORAL at 21:32

## 2021-08-20 RX ADMIN — CEFEPIME 100 MILLIGRAM(S): 1 INJECTION, POWDER, FOR SOLUTION INTRAMUSCULAR; INTRAVENOUS at 21:32

## 2021-08-20 RX ADMIN — Medication 250 MILLIGRAM(S): at 22:38

## 2021-08-20 RX ADMIN — TAMSULOSIN HYDROCHLORIDE 0.4 MILLIGRAM(S): 0.4 CAPSULE ORAL at 21:25

## 2021-08-20 RX ADMIN — POLYETHYLENE GLYCOL 3350 17 GRAM(S): 17 POWDER, FOR SOLUTION ORAL at 22:38

## 2021-08-20 RX ADMIN — Medication 0.2 MILLIGRAM(S): at 21:32

## 2021-08-20 RX ADMIN — CHLORHEXIDINE GLUCONATE 15 MILLILITER(S): 213 SOLUTION TOPICAL at 09:35

## 2021-08-20 RX ADMIN — Medication 5 MILLIGRAM(S): at 21:30

## 2021-08-20 RX ADMIN — Medication 1 DROP(S): at 09:34

## 2021-08-20 RX ADMIN — Medication 1 TABLET(S): at 09:38

## 2021-08-20 RX ADMIN — CHLORHEXIDINE GLUCONATE 15 MILLILITER(S): 213 SOLUTION TOPICAL at 21:26

## 2021-08-20 RX ADMIN — Medication 25 MILLIGRAM(S): at 21:25

## 2021-08-20 RX ADMIN — LAMOTRIGINE 100 MILLIGRAM(S): 25 TABLET, ORALLY DISINTEGRATING ORAL at 09:38

## 2021-08-20 RX ADMIN — CEFEPIME 100 MILLIGRAM(S): 1 INJECTION, POWDER, FOR SOLUTION INTRAMUSCULAR; INTRAVENOUS at 09:35

## 2021-08-20 RX ADMIN — Medication 5 MILLIGRAM(S): at 09:34

## 2021-08-20 RX ADMIN — AMLODIPINE BESYLATE 5 MILLIGRAM(S): 2.5 TABLET ORAL at 09:34

## 2021-08-20 NOTE — PROGRESS NOTE ADULT - SUBJECTIVE AND OBJECTIVE BOX
56 y/o M w/ PMH of cerebral palsy, HTN, developmentally delayed, neurogenic bladder, psoriasis, spinal stenosis, seizure disorder, dysphagia presents from group home with worsening sacral ulcer with discharge.  Patient with baseline confusion, denies any acute complaints. Aide states pt was in a rehab and 2 weeks ago returned to group home with open wound to sacrum and wound appears to be getting larger and deeper.   States pt had possibly yellow discharge a few days ago but aides hasn't seen wound since.No fever.    Patient seen this am, no acute complaints. Does not appear in distress    Review of Systems: ROS: Negative except for above      Vital Signs Last 24 Hrs  T(C): 36.8 (20 Aug 2021 08:07), Max: 36.8 (20 Aug 2021 08:07)  T(F): 98.2 (20 Aug 2021 08:07), Max: 98.2 (20 Aug 2021 08:07)  HR: 78 (20 Aug 2021 08:07) (78 - 98)  BP: 96/55 (20 Aug 2021 08:07) (96/55 - 161/100)  BP(mean): --  RR: 18 (20 Aug 2021 08:07) (18 - 18)  SpO2: 95% (20 Aug 2021 08:07) (95% - 98%)    I&O's Summary    19 Aug 2021 07:01  -  20 Aug 2021 07:00  --------------------------------------------------------  IN: 0 mL / OUT: 1975 mL / NET: -1975 m      PHYSICAL EXAM:  Constitutional: NAD, awake and alert, frail  HEENT: PERR, EOMI, Normal Hearing, MMM  Neck: Soft and supple, No LAD, No JVD  Respiratory: Breath sounds are clear bilaterally, No wheezing, rales or rhonchi  Cardiovascular: S1 and S2, regular rate and rhythm, + Murmurs, gallops or rubs  Gastrointestinal: Bowel Sounds present, soft, nontender, nondistended, no guarding, no rebound  Extremities: No peripheral edema  Vascular: 2+ peripheral pulses  Neurological: A/O x 1, no focal deficits  Musculoskeletal: 5/5 strength b/l upper and lower extremities, slight contractures  Skin: Large sacral wound with discharge, no flutuance, Stage IV    LABS: All Labs Reviewed:                        13.1   7.28  )-----------( 275      ( 19 Aug 2021 14:06 )             37.2     08-19    138  |  105  |  20  ----------------------------<  106<H>  3.9   |  27  |  0.69    Ca    9.7      19 Aug 2021 14:06    TPro  7.6  /  Alb  3.2<L>  /  TBili  0.3  /  DBili  x   /  AST  20  /  ALT  26  /  AlkPhos  97  08-19    PT/INR - ( 18 Aug 2021 21:27 )   PT: 14.0 sec;   INR: 1.21 ratio         PTT - ( 18 Aug 2021 21:27 )  PTT:31.8 sec

## 2021-08-20 NOTE — PROGRESS NOTE ADULT - SUBJECTIVE AND OBJECTIVE BOX
Date of service: 08-20-21 @ 09:56    Lying in bed in NAD  Alert and confused  Dose not seem in pain    ROS: no fever or chills; limited    MEDICATIONS  (STANDING):  amLODIPine   Tablet 5 milliGRAM(s) Oral daily  artificial  tears Solution 1 Drop(s) Both EYES daily  baclofen 5 milliGRAM(s) Oral two times a day  bethanechol 25 milliGRAM(s) Oral at bedtime  calcium carbonate 1250 mG  + Vitamin D (OsCal 500 + D) 1 Tablet(s) Oral daily  cefepime   IVPB 2000 milliGRAM(s) IV Intermittent every 12 hours  chlorhexidine 0.12% Liquid 15 milliLiter(s) Oral Mucosa two times a day  cloNIDine 0.2 milliGRAM(s) Oral at bedtime  enoxaparin Injectable 40 milliGRAM(s) SubCutaneous daily  lamoTRIgine 100 milliGRAM(s) Oral two times a day  multivitamin 1 Tablet(s) Oral daily  oxybutynin 5 milliGRAM(s) Oral at bedtime  polyethylene glycol 3350 17 Gram(s) Oral at bedtime  senna 2 Tablet(s) Oral at bedtime  sodium chloride 0.9%. 500 milliLiter(s) (125 mL/Hr) IV Continuous <Continuous>  tamsulosin 0.4 milliGRAM(s) Oral at bedtime  vancomycin  IVPB 1000 milliGRAM(s) IV Intermittent every 12 hours    Vital Signs Last 24 Hrs  T(C): 36.8 (20 Aug 2021 08:07), Max: 36.8 (20 Aug 2021 08:07)  T(F): 98.2 (20 Aug 2021 08:07), Max: 98.2 (20 Aug 2021 08:07)  HR: 78 (20 Aug 2021 08:07) (78 - 98)  BP: 96/55 (20 Aug 2021 08:07) (96/55 - 161/100)  BP(mean): --  RR: 18 (20 Aug 2021 08:07) (18 - 18)  SpO2: 95% (20 Aug 2021 08:07) (95% - 98%)     Physical exam:    Constitutional:  No acute distress  HEENT: NC/AT, EOMI, PERRLA, conjunctivae clear  Neck: supple; thyroid not palpable  Back: no tenderness  Respiratory: respiratory effort normal; decreased BS at bases  Cardiovascular: S1S2 regular, no murmurs  Abdomen: soft, not tender, not distended, positive BS  Genitourinary: no suprapubic tenderness  Lymphatic: no LN palpable  Musculoskeletal: no muscle tenderness, no joint swelling or tenderness  Large sacral decubitus ulcer, surrounded by skin with raised margins; periwound erythema; bone is palpable  Extremities: no pedal edema  Left heel pressure ulcer clean  Neurological/ Psychiatric: confused, moving all extremities  Skin: no rashes; no palpable lesions    Labs: all available labs reviewed                        12.6   7.02  )-----------( 241      ( 18 Aug 2021 21:27 )             37.1     08-18    135  |  103  |  29<H>  ----------------------------<  102<H>  3.8   |  28  |  0.76    Ca    9.5      18 Aug 2021 21:27    TPro  7.1  /  Alb  3.1<L>  /  TBili  0.3  /  DBili  x   /  AST  19  /  ALT  22  /  AlkPhos  93  08-18     LIVER FUNCTIONS - ( 18 Aug 2021 21:27 )  Alb: 3.1 g/dL / Pro: 7.1 gm/dL / ALK PHOS: 93 U/L / ALT: 22 U/L / AST: 19 U/L / GGT: x           COVID-19 PCR: NotDetec (08-18-21 @ 21:43)      Radiology: all available radiological tests reviewed        Advanced directives addressed: full resuscitation

## 2021-08-20 NOTE — PROVIDER CONTACT NOTE (CRITICAL VALUE NOTIFICATION) - SITUATION
Patient with hx. of cerebral palsy, functional quadaplegia, sz disorders. Patient admitted for eval of sacral ulcer. Pt. blood culture shows growth in aerobic bottle, gram positive cocci in clusters.

## 2021-08-21 LAB
CULTURE RESULTS: SIGNIFICANT CHANGE UP
SPECIMEN SOURCE: SIGNIFICANT CHANGE UP
VANCOMYCIN TROUGH SERPL-MCNC: 17.3 UG/ML — SIGNIFICANT CHANGE UP (ref 10–20)

## 2021-08-21 PROCEDURE — 99232 SBSQ HOSP IP/OBS MODERATE 35: CPT

## 2021-08-21 RX ORDER — ALPRAZOLAM 0.25 MG
0.25 TABLET ORAL
Refills: 0 | Status: DISCONTINUED | OUTPATIENT
Start: 2021-08-21 | End: 2021-08-23

## 2021-08-21 RX ADMIN — CHLORHEXIDINE GLUCONATE 15 MILLILITER(S): 213 SOLUTION TOPICAL at 10:11

## 2021-08-21 RX ADMIN — Medication 3 MILLIGRAM(S): at 20:57

## 2021-08-21 RX ADMIN — CEFEPIME 100 MILLIGRAM(S): 1 INJECTION, POWDER, FOR SOLUTION INTRAMUSCULAR; INTRAVENOUS at 10:09

## 2021-08-21 RX ADMIN — Medication 1 TABLET(S): at 10:10

## 2021-08-21 RX ADMIN — Medication 25 MILLIGRAM(S): at 20:58

## 2021-08-21 RX ADMIN — Medication 0.25 MILLIGRAM(S): at 22:06

## 2021-08-21 RX ADMIN — ENOXAPARIN SODIUM 40 MILLIGRAM(S): 100 INJECTION SUBCUTANEOUS at 10:10

## 2021-08-21 RX ADMIN — Medication 250 MILLIGRAM(S): at 12:15

## 2021-08-21 RX ADMIN — SENNA PLUS 2 TABLET(S): 8.6 TABLET ORAL at 20:57

## 2021-08-21 RX ADMIN — Medication 5 MILLIGRAM(S): at 10:13

## 2021-08-21 RX ADMIN — Medication 5 MILLIGRAM(S): at 21:05

## 2021-08-21 RX ADMIN — Medication 250 MILLIGRAM(S): at 22:06

## 2021-08-21 RX ADMIN — CHLORHEXIDINE GLUCONATE 15 MILLILITER(S): 213 SOLUTION TOPICAL at 20:58

## 2021-08-21 RX ADMIN — LAMOTRIGINE 100 MILLIGRAM(S): 25 TABLET, ORALLY DISINTEGRATING ORAL at 20:58

## 2021-08-21 RX ADMIN — Medication 5 MILLIGRAM(S): at 20:57

## 2021-08-21 RX ADMIN — Medication 1 DROP(S): at 10:12

## 2021-08-21 RX ADMIN — POLYETHYLENE GLYCOL 3350 17 GRAM(S): 17 POWDER, FOR SOLUTION ORAL at 20:52

## 2021-08-21 RX ADMIN — TAMSULOSIN HYDROCHLORIDE 0.4 MILLIGRAM(S): 0.4 CAPSULE ORAL at 20:58

## 2021-08-21 RX ADMIN — CEFEPIME 100 MILLIGRAM(S): 1 INJECTION, POWDER, FOR SOLUTION INTRAMUSCULAR; INTRAVENOUS at 20:59

## 2021-08-21 RX ADMIN — LAMOTRIGINE 100 MILLIGRAM(S): 25 TABLET, ORALLY DISINTEGRATING ORAL at 10:11

## 2021-08-21 RX ADMIN — Medication 0.2 MILLIGRAM(S): at 20:58

## 2021-08-21 NOTE — PROGRESS NOTE ADULT - SUBJECTIVE AND OBJECTIVE BOX
Cheif complaints and Diagnosis: sacral decbitus/ rule out osteomyelitis    Subjective: no complaints      REVIEW OF SYSTEMS:    CONSTITUTIONAL: No weakness, fevers or chills  EYES/ENT: No visual changes;  No vertigo or throat pain   NECK: No pain or stiffness  RESPIRATORY: No cough, wheezing, hemoptysis; No shortness of breath  CARDIOVASCULAR: No chest pain or palpitations  GASTROINTESTINAL: No abdominal or epigastric pain. No nausea, vomiting, or hematemesis; No diarrhea or constipation. No melena or hematochezia.  GENITOURINARY: No dysuria, frequency or hematuria  NEUROLOGICAL: No numbness or weakness  SKIN: No itching, burning, rashes, or lesions   All other review of systems is negative unless indicated above      Vital Signs Last 24 Hrs  T(C): 36.2 (21 Aug 2021 07:44), Max: 36.7 (20 Aug 2021 15:03)  T(F): 97.1 (21 Aug 2021 07:44), Max: 98 (20 Aug 2021 15:03)  HR: 80 (21 Aug 2021 10:09) (80 - 99)  BP: 100/66 (21 Aug 2021 10:09) (97/71 - 129/90)  BP(mean): --  RR: 16 (21 Aug 2021 07:44) (16 - 18)  SpO2: 100% (21 Aug 2021 07:44) (96% - 100%)    HEENT:   pupils equal and reactive, EOMI, no oropharyngeal lesions, erythema, exudates, oral thrush    NECK:   supple, no carotid bruits, no palpable lymph nodes, no thyromegaly    CV:  +S1, +S2, regular, no murmurs or rubs    RESP:   lungs clear to auscultation bilaterally, no wheezing, rales, rhonchi, good air entry bilaterally    BREAST:  not examined    GI:  abdomen soft, non-tender, non-distended, normal BS, no bruits, no abdominal masses, no palpable masses    RECTAL:  not examined    :  not examined    MSK:   normal muscle tone, no atrophy, no rigidity, no contractions    EXT:   no clubbing, no cyanosis, no edema, no calf pain, swelling or erythema    VASCULAR:  pulses equal and symmetric in the upper and lower extremities    NEURO:  AAOX3, no focal neurological deficits, follows all commands, able to move extremities spontaneously    SKIN:  no ulcers, lesions or rashes    MEDICATIONS  (STANDING):  amLODIPine   Tablet 5 milliGRAM(s) Oral daily  artificial  tears Solution 1 Drop(s) Both EYES daily  baclofen 5 milliGRAM(s) Oral two times a day  bethanechol 25 milliGRAM(s) Oral at bedtime  calcium carbonate 1250 mG  + Vitamin D (OsCal 500 + D) 1 Tablet(s) Oral daily  cefepime   IVPB 2000 milliGRAM(s) IV Intermittent every 12 hours  chlorhexidine 0.12% Liquid 15 milliLiter(s) Oral Mucosa two times a day  cloNIDine 0.2 milliGRAM(s) Oral at bedtime  enoxaparin Injectable 40 milliGRAM(s) SubCutaneous daily  lamoTRIgine 100 milliGRAM(s) Oral two times a day  multivitamin 1 Tablet(s) Oral daily  oxybutynin 5 milliGRAM(s) Oral at bedtime  polyethylene glycol 3350 17 Gram(s) Oral at bedtime  senna 2 Tablet(s) Oral at bedtime  sodium chloride 0.9%. 500 milliLiter(s) (125 mL/Hr) IV Continuous <Continuous>  tamsulosin 0.4 milliGRAM(s) Oral at bedtime  vancomycin  IVPB 1000 milliGRAM(s) IV Intermittent every 12 hours    MEDICATIONS  (PRN):  acetaminophen   Tablet .. 650 milliGRAM(s) Oral every 6 hours PRN Temp greater or equal to 38.5C (101.3F), Mild Pain (1 - 3)  aluminum hydroxide/magnesium hydroxide/simethicone Suspension 30 milliLiter(s) Oral every 4 hours PRN Dyspepsia  melatonin 3 milliGRAM(s) Oral at bedtime PRN Insomnia  ondansetron Injectable 4 milliGRAM(s) IV Push every 8 hours PRN Nausea and/or Vomiting      19 Aug 2021 14:06    138    |  105    |  20     ----------------------------<  106    3.9     |  27     |  0.69     Ca    9.7        19 Aug 2021 14:06    TPro  7.6    /  Alb  3.2    /  TBili  0.3    /  DBili  x      /  AST  20     /  ALT  26     /  AlkPhos  97     19 Aug 2021 14:06  LIVER FUNCTIONS - ( 19 Aug 2021 14:06 )  Alb: 3.2 g/dL / Pro: 7.6 gm/dL / ALK PHOS: 97 U/L / ALT: 26 U/L / AST: 20 U/L / GGT: x         CBC Full  -  ( 19 Aug 2021 14:06 )  WBC Count : 7.28 K/uL  Hemoglobin : 13.1 g/dL  Hematocrit : 37.2 %  Platelet Count - Automated : 275 K/uL  Mean Cell Volume : 86.9 fl  Mean Cell Hemoglobin : 30.6 pg  Mean Cell Hemoglobin Concentration : 35.2 gm/dL          Assessment and Plan:   	  54 y/o M w/ PMH of cerebral palsy, HTN, developmentally delayed, neurogenic bladder, psoriasis, spinal stenosis, seizure disorder, dysphagia presents from group home with worsening sacral ulcer with discharge.    1) Sacral Decubitus Infection r/o Osteomyelitis    - DC zosyn    - Cefepime and Vanco    - Check trough    - Fu Culture    - ID following    - Xray Sacrum pending  -wound care consult pending.     2) History of HTN, Neurogenic bladder, psoriasis, spinal stenosis and seizure disorder     - all stable, continue current care    3) DVT prophylaxis     - Lovenox

## 2021-08-22 PROCEDURE — 99232 SBSQ HOSP IP/OBS MODERATE 35: CPT

## 2021-08-22 RX ADMIN — CHLORHEXIDINE GLUCONATE 15 MILLILITER(S): 213 SOLUTION TOPICAL at 21:20

## 2021-08-22 RX ADMIN — Medication 0.25 MILLIGRAM(S): at 09:54

## 2021-08-22 RX ADMIN — LAMOTRIGINE 100 MILLIGRAM(S): 25 TABLET, ORALLY DISINTEGRATING ORAL at 21:22

## 2021-08-22 RX ADMIN — Medication 1 DROP(S): at 09:57

## 2021-08-22 RX ADMIN — Medication 650 MILLIGRAM(S): at 21:48

## 2021-08-22 RX ADMIN — Medication 5 MILLIGRAM(S): at 08:45

## 2021-08-22 RX ADMIN — CHLORHEXIDINE GLUCONATE 15 MILLILITER(S): 213 SOLUTION TOPICAL at 08:43

## 2021-08-22 RX ADMIN — ENOXAPARIN SODIUM 40 MILLIGRAM(S): 100 INJECTION SUBCUTANEOUS at 08:44

## 2021-08-22 RX ADMIN — LAMOTRIGINE 100 MILLIGRAM(S): 25 TABLET, ORALLY DISINTEGRATING ORAL at 08:42

## 2021-08-22 RX ADMIN — CEFEPIME 100 MILLIGRAM(S): 1 INJECTION, POWDER, FOR SOLUTION INTRAMUSCULAR; INTRAVENOUS at 08:44

## 2021-08-22 RX ADMIN — AMLODIPINE BESYLATE 5 MILLIGRAM(S): 2.5 TABLET ORAL at 08:42

## 2021-08-22 RX ADMIN — Medication 250 MILLIGRAM(S): at 21:18

## 2021-08-22 RX ADMIN — Medication 5 MILLIGRAM(S): at 21:19

## 2021-08-22 RX ADMIN — Medication 25 MILLIGRAM(S): at 21:20

## 2021-08-22 RX ADMIN — Medication 250 MILLIGRAM(S): at 09:54

## 2021-08-22 RX ADMIN — Medication 3 MILLIGRAM(S): at 21:23

## 2021-08-22 RX ADMIN — Medication 0.2 MILLIGRAM(S): at 21:21

## 2021-08-22 RX ADMIN — CEFEPIME 100 MILLIGRAM(S): 1 INJECTION, POWDER, FOR SOLUTION INTRAMUSCULAR; INTRAVENOUS at 21:16

## 2021-08-22 RX ADMIN — Medication 5 MILLIGRAM(S): at 21:22

## 2021-08-22 RX ADMIN — TAMSULOSIN HYDROCHLORIDE 0.4 MILLIGRAM(S): 0.4 CAPSULE ORAL at 21:23

## 2021-08-22 RX ADMIN — Medication 1 TABLET(S): at 08:43

## 2021-08-22 RX ADMIN — Medication 0.25 MILLIGRAM(S): at 22:01

## 2021-08-22 RX ADMIN — Medication 1 TABLET(S): at 08:42

## 2021-08-22 RX ADMIN — Medication 650 MILLIGRAM(S): at 21:18

## 2021-08-22 NOTE — PROGRESS NOTE ADULT - SUBJECTIVE AND OBJECTIVE BOX
Cheif complaints and Diagnosis: sacral decubitus/ osteomyelitis    Subjective: no complaints      REVIEW OF SYSTEMS:    CONSTITUTIONAL: No weakness, fevers or chills  EYES/ENT: No visual changes;  No vertigo or throat pain   NECK: No pain or stiffness  RESPIRATORY: No cough, wheezing, hemoptysis; No shortness of breath  CARDIOVASCULAR: No chest pain or palpitations  GASTROINTESTINAL: No abdominal or epigastric pain. No nausea, vomiting, or hematemesis; No diarrhea or constipation. No melena or hematochezia.  GENITOURINARY: No dysuria, frequency or hematuria  NEUROLOGICAL: No numbness or weakness  SKIN: No itching, burning, rashes, or lesions   All other review of systems is negative unless indicated above      Vital Signs Last 24 Hrs  T(C): 36.7 (22 Aug 2021 00:37), Max: 36.7 (22 Aug 2021 00:37)  T(F): 98.1 (22 Aug 2021 00:37), Max: 98.1 (22 Aug 2021 00:37)  HR: 87 (22 Aug 2021 00:37) (80 - 103)  BP: 150/69 (22 Aug 2021 00:37) (97/71 - 158/84)  BP(mean): --  RR: 18 (22 Aug 2021 00:37) (16 - 18)  SpO2: 99% (21 Aug 2021 15:05) (99% - 100%)    HEENT:   pupils equal and reactive, EOMI, no oropharyngeal lesions, erythema, exudates, oral thrush    NECK:   supple, no carotid bruits, no palpable lymph nodes, no thyromegaly    CV:  +S1, +S2, regular, no murmurs or rubs    RESP:   lungs clear to auscultation bilaterally, no wheezing, rales, rhonchi, good air entry bilaterally    BREAST:  not examined    GI:  abdomen soft, non-tender, non-distended, normal BS, no bruits, no abdominal masses, no palpable masses    RECTAL:  not examined    :  not examined    MSK:   normal muscle tone, no atrophy, no rigidity, no contractions    EXT:   no clubbing, no cyanosis, no edema, no calf pain, swelling or erythema    VASCULAR:  pulses equal and symmetric in the upper and lower extremities    NEURO:  AAOX3, no focal neurological deficits, follows all commands, able to move extremities spontaneously    SKIN:  no ulcers, lesions or rashes    MEDICATIONS  (STANDING):  amLODIPine   Tablet 5 milliGRAM(s) Oral daily  artificial  tears Solution 1 Drop(s) Both EYES daily  baclofen 5 milliGRAM(s) Oral two times a day  bethanechol 25 milliGRAM(s) Oral at bedtime  calcium carbonate 1250 mG  + Vitamin D (OsCal 500 + D) 1 Tablet(s) Oral daily  cefepime   IVPB 2000 milliGRAM(s) IV Intermittent every 12 hours  chlorhexidine 0.12% Liquid 15 milliLiter(s) Oral Mucosa two times a day  cloNIDine 0.2 milliGRAM(s) Oral at bedtime  enoxaparin Injectable 40 milliGRAM(s) SubCutaneous daily  lamoTRIgine 100 milliGRAM(s) Oral two times a day  multivitamin 1 Tablet(s) Oral daily  oxybutynin 5 milliGRAM(s) Oral at bedtime  polyethylene glycol 3350 17 Gram(s) Oral at bedtime  senna 2 Tablet(s) Oral at bedtime  sodium chloride 0.9%. 500 milliLiter(s) (125 mL/Hr) IV Continuous <Continuous>  tamsulosin 0.4 milliGRAM(s) Oral at bedtime  vancomycin  IVPB 1000 milliGRAM(s) IV Intermittent every 12 hours    MEDICATIONS  (PRN):  acetaminophen   Tablet .. 650 milliGRAM(s) Oral every 6 hours PRN Temp greater or equal to 38.5C (101.3F), Mild Pain (1 - 3)  ALPRAZolam 0.25 milliGRAM(s) Oral four times a day PRN anxiety  aluminum hydroxide/magnesium hydroxide/simethicone Suspension 30 milliLiter(s) Oral every 4 hours PRN Dyspepsia  melatonin 3 milliGRAM(s) Oral at bedtime PRN Insomnia  ondansetron Injectable 4 milliGRAM(s) IV Push every 8 hours PRN Nausea and/or Vomiting              Assessment and Plan:   	  54 y/o M w/ PMH of cerebral palsy, HTN, developmentally delayed, neurogenic bladder, psoriasis, spinal stenosis, seizure disorder, dysphagia presents from group home with worsening sacral ulcer with discharge.    1) Sacral Decubitus Infection r/o Osteomyelitis    - DC zosyn    - Cefepime and Vanco    - Check trough    - Fu Culture    - ID following    - Xray Sacrum >>>> osteomyelitis  -wound care consult pending.     2) History of HTN, Neurogenic bladder, psoriasis, spinal stenosis and seizure disorder     - all stable, continue current care    3) DVT prophylaxis     - Lovenox     4-anxiety- started prn xanax    5-fecal impaction on xray  -Duculox suppository

## 2021-08-22 NOTE — PROGRESS NOTE ADULT - SUBJECTIVE AND OBJECTIVE BOX
Date of service: 08-22-21 @ 09:11    Lying in bed in NAD  Alert, confused and verbal  Has sacral discomfort    ROS: no fever or chills; limited due to confusion    MEDICATIONS  (STANDING):  amLODIPine   Tablet 5 milliGRAM(s) Oral daily  artificial  tears Solution 1 Drop(s) Both EYES daily  baclofen 5 milliGRAM(s) Oral two times a day  bethanechol 25 milliGRAM(s) Oral at bedtime  bisacodyl Suppository 10 milliGRAM(s) Rectal once  calcium carbonate 1250 mG  + Vitamin D (OsCal 500 + D) 1 Tablet(s) Oral daily  cefepime   IVPB 2000 milliGRAM(s) IV Intermittent every 12 hours  chlorhexidine 0.12% Liquid 15 milliLiter(s) Oral Mucosa two times a day  cloNIDine 0.2 milliGRAM(s) Oral at bedtime  enoxaparin Injectable 40 milliGRAM(s) SubCutaneous daily  lamoTRIgine 100 milliGRAM(s) Oral two times a day  multivitamin 1 Tablet(s) Oral daily  oxybutynin 5 milliGRAM(s) Oral at bedtime  polyethylene glycol 3350 17 Gram(s) Oral at bedtime  senna 2 Tablet(s) Oral at bedtime  sodium chloride 0.9%. 500 milliLiter(s) (125 mL/Hr) IV Continuous <Continuous>  tamsulosin 0.4 milliGRAM(s) Oral at bedtime  vancomycin  IVPB 1000 milliGRAM(s) IV Intermittent every 12 hours    Vital Signs Last 24 Hrs  T(C): 36.8 (22 Aug 2021 07:51), Max: 36.8 (22 Aug 2021 07:51)  T(F): 98.2 (22 Aug 2021 07:51), Max: 98.2 (22 Aug 2021 07:51)  HR: 83 (22 Aug 2021 07:51) (80 - 103)  BP: 112/73 (22 Aug 2021 07:51) (100/66 - 158/84)  BP(mean): --  RR: 17 (22 Aug 2021 07:51) (17 - 18)  SpO2: 98% (22 Aug 2021 07:51) (98% - 99%)     Physical exam:    Constitutional:  No acute distress  HEENT: NC/AT, EOMI, PERRLA, conjunctivae clear  Neck: supple; thyroid not palpable  Back: no tenderness  Respiratory: respiratory effort normal; decreased BS at bases  Cardiovascular: S1S2 regular, no murmurs  Abdomen: soft, not tender, not distended, positive BS  Genitourinary: no suprapubic tenderness  Lymphatic: no LN palpable  Musculoskeletal: no muscle tenderness, no joint swelling or tenderness  Large sacral decubitus ulcer, surrounded by skin with raised margins;   periwound erythema; bone is palpable  Extremities: no pedal edema  Left heel pressure ulcer clean  Neurological/ Psychiatric: confused, moving all extremities  Skin: no rashes; no palpable lesions    Labs: reviewed    Vancomycin Level, Trough: 17.3 ug/mL (08-21 @ 09:29)    C-Reactive Protein, Serum: 3 mg/L (08-19-21 @ 14:06)                        12.6   7.02  )-----------( 241      ( 18 Aug 2021 21:27 )             37.1     08-18    135  |  103  |  29<H>  ----------------------------<  102<H>  3.8   |  28  |  0.76    Ca    9.5      18 Aug 2021 21:27    TPro  7.1  /  Alb  3.1<L>  /  TBili  0.3  /  DBili  x   /  AST  19  /  ALT  22  /  AlkPhos  93  08-18     LIVER FUNCTIONS - ( 18 Aug 2021 21:27 )  Alb: 3.1 g/dL / Pro: 7.1 gm/dL / ALK PHOS: 93 U/L / ALT: 22 U/L / AST: 19 U/L / GGT: x           COVID-19 PCR: NotDetec (08-18-21 @ 21:43)      Culture - Blood (collected 18 Aug 2021 21:27)  Source: .Blood None  Gram Stain (20 Aug 2021 09:04):    Growth in aerobic bottle: Gram Positive Cocci in Clusters  Final Report (21 Aug 2021 13:13):    Growth in aerobic bottle: Staphylococcus hominis    Coag Negative Staphylococcus    Culture - Blood (collected 18 Aug 2021 21:27)  Source: .Blood None  Gram Stain (19 Aug 2021 19:36):    Growth in aerobic bottle: Gram Positive Cocci in Clusters  Final Report (20 Aug 2021 20:56):    Growth in aerobic bottle: Staphylococcus haemolyticus    Coag Negative Staphylococcus  Organism: Blood Culture PCR (20 Aug 2021 20:56)      -  Coagulase negative Staphylococcus: Detec      Method Type: PCR        Radiology: all available radiological tests reviewed        Advanced directives addressed: full resuscitation

## 2021-08-23 PROCEDURE — 93306 TTE W/DOPPLER COMPLETE: CPT | Mod: 26

## 2021-08-23 PROCEDURE — 99233 SBSQ HOSP IP/OBS HIGH 50: CPT

## 2021-08-23 RX ORDER — DOCUSATE SODIUM 100 MG
1 CAPSULE ORAL
Qty: 0 | Refills: 0 | DISCHARGE

## 2021-08-23 RX ORDER — TAMSULOSIN HYDROCHLORIDE 0.4 MG/1
1 CAPSULE ORAL
Qty: 0 | Refills: 0 | DISCHARGE

## 2021-08-23 RX ORDER — BETHANECHOL CHLORIDE 25 MG
1 TABLET ORAL
Qty: 0 | Refills: 0 | DISCHARGE

## 2021-08-23 RX ORDER — OXYBUTYNIN CHLORIDE 5 MG
1 TABLET ORAL
Qty: 0 | Refills: 0 | DISCHARGE

## 2021-08-23 RX ORDER — BACLOFEN 100 %
0.5 POWDER (GRAM) MISCELLANEOUS
Qty: 0 | Refills: 0 | DISCHARGE

## 2021-08-23 RX ADMIN — Medication 1 DROP(S): at 11:00

## 2021-08-23 RX ADMIN — Medication 0.25 MILLIGRAM(S): at 20:09

## 2021-08-23 RX ADMIN — CHLORHEXIDINE GLUCONATE 15 MILLILITER(S): 213 SOLUTION TOPICAL at 20:09

## 2021-08-23 RX ADMIN — TAMSULOSIN HYDROCHLORIDE 0.4 MILLIGRAM(S): 0.4 CAPSULE ORAL at 20:07

## 2021-08-23 RX ADMIN — ENOXAPARIN SODIUM 40 MILLIGRAM(S): 100 INJECTION SUBCUTANEOUS at 11:01

## 2021-08-23 RX ADMIN — AMLODIPINE BESYLATE 5 MILLIGRAM(S): 2.5 TABLET ORAL at 11:02

## 2021-08-23 RX ADMIN — Medication 3 MILLIGRAM(S): at 20:07

## 2021-08-23 RX ADMIN — Medication 5 MILLIGRAM(S): at 11:01

## 2021-08-23 RX ADMIN — CHLORHEXIDINE GLUCONATE 15 MILLILITER(S): 213 SOLUTION TOPICAL at 11:00

## 2021-08-23 RX ADMIN — Medication 0.2 MILLIGRAM(S): at 20:07

## 2021-08-23 RX ADMIN — Medication 650 MILLIGRAM(S): at 21:32

## 2021-08-23 RX ADMIN — SENNA PLUS 2 TABLET(S): 8.6 TABLET ORAL at 20:07

## 2021-08-23 RX ADMIN — Medication 250 MILLIGRAM(S): at 21:31

## 2021-08-23 RX ADMIN — Medication 650 MILLIGRAM(S): at 07:42

## 2021-08-23 RX ADMIN — POLYETHYLENE GLYCOL 3350 17 GRAM(S): 17 POWDER, FOR SOLUTION ORAL at 20:08

## 2021-08-23 RX ADMIN — LAMOTRIGINE 100 MILLIGRAM(S): 25 TABLET, ORALLY DISINTEGRATING ORAL at 20:08

## 2021-08-23 RX ADMIN — Medication 1 TABLET(S): at 11:02

## 2021-08-23 RX ADMIN — Medication 5 MILLIGRAM(S): at 20:07

## 2021-08-23 RX ADMIN — Medication 650 MILLIGRAM(S): at 09:00

## 2021-08-23 RX ADMIN — CEFEPIME 100 MILLIGRAM(S): 1 INJECTION, POWDER, FOR SOLUTION INTRAMUSCULAR; INTRAVENOUS at 21:31

## 2021-08-23 RX ADMIN — LAMOTRIGINE 100 MILLIGRAM(S): 25 TABLET, ORALLY DISINTEGRATING ORAL at 11:01

## 2021-08-23 RX ADMIN — CEFEPIME 100 MILLIGRAM(S): 1 INJECTION, POWDER, FOR SOLUTION INTRAMUSCULAR; INTRAVENOUS at 11:02

## 2021-08-23 RX ADMIN — Medication 650 MILLIGRAM(S): at 20:19

## 2021-08-23 RX ADMIN — Medication 25 MILLIGRAM(S): at 20:07

## 2021-08-23 RX ADMIN — Medication 250 MILLIGRAM(S): at 11:02

## 2021-08-23 RX ADMIN — Medication 1 TABLET(S): at 11:01

## 2021-08-23 NOTE — PROGRESS NOTE ADULT - SUBJECTIVE AND OBJECTIVE BOX
Date of service: 08-23-21 @ 14:09    Lying in bed in NAD  Alert and somewhat verbal  Has lower back discomfort    ROS: no fever or chills; poorly verbal    MEDICATIONS  (STANDING):  amLODIPine   Tablet 5 milliGRAM(s) Oral daily  artificial  tears Solution 1 Drop(s) Both EYES daily  baclofen 5 milliGRAM(s) Oral two times a day  bethanechol 25 milliGRAM(s) Oral at bedtime  calcium carbonate 1250 mG  + Vitamin D (OsCal 500 + D) 1 Tablet(s) Oral daily  cefepime   IVPB 2000 milliGRAM(s) IV Intermittent every 12 hours  chlorhexidine 0.12% Liquid 15 milliLiter(s) Oral Mucosa two times a day  cloNIDine 0.2 milliGRAM(s) Oral at bedtime  enoxaparin Injectable 40 milliGRAM(s) SubCutaneous daily  lamoTRIgine 100 milliGRAM(s) Oral two times a day  multivitamin 1 Tablet(s) Oral daily  oxybutynin 5 milliGRAM(s) Oral at bedtime  polyethylene glycol 3350 17 Gram(s) Oral at bedtime  senna 2 Tablet(s) Oral at bedtime  sodium chloride 0.9%. 500 milliLiter(s) (125 mL/Hr) IV Continuous <Continuous>  tamsulosin 0.4 milliGRAM(s) Oral at bedtime  vancomycin  IVPB 1000 milliGRAM(s) IV Intermittent every 12 hours    Vital Signs Last 24 Hrs  T(C): 37.2 (23 Aug 2021 08:22), Max: 37.2 (23 Aug 2021 08:22)  T(F): 98.9 (23 Aug 2021 08:22), Max: 98.9 (23 Aug 2021 08:22)  HR: 92 (22 Aug 2021 21:31) (92 - 107)  BP: 114/73 (22 Aug 2021 21:31) (114/73 - 117/71)  BP(mean): --  RR: 18 (23 Aug 2021 08:22) (17 - 18)  SpO2: 94% (23 Aug 2021 08:22) (94% - 98%)     Physical exam:    Constitutional:  No acute distress  HEENT: NC/AT, EOMI, PERRLA, conjunctivae clear  Neck: supple; thyroid not palpable  Back: no tenderness  Respiratory: respiratory effort normal; decreased BS at bases  Cardiovascular: S1S2 regular, no murmurs  Abdomen: soft, not tender, not distended, positive BS  Genitourinary: no suprapubic tenderness  Lymphatic: no LN palpable  Musculoskeletal: no muscle tenderness, no joint swelling or tenderness  Large sacral decubitus ulcer, surrounded by skin with raised margins;   periwound erythema; bone is palpable  Extremities: no pedal edema  Left heel pressure ulcer clean  Neurological/ Psychiatric: confused, moving all extremities  Skin: no rashes; no palpable lesions    Labs: reviewed    Vancomycin Level, Trough: 17.3 ug/mL (08-21 @ 09:29)    C-Reactive Protein, Serum: 3 mg/L (08-19-21 @ 14:06)                        12.6   7.02  )-----------( 241      ( 18 Aug 2021 21:27 )             37.1     08-18    135  |  103  |  29<H>  ----------------------------<  102<H>  3.8   |  28  |  0.76    Ca    9.5      18 Aug 2021 21:27    TPro  7.1  /  Alb  3.1<L>  /  TBili  0.3  /  DBili  x   /  AST  19  /  ALT  22  /  AlkPhos  93  08-18     LIVER FUNCTIONS - ( 18 Aug 2021 21:27 )  Alb: 3.1 g/dL / Pro: 7.1 gm/dL / ALK PHOS: 93 U/L / ALT: 22 U/L / AST: 19 U/L / GGT: x           COVID-19 PCR: NotDetec (08-18-21 @ 21:43)      Culture - Blood (collected 18 Aug 2021 21:27)  Source: .Blood None  Gram Stain (20 Aug 2021 09:04):    Growth in aerobic bottle: Gram Positive Cocci in Clusters  Final Report (21 Aug 2021 13:13):    Growth in aerobic bottle: Staphylococcus hominis    Coag Negative Staphylococcus    Single set isolate, possible contaminant. Contact    Microbiology if susceptibility testing clinically    indicated.    Culture - Blood (collected 18 Aug 2021 21:27)  Source: .Blood None  Gram Stain (19 Aug 2021 19:36):    Growth in aerobic bottle: Gram Positive Cocci in Clusters  Final Report (20 Aug 2021 20:56):    Growth in aerobic bottle: Staphylococcus haemolyticus    Coag Negative Staphylococcus  Organism: Blood Culture PCR (20 Aug 2021 20:56)      -  Coagulase negative Staphylococcus: Detec      Method Type: PCR        Radiology: all available radiological tests reviewed        Advanced directives addressed: full resuscitation

## 2021-08-24 PROCEDURE — 99232 SBSQ HOSP IP/OBS MODERATE 35: CPT

## 2021-08-24 PROCEDURE — 99221 1ST HOSP IP/OBS SF/LOW 40: CPT

## 2021-08-24 RX ADMIN — POLYETHYLENE GLYCOL 3350 17 GRAM(S): 17 POWDER, FOR SOLUTION ORAL at 20:55

## 2021-08-24 RX ADMIN — Medication 250 MILLIGRAM(S): at 21:42

## 2021-08-24 RX ADMIN — AMLODIPINE BESYLATE 5 MILLIGRAM(S): 2.5 TABLET ORAL at 09:04

## 2021-08-24 RX ADMIN — SENNA PLUS 2 TABLET(S): 8.6 TABLET ORAL at 20:54

## 2021-08-24 RX ADMIN — Medication 5 MILLIGRAM(S): at 20:55

## 2021-08-24 RX ADMIN — CEFEPIME 100 MILLIGRAM(S): 1 INJECTION, POWDER, FOR SOLUTION INTRAMUSCULAR; INTRAVENOUS at 20:55

## 2021-08-24 RX ADMIN — LAMOTRIGINE 100 MILLIGRAM(S): 25 TABLET, ORALLY DISINTEGRATING ORAL at 20:54

## 2021-08-24 RX ADMIN — Medication 25 MILLIGRAM(S): at 20:54

## 2021-08-24 RX ADMIN — LAMOTRIGINE 100 MILLIGRAM(S): 25 TABLET, ORALLY DISINTEGRATING ORAL at 09:04

## 2021-08-24 RX ADMIN — CHLORHEXIDINE GLUCONATE 15 MILLILITER(S): 213 SOLUTION TOPICAL at 09:03

## 2021-08-24 RX ADMIN — Medication 1 DROP(S): at 09:04

## 2021-08-24 RX ADMIN — Medication 1 TABLET(S): at 09:04

## 2021-08-24 RX ADMIN — TAMSULOSIN HYDROCHLORIDE 0.4 MILLIGRAM(S): 0.4 CAPSULE ORAL at 20:54

## 2021-08-24 RX ADMIN — ENOXAPARIN SODIUM 40 MILLIGRAM(S): 100 INJECTION SUBCUTANEOUS at 09:03

## 2021-08-24 RX ADMIN — Medication 5 MILLIGRAM(S): at 09:04

## 2021-08-24 RX ADMIN — CHLORHEXIDINE GLUCONATE 15 MILLILITER(S): 213 SOLUTION TOPICAL at 20:54

## 2021-08-24 RX ADMIN — Medication 5 MILLIGRAM(S): at 20:54

## 2021-08-24 RX ADMIN — Medication 0.2 MILLIGRAM(S): at 20:55

## 2021-08-24 RX ADMIN — Medication 250 MILLIGRAM(S): at 09:03

## 2021-08-24 NOTE — ADVANCED PRACTICE NURSE CONSULT - ASSESSMENT
HPI: This is a 55 year old male that was admitted to the hospital on 8/19/2021 for pressure injury to sacrum.  PMH-  cerebral palsy, HTN, developmentally delayed, neurogenic bladder, psoriasis, spinal stenosis, seizure disorder, dysphagia presents from group home with worsening sacral ulcer with discharge.  Consulted to evaluate patient’s sacral pressure injury that was present on admission. Patient presents on an [employer]mos[employer] 9000 MRS on his backside with heels elevated off mattress. Patient’s sacrum assessed to have a full-thickness wound measuring [id number]. Positive probe to the bone. Would classify as a stage 4 pressure injury. Wound bed with 100% pink tissue. No odor, periwound intact. Wound irrigated with normal saline. Aqaucel Ag applied to base of wound bed to promote autolytic debridement and wound healing. Foam dressing applied as cover dressing.  Patient positioned to his right side with a pillow when assessment and treatment completed.   PAST MEDICAL & SURGICAL HISTORY:  Mental retardation  Hypertension  Seizure  Neurogenic bladder  Psoriasis  Spinal stenosis  Neurogenic bladder  Spinal stenosis  Seizure disorder  No significant past surgical history  REVIEW OF SYSTEMS  General:	  Skin/Breast:  Ophthalmologic:  ENMT:	  Respiratory and Thorax: denies shortness of breath  Cardiovascular:	denies chest pain  Gastrointestinal:	  Genitourinary:	  Musculoskeletal:	  Neurological:	  Psychiatric:	  Hematology/Lymphatics:	  Endocrine:	  Allergic/Immunologic:	  MEDICATIONS  (STANDING):  amLODIPine   Tablet 5 milliGRAM(s) Oral daily  artificial  tears Solution 1 Drop(s) Both EYES daily  baclofen 5 milliGRAM(s) Oral two times a day  bethanechol 25 milliGRAM(s) Oral at bedtime  calcium carbonate 1250 mG  + Vitamin D (OsCal 500 + D) 1 Tablet(s) Oral daily  cefepime   IVPB 2000 milliGRAM(s) IV Intermittent every 12 hours  chlorhexidine 0.12% Liquid 15 milliLiter(s) Oral Mucosa two times a day  cloNIDine 0.2 milliGRAM(s) Oral at bedtime  enoxaparin Injectable 40 milliGRAM(s) SubCutaneous daily  lamoTRIgine 100 milliGRAM(s) Oral two times a day  multivitamin 1 Tablet(s) Oral daily  oxybutynin 5 milliGRAM(s) Oral at bedtime  polyethylene glycol 3350 17 Gram(s) Oral at bedtime  senna 2 Tablet(s) Oral at bedtime  sodium chloride 0.9%. 500 milliLiter(s) (125 mL/Hr) IV Continuous <Continuous>  tamsulosin 0.4 milliGRAM(s) Oral at bedtime  vancomycin  IVPB 1000 milliGRAM(s) IV Intermittent every 12 hours  Allergies  No Known Allergies  Intolerances  SOCIAL HISTORY:  FAMILY HISTORY:  Family history of breast cancer (Grandparent)  Grandmother on mother&#x27;s side  Family history of MI (myocardial infarction) (Father)  Father  Vital Signs Last 24 Hrs  T(C): 36.7 (24 Aug 2021 07:48), Max: 37.8 (23 Aug 2021 20:39)  T(F): 98 (24 Aug 2021 07:48), Max: 100 (23 Aug 2021 20:39)  HR: 96 (24 Aug 2021 07:48) (93 - 104)  BP: 129/61 (24 Aug 2021 07:48) (107/56 - 130/87)  RR: 18 (24 Aug 2021 07:48) (16 - 18)  SpO2: 97% (24 Aug 2021 07:48) (96% - 97%)  PHYSICAL EXAM:  Constitutional:  Eyes: conjunctiva and sclera clear  ENMT: Moist mucous membranes  Neck:  Breasts:  Back:  Respiratory: respirations even and unlabored  Cardiovascular:  Gastrointestinal:  Genitourinary:  Rectal:  Extremities:  Vascular:  Neurological: alert and oriented x 4  Skin: Stage 4 pressure injury to sacrum  Lymph Nodes:  Musculoskeletal:  Psychiatric:  LABS:             HPI: This is a 55 year old male that was admitted to the hospital on 8/19/2021 for pressure injury to sacrum.  PMH-  cerebral palsy, HTN, developmentally delayed, neurogenic bladder, psoriasis, spinal stenosis, seizure disorder, dysphagia presents from group home with worsening sacral ulcer with discharge.  Consulted to evaluate patient’s sacral pressure injury that was present on admission. Patient presents on an [employer] 9000 MRS on his backside with heels elevated off mattress. Patient’s sacrum assessed to have a full-thickness wound measuring 5mgv4bum2.5cm. Undermining noted to entire wound with deepest area at 3 o'clock measuring 2cm. Positive probe to the bone. Would classify as a stage 4 pressure injury. Wound bed with 100% pink tissue. No odor, periwound intact. Wound irrigated with normal saline. Aqaucel Ag applied to base of wound bed to promote autolytic debridement and wound healing. Foam dressing applied as cover dressing.  Patient positioned to his right side with a pillow when assessment and treatment completed.   PAST MEDICAL & SURGICAL HISTORY:  Mental retardation  Hypertension  Seizure  Neurogenic bladder  Psoriasis  Spinal stenosis  Neurogenic bladder  Spinal stenosis  Seizure disorder  No significant past surgical history  REVIEW OF SYSTEMS  General:	  Skin/Breast:  Ophthalmologic:  ENMT:	  Respiratory and Thorax: denies shortness of breath  Cardiovascular:	denies chest pain  Gastrointestinal:	  Genitourinary:	  Musculoskeletal:	  Neurological:	  Psychiatric:	  Hematology/Lymphatics:	  Endocrine:	  Allergic/Immunologic:	  MEDICATIONS  (STANDING):  amLODIPine   Tablet 5 milliGRAM(s) Oral daily  artificial  tears Solution 1 Drop(s) Both EYES daily  baclofen 5 milliGRAM(s) Oral two times a day  bethanechol 25 milliGRAM(s) Oral at bedtime  calcium carbonate 1250 mG  + Vitamin D (OsCal 500 + D) 1 Tablet(s) Oral daily  cefepime   IVPB 2000 milliGRAM(s) IV Intermittent every 12 hours  chlorhexidine 0.12% Liquid 15 milliLiter(s) Oral Mucosa two times a day  cloNIDine 0.2 milliGRAM(s) Oral at bedtime  enoxaparin Injectable 40 milliGRAM(s) SubCutaneous daily  lamoTRIgine 100 milliGRAM(s) Oral two times a day  multivitamin 1 Tablet(s) Oral daily  oxybutynin 5 milliGRAM(s) Oral at bedtime  polyethylene glycol 3350 17 Gram(s) Oral at bedtime  senna 2 Tablet(s) Oral at bedtime  sodium chloride 0.9%. 500 milliLiter(s) (125 mL/Hr) IV Continuous <Continuous>  tamsulosin 0.4 milliGRAM(s) Oral at bedtime  vancomycin  IVPB 1000 milliGRAM(s) IV Intermittent every 12 hours  Allergies  No Known Allergies  Intolerances  SOCIAL HISTORY:  FAMILY HISTORY:  Family history of breast cancer (Grandparent)  Grandmother on mother&#x27;s side  Family history of MI (myocardial infarction) (Father)  Father  Vital Signs Last 24 Hrs  T(C): 36.7 (24 Aug 2021 07:48), Max: 37.8 (23 Aug 2021 20:39)  T(F): 98 (24 Aug 2021 07:48), Max: 100 (23 Aug 2021 20:39)  HR: 96 (24 Aug 2021 07:48) (93 - 104)  BP: 129/61 (24 Aug 2021 07:48) (107/56 - 130/87)  RR: 18 (24 Aug 2021 07:48) (16 - 18)  SpO2: 97% (24 Aug 2021 07:48) (96% - 97%)  PHYSICAL EXAM:  Constitutional:  Eyes: conjunctiva and sclera clear  ENMT: Moist mucous membranes  Neck:  Breasts:  Back:  Respiratory: respirations even and unlabored  Cardiovascular:  Gastrointestinal:  Genitourinary:  Rectal:  Extremities:  Vascular:  Neurological: alert and oriented x 4  Skin: Stage 4 pressure injury to sacrum  Lymph Nodes:  Musculoskeletal:  Psychiatric:  LABS:

## 2021-08-24 NOTE — ADVANCED PRACTICE NURSE CONSULT - RECOMMEDATIONS
1) Continue to turn and position every 2 hours  2) Continue to elevate heels off mattress  3) Change dressing to sacrum with Aqaucel Ag and foam dressing every 2 days  4) Albumin- 3.2 on 8/19/2021. Registered dietician following patient will the current recommendations    · Additional Recommendations  1) continue with mechanical soft diet (ground) and encourage protein enriched foods with all meals 2) continue with MVI w/ minerals and add vitamin C 500mg po BID and Zinc sulfate 220mg po BID x 10 days to promote wound healing 3) monitor lytes and hydration replete as needed 4) monitor daily weights and track trends. 5) monitor BM if none x > 3 days initiate additional bowel meds

## 2021-08-24 NOTE — PROGRESS NOTE ADULT - SUBJECTIVE AND OBJECTIVE BOX
HOSPITALIST PROGRESS NOTE:  SUBJECTIVE:  PCP:  Chief Complaint: Patient is a 55y old  Male who presents with a chief complaint of Sacral wound (23 Aug 2021 17:25)      HPI:  54 y/o M w/ PMH of cerebral palsy, HTN, developmentally delayed, neurogenic bladder, psoriasis, spinal stenosis, seizure disorder, dysphagia presents from group home with worsening sacral ulcer with discharge.  Patient with baseline confusion, denies any acute complaints. Aide states pt was in a rehab and 2 weeks ago returned to group home with open wound to sacrum and wound appears to be getting larger and deeper.   States pt had possibly yellow discharge a few days ago but aides hasn't seen wound since.No fever.   (19 Aug 2021 11:31)    8/24: Above reviewed; patient has no complaints, no ovenright events       Allergies:  No Known Allergies    REVIEW OF SYSTEMS:  See HPI. All other review of systems is negative unless indicated above.     OBJECTIVE  Physical Exam:  Vital Signs:    Vital Signs Last 24 Hrs  T(C): 36.7 (24 Aug 2021 07:48), Max: 37.8 (23 Aug 2021 20:39)  T(F): 98 (24 Aug 2021 07:48), Max: 100 (23 Aug 2021 20:39)  HR: 96 (24 Aug 2021 07:48) (93 - 104)  BP: 129/61 (24 Aug 2021 07:48) (107/56 - 130/87)  BP(mean): --  RR: 18 (24 Aug 2021 07:48) (16 - 18)  SpO2: 97% (24 Aug 2021 07:48) (96% - 97%)  I&O's Summary    23 Aug 2021 07:01  -  24 Aug 2021 07:00  --------------------------------------------------------  IN: 480 mL / OUT: 1900 mL / NET: -1420 mL    Constitutional: NAD, awake   Neurological: no focal deficits  HEENT: PERRLA, EOMI, MMM  Neck: Soft and supple, No LAD, No JVD  Respiratory: Breath sounds are clear bilaterally, No wheezing, rales or rhonchi  Cardiovascular: S1 and S2, regular rate and rhythm; no Murmurs, gallops or rubs  Gastrointestinal: Bowel Sounds present, soft, nontender, nondistended, no guarding, no rebound tenderness  Back: No CVA tenderness +Stage IV decubiti  Extremities: No peripheral edema  Vascular: 2+ peripheral pulses  Musculoskeletal: 5/5 strength b/l upper and lower extremities  Skin: No rashes  Breast: Deferred  Rectal: Deferred    MEDICATIONS  (STANDING):  amLODIPine   Tablet 5 milliGRAM(s) Oral daily  artificial  tears Solution 1 Drop(s) Both EYES daily  baclofen 5 milliGRAM(s) Oral two times a day  bethanechol 25 milliGRAM(s) Oral at bedtime  calcium carbonate 1250 mG  + Vitamin D (OsCal 500 + D) 1 Tablet(s) Oral daily  cefepime   IVPB 2000 milliGRAM(s) IV Intermittent every 12 hours  chlorhexidine 0.12% Liquid 15 milliLiter(s) Oral Mucosa two times a day  cloNIDine 0.2 milliGRAM(s) Oral at bedtime  enoxaparin Injectable 40 milliGRAM(s) SubCutaneous daily  lamoTRIgine 100 milliGRAM(s) Oral two times a day  multivitamin 1 Tablet(s) Oral daily  oxybutynin 5 milliGRAM(s) Oral at bedtime  polyethylene glycol 3350 17 Gram(s) Oral at bedtime  senna 2 Tablet(s) Oral at bedtime  sodium chloride 0.9%. 500 milliLiter(s) (125 mL/Hr) IV Continuous <Continuous>  tamsulosin 0.4 milliGRAM(s) Oral at bedtime  vancomycin  IVPB 1000 milliGRAM(s) IV Intermittent every 12 hours      LABS: All Labs Reviewed:        RADIOLOGY/EKG:    x< from: Xray Chest 1 View- PORTABLE-Urgent (Xray Chest 1 View- PORTABLE-Urgent .) (08.18.21 @ 21:53) >    IMPRESSION: Question slight right lower lung field infiltrate.      < end of copied text >  < from: Xray Sacrum + Coccyx (08.19.21 @ 12:23) >    IMPRESSION:   Sacral decubitus ulcer with findings concerning for osteomyelitis mid sacrum. Confirmatory CT scan recommended. Fecal impaction      < end of copied text >    < from: TTE Echo Complete w/o Contrast w/ Doppler (08.23.21 @ 11:32) >     Impression     Summary     Fibrocalcific changes noted to the mitral valve leaflets with preserved   leaflet excursion.   Trace mitral regurgitation is present.   The aortic valve is trileaflet with thin pliable leaflets.   The tricuspid valve leaflets are thin and pliable; valve motion is normal.   Trace tricuspid valve regurgitation is present.   The left ventricle is normal in size, wall thickness, wall motion and   contractility as seen in limited views.   Estimated left ventricular ejection fraction is 55-60 %.   Normal appearing right ventricle structure and function.   No evidence of pericardial effusion.      < end of copied text >

## 2021-08-24 NOTE — PROGRESS NOTE ADULT - SUBJECTIVE AND OBJECTIVE BOX
Date of service: 08-24-21 @ 13:27    Lying in bed in NAD  Has sacral area tenderness  Has low grade fever    ROS: limited; poorly verbal    MEDICATIONS  (STANDING):  amLODIPine   Tablet 5 milliGRAM(s) Oral daily  artificial  tears Solution 1 Drop(s) Both EYES daily  baclofen 5 milliGRAM(s) Oral two times a day  bethanechol 25 milliGRAM(s) Oral at bedtime  calcium carbonate 1250 mG  + Vitamin D (OsCal 500 + D) 1 Tablet(s) Oral daily  cefepime   IVPB 2000 milliGRAM(s) IV Intermittent every 12 hours  chlorhexidine 0.12% Liquid 15 milliLiter(s) Oral Mucosa two times a day  cloNIDine 0.2 milliGRAM(s) Oral at bedtime  enoxaparin Injectable 40 milliGRAM(s) SubCutaneous daily  lamoTRIgine 100 milliGRAM(s) Oral two times a day  multivitamin 1 Tablet(s) Oral daily  oxybutynin 5 milliGRAM(s) Oral at bedtime  polyethylene glycol 3350 17 Gram(s) Oral at bedtime  senna 2 Tablet(s) Oral at bedtime  sodium chloride 0.9%. 500 milliLiter(s) (125 mL/Hr) IV Continuous <Continuous>  tamsulosin 0.4 milliGRAM(s) Oral at bedtime  vancomycin  IVPB 1000 milliGRAM(s) IV Intermittent every 12 hours    Vital Signs Last 24 Hrs  T(C): 36.7 (24 Aug 2021 07:48), Max: 37.8 (23 Aug 2021 20:39)  T(F): 98 (24 Aug 2021 07:48), Max: 100 (23 Aug 2021 20:39)  HR: 96 (24 Aug 2021 07:48) (93 - 104)  BP: 129/61 (24 Aug 2021 07:48) (107/56 - 130/87)  BP(mean): --  RR: 18 (24 Aug 2021 07:48) (16 - 18)  SpO2: 97% (24 Aug 2021 07:48) (96% - 97%)     Physical exam:    Constitutional:  No acute distress  HEENT: NC/AT, EOMI, PERRLA, conjunctivae clear  Neck: supple; thyroid not palpable  Back: no tenderness  Respiratory: respiratory effort normal; decreased BS at bases  Cardiovascular: S1S2 regular, no murmurs  Abdomen: soft, not tender, not distended, positive BS  Genitourinary: no suprapubic tenderness  Lymphatic: no LN palpable  Musculoskeletal: no muscle tenderness, no joint swelling or tenderness  Large sacral decubitus ulcer, surrounded by skin with raised margins;   periwound erythema; bone is palpable  Extremities: no pedal edema  Left heel pressure ulcer clean  Neurological/ Psychiatric: confused, moving all extremities  Skin: no rashes; no palpable lesions    Labs: reviewed    Vancomycin Level, Trough: 17.3 ug/mL (08-21 @ 09:29)    C-Reactive Protein, Serum: 3 mg/L (08-19-21 @ 14:06)                        12.6   7.02  )-----------( 241      ( 18 Aug 2021 21:27 )             37.1     08-18    135  |  103  |  29<H>  ----------------------------<  102<H>  3.8   |  28  |  0.76    Ca    9.5      18 Aug 2021 21:27    TPro  7.1  /  Alb  3.1<L>  /  TBili  0.3  /  DBili  x   /  AST  19  /  ALT  22  /  AlkPhos  93  08-18     LIVER FUNCTIONS - ( 18 Aug 2021 21:27 )  Alb: 3.1 g/dL / Pro: 7.1 gm/dL / ALK PHOS: 93 U/L / ALT: 22 U/L / AST: 19 U/L / GGT: x           COVID-19 PCR: NotDetec (08-18-21 @ 21:43)      Culture - Blood (collected 18 Aug 2021 21:27)  Source: .Blood None  Gram Stain (20 Aug 2021 09:04):    Growth in aerobic bottle: Gram Positive Cocci in Clusters  Final Report (21 Aug 2021 13:13):    Growth in aerobic bottle: Staphylococcus hominis    Coag Negative Staphylococcus    Single set isolate, possible contaminant. Contact    Microbiology if susceptibility testing clinically    indicated.    Culture - Blood (collected 18 Aug 2021 21:27)  Source: .Blood None  Gram Stain (19 Aug 2021 19:36):    Growth in aerobic bottle: Gram Positive Cocci in Clusters  Final Report (20 Aug 2021 20:56):    Growth in aerobic bottle: Staphylococcus haemolyticus    Coag Negative Staphylococcus  Organism: Blood Culture PCR (20 Aug 2021 20:56)      -  Coagulase negative Staphylococcus: Detec      Method Type: PCR        Radiology: all available radiological tests reviewed        Advanced directives addressed: full resuscitation

## 2021-08-25 LAB
ANION GAP SERPL CALC-SCNC: 4 MMOL/L — LOW (ref 5–17)
BUN SERPL-MCNC: 28 MG/DL — HIGH (ref 7–23)
CALCIUM SERPL-MCNC: 9.8 MG/DL — SIGNIFICANT CHANGE UP (ref 8.5–10.1)
CHLORIDE SERPL-SCNC: 107 MMOL/L — SIGNIFICANT CHANGE UP (ref 96–108)
CO2 SERPL-SCNC: 28 MMOL/L — SIGNIFICANT CHANGE UP (ref 22–31)
CREAT SERPL-MCNC: 0.6 MG/DL — SIGNIFICANT CHANGE UP (ref 0.5–1.3)
GLUCOSE SERPL-MCNC: 80 MG/DL — SIGNIFICANT CHANGE UP (ref 70–99)
HCT VFR BLD CALC: 33.7 % — LOW (ref 39–50)
HGB BLD-MCNC: 11 G/DL — LOW (ref 13–17)
MAGNESIUM SERPL-MCNC: 2.4 MG/DL — SIGNIFICANT CHANGE UP (ref 1.6–2.6)
MCHC RBC-ENTMCNC: 29.9 PG — SIGNIFICANT CHANGE UP (ref 27–34)
MCHC RBC-ENTMCNC: 32.6 GM/DL — SIGNIFICANT CHANGE UP (ref 32–36)
MCV RBC AUTO: 91.6 FL — SIGNIFICANT CHANGE UP (ref 80–100)
PHOSPHATE SERPL-MCNC: 3.3 MG/DL — SIGNIFICANT CHANGE UP (ref 2.5–4.5)
PLATELET # BLD AUTO: 169 K/UL — SIGNIFICANT CHANGE UP (ref 150–400)
POTASSIUM SERPL-MCNC: 4.1 MMOL/L — SIGNIFICANT CHANGE UP (ref 3.5–5.3)
POTASSIUM SERPL-SCNC: 4.1 MMOL/L — SIGNIFICANT CHANGE UP (ref 3.5–5.3)
RBC # BLD: 3.68 M/UL — LOW (ref 4.2–5.8)
RBC # FLD: 12.6 % — SIGNIFICANT CHANGE UP (ref 10.3–14.5)
SODIUM SERPL-SCNC: 139 MMOL/L — SIGNIFICANT CHANGE UP (ref 135–145)
WBC # BLD: 4.31 K/UL — SIGNIFICANT CHANGE UP (ref 3.8–10.5)
WBC # FLD AUTO: 4.31 K/UL — SIGNIFICANT CHANGE UP (ref 3.8–10.5)

## 2021-08-25 PROCEDURE — 99232 SBSQ HOSP IP/OBS MODERATE 35: CPT

## 2021-08-25 RX ADMIN — Medication 0.2 MILLIGRAM(S): at 21:59

## 2021-08-25 RX ADMIN — Medication 1 DROP(S): at 09:52

## 2021-08-25 RX ADMIN — Medication 5 MILLIGRAM(S): at 22:00

## 2021-08-25 RX ADMIN — TAMSULOSIN HYDROCHLORIDE 0.4 MILLIGRAM(S): 0.4 CAPSULE ORAL at 21:59

## 2021-08-25 RX ADMIN — Medication 1 TABLET(S): at 09:52

## 2021-08-25 RX ADMIN — AMLODIPINE BESYLATE 5 MILLIGRAM(S): 2.5 TABLET ORAL at 09:52

## 2021-08-25 RX ADMIN — LAMOTRIGINE 100 MILLIGRAM(S): 25 TABLET, ORALLY DISINTEGRATING ORAL at 09:52

## 2021-08-25 RX ADMIN — SENNA PLUS 2 TABLET(S): 8.6 TABLET ORAL at 22:02

## 2021-08-25 RX ADMIN — Medication 25 MILLIGRAM(S): at 22:01

## 2021-08-25 RX ADMIN — ENOXAPARIN SODIUM 40 MILLIGRAM(S): 100 INJECTION SUBCUTANEOUS at 09:51

## 2021-08-25 RX ADMIN — Medication 250 MILLIGRAM(S): at 10:21

## 2021-08-25 RX ADMIN — Medication 3 MILLIGRAM(S): at 22:00

## 2021-08-25 RX ADMIN — Medication 250 MILLIGRAM(S): at 21:58

## 2021-08-25 RX ADMIN — CHLORHEXIDINE GLUCONATE 15 MILLILITER(S): 213 SOLUTION TOPICAL at 21:59

## 2021-08-25 RX ADMIN — POLYETHYLENE GLYCOL 3350 17 GRAM(S): 17 POWDER, FOR SOLUTION ORAL at 22:03

## 2021-08-25 RX ADMIN — CEFEPIME 100 MILLIGRAM(S): 1 INJECTION, POWDER, FOR SOLUTION INTRAMUSCULAR; INTRAVENOUS at 21:58

## 2021-08-25 RX ADMIN — CHLORHEXIDINE GLUCONATE 15 MILLILITER(S): 213 SOLUTION TOPICAL at 09:52

## 2021-08-25 RX ADMIN — CEFEPIME 100 MILLIGRAM(S): 1 INJECTION, POWDER, FOR SOLUTION INTRAMUSCULAR; INTRAVENOUS at 09:51

## 2021-08-25 RX ADMIN — Medication 5 MILLIGRAM(S): at 09:52

## 2021-08-25 RX ADMIN — LAMOTRIGINE 100 MILLIGRAM(S): 25 TABLET, ORALLY DISINTEGRATING ORAL at 22:00

## 2021-08-25 NOTE — PROGRESS NOTE ADULT - SUBJECTIVE AND OBJECTIVE BOX
Date of service: 08-25-21 @ 12:57    Lying in bed in NAD  Alert and verbal  Denies pain  No fever reported  He is tender on sacral area    ROS is limited: no fever or chills; no HA, no SOB or cough, no abdominal pain, no dysuria, no legs pain    MEDICATIONS  (STANDING):  amLODIPine   Tablet 5 milliGRAM(s) Oral daily  artificial  tears Solution 1 Drop(s) Both EYES daily  baclofen 5 milliGRAM(s) Oral two times a day  bethanechol 25 milliGRAM(s) Oral at bedtime  calcium carbonate 1250 mG  + Vitamin D (OsCal 500 + D) 1 Tablet(s) Oral daily  cefepime   IVPB 2000 milliGRAM(s) IV Intermittent every 12 hours  chlorhexidine 0.12% Liquid 15 milliLiter(s) Oral Mucosa two times a day  cloNIDine 0.2 milliGRAM(s) Oral at bedtime  enoxaparin Injectable 40 milliGRAM(s) SubCutaneous daily  lamoTRIgine 100 milliGRAM(s) Oral two times a day  multivitamin 1 Tablet(s) Oral daily  oxybutynin 5 milliGRAM(s) Oral at bedtime  polyethylene glycol 3350 17 Gram(s) Oral at bedtime  senna 2 Tablet(s) Oral at bedtime  sodium chloride 0.9%. 500 milliLiter(s) (125 mL/Hr) IV Continuous <Continuous>  tamsulosin 0.4 milliGRAM(s) Oral at bedtime  vancomycin  IVPB 1000 milliGRAM(s) IV Intermittent every 12 hours    Vital Signs Last 24 Hrs  T(C): 36.4 (25 Aug 2021 07:37), Max: 36.8 (24 Aug 2021 15:19)  T(F): 97.5 (25 Aug 2021 07:37), Max: 98.2 (24 Aug 2021 15:19)  HR: 72 (25 Aug 2021 07:37) (72 - 110)  BP: 126/67 (25 Aug 2021 07:37) (125/69 - 147/94)  BP(mean): --  RR: 18 (25 Aug 2021 07:37) (18 - 18)  SpO2: 100% (25 Aug 2021 07:37) (90% - 100%)     Physical exam:    Constitutional:  No acute distress  HEENT: NC/AT, EOMI, PERRLA, conjunctivae clear  Neck: supple; thyroid not palpable  Back: no tenderness  Respiratory: respiratory effort normal; decreased BS at bases  Cardiovascular: S1S2 regular, no murmurs  Abdomen: soft, not tender, not distended, positive BS  Genitourinary: no suprapubic tenderness  Lymphatic: no LN palpable  Musculoskeletal: no muscle tenderness, no joint swelling or tenderness  Large sacral decubitus ulcer, surrounded by skin with raised margins;   periwound erythema; bone is palpable  Extremities: no pedal edema  Left heel pressure ulcer clean  Neurological/ Psychiatric: confused, moving all extremities  Skin: no rashes; no palpable lesions    Labs: reviewed    Vancomycin Level, Trough: 17.3 ug/mL (08-21 @ 09:29)    C-Reactive Protein, Serum: 3 mg/L (08-19-21 @ 14:06)                        12.6   7.02  )-----------( 241      ( 18 Aug 2021 21:27 )             37.1     08-18    135  |  103  |  29<H>  ----------------------------<  102<H>  3.8   |  28  |  0.76    Ca    9.5      18 Aug 2021 21:27    TPro  7.1  /  Alb  3.1<L>  /  TBili  0.3  /  DBili  x   /  AST  19  /  ALT  22  /  AlkPhos  93  08-18     LIVER FUNCTIONS - ( 18 Aug 2021 21:27 )  Alb: 3.1 g/dL / Pro: 7.1 gm/dL / ALK PHOS: 93 U/L / ALT: 22 U/L / AST: 19 U/L / GGT: x           COVID-19 PCR: NotDetec (08-18-21 @ 21:43)      Culture - Blood (collected 23 Aug 2021 09:51)  Source: .Blood None  Preliminary Report (24 Aug 2021 16:01):    No growth to date.    Culture - Blood (collected 23 Aug 2021 09:50)  Source: .Blood None  Preliminary Report (24 Aug 2021 16:01):    No growth to date.    Culture - Blood (collected 18 Aug 2021 21:27)  Source: .Blood None  Gram Stain (20 Aug 2021 09:04):    Growth in aerobic bottle: Gram Positive Cocci in Clusters  Final Report (21 Aug 2021 13:13):    Growth in aerobic bottle: Staphylococcus hominis    Coag Negative Staphylococcus    Single set isolate, possible contaminant. Contact    Microbiology if susceptibility testing clinically    indicated.    Culture - Blood (collected 18 Aug 2021 21:27)  Source: .Blood None  Gram Stain (19 Aug 2021 19:36):    Growth in aerobic bottle: Gram Positive Cocci in Clusters  Final Report (20 Aug 2021 20:56):    Growth in aerobic bottle: Staphylococcus haemolyticus    Coag Negative Staphylococcus  Organism: Blood Culture PCR (20 Aug 2021 20:56)      -  Coagulase negative Staphylococcus: Detec      Method Type: PCR        Radiology: all available radiological tests reviewed        Advanced directives addressed: full resuscitation

## 2021-08-25 NOTE — PROGRESS NOTE ADULT - SUBJECTIVE AND OBJECTIVE BOX
Chief complaint of Sacral wound (23 Aug 2021 17:25)      HPI:  56 y/o M w/ PMH of cerebral palsy, HTN, developmentally delayed, neurogenic bladder, psoriasis, spinal stenosis, seizure disorder, dysphagia presents from group home with worsening sacral ulcer with discharge.  Patient with baseline confusion, denies any acute complaints. Aide states pt was in a rehab and 2 weeks ago returned to group home with open wound to sacrum and wound appears to be getting larger and deeper.   States pt had possibly yellow discharge a few days ago but aides hasn't seen wound since.No fever.   (19 Aug 2021 11:31)    8/24: Above reviewed; patient has no complaints, no ovenright events   8/25: Lying in bed, alert, comfortable, answering basic questions. No new events. Tolerating antibiotics.    REVIEW OF SYSTEMS: All other review of systems is negative unless indicated above.      Vital Signs Last 24 Hrs  T(C): 36.4 (25 Aug 2021 07:37), Max: 36.8 (24 Aug 2021 15:19)  T(F): 97.5 (25 Aug 2021 07:37), Max: 98.2 (24 Aug 2021 15:19)  HR: 72 (25 Aug 2021 07:37) (72 - 110)  BP: 126/67 (25 Aug 2021 07:37) (125/69 - 147/94)  BP(mean): --  RR: 18 (25 Aug 2021 07:37) (18 - 18)  SpO2: 100% (25 Aug 2021 07:37) (90% - 100%)      Constitutional: NAD, awake   Neurological: no focal deficits  HEENT: PERRLA, EOMI, MMM  Neck: Soft and supple, No LAD, No JVD  Respiratory: Breath sounds are clear bilaterally, No wheezing, rales or rhonchi  Cardiovascular: S1 and S2, regular rate and rhythm; no Murmurs, gallops or rubs  Gastrointestinal: Bowel Sounds present, soft, nontender, nondistended, no guarding, no rebound tenderness  Back: No CVA tenderness +Stage IV decubiti  Extremities: No peripheral edema  Vascular: 2+ peripheral pulses  Musculoskeletal: 5/5 strength b/l upper and lower extremities  Skin: No rashes    MEDICATIONS  (STANDING):  amLODIPine   Tablet 5 milliGRAM(s) Oral daily  artificial  tears Solution 1 Drop(s) Both EYES daily  baclofen 5 milliGRAM(s) Oral two times a day  bethanechol 25 milliGRAM(s) Oral at bedtime  calcium carbonate 1250 mG  + Vitamin D (OsCal 500 + D) 1 Tablet(s) Oral daily  cefepime   IVPB 2000 milliGRAM(s) IV Intermittent every 12 hours  chlorhexidine 0.12% Liquid 15 milliLiter(s) Oral Mucosa two times a day  cloNIDine 0.2 milliGRAM(s) Oral at bedtime  enoxaparin Injectable 40 milliGRAM(s) SubCutaneous daily  lamoTRIgine 100 milliGRAM(s) Oral two times a day  multivitamin 1 Tablet(s) Oral daily  oxybutynin 5 milliGRAM(s) Oral at bedtime  polyethylene glycol 3350 17 Gram(s) Oral at bedtime  senna 2 Tablet(s) Oral at bedtime  sodium chloride 0.9%. 500 milliLiter(s) (125 mL/Hr) IV Continuous <Continuous>  tamsulosin 0.4 milliGRAM(s) Oral at bedtime  vancomycin  IVPB 1000 milliGRAM(s) IV Intermittent every 12 hours    MEDICATIONS  (PRN):  acetaminophen   Tablet .. 650 milliGRAM(s) Oral every 6 hours PRN Temp greater or equal to 38.5C (101.3F), Mild Pain (1 - 3)  ALPRAZolam 0.25 milliGRAM(s) Oral four times a day PRN anxiety  aluminum hydroxide/magnesium hydroxide/simethicone Suspension 30 milliLiter(s) Oral every 4 hours PRN Dyspepsia  melatonin 3 milliGRAM(s) Oral at bedtime PRN Insomnia  ondansetron Injectable 4 milliGRAM(s) IV Push every 8 hours PRN Nausea and/or Vomiting                                11.0   4.31  )-----------( 169      ( 25 Aug 2021 07:02 )             33.7     08-25    139  |  107  |  28<H>  ----------------------------<  80  4.1   |  28  |  0.60    Ca    9.8      25 Aug 2021 07:02  Phos  3.3     08-25  Mg     2.4     08-25      CAPILLARY BLOOD GLUCOSE          Assessment and Plan:  56 y/o M w/ PMH of cerebral palsy, HTN, developmentally delayed, neurogenic bladder, psoriasis, spinal stenosis, seizure disorder, dysphagia presents from group home with worsening sacral ulcer with discharge.    #Sacral Decubitus Infection w/ Acute on Chronic Osteomyelitis  #Bacteremia with strep hominis likely source is large sacral wound and left heel pressure ulcer.  -Xray Sacrum >>>> osteomyelitis  -on vancomycin 1 gm IV q12h and cefepime 2 gm IV q12h # 7  -ECHO  Estimated left ventricular ejection fraction is 55-60 %.  -ID following - plan for PICC with long term IV antibiotics  -wound care services following  -repeat cultures 8/23 negative    #History of HTN, Neurogenic bladder, psoriasis, spinal stenosis and seizure disorder     - all stable, continue current care    #anxiety  - started prn xanax    #Fecal impaction on xray  -Duculox suppository    #DVT prophylaxis     - Lovenox     DIspo:  -d/c with wound care and long term IV antibiotics via PICC

## 2021-08-26 LAB
SARS-COV-2 RNA SPEC QL NAA+PROBE: SIGNIFICANT CHANGE UP
VANCOMYCIN TROUGH SERPL-MCNC: 23.8 UG/ML — HIGH (ref 10–20)

## 2021-08-26 PROCEDURE — 99232 SBSQ HOSP IP/OBS MODERATE 35: CPT

## 2021-08-26 RX ORDER — VANCOMYCIN HCL 1 G
750 VIAL (EA) INTRAVENOUS EVERY 12 HOURS
Refills: 0 | Status: DISCONTINUED | OUTPATIENT
Start: 2021-08-27 | End: 2021-08-30

## 2021-08-26 RX ADMIN — LAMOTRIGINE 100 MILLIGRAM(S): 25 TABLET, ORALLY DISINTEGRATING ORAL at 10:10

## 2021-08-26 RX ADMIN — Medication 0.2 MILLIGRAM(S): at 21:37

## 2021-08-26 RX ADMIN — CHLORHEXIDINE GLUCONATE 15 MILLILITER(S): 213 SOLUTION TOPICAL at 21:36

## 2021-08-26 RX ADMIN — TAMSULOSIN HYDROCHLORIDE 0.4 MILLIGRAM(S): 0.4 CAPSULE ORAL at 21:37

## 2021-08-26 RX ADMIN — POLYETHYLENE GLYCOL 3350 17 GRAM(S): 17 POWDER, FOR SOLUTION ORAL at 21:37

## 2021-08-26 RX ADMIN — CEFEPIME 100 MILLIGRAM(S): 1 INJECTION, POWDER, FOR SOLUTION INTRAMUSCULAR; INTRAVENOUS at 10:07

## 2021-08-26 RX ADMIN — Medication 1 TABLET(S): at 10:10

## 2021-08-26 RX ADMIN — CHLORHEXIDINE GLUCONATE 15 MILLILITER(S): 213 SOLUTION TOPICAL at 10:08

## 2021-08-26 RX ADMIN — Medication 3 MILLIGRAM(S): at 21:39

## 2021-08-26 RX ADMIN — Medication 250 MILLIGRAM(S): at 12:43

## 2021-08-26 RX ADMIN — CEFEPIME 100 MILLIGRAM(S): 1 INJECTION, POWDER, FOR SOLUTION INTRAMUSCULAR; INTRAVENOUS at 21:36

## 2021-08-26 RX ADMIN — Medication 5 MILLIGRAM(S): at 10:04

## 2021-08-26 RX ADMIN — Medication 1 DROP(S): at 10:02

## 2021-08-26 RX ADMIN — Medication 5 MILLIGRAM(S): at 21:38

## 2021-08-26 RX ADMIN — Medication 5 MILLIGRAM(S): at 21:37

## 2021-08-26 RX ADMIN — AMLODIPINE BESYLATE 5 MILLIGRAM(S): 2.5 TABLET ORAL at 13:04

## 2021-08-26 RX ADMIN — LAMOTRIGINE 100 MILLIGRAM(S): 25 TABLET, ORALLY DISINTEGRATING ORAL at 21:38

## 2021-08-26 RX ADMIN — Medication 25 MILLIGRAM(S): at 21:38

## 2021-08-26 RX ADMIN — ENOXAPARIN SODIUM 40 MILLIGRAM(S): 100 INJECTION SUBCUTANEOUS at 10:09

## 2021-08-26 RX ADMIN — SENNA PLUS 2 TABLET(S): 8.6 TABLET ORAL at 21:37

## 2021-08-26 RX ADMIN — Medication 1 TABLET(S): at 10:06

## 2021-08-26 NOTE — PROGRESS NOTE ADULT - SUBJECTIVE AND OBJECTIVE BOX
Chief complaint of Sacral wound (23 Aug 2021 17:25)      HPI:  56 y/o M w/ PMH of cerebral palsy, HTN, developmentally delayed, neurogenic bladder, psoriasis, spinal stenosis, seizure disorder, dysphagia presents from group home with worsening sacral ulcer with discharge.  Patient with baseline confusion, denies any acute complaints. Aide states pt was in a rehab and 2 weeks ago returned to group home with open wound to sacrum and wound appears to be getting larger and deeper.   States pt had possibly yellow discharge a few days ago but aides hasn't seen wound since.No fever.   (19 Aug 2021 11:31)    8/24: Above reviewed; patient has no complaints, no ovenright events   8/25: Lying in bed, alert, comfortable, answering basic questions. No new events. Tolerating antibiotics.  8/26:  Comfortable, in bed.  Asking about his discharge.  Tolerating antibiotics, doing well.    REVIEW OF SYSTEMS: All other review of systems is negative unless indicated above.    Vital Signs Last 24 Hrs  T(C): 36.5 (26 Aug 2021 13:46), Max: 37.2 (25 Aug 2021 21:30)  T(F): 97.7 (26 Aug 2021 13:46), Max: 99 (25 Aug 2021 21:30)  HR: 95 (26 Aug 2021 13:46) (65 - 97)  BP: 124/78 (26 Aug 2021 13:46) (105/60 - 137/84)  BP(mean): 91 (25 Aug 2021 16:35) (91 - 91)  RR: 16 (26 Aug 2021 13:46) (16 - 18)  SpO2: 100% (26 Aug 2021 13:46) (99% - 100%)    Constitutional: NAD, awake, frail, thin appearing  Neurological: no focal deficits  HEENT: PERRLA, EOMI, MMM  Neck: Soft and supple, No LAD, No JVD  Respiratory: Breath sounds are clear bilaterally, No wheezing, rales or rhonchi  Cardiovascular: S1 and S2, regular rate and rhythm; no Murmurs, gallops or rubs  Gastrointestinal: Bowel Sounds present, soft, nontender, nondistended, no guarding, no rebound tenderness  Back: No CVA tenderness +Stage IV decubiti  Extremities: No peripheral edema  Vascular: 2+ peripheral pulses  Musculoskeletal: 5/5 strength b/l upper and lower extremities  Skin: No rashes    MEDICATIONS  (STANDING):  amLODIPine   Tablet 5 milliGRAM(s) Oral daily  artificial  tears Solution 1 Drop(s) Both EYES daily  baclofen 5 milliGRAM(s) Oral two times a day  bethanechol 25 milliGRAM(s) Oral at bedtime  calcium carbonate 1250 mG  + Vitamin D (OsCal 500 + D) 1 Tablet(s) Oral daily  cefepime   IVPB 2000 milliGRAM(s) IV Intermittent every 12 hours  chlorhexidine 0.12% Liquid 15 milliLiter(s) Oral Mucosa two times a day  cloNIDine 0.2 milliGRAM(s) Oral at bedtime  enoxaparin Injectable 40 milliGRAM(s) SubCutaneous daily  lamoTRIgine 100 milliGRAM(s) Oral two times a day  multivitamin 1 Tablet(s) Oral daily  oxybutynin 5 milliGRAM(s) Oral at bedtime  polyethylene glycol 3350 17 Gram(s) Oral at bedtime  senna 2 Tablet(s) Oral at bedtime  sodium chloride 0.9%. 500 milliLiter(s) (125 mL/Hr) IV Continuous <Continuous>  tamsulosin 0.4 milliGRAM(s) Oral at bedtime    MEDICATIONS  (PRN):  acetaminophen   Tablet .. 650 milliGRAM(s) Oral every 6 hours PRN Temp greater or equal to 38.5C (101.3F), Mild Pain (1 - 3)  ALPRAZolam 0.25 milliGRAM(s) Oral four times a day PRN anxiety  aluminum hydroxide/magnesium hydroxide/simethicone Suspension 30 milliLiter(s) Oral every 4 hours PRN Dyspepsia  melatonin 3 milliGRAM(s) Oral at bedtime PRN Insomnia  ondansetron Injectable 4 milliGRAM(s) IV Push every 8 hours PRN Nausea and/or Vomiting                                11.0   4.31  )-----------( 169      ( 25 Aug 2021 07:02 )             33.7     08-25    139  |  107  |  28<H>  ----------------------------<  80  4.1   |  28  |  0.60    Ca    9.8      25 Aug 2021 07:02  Phos  3.3     08-25  Mg     2.4     08-25      CAPILLARY BLOOD GLUCOSE              Assessment and Plan:  56 y/o M w/ PMH of cerebral palsy, HTN, developmentally delayed, neurogenic bladder, psoriasis, spinal stenosis, seizure disorder, dysphagia presents from group home with worsening sacral ulcer with discharge.    #Sacral Decubitus Infection w/ Acute on Chronic Osteomyelitis  #Bacteremia with strep hominis likely source is large sacral wound and left heel pressure ulcer.  -Xray Sacrum >>>> osteomyelitis  -on vancomycin 1 gm IV q12h and cefepime 2 gm IV q12h # 7  -ECHO  Estimated left ventricular ejection fraction is 55-60 %.  -ID following - plan for PICC with long term IV antibiotics  -wound care services following  -repeat cultures 8/23 negative    #HTN / Neurogenic bladder / psoriasis / spinal stenosis / seizure disorder / Severe protein-calorie malnutrition:  -continue current care  -enourage oral intak  -supplementation    #anxiety  - started prn xanax    #Fecal impaction on xray  -Duculox suppository    #DVT prophylaxis     - Lovenox     DIspo:  -d/c to [facility] with wound care and long term IV antibiotics via PICC

## 2021-08-26 NOTE — PROGRESS NOTE ADULT - SUBJECTIVE AND OBJECTIVE BOX
Date of service: 08-26-21 @ 14:32    Lying in bed in NAD  Arms are contracted  Denies pain  Mild confused  Reported with vancomycin lvel high    ROS: no fever or chills; denies dizziness, no HA, no SOB or cough, no abdominal pain, no diarrhea or constipation; no dysuria, no legs pain, no rashes    MEDICATIONS  (STANDING):  amLODIPine   Tablet 5 milliGRAM(s) Oral daily  artificial  tears Solution 1 Drop(s) Both EYES daily  baclofen 5 milliGRAM(s) Oral two times a day  bethanechol 25 milliGRAM(s) Oral at bedtime  calcium carbonate 1250 mG  + Vitamin D (OsCal 500 + D) 1 Tablet(s) Oral daily  cefepime   IVPB 2000 milliGRAM(s) IV Intermittent every 12 hours  chlorhexidine 0.12% Liquid 15 milliLiter(s) Oral Mucosa two times a day  cloNIDine 0.2 milliGRAM(s) Oral at bedtime  enoxaparin Injectable 40 milliGRAM(s) SubCutaneous daily  lamoTRIgine 100 milliGRAM(s) Oral two times a day  multivitamin 1 Tablet(s) Oral daily  oxybutynin 5 milliGRAM(s) Oral at bedtime  polyethylene glycol 3350 17 Gram(s) Oral at bedtime  senna 2 Tablet(s) Oral at bedtime  sodium chloride 0.9%. 500 milliLiter(s) (125 mL/Hr) IV Continuous <Continuous>  tamsulosin 0.4 milliGRAM(s) Oral at bedtime  vancomycin  IVPB 1000 milliGRAM(s) IV Intermittent every 12 hours    Vital Signs Last 24 Hrs  T(C): 36.5 (26 Aug 2021 13:46), Max: 37.2 (25 Aug 2021 21:30)  T(F): 97.7 (26 Aug 2021 13:46), Max: 99 (25 Aug 2021 21:30)  HR: 95 (26 Aug 2021 13:46) (65 - 97)  BP: 124/78 (26 Aug 2021 13:46) (105/60 - 137/84)  BP(mean): 91 (25 Aug 2021 16:35) (91 - 91)  RR: 16 (26 Aug 2021 13:46) (16 - 18)  SpO2: 100% (26 Aug 2021 13:46) (99% - 100%)     Physical exam:    Constitutional:  No acute distress  HEENT: NC/AT, EOMI, PERRLA, conjunctivae clear  Neck: supple; thyroid not palpable  Back: no tenderness  Respiratory: respiratory effort normal; decreased BS at bases  Cardiovascular: S1S2 regular, no murmurs  Abdomen: soft, not tender, not distended, positive BS  Genitourinary: no suprapubic tenderness  Lymphatic: no LN palpable  Musculoskeletal: no muscle tenderness, no joint swelling or tenderness  Large sacral decubitus ulcer, surrounded by skin with raised margins;   periwound erythema; bone is palpable  Extremities: no pedal edema  Left heel pressure ulcer clean  Neurological/ Psychiatric: confused, moving all extremities  Skin: no rashes; no palpable lesions    Labs: reviewed                        11.0   4.31  )-----------( 169      ( 25 Aug 2021 07:02 )             33.7     08-25    139  |  107  |  28<H>  ----------------------------<  80  4.1   |  28  |  0.60    Ca    9.8      25 Aug 2021 07:02  Phos  3.3     08-25  Mg     2.4     08-25    Vancomycin Level, Trough: 23.8 ug/mL (08-26 @ 08:30)    Vancomycin Level, Trough: 17.3 ug/mL (08-21 @ 09:29)    C-Reactive Protein, Serum: 3 mg/L (08-19-21 @ 14:06)                        12.6   7.02  )-----------( 241      ( 18 Aug 2021 21:27 )             37.1     08-18    135  |  103  |  29<H>  ----------------------------<  102<H>  3.8   |  28  |  0.76    Ca    9.5      18 Aug 2021 21:27    TPro  7.1  /  Alb  3.1<L>  /  TBili  0.3  /  DBili  x   /  AST  19  /  ALT  22  /  AlkPhos  93  08-18     LIVER FUNCTIONS - ( 18 Aug 2021 21:27 )  Alb: 3.1 g/dL / Pro: 7.1 gm/dL / ALK PHOS: 93 U/L / ALT: 22 U/L / AST: 19 U/L / GGT: x           COVID-19 PCR: NotDetec (08-18-21 @ 21:43)      Culture - Blood (collected 23 Aug 2021 09:51)  Source: .Blood None  Preliminary Report (24 Aug 2021 16:01):    No growth to date.    Culture - Blood (collected 23 Aug 2021 09:50)  Source: .Blood None  Preliminary Report (24 Aug 2021 16:01):    No growth to date.    Culture - Blood (collected 18 Aug 2021 21:27)  Source: .Blood None  Gram Stain (20 Aug 2021 09:04):    Growth in aerobic bottle: Gram Positive Cocci in Clusters  Final Report (21 Aug 2021 13:13):    Growth in aerobic bottle: Staphylococcus hominis    Coag Negative Staphylococcus    Single set isolate, possible contaminant. Contact    Microbiology if susceptibility testing clinically    indicated.    Culture - Blood (collected 18 Aug 2021 21:27)  Source: .Blood None  Gram Stain (19 Aug 2021 19:36):    Growth in aerobic bottle: Gram Positive Cocci in Clusters  Final Report (20 Aug 2021 20:56):    Growth in aerobic bottle: Staphylococcus haemolyticus    Coag Negative Staphylococcus  Organism: Blood Culture PCR (20 Aug 2021 20:56)      -  Coagulase negative Staphylococcus: Detec      Method Type: PCR        Radiology: all available radiological tests reviewed        Advanced directives addressed: full resuscitation

## 2021-08-27 ENCOUNTER — TRANSCRIPTION ENCOUNTER (OUTPATIENT)
Age: 56
End: 2021-08-27

## 2021-08-27 PROCEDURE — 99232 SBSQ HOSP IP/OBS MODERATE 35: CPT

## 2021-08-27 RX ORDER — CEFEPIME 1 G/1
2 INJECTION, POWDER, FOR SOLUTION INTRAMUSCULAR; INTRAVENOUS
Qty: 0 | Refills: 0 | DISCHARGE
Start: 2021-08-27

## 2021-08-27 RX ORDER — VANCOMYCIN HCL 1 G
750 VIAL (EA) INTRAVENOUS
Qty: 0 | Refills: 0 | DISCHARGE
Start: 2021-08-27

## 2021-08-27 RX ADMIN — Medication 25 MILLIGRAM(S): at 22:12

## 2021-08-27 RX ADMIN — LAMOTRIGINE 100 MILLIGRAM(S): 25 TABLET, ORALLY DISINTEGRATING ORAL at 10:04

## 2021-08-27 RX ADMIN — Medication 250 MILLIGRAM(S): at 22:06

## 2021-08-27 RX ADMIN — SENNA PLUS 2 TABLET(S): 8.6 TABLET ORAL at 22:11

## 2021-08-27 RX ADMIN — CEFEPIME 100 MILLIGRAM(S): 1 INJECTION, POWDER, FOR SOLUTION INTRAMUSCULAR; INTRAVENOUS at 22:06

## 2021-08-27 RX ADMIN — Medication 1 TABLET(S): at 10:04

## 2021-08-27 RX ADMIN — LAMOTRIGINE 100 MILLIGRAM(S): 25 TABLET, ORALLY DISINTEGRATING ORAL at 22:11

## 2021-08-27 RX ADMIN — CEFEPIME 100 MILLIGRAM(S): 1 INJECTION, POWDER, FOR SOLUTION INTRAMUSCULAR; INTRAVENOUS at 10:06

## 2021-08-27 RX ADMIN — TAMSULOSIN HYDROCHLORIDE 0.4 MILLIGRAM(S): 0.4 CAPSULE ORAL at 22:11

## 2021-08-27 RX ADMIN — Medication 1 TABLET(S): at 10:05

## 2021-08-27 RX ADMIN — CHLORHEXIDINE GLUCONATE 15 MILLILITER(S): 213 SOLUTION TOPICAL at 22:11

## 2021-08-27 RX ADMIN — ENOXAPARIN SODIUM 40 MILLIGRAM(S): 100 INJECTION SUBCUTANEOUS at 10:05

## 2021-08-27 RX ADMIN — Medication 1 DROP(S): at 10:07

## 2021-08-27 RX ADMIN — CHLORHEXIDINE GLUCONATE 15 MILLILITER(S): 213 SOLUTION TOPICAL at 10:06

## 2021-08-27 RX ADMIN — Medication 3 MILLIGRAM(S): at 22:11

## 2021-08-27 RX ADMIN — Medication 250 MILLIGRAM(S): at 12:03

## 2021-08-27 RX ADMIN — Medication 5 MILLIGRAM(S): at 22:12

## 2021-08-27 RX ADMIN — Medication 5 MILLIGRAM(S): at 10:04

## 2021-08-27 RX ADMIN — Medication 0.2 MILLIGRAM(S): at 22:12

## 2021-08-27 RX ADMIN — POLYETHYLENE GLYCOL 3350 17 GRAM(S): 17 POWDER, FOR SOLUTION ORAL at 22:11

## 2021-08-27 RX ADMIN — Medication 5 MILLIGRAM(S): at 22:11

## 2021-08-27 NOTE — DISCHARGE NOTE PROVIDER - NSDCCPCAREPLAN_GEN_ALL_CORE_FT
PRINCIPAL DISCHARGE DIAGNOSIS  Diagnosis: Decubitus ulcer of sacral area  Assessment and Plan of Treatment: Infected with acute on chronic OM  -Long term IV antibiotics via PICC per infectious disease recommendations  -ongoing wound care      SECONDARY DISCHARGE DIAGNOSES  Diagnosis: Cerebral palsy  Assessment and Plan of Treatment: Resume home meds    Diagnosis: HTN (hypertension)  Assessment and Plan of Treatment: Continue home meds, monitor BP as it can be labile.

## 2021-08-27 NOTE — DISCHARGE NOTE PROVIDER - HOSPITAL COURSE
CC: Sacral wound  HPI/Hospital course:  54 y/o M w/ PMH of cerebral palsy, HTN, developmentally delayed, neurogenic bladder, psoriasis, spinal stenosis, seizure disorder, dysphagia presents from group home with worsening sacral ulcer with discharge. Patient with baseline confusion, denies any acute complaints. Aide states pt was in a rehab and 2 weeks ago returned to group home with open wound to sacrum and wound appears to be getting larger and deeper.  States pt had possibly yellow discharge a few days ago but aides hasn't seen wound since. No fever.    Pt admitted for sacral decubitis ulcer with superimposed infection and acute on chronic OM.  Pt found to have gram + bacteremia due to ulcer with strep hominis.  He was placed on broad spectrum antibiotics with vanco and cefepime per ID.  Echo normal EF, no vegetations.  Repeat blood cultures cleared.  Pt receiving local wound care daily.  He denies pain and is otherwise comfortable.  Plan for PICC line and long term antibiotics per ID recommendations.    REVIEW OF SYSTEMS: All other review of systems is negative unless indicated above.    Vital Signs Last 24 Hrs  T(C): 36.4 (27 Aug 2021 08:54), Max: 36.8 (26 Aug 2021 15:24)  T(F): 97.5 (27 Aug 2021 08:54), Max: 98.2 (26 Aug 2021 15:24)  HR: 99 (27 Aug 2021 10:30) (88 - 99)  BP: 98/65 (27 Aug 2021 08:54) (98/65 - 138/78)  BP(mean): --  RR: 17 (27 Aug 2021 08:54) (16 - 18)  SpO2: 99% (27 Aug 2021 10:30) (99% - 100%)    Constitutional: NAD, awake, frail, thin appearing  Neurological: no focal deficits  HEENT: PERRLA, EOMI, MMM  Neck: Soft and supple, No LAD, No JVD  Respiratory: Breath sounds are clear bilaterally, No wheezing, rales or rhonchi  Cardiovascular: S1 and S2, regular rate and rhythm; no Murmurs, gallops or rubs  Gastrointestinal: Bowel Sounds present, soft, nontender, nondistended, no guarding, no rebound tenderness  Back: No CVA tenderness +Stage IV decubiti  Extremities: No peripheral edema  Vascular: 2+ peripheral pulses  Skin: No rashes    med/labs: Reviewed and interpreted     Assessment and Plan:  54 y/o M w/ PMH of cerebral palsy, HTN, developmentally delayed, neurogenic bladder, psoriasis, spinal stenosis, seizure disorder, dysphagia presents from group home with worsening sacral ulcer with discharge.    #Sacral Decubitus Infection w/ Acute on Chronic Osteomyelitis  #Bacteremia with strep hominis likely source is large sacral wound and left heel pressure ulcer.  -Xray Sacrum >>>> osteomyelitis  -on vancomycin 1 gm IV q12h and cefepime 2 gm IV q12h # 8  -ECHO  Estimated left ventricular ejection fraction is 55-60 %.  -ID following - plan for PICC with long term IV antibiotics  -wound care services following  -repeat cultures 8/23 negative    #HTN / Neurogenic bladder / psoriasis / spinal stenosis / seizure disorder / Severe protein-calorie malnutrition:  -continue current care  -enourage oral intake  -supplementation    #anxiety  - started prn xanax    #Fecal impaction on xray  -Duculox suppository    #DVT prophylaxis     - Lovenox     DIspo:  -d/c to [facility] with wound care and long term IV antibiotics via PICC

## 2021-08-27 NOTE — DISCHARGE NOTE PROVIDER - DETAILS OF MALNUTRITION DIAGNOSIS/DIAGNOSES
This patient has been assessed with a concern for Malnutrition and was treated during this hospitalization for the following Nutrition diagnosis/diagnoses:     -  08/19/2021: Severe protein-calorie malnutrition   -  08/19/2021: Underweight (BMI < 19)

## 2021-08-27 NOTE — CHART NOTE - NSCHARTNOTEFT_GEN_A_CORE
Patient brought to IR for PICC Line placement  When nurse and I went to consent patient for procedure, patient refused to talk about procedure. Patient asked about when is wrap was being removed. Patient asked if we had ever been to a rehab facility. When nurse tried to explain to patient he needed PICC line so he could be discharged, patient started yelling that he was not leaving today. Patient was not re-directable and refused to continue with conversation about PICC line    Dr Brunson informed patient is either refusing procedure or no longer consentable (which would be a change from base line). Patient can be tentatively rescheduled for Monday with sedation should patient want procedure at that time. Please keep NPO after midnight Sunday

## 2021-08-27 NOTE — DISCHARGE NOTE PROVIDER - NSDCCAREPROVSEEN_GEN_ALL_CORE_FT
4/20/2018       RE: Vianey Manning  [address] [facility] [place] [address]     Dear Colleague,    Thank you for referring your patient, Vianey Manning, to the [facility]. Please see a copy of my visit note below.    REASON FOR VISIT:   6 mo surveillance CT s/p lobectomy for LLL adenocarcinoma    PROCEDURES PERFORMED:  LVATS left lower lobe completion lobectomy, mediastinal lymphadenoctomy    DATE ABOVE PROCEDURES PERFORMED:  12/4/15    SURGEON:  Dr. Gabriel Herrera    HISTOPATHOLOGY:  FINAL DIAGNOSIS:   Lung, left, lower lobe, wedge resection:   - Invasive adenocarcinoma, well differentiated        - Specimen integrity: Intact        - Histologic type: Adenocarcinoma with mixed acinar (60%),   papillary (25%), and lipidic (15%) patterns of growth        - Histologic grade: Well differentiated        - Tumor site: Left lower lobe        - Tumor size: 1.4 cm in greatest dimension             - invasive focus: More than 0.5 cm        - Tumor focality: Unifocal        - Visceral pleural invasion: No invasion identified (PL0)        - Extra-pulmonary tumor extension: Not Applicable        - Margins: Negative             - Bronchial margin: Not applicable.             - Vascular margin: Not applicable.             - Parenchymal margin: Negative                  - 1.5 cm from the parenchymal resection margin        - Treatment effect: Not applicable        - Lymphovascular invasion: Not identified        - Large vein and artery involvement: Not identified        - Lymph nodes: Not submitted        - Pathologic staging:  [id number]   - Non-neoplastic lung with respiratory bronchiolit        Assessment:  Vianey is here today with her .  She reports doing well with no recent illnesses.  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   She states her voice is strong and she sees ENT on a PRN basis for treatment of a paralyzed vocal cord.          Chest CT done today reviewed, with the following results:  Impression:  1. Stable postoperative changes of left lower lobectomy without  evidence for recurrence or metastatic disease.  2. Previously seen right upper lobe groundglass opacity has resolved.         We can now proceed to annual surveillance CT scans.   Questions were answered and Vianey agrees with this plan.    Plan:   Surveillance chest CT scan in 1 year.    Total time:  30 minutes      Again, thank you for allowing me to participate in the care of your patient.      Sincerely,    SCOT Napier CNS       Sandip Brunson

## 2021-08-27 NOTE — DISCHARGE NOTE PROVIDER - NSDCMRMEDTOKEN_GEN_ALL_CORE_FT
baclofen 10 mg oral tablet: 0.5 tab(s) orally 2 times a day  bethanechol 25 mg oral tablet: 1 tab(s) orally once a day (at bedtime)  cefepime 2 g intravenous injection: 2 gram(s) intravenous every 12 hours  chlorhexidine 0.12% mucous membrane liquid: 15 milliliter(s) mucous membrane 2 times a day  cloNIDine 0.1 mg oral tablet: 2 tab(s) orally once a day (at bedtime)   mg oral tablet: 1 tab(s) orally 3 times a day  lamoTRIgine 100 mg oral tablet: 1 tab(s) orally 2 times a day  MiraLax oral powder for reconstitution: 17 gram(s) orally once a day (at bedtime)   oxybutynin 5 mg oral tablet: 1 tab(s) orally once a day (at bedtime)  Oysco 500 with D 500 mg-200 intl units (5 mcg) oral tablet: 2 tab(s) orally once a day  Refresh Liquigel ophthalmic gel: 1 day(s) to each affected eye once a day  Tab-A-Heaven oral tablet: 1 tab(s) orally once a day  tamsulosin 0.4 mg oral capsule: 1 cap(s) orally once a day (at bedtime)  vancomycin 750 mg intravenous injection: 750 milligram(s) intravenous every 12 hours

## 2021-08-27 NOTE — PROGRESS NOTE ADULT - SUBJECTIVE AND OBJECTIVE BOX
Date of service: 08-27-21 @ 14:48    Sitting in [facility] n NAD  Has sacral area tenderness  Alert  Verbal and confused    ROS: no fever or chills; limited    MEDICATIONS  (STANDING):  artificial  tears Solution 1 Drop(s) Both EYES daily  baclofen 5 milliGRAM(s) Oral two times a day  bethanechol 25 milliGRAM(s) Oral at bedtime  calcium carbonate 1250 mG  + Vitamin D (OsCal 500 + D) 1 Tablet(s) Oral daily  cefepime   IVPB 2000 milliGRAM(s) IV Intermittent every 12 hours  chlorhexidine 0.12% Liquid 15 milliLiter(s) Oral Mucosa two times a day  cloNIDine 0.2 milliGRAM(s) Oral at bedtime  enoxaparin Injectable 40 milliGRAM(s) SubCutaneous daily  lamoTRIgine 100 milliGRAM(s) Oral two times a day  multivitamin 1 Tablet(s) Oral daily  oxybutynin 5 milliGRAM(s) Oral at bedtime  polyethylene glycol 3350 17 Gram(s) Oral at bedtime  senna 2 Tablet(s) Oral at bedtime  tamsulosin 0.4 milliGRAM(s) Oral at bedtime  vancomycin  IVPB 750 milliGRAM(s) IV Intermittent every 12 hours    Vital Signs Last 24 Hrs  T(C): 36.4 (27 Aug 2021 08:54), Max: 36.8 (26 Aug 2021 15:24)  T(F): 97.5 (27 Aug 2021 08:54), Max: 98.2 (26 Aug 2021 15:24)  HR: 99 (27 Aug 2021 10:30) (88 - 99)  BP: 98/65 (27 Aug 2021 08:54) (98/65 - 138/78)  BP(mean): --  RR: 17 (27 Aug 2021 08:54) (17 - 18)  SpO2: 99% (27 Aug 2021 10:30) (99% - 100%)     Physical exam:    Constitutional:  No acute distress  HEENT: NC/AT, EOMI, PERRLA, conjunctivae clear  Neck: supple; thyroid not palpable  Back: no tenderness  Respiratory: respiratory effort normal; decreased BS at bases  Cardiovascular: S1S2 regular, no murmurs  Abdomen: soft, not tender, not distended, positive BS  Genitourinary: no suprapubic tenderness  Lymphatic: no LN palpable  Musculoskeletal: no muscle tenderness, no joint swelling or tenderness  Large sacral decubitus ulcer, surrounded by skin with raised margins;   periwound erythema; bone is palpable  Extremities: no pedal edema  Left heel pressure ulcer clean  Neurological/ Psychiatric: confused, moving all extremities  Skin: no rashes; no palpable lesions    Labs: reviewed    Vancomycin Level, Trough: 23.8 ug/mL (08-26 @ 08:30)    C-Reactive Protein, Serum: 3 mg/L (08-19-21 @ 14:06)                          11.0   4.31  )-----------( 169      ( 25 Aug 2021 07:02 )             33.7     08-25    139  |  107  |  28<H>  ----------------------------<  80  4.1   |  28  |  0.60    Ca    9.8      25 Aug 2021 07:02  Phos  3.3     08-25  Mg     2.4     08-25    Vancomycin Level, Trough: 23.8 ug/mL (08-26 @ 08:30)    Vancomycin Level, Trough: 17.3 ug/mL (08-21 @ 09:29)    C-Reactive Protein, Serum: 3 mg/L (08-19-21 @ 14:06)                        12.6   7.02  )-----------( 241      ( 18 Aug 2021 21:27 )             37.1     08-18    135  |  103  |  29<H>  ----------------------------<  102<H>  3.8   |  28  |  0.76    Ca    9.5      18 Aug 2021 21:27    TPro  7.1  /  Alb  3.1<L>  /  TBili  0.3  /  DBili  x   /  AST  19  /  ALT  22  /  AlkPhos  93  08-18     LIVER FUNCTIONS - ( 18 Aug 2021 21:27 )  Alb: 3.1 g/dL / Pro: 7.1 gm/dL / ALK PHOS: 93 U/L / ALT: 22 U/L / AST: 19 U/L / GGT: x           COVID-19 PCR: NotDetec (08-18-21 @ 21:43)      Culture - Blood (collected 23 Aug 2021 09:51)  Source: .Blood None  Preliminary Report (24 Aug 2021 16:01):    No growth to date.    Culture - Blood (collected 23 Aug 2021 09:50)  Source: .Blood None  Preliminary Report (24 Aug 2021 16:01):    No growth to date.    Culture - Blood (collected 18 Aug 2021 21:27)  Source: .Blood None  Gram Stain (20 Aug 2021 09:04):    Growth in aerobic bottle: Gram Positive Cocci in Clusters  Final Report (21 Aug 2021 13:13):    Growth in aerobic bottle: Staphylococcus hominis    Coag Negative Staphylococcus    Single set isolate, possible contaminant. Contact    Microbiology if susceptibility testing clinically    indicated.    Culture - Blood (collected 18 Aug 2021 21:27)  Source: .Blood None  Gram Stain (19 Aug 2021 19:36):    Growth in aerobic bottle: Gram Positive Cocci in Clusters  Final Report (20 Aug 2021 20:56):    Growth in aerobic bottle: Staphylococcus haemolyticus    Coag Negative Staphylococcus  Organism: Blood Culture PCR (20 Aug 2021 20:56)      -  Coagulase negative Staphylococcus: Detec      Method Type: PCR        Radiology: all available radiological tests reviewed        Advanced directives addressed: full resuscitation

## 2021-08-28 LAB
CULTURE RESULTS: SIGNIFICANT CHANGE UP
CULTURE RESULTS: SIGNIFICANT CHANGE UP
SPECIMEN SOURCE: SIGNIFICANT CHANGE UP
SPECIMEN SOURCE: SIGNIFICANT CHANGE UP
VANCOMYCIN TROUGH SERPL-MCNC: 15.8 UG/ML — SIGNIFICANT CHANGE UP (ref 10–20)

## 2021-08-28 PROCEDURE — 99232 SBSQ HOSP IP/OBS MODERATE 35: CPT

## 2021-08-28 RX ADMIN — CHLORHEXIDINE GLUCONATE 15 MILLILITER(S): 213 SOLUTION TOPICAL at 09:44

## 2021-08-28 RX ADMIN — Medication 250 MILLIGRAM(S): at 12:09

## 2021-08-28 RX ADMIN — Medication 5 MILLIGRAM(S): at 20:51

## 2021-08-28 RX ADMIN — Medication 0.2 MILLIGRAM(S): at 20:51

## 2021-08-28 RX ADMIN — Medication 3 MILLIGRAM(S): at 20:51

## 2021-08-28 RX ADMIN — CEFEPIME 100 MILLIGRAM(S): 1 INJECTION, POWDER, FOR SOLUTION INTRAMUSCULAR; INTRAVENOUS at 20:50

## 2021-08-28 RX ADMIN — Medication 1 DROP(S): at 09:43

## 2021-08-28 RX ADMIN — Medication 1 TABLET(S): at 09:44

## 2021-08-28 RX ADMIN — LAMOTRIGINE 100 MILLIGRAM(S): 25 TABLET, ORALLY DISINTEGRATING ORAL at 20:51

## 2021-08-28 RX ADMIN — CHLORHEXIDINE GLUCONATE 15 MILLILITER(S): 213 SOLUTION TOPICAL at 20:50

## 2021-08-28 RX ADMIN — CEFEPIME 100 MILLIGRAM(S): 1 INJECTION, POWDER, FOR SOLUTION INTRAMUSCULAR; INTRAVENOUS at 09:48

## 2021-08-28 RX ADMIN — POLYETHYLENE GLYCOL 3350 17 GRAM(S): 17 POWDER, FOR SOLUTION ORAL at 20:50

## 2021-08-28 RX ADMIN — TAMSULOSIN HYDROCHLORIDE 0.4 MILLIGRAM(S): 0.4 CAPSULE ORAL at 20:51

## 2021-08-28 RX ADMIN — LAMOTRIGINE 100 MILLIGRAM(S): 25 TABLET, ORALLY DISINTEGRATING ORAL at 09:44

## 2021-08-28 RX ADMIN — Medication 25 MILLIGRAM(S): at 20:51

## 2021-08-28 RX ADMIN — ENOXAPARIN SODIUM 40 MILLIGRAM(S): 100 INJECTION SUBCUTANEOUS at 09:43

## 2021-08-28 RX ADMIN — Medication 250 MILLIGRAM(S): at 21:24

## 2021-08-28 RX ADMIN — SENNA PLUS 2 TABLET(S): 8.6 TABLET ORAL at 20:51

## 2021-08-28 RX ADMIN — Medication 5 MILLIGRAM(S): at 09:45

## 2021-08-28 NOTE — PROGRESS NOTE ADULT - SUBJECTIVE AND OBJECTIVE BOX
CC: Sacral wound  HPI/Hospital course:  56 y/o M w/ PMH of cerebral palsy, HTN, developmentally delayed, neurogenic bladder, psoriasis, spinal stenosis, seizure disorder, dysphagia presents from group home with worsening sacral ulcer with discharge. Patient with baseline confusion, denies any acute complaints. Aide states pt was in a rehab and 2 weeks ago returned to group home with open wound to sacrum and wound appears to be getting larger and deeper.  States pt had possibly yellow discharge a few days ago but aides hasn't seen wound since. No fever.    Pt admitted for sacral decubitis ulcer with superimposed infection and acute on chronic OM.  Pt found to have gram + bacteremia due to ulcer with strep hominis.  He was placed on broad spectrum antibiotics with vanco and cefepime per ID.  Echo normal EF, no vegetations.  Repeat blood cultures cleared.  Pt receiving local wound care daily.  He denies pain and is otherwise comfortable.  Plan for PICC line and long term antibiotics per ID recommendations.    8/28: PICC to be placed Monday.  No new events.  Pt status quo.    REVIEW OF SYSTEMS: All other review of systems is negative unless indicated above.    Vital Signs Last 24 Hrs  T(C): 36.7 (28 Aug 2021 08:35), Max: 36.7 (28 Aug 2021 08:35)  T(F): 98 (28 Aug 2021 08:35), Max: 98 (28 Aug 2021 08:35)  HR: 100 (28 Aug 2021 08:35) (90 - 100)  BP: 147/89 (28 Aug 2021 08:35) (134/68 - 148/79)  BP(mean): --  RR: 18 (28 Aug 2021 08:35) (18 - 18)  SpO2: 100% (28 Aug 2021 08:35) (96% - 100%)    Constitutional: NAD, awake, frail, thin appearing  Neurological: no focal deficits  HEENT: PERRLA, EOMI, MMM  Neck: Soft and supple, No LAD, No JVD  Respiratory: Breath sounds are clear bilaterally, No wheezing, rales or rhonchi  Cardiovascular: S1 and S2, regular rate and rhythm; no Murmurs, gallops or rubs  Gastrointestinal: Bowel Sounds present, soft, nontender, nondistended, no guarding, no rebound tenderness  Back: No CVA tenderness +Stage IV decubiti  Extremities: No peripheral edema  Vascular: 2+ peripheral pulses  Skin: No rashes    MEDICATIONS  (STANDING):  artificial  tears Solution 1 Drop(s) Both EYES daily  baclofen 5 milliGRAM(s) Oral two times a day  bethanechol 25 milliGRAM(s) Oral at bedtime  calcium carbonate 1250 mG  + Vitamin D (OsCal 500 + D) 1 Tablet(s) Oral daily  cefepime   IVPB 2000 milliGRAM(s) IV Intermittent every 12 hours  chlorhexidine 0.12% Liquid 15 milliLiter(s) Oral Mucosa two times a day  cloNIDine 0.2 milliGRAM(s) Oral at bedtime  enoxaparin Injectable 40 milliGRAM(s) SubCutaneous daily  lamoTRIgine 100 milliGRAM(s) Oral two times a day  multivitamin 1 Tablet(s) Oral daily  oxybutynin 5 milliGRAM(s) Oral at bedtime  polyethylene glycol 3350 17 Gram(s) Oral at bedtime  senna 2 Tablet(s) Oral at bedtime  tamsulosin 0.4 milliGRAM(s) Oral at bedtime  vancomycin  IVPB 750 milliGRAM(s) IV Intermittent every 12 hours    MEDICATIONS  (PRN):  acetaminophen   Tablet .. 650 milliGRAM(s) Oral every 6 hours PRN Temp greater or equal to 38.5C (101.3F), Mild Pain (1 - 3)  ALPRAZolam 0.25 milliGRAM(s) Oral four times a day PRN anxiety  aluminum hydroxide/magnesium hydroxide/simethicone Suspension 30 milliLiter(s) Oral every 4 hours PRN Dyspepsia  melatonin 3 milliGRAM(s) Oral at bedtime PRN Insomnia  ondansetron Injectable 4 milliGRAM(s) IV Push every 8 hours PRN Nausea and/or Vomiting            Assessment and Plan:  56 y/o M w/ PMH of cerebral palsy, HTN, developmentally delayed, neurogenic bladder, psoriasis, spinal stenosis, seizure disorder, dysphagia presents from group home with worsening sacral ulcer with discharge.    #Sacral Decubitus Infection w/ Acute on Chronic Osteomyelitis  #Bacteremia with strep hominis likely source is large sacral wound and left heel pressure ulcer.  -Xray Sacrum >>>> osteomyelitis  -on vancomycin 1 gm IV q12h and cefepime 2 gm IV q12h # 8  -ECHO  Estimated left ventricular ejection fraction is 55-60 %.  -ID following - plan for PICC with long term IV antibiotics  -wound care services following  -repeat cultures 8/23 negative    #HTN / Neurogenic bladder / psoriasis / spinal stenosis / seizure disorder / Severe protein-calorie malnutrition:  -continue current care  -enourage oral intake  -supplementation    #anxiety  - started prn xanax    #Fecal impaction on xray  -Duculox suppository    #DVT prophylaxis     - Lovenox     DIspo:  -d/c to [facility] with wound care and long term IV antibiotics via PICC    CC: Sacral wound  HPI/Hospital course:  56 y/o M w/ PMH of cerebral palsy, HTN, developmentally delayed, neurogenic bladder, psoriasis, spinal stenosis, seizure disorder, dysphagia presents from group home with worsening sacral ulcer with discharge. Patient with baseline confusion, denies any acute complaints. Aide states pt was in a rehab and 2 weeks ago returned to group home with open wound to sacrum and wound appears to be getting larger and deeper.  States pt had possibly yellow discharge a few days ago but aides hasn't seen wound since. No fever.    Pt admitted for sacral decubitis ulcer with superimposed infection and acute on chronic OM.  Pt found to have gram + bacteremia due to ulcer with strep hominis.  He was placed on broad spectrum antibiotics with vanco and cefepime per ID.  Echo normal EF, no vegetations.  Repeat blood cultures cleared.  Pt receiving local wound care daily.  He denies pain and is otherwise comfortable.  Plan for PICC line and long term antibiotics per ID recommendations.    8/28: PICC to be placed Monday.  No new events.  Pt status quo.    REVIEW OF SYSTEMS: All other review of systems is negative unless indicated above.    Vital Signs Last 24 Hrs  T(C): 36.7 (28 Aug 2021 08:35), Max: 36.7 (28 Aug 2021 08:35)  T(F): 98 (28 Aug 2021 08:35), Max: 98 (28 Aug 2021 08:35)  HR: 100 (28 Aug 2021 08:35) (90 - 100)  BP: 147/89 (28 Aug 2021 08:35) (134/68 - 148/79)  BP(mean): --  RR: 18 (28 Aug 2021 08:35) (18 - 18)  SpO2: 100% (28 Aug 2021 08:35) (96% - 100%)    Constitutional: NAD, awake, frail, thin appearing  Neurological: no focal deficits  HEENT: PERRLA, EOMI, MMM  Neck: Soft and supple, No LAD, No JVD  Respiratory: Breath sounds are clear bilaterally, No wheezing, rales or rhonchi  Cardiovascular: S1 and S2, regular rate and rhythm; no Murmurs, gallops or rubs  Gastrointestinal: Bowel Sounds present, soft, nontender, nondistended, no guarding, no rebound tenderness  Back: No CVA tenderness +Stage IV decubiti  Extremities: No peripheral edema  Vascular: 2+ peripheral pulses  Skin: No rashes    MEDICATIONS  (STANDING):  artificial  tears Solution 1 Drop(s) Both EYES daily  baclofen 5 milliGRAM(s) Oral two times a day  bethanechol 25 milliGRAM(s) Oral at bedtime  calcium carbonate 1250 mG  + Vitamin D (OsCal 500 + D) 1 Tablet(s) Oral daily  cefepime   IVPB 2000 milliGRAM(s) IV Intermittent every 12 hours  chlorhexidine 0.12% Liquid 15 milliLiter(s) Oral Mucosa two times a day  cloNIDine 0.2 milliGRAM(s) Oral at bedtime  enoxaparin Injectable 40 milliGRAM(s) SubCutaneous daily  lamoTRIgine 100 milliGRAM(s) Oral two times a day  multivitamin 1 Tablet(s) Oral daily  oxybutynin 5 milliGRAM(s) Oral at bedtime  polyethylene glycol 3350 17 Gram(s) Oral at bedtime  senna 2 Tablet(s) Oral at bedtime  tamsulosin 0.4 milliGRAM(s) Oral at bedtime  vancomycin  IVPB 750 milliGRAM(s) IV Intermittent every 12 hours    MEDICATIONS  (PRN):  acetaminophen   Tablet .. 650 milliGRAM(s) Oral every 6 hours PRN Temp greater or equal to 38.5C (101.3F), Mild Pain (1 - 3)  ALPRAZolam 0.25 milliGRAM(s) Oral four times a day PRN anxiety  aluminum hydroxide/magnesium hydroxide/simethicone Suspension 30 milliLiter(s) Oral every 4 hours PRN Dyspepsia  melatonin 3 milliGRAM(s) Oral at bedtime PRN Insomnia  ondansetron Injectable 4 milliGRAM(s) IV Push every 8 hours PRN Nausea and/or Vomiting            Assessment and Plan:  56 y/o M w/ PMH of cerebral palsy, HTN, developmentally delayed, neurogenic bladder, psoriasis, spinal stenosis, seizure disorder, dysphagia presents from group home with worsening sacral ulcer with discharge.    #Sacral Decubitus Infection w/ Acute on Chronic Osteomyelitis  #Bacteremia with strep hominis likely source is large sacral wound and left heel pressure ulcer.  -Xray Sacrum >>>> osteomyelitis  -on vancomycin 1 gm IV q12h and cefepime 2 gm IV q12h # 9  -ECHO  Estimated left ventricular ejection fraction is 55-60 %.  -ID following - plan for PICC with long term IV antibiotics  -wound care services following  -repeat cultures 8/23 negative    #HTN / Neurogenic bladder / psoriasis / spinal stenosis / seizure disorder / Severe protein-calorie malnutrition:  -continue current care  -enourage oral intake  -supplementation    #anxiety  - started prn xanax    #Fecal impaction on xray  -Duculox suppository    #DVT prophylaxis     - Lovenox     DIspo:  -d/c to [facility] with wound care and long term IV antibiotics via PICC

## 2021-08-28 NOTE — PROGRESS NOTE ADULT - SUBJECTIVE AND OBJECTIVE BOX
Date of service: 08-28-21 @ 09:31    Sitting in bed in NAD  Has sacral discomfort  Alert and confused    ROS: no fever or chills; no HA, no SOB or cough, no abdominal pain, no legs pain    MEDICATIONS  (STANDING):  artificial  tears Solution 1 Drop(s) Both EYES daily  baclofen 5 milliGRAM(s) Oral two times a day  bethanechol 25 milliGRAM(s) Oral at bedtime  calcium carbonate 1250 mG  + Vitamin D (OsCal 500 + D) 1 Tablet(s) Oral daily  cefepime   IVPB 2000 milliGRAM(s) IV Intermittent every 12 hours  chlorhexidine 0.12% Liquid 15 milliLiter(s) Oral Mucosa two times a day  cloNIDine 0.2 milliGRAM(s) Oral at bedtime  enoxaparin Injectable 40 milliGRAM(s) SubCutaneous daily  lamoTRIgine 100 milliGRAM(s) Oral two times a day  multivitamin 1 Tablet(s) Oral daily  oxybutynin 5 milliGRAM(s) Oral at bedtime  polyethylene glycol 3350 17 Gram(s) Oral at bedtime  senna 2 Tablet(s) Oral at bedtime  tamsulosin 0.4 milliGRAM(s) Oral at bedtime  vancomycin  IVPB 750 milliGRAM(s) IV Intermittent every 12 hours    Vital Signs Last 24 Hrs  T(C): 36.7 (28 Aug 2021 08:35), Max: 36.7 (28 Aug 2021 08:35)  T(F): 98 (28 Aug 2021 08:35), Max: 98 (28 Aug 2021 08:35)  HR: 100 (28 Aug 2021 08:35) (90 - 100)  BP: 147/89 (28 Aug 2021 08:35) (134/68 - 148/79)  BP(mean): --  RR: 18 (28 Aug 2021 08:35) (18 - 18)  SpO2: 100% (28 Aug 2021 08:35) (96% - 100%)     Physical exam:    Constitutional:  No acute distress  HEENT: NC/AT, EOMI, PERRLA, conjunctivae clear  Neck: supple; thyroid not palpable  Back: no tenderness  Respiratory: respiratory effort normal; decreased BS at bases  Cardiovascular: S1S2 regular, no murmurs  Abdomen: soft, not tender, not distended, positive BS  Genitourinary: no suprapubic tenderness  Lymphatic: no LN palpable  Musculoskeletal: no muscle tenderness, no joint swelling or tenderness  Large sacral decubitus ulcer, surrounded by skin with raised margins;   periwound erythema; bone is palpable  Extremities: no pedal edema  Left heel pressure ulcer clean  Neurological/ Psychiatric: confused, moving all extremities  Skin: no rashes; no palpable lesions    Labs: reviewed      C-Reactive Protein, Serum: 3 mg/L (08-19-21 @ 14:06)    Vancomycin Level, Trough: 23.8 ug/mL (08-26 @ 08:30)                          11.0   4.31  )-----------( 169      ( 25 Aug 2021 07:02 )             33.7     08-25    139  |  107  |  28<H>  ----------------------------<  80  4.1   |  28  |  0.60    Ca    9.8      25 Aug 2021 07:02  Phos  3.3     08-25  Mg     2.4     08-25    Vancomycin Level, Trough: 23.8 ug/mL (08-26 @ 08:30)    Vancomycin Level, Trough: 17.3 ug/mL (08-21 @ 09:29)    C-Reactive Protein, Serum: 3 mg/L (08-19-21 @ 14:06)                        12.6   7.02  )-----------( 241      ( 18 Aug 2021 21:27 )             37.1     08-18    135  |  103  |  29<H>  ----------------------------<  102<H>  3.8   |  28  |  0.76    Ca    9.5      18 Aug 2021 21:27    TPro  7.1  /  Alb  3.1<L>  /  TBili  0.3  /  DBili  x   /  AST  19  /  ALT  22  /  AlkPhos  93  08-18     LIVER FUNCTIONS - ( 18 Aug 2021 21:27 )  Alb: 3.1 g/dL / Pro: 7.1 gm/dL / ALK PHOS: 93 U/L / ALT: 22 U/L / AST: 19 U/L / GGT: x           COVID-19 PCR: NotDetec (08-18-21 @ 21:43)      Culture - Blood (collected 23 Aug 2021 09:51)  Source: .Blood None  Preliminary Report (24 Aug 2021 16:01):    No growth to date.    Culture - Blood (collected 23 Aug 2021 09:50)  Source: .Blood None  Preliminary Report (24 Aug 2021 16:01):    No growth to date.    Culture - Blood (collected 18 Aug 2021 21:27)  Source: .Blood None  Gram Stain (20 Aug 2021 09:04):    Growth in aerobic bottle: Gram Positive Cocci in Clusters  Final Report (21 Aug 2021 13:13):    Growth in aerobic bottle: Staphylococcus hominis    Coag Negative Staphylococcus    Single set isolate, possible contaminant. Contact    Microbiology if susceptibility testing clinically    indicated.    Culture - Blood (collected 18 Aug 2021 21:27)  Source: .Blood None  Gram Stain (19 Aug 2021 19:36):    Growth in aerobic bottle: Gram Positive Cocci in Clusters  Final Report (20 Aug 2021 20:56):    Growth in aerobic bottle: Staphylococcus haemolyticus    Coag Negative Staphylococcus  Organism: Blood Culture PCR (20 Aug 2021 20:56)      -  Coagulase negative Staphylococcus: Detec      Method Type: PCR        Radiology: all available radiological tests reviewed        Advanced directives addressed: full resuscitation

## 2021-08-29 PROCEDURE — 99232 SBSQ HOSP IP/OBS MODERATE 35: CPT

## 2021-08-29 RX ADMIN — Medication 5 MILLIGRAM(S): at 21:01

## 2021-08-29 RX ADMIN — Medication 25 MILLIGRAM(S): at 21:01

## 2021-08-29 RX ADMIN — Medication 0.2 MILLIGRAM(S): at 21:02

## 2021-08-29 RX ADMIN — CHLORHEXIDINE GLUCONATE 15 MILLILITER(S): 213 SOLUTION TOPICAL at 21:01

## 2021-08-29 RX ADMIN — Medication 1 DROP(S): at 09:23

## 2021-08-29 RX ADMIN — Medication 250 MILLIGRAM(S): at 10:43

## 2021-08-29 RX ADMIN — Medication 5 MILLIGRAM(S): at 09:24

## 2021-08-29 RX ADMIN — Medication 1 TABLET(S): at 09:23

## 2021-08-29 RX ADMIN — ENOXAPARIN SODIUM 40 MILLIGRAM(S): 100 INJECTION SUBCUTANEOUS at 09:24

## 2021-08-29 RX ADMIN — Medication 250 MILLIGRAM(S): at 22:05

## 2021-08-29 RX ADMIN — CEFEPIME 100 MILLIGRAM(S): 1 INJECTION, POWDER, FOR SOLUTION INTRAMUSCULAR; INTRAVENOUS at 09:23

## 2021-08-29 RX ADMIN — CHLORHEXIDINE GLUCONATE 15 MILLILITER(S): 213 SOLUTION TOPICAL at 09:23

## 2021-08-29 RX ADMIN — POLYETHYLENE GLYCOL 3350 17 GRAM(S): 17 POWDER, FOR SOLUTION ORAL at 21:00

## 2021-08-29 RX ADMIN — LAMOTRIGINE 100 MILLIGRAM(S): 25 TABLET, ORALLY DISINTEGRATING ORAL at 21:01

## 2021-08-29 RX ADMIN — Medication 1 TABLET(S): at 09:24

## 2021-08-29 RX ADMIN — TAMSULOSIN HYDROCHLORIDE 0.4 MILLIGRAM(S): 0.4 CAPSULE ORAL at 21:01

## 2021-08-29 RX ADMIN — SENNA PLUS 2 TABLET(S): 8.6 TABLET ORAL at 21:01

## 2021-08-29 RX ADMIN — CEFEPIME 100 MILLIGRAM(S): 1 INJECTION, POWDER, FOR SOLUTION INTRAMUSCULAR; INTRAVENOUS at 21:01

## 2021-08-29 RX ADMIN — LAMOTRIGINE 100 MILLIGRAM(S): 25 TABLET, ORALLY DISINTEGRATING ORAL at 09:24

## 2021-08-29 RX ADMIN — Medication 5 MILLIGRAM(S): at 21:02

## 2021-08-29 RX ADMIN — Medication 3 MILLIGRAM(S): at 21:01

## 2021-08-29 NOTE — PROGRESS NOTE ADULT - SUBJECTIVE AND OBJECTIVE BOX
CC: Sacral wound  HPI/Hospital course:  56 y/o M w/ PMH of cerebral palsy, HTN, developmentally delayed, neurogenic bladder, psoriasis, spinal stenosis, seizure disorder, dysphagia presents from group home with worsening sacral ulcer with discharge. Patient with baseline confusion, denies any acute complaints. Aide states pt was in a rehab and 2 weeks ago returned to group home with open wound to sacrum and wound appears to be getting larger and deeper.  States pt had possibly yellow discharge a few days ago but aides hasn't seen wound since. No fever.    Pt admitted for sacral decubitis ulcer with superimposed infection and acute on chronic OM.  Pt found to have gram + bacteremia due to ulcer with strep hominis.  He was placed on broad spectrum antibiotics with vanco and cefepime per ID.  Echo normal EF, no vegetations.  Repeat blood cultures cleared.  Pt receiving local wound care daily.  He denies pain and is otherwise comfortable.  Plan for PICC line and long term antibiotics per ID recommendations.    8/28: PICC to be placed Monday.  No new events.  Pt status quo.  8/29: No new events. Pt comfortable. PICC line scheduled for tomorrow.    REVIEW OF SYSTEMS: All other review of systems is negative unless indicated above.    Vital Signs Last 24 Hrs  T(C): 36.5 (29 Aug 2021 08:16), Max: 36.5 (29 Aug 2021 08:16)  T(F): 97.7 (29 Aug 2021 08:16), Max: 97.7 (29 Aug 2021 08:16)  HR: 75 (29 Aug 2021 08:16) (75 - 104)  BP: 106/60 (29 Aug 2021 08:16) (106/60 - 167/98)  BP(mean): --  RR: 18 (29 Aug 2021 08:16) (18 - 18)  SpO2: 100% (29 Aug 2021 08:16) (97% - 100%)    Constitutional: NAD, awake, frail, thin appearing  Neurological: no focal deficits  HEENT: PERRLA, EOMI, MMM  Neck: Soft and supple, No LAD, No JVD  Respiratory: Breath sounds are clear bilaterally, No wheezing, rales or rhonchi  Cardiovascular: S1 and S2, regular rate and rhythm; no Murmurs, gallops or rubs  Gastrointestinal: Bowel Sounds present, soft, nontender, nondistended, no guarding, no rebound tenderness  Back: No CVA tenderness +Stage IV decubiti  Extremities: No peripheral edema  Vascular: 2+ peripheral pulses  Skin: No rashes    MEDICATIONS  (STANDING):  artificial  tears Solution 1 Drop(s) Both EYES daily  baclofen 5 milliGRAM(s) Oral two times a day  bethanechol 25 milliGRAM(s) Oral at bedtime  calcium carbonate 1250 mG  + Vitamin D (OsCal 500 + D) 1 Tablet(s) Oral daily  cefepime   IVPB 2000 milliGRAM(s) IV Intermittent every 12 hours  chlorhexidine 0.12% Liquid 15 milliLiter(s) Oral Mucosa two times a day  cloNIDine 0.2 milliGRAM(s) Oral at bedtime  enoxaparin Injectable 40 milliGRAM(s) SubCutaneous daily  lamoTRIgine 100 milliGRAM(s) Oral two times a day  multivitamin 1 Tablet(s) Oral daily  oxybutynin 5 milliGRAM(s) Oral at bedtime  polyethylene glycol 3350 17 Gram(s) Oral at bedtime  senna 2 Tablet(s) Oral at bedtime  tamsulosin 0.4 milliGRAM(s) Oral at bedtime  vancomycin  IVPB 750 milliGRAM(s) IV Intermittent every 12 hours    MEDICATIONS  (PRN):  acetaminophen   Tablet .. 650 milliGRAM(s) Oral every 6 hours PRN Temp greater or equal to 38.5C (101.3F), Mild Pain (1 - 3)  aluminum hydroxide/magnesium hydroxide/simethicone Suspension 30 milliLiter(s) Oral every 4 hours PRN Dyspepsia  melatonin 3 milliGRAM(s) Oral at bedtime PRN Insomnia  ondansetron Injectable 4 milliGRAM(s) IV Push every 8 hours PRN Nausea and/or Vomiting        Assessment and Plan:  56 y/o M w/ PMH of cerebral palsy, HTN, developmentally delayed, neurogenic bladder, psoriasis, spinal stenosis, seizure disorder, dysphagia presents from group home with worsening sacral ulcer with discharge.    #Sacral Decubitus Infection w/ Acute on Chronic Osteomyelitis  #Bacteremia with strep hominis likely source is large sacral wound and left heel pressure ulcer.  -Xray Sacrum >>>> osteomyelitis  -on vancomycin 1 gm IV q12h and cefepime 2 gm IV q12h # 9  -ECHO  Estimated left ventricular ejection fraction is 55-60 %.  -ID following - plan for PICC with long term IV antibiotics  -wound care services following  -repeat cultures 8/23 negative    #HTN / Neurogenic bladder / psoriasis / spinal stenosis / seizure disorder / Severe protein-calorie malnutrition:  -continue current care  -enourage oral intake  -supplementation    #anxiety  - started prn xanax    #Fecal impaction on xray  -Duculox suppository    #DVT prophylaxis     - Lovenox     DIspo:  -d/c to [facility] with wound care and long term IV antibiotics via PICC       Assessment and Plan:   Nutritional Assessment:  · Nutritional Assessment  This patient has been assessed with a concern for Malnutrition and has been determined to have a diagnosis/diagnoses of Severe protein-calorie malnutrition and Underweight (BMI < 19).    This patient is being managed with:   Diet Mechanical Soft-  Prosource Gelatein Plus     Qty per Day:  TID  Entered: Aug 19 2021  8:59AM        Electronic Signatures:  Sandip Brunson)  (Signed 28-Aug-2021 13:23)  	Authored: Progress Note, Reason for Admission, Subjective and Objective, Assessment and Plan      Last Updated: 28-Aug-2021 13:23 by Sandip Brunson ()

## 2021-08-29 NOTE — PROGRESS NOTE ADULT - SUBJECTIVE AND OBJECTIVE BOX
Date of service: 08-29-21 @ 14:05    Sitting in bed in NAD  Alert and mild confused  Poorly mobile    ROS: no fever or chills; denies pain; limited    MEDICATIONS  (STANDING):  artificial  tears Solution 1 Drop(s) Both EYES daily  baclofen 5 milliGRAM(s) Oral two times a day  bethanechol 25 milliGRAM(s) Oral at bedtime  calcium carbonate 1250 mG  + Vitamin D (OsCal 500 + D) 1 Tablet(s) Oral daily  cefepime   IVPB 2000 milliGRAM(s) IV Intermittent every 12 hours  chlorhexidine 0.12% Liquid 15 milliLiter(s) Oral Mucosa two times a day  cloNIDine 0.2 milliGRAM(s) Oral at bedtime  enoxaparin Injectable 40 milliGRAM(s) SubCutaneous daily  lamoTRIgine 100 milliGRAM(s) Oral two times a day  multivitamin 1 Tablet(s) Oral daily  oxybutynin 5 milliGRAM(s) Oral at bedtime  polyethylene glycol 3350 17 Gram(s) Oral at bedtime  senna 2 Tablet(s) Oral at bedtime  tamsulosin 0.4 milliGRAM(s) Oral at bedtime  vancomycin  IVPB 750 milliGRAM(s) IV Intermittent every 12 hours    Vital Signs Last 24 Hrs  T(C): 36.5 (29 Aug 2021 08:16), Max: 36.5 (29 Aug 2021 08:16)  T(F): 97.7 (29 Aug 2021 08:16), Max: 97.7 (29 Aug 2021 08:16)  HR: 75 (29 Aug 2021 08:16) (75 - 104)  BP: 106/60 (29 Aug 2021 08:16) (106/60 - 167/98)  BP(mean): --  RR: 18 (29 Aug 2021 08:16) (18 - 18)  SpO2: 100% (29 Aug 2021 08:16) (97% - 100%)     Physical exam:    Constitutional:  No acute distress  HEENT: NC/AT, EOMI, PERRLA, conjunctivae clear  Neck: supple; thyroid not palpable  Back: no tenderness  Respiratory: respiratory effort normal; decreased BS at bases  Cardiovascular: S1S2 regular, no murmurs  Abdomen: soft, not tender, not distended, positive BS  Genitourinary: no suprapubic tenderness  Lymphatic: no LN palpable  Musculoskeletal: no muscle tenderness, no joint swelling or tenderness  Large sacral decubitus ulcer, surrounded by skin with raised margins; minimal surrounding erythema  periwound erythema; bone is palpable  Extremities: no pedal edema  Left heel pressure ulcer clean  Neurological/ Psychiatric: confused, moving all extremities  Skin: no rashes; no palpable lesions    Labs: reviewed    C-Reactive Protein, Serum: 3 mg/L (08-19-21 @ 14:06)    Vancomycin Level, Trough: 23.8 ug/mL (08-26 @ 08:30)                          11.0   4.31  )-----------( 169      ( 25 Aug 2021 07:02 )             33.7     08-25    139  |  107  |  28<H>  ----------------------------<  80  4.1   |  28  |  0.60    Ca    9.8      25 Aug 2021 07:02  Phos  3.3     08-25  Mg     2.4     08-25    Vancomycin Level, Trough: 23.8 ug/mL (08-26 @ 08:30)    Vancomycin Level, Trough: 17.3 ug/mL (08-21 @ 09:29)    C-Reactive Protein, Serum: 3 mg/L (08-19-21 @ 14:06)                        12.6   7.02  )-----------( 241      ( 18 Aug 2021 21:27 )             37.1     08-18    135  |  103  |  29<H>  ----------------------------<  102<H>  3.8   |  28  |  0.76    Ca    9.5      18 Aug 2021 21:27    TPro  7.1  /  Alb  3.1<L>  /  TBili  0.3  /  DBili  x   /  AST  19  /  ALT  22  /  AlkPhos  93  08-18     LIVER FUNCTIONS - ( 18 Aug 2021 21:27 )  Alb: 3.1 g/dL / Pro: 7.1 gm/dL / ALK PHOS: 93 U/L / ALT: 22 U/L / AST: 19 U/L / GGT: x           COVID-19 PCR: NotDetec (08-18-21 @ 21:43)      Culture - Blood (collected 23 Aug 2021 09:51)  Source: .Blood None  Preliminary Report (24 Aug 2021 16:01):    No growth to date.    Culture - Blood (collected 23 Aug 2021 09:50)  Source: .Blood None  Preliminary Report (24 Aug 2021 16:01):    No growth to date.    Culture - Blood (collected 18 Aug 2021 21:27)  Source: .Blood None  Gram Stain (20 Aug 2021 09:04):    Growth in aerobic bottle: Gram Positive Cocci in Clusters  Final Report (21 Aug 2021 13:13):    Growth in aerobic bottle: Staphylococcus hominis    Coag Negative Staphylococcus    Single set isolate, possible contaminant. Contact    Microbiology if susceptibility testing clinically    indicated.    Culture - Blood (collected 18 Aug 2021 21:27)  Source: .Blood None  Gram Stain (19 Aug 2021 19:36):    Growth in aerobic bottle: Gram Positive Cocci in Clusters  Final Report (20 Aug 2021 20:56):    Growth in aerobic bottle: Staphylococcus haemolyticus    Coag Negative Staphylococcus  Organism: Blood Culture PCR (20 Aug 2021 20:56)      -  Coagulase negative Staphylococcus: Detec      Method Type: PCR        Radiology: all available radiological tests reviewed        Advanced directives addressed: full resuscitation

## 2021-08-29 NOTE — PROGRESS NOTE ADULT - ASSESSMENT
54 y/o M w/ PMH of cerebral palsy, HTN, developmentally delayed, neurogenic bladder, psoriasis, spinal stenosis, seizure disorder, dysphagia presents from group home with worsening sacral ulcer with discharge.    #Sacral Decubitus Infection w/ Acute on Chronic Osteomyelitis  #Bacteremia with strep hominis likely source is large sacral wound and left heel pressure ulcer.  -Xray Sacrum >>>> osteomyelitis  -on vancomycin 1 gm IV q12h and cefepime 2 gm IV q12h # 6  -FU ECHO  Estimated left ventricular ejection fraction is 55-60 %.  -ID following  -wound care consult pending.   -repeat cultures 8/23 pending    #History of HTN, Neurogenic bladder, psoriasis, spinal stenosis and seizure disorder     - all stable, continue current care    #anxiety  - started prn xanax    #Fecal impaction on xray  -Duculox suppository    #DVT prophylaxis     - Lovenox 
54 y/o Male with h/o cerebral palsy, HTN, developmentally disability, neurogenic bladder, psoriasis, spinal stenosis, seizure disorder, dysphagia was admitted on 8/19 from group home with worsening sacral ulcer with discharge. Patient with baseline confusion, denies any acute complaints. Aide states pt was in a rehab and 2 weeks PTA returned to group home with open wound to sacrum and wound appears to be getting larger and deeper. States pt had yellow discharge a few days ago but aides hasn't seen wound since then. No fever reported. In ER he received vancomycin IV and cefepime.     1. Sacral decubitus ulcer. Probable acute on chronic sacral OM. Cerebral palsy. Functionally quadriplegic.   -no clinical evidence of sepsis  -large sacral wound noted  -left heel pressure ulcer  -on vancomycin 1 gm IV q12h and cefepime 2 gm IV q12h # 2  -tolerating abx well so far; no side effects noted  -obtain vancomycin trough level   -local wound care  -consider surgical evaluation  -continue abx coverage  -monitor temps  -f/u CBC  -supportive care  2. Other issues:   -care per medicine    
56 y/o M w/ PMH of cerebral palsy, HTN, developmentally delayed, neurogenic bladder, psoriasis, spinal stenosis, seizure disorder, dysphagia presents from group home with worsening sacral ulcer with discharge.    1) Sacral Decubitus Infection r/o Osteomyelitis    - DC zosyn    - Cefepime and Vanco    - Check trough    - Fu Culture    - ID following    - Xray Sacrum pending    2) History of HTN, Neurogenic bladder, psoriasis, spinal stenosis and seizure disorder     - all stable, continue current care    3) DVT prophylaxis     - Lovenox   
56 y/o Male with h/o cerebral palsy, HTN, developmentally disability, neurogenic bladder, psoriasis, spinal stenosis, seizure disorder, dysphagia was admitted on 8/19 from group home with worsening sacral ulcer with discharge. Patient with baseline confusion, denies any acute complaints. Aide states pt was in a rehab and 2 weeks PTA returned to group home with open wound to sacrum and wound appears to be getting larger and deeper. States pt had yellow discharge a few days ago but aides hasn't seen wound since then. No fever reported. In ER he received vancomycin IV and cefepime.     1. Sepsis with strep hominis resolving. Sacral decubitus ulcer. Probable acute on chronic sacral OM. Cerebral palsy. Functionally quadriplegic.   -the patient is having bacteremia in several BC bottles with CNST - clearing  -likely source is large sacral wound  -left heel pressure ulcer clean  -on vancomycin 750 gm IV q12h and cefepime 2 gm IV q12h # 10  -tolerating abx well so far; no side effects noted  -f/u repeat vancomycin trough level   -repeat BC x 2 are negative to date  -case reviewed with wound care RN - sacral wound may be able to heal with local care and abx therapy  -plan for PICC line if possible in darell of arms contraction and longer term IV abx therapy  -local wound care  -continue abx coverage  -monitor temps  -f/u CBC  -supportive care  2. Other issues:   -care per medicine    
56 y/o Male with h/o cerebral palsy, HTN, developmentally disability, neurogenic bladder, psoriasis, spinal stenosis, seizure disorder, dysphagia was admitted on 8/19 from group home with worsening sacral ulcer with discharge. Patient with baseline confusion, denies any acute complaints. Aide states pt was in a rehab and 2 weeks PTA returned to group home with open wound to sacrum and wound appears to be getting larger and deeper. States pt had yellow discharge a few days ago but aides hasn't seen wound since then. No fever reported. In ER he received vancomycin IV and cefepime.     1. Sepsis with strep hominis. Sacral decubitus ulcer. Probable acute on chronic sacral OM. Cerebral palsy. Functionally quadriplegic.   -the patient is having bacteremia in several BC bottles with CNST - clearing  -likely source is large sacral wound  -left heel pressure ulcer clean  -on vancomycin 1 gm IV q12h and cefepime 2 gm IV q12h # 7  -tolerating abx well so far; no side effects noted  -repeat vancomycin trough level  -repeat BC x 2 are negative to date  -case reviewed with wound care RN - sacral wound may be able to heal with local care and abx therapy  -plan for PICC line and longer term IV abx therapy  -local wound care  -continue abx coverage  -monitor temps  -f/u CBC  -supportive care  2. Other issues:   -care per medicine    
56 y/o Male with h/o cerebral palsy, HTN, developmentally disability, neurogenic bladder, psoriasis, spinal stenosis, seizure disorder, dysphagia was admitted on 8/19 from group home with worsening sacral ulcer with discharge. Patient with baseline confusion, denies any acute complaints. Aide states pt was in a rehab and 2 weeks PTA returned to group home with open wound to sacrum and wound appears to be getting larger and deeper. States pt had yellow discharge a few days ago but aides hasn't seen wound since then. No fever reported. In ER he received vancomycin IV and cefepime.     1. Sepsis with strep hominis. Sacral decubitus ulcer. Probable acute on chronic sacral OM. Cerebral palsy. Functionally quadriplegic.   -the patient is having bacteremia in several BC bottles with CNST - clearing  -likely source is large sacral wound  -left heel pressure ulcer clean  -on vancomycin 750 gm IV q12h and cefepime 2 gm IV q12h # 9  -tolerating abx well so far; no side effects noted  -f/u repeat vancomycin trough level   -repeat BC x 2 are negative to date  -case reviewed with wound care RN - sacral wound may be able to heal with local care and abx therapy  -plan for PICC line if possible in darell of arms contraction and longer term IV abx therapy  -local wound care  -continue abx coverage  -monitor temps  -f/u CBC  -supportive care  2. Other issues:   -care per medicine    
54 y/o Male with h/o cerebral palsy, HTN, developmentally disability, neurogenic bladder, psoriasis, spinal stenosis, seizure disorder, dysphagia was admitted on 8/19 from group home with worsening sacral ulcer with discharge. Patient with baseline confusion, denies any acute complaints. Aide states pt was in a rehab and 2 weeks PTA returned to group home with open wound to sacrum and wound appears to be getting larger and deeper. States pt had yellow discharge a few days ago but aides hasn't seen wound since then. No fever reported. In ER he received vancomycin IV and cefepime.     1. Sepsis with strep hominis. Sacral decubitus ulcer. Probable acute on chronic sacral OM. Cerebral palsy. Functionally quadriplegic.   -the patient is having bacteremia in several BC bottles; this raises concern for sepsis   -likely source is large sacral wound  -left heel pressure ulcer  -on vancomycin 1 gm IV q12h and cefepime 2 gm IV q12h # 6  -tolerating abx well so far; no side effects noted  -vancomycin trough level is therapeutic  -f/u repeat BC x 2  -f/u 2D-Echo  -local wound care  -continue abx coverage  -monitor temps  -f/u CBC  -supportive care  2. Other issues:   -care per medicine    
56 y/o Male with h/o cerebral palsy, HTN, developmentally disability, neurogenic bladder, psoriasis, spinal stenosis, seizure disorder, dysphagia was admitted on 8/19 from group home with worsening sacral ulcer with discharge. Patient with baseline confusion, denies any acute complaints. Aide states pt was in a rehab and 2 weeks PTA returned to group home with open wound to sacrum and wound appears to be getting larger and deeper. States pt had yellow discharge a few days ago but aides hasn't seen wound since then. No fever reported. In ER he received vancomycin IV and cefepime.     1. Sepsis with strep hominis. Sacral decubitus ulcer. Probable acute on chronic sacral OM. Cerebral palsy. Functionally quadriplegic.   -the patient is having bacteremia in several BC bottles with CNST - clearing  -likely source is large sacral wound  -left heel pressure ulcer clean  -on vancomycin 750 gm IV q12h and cefepime 2 gm IV q12h # 8  -tolerating abx well so far; no side effects noted  -repeat vancomycin trough level   -repeat BC x 2 are negative to date  -case reviewed with wound care RN - sacral wound may be able to heal with local care and abx therapy  -plan for PICC line if possible in darell of arms contraction and longer term IV abx therapy  -local wound care  -continue abx coverage  -monitor temps  -f/u CBC  -supportive care  2. Other issues:   -care per medicine    
54 y/o Male with h/o cerebral palsy, HTN, developmentally disability, neurogenic bladder, psoriasis, spinal stenosis, seizure disorder, dysphagia was admitted on 8/19 from group home with worsening sacral ulcer with discharge. Patient with baseline confusion, denies any acute complaints. Aide states pt was in a rehab and 2 weeks PTA returned to group home with open wound to sacrum and wound appears to be getting larger and deeper. States pt had yellow discharge a few days ago but aides hasn't seen wound since then. No fever reported. In ER he received vancomycin IV and cefepime.     1. Sepsis with strep hominis. Sacral decubitus ulcer. Probable acute on chronic sacral OM. Cerebral palsy. Functionally quadriplegic.   -the patient is having bacteremia in several BC bottles; this raises concern for sepsis   -likely source is large sacral wound  -left heel pressure ulcer  -on vancomycin 1 gm IV q12h and cefepime 2 gm IV q12h # 4  -tolerating abx well so far; no side effects noted  -vancomycin trough level is therapeutic  -repeat BC x 2  -obtain 2D-Echo  -local wound care  -consider surgical evaluation  -continue abx coverage  -monitor temps  -f/u CBC  -supportive care  2. Other issues:   -care per medicine    
56 y/o Male with h/o cerebral palsy, HTN, developmentally disability, neurogenic bladder, psoriasis, spinal stenosis, seizure disorder, dysphagia was admitted on 8/19 from group home with worsening sacral ulcer with discharge. Patient with baseline confusion, denies any acute complaints. Aide states pt was in a rehab and 2 weeks PTA returned to group home with open wound to sacrum and wound appears to be getting larger and deeper. States pt had yellow discharge a few days ago but aides hasn't seen wound since then. No fever reported. In ER he received vancomycin IV and cefepime.     1. Sepsis with strep hominis. Sacral decubitus ulcer. Probable acute on chronic sacral OM. Cerebral palsy. Functionally quadriplegic.   -the patient is having bacteremia in several BC bottles with CNST - clearing  -likely source is large sacral wound  -left heel pressure ulcer clean  -on vancomycin 1 gm IV q12h and cefepime 2 gm IV q12h # 7  -tolerating abx well so far; no side effects noted  -repeat vancomycin trough level is high; hold PM vancomycin dose and decrease vancomycin to 750 mg IV q12h  -repeat BC x 2 are negative to date  -case reviewed with wound care RN - sacral wound may be able to heal with local care and abx therapy  -plan for PICC line if possible in darell of arms contraction and longer term IV abx therapy  -local wound care  -continue abx coverage  -monitor temps  -f/u CBC  -supportive care  2. Other issues:   -care per medicine    
54 y/o Male with h/o cerebral palsy, HTN, developmentally disability, neurogenic bladder, psoriasis, spinal stenosis, seizure disorder, dysphagia was admitted on 8/19 from group home with worsening sacral ulcer with discharge. Patient with baseline confusion, denies any acute complaints. Aide states pt was in a rehab and 2 weeks PTA returned to group home with open wound to sacrum and wound appears to be getting larger and deeper. States pt had yellow discharge a few days ago but aides hasn't seen wound since then. No fever reported. In ER he received vancomycin IV and cefepime.     1. Sepsis with strep hominis. Sacral decubitus ulcer. Probable acute on chronic sacral OM. Cerebral palsy. Functionally quadriplegic.   -the patient is having bacteremia in several BC bottles; this raises concern for sepsis   -likely source is large sacral wound  -left heel pressure ulcer  -on vancomycin 1 gm IV q12h and cefepime 2 gm IV q12h # 5  -tolerating abx well so far; no side effects noted  -vancomycin trough level is therapeutic  -repeat BC x 2 collected  -f/u 2D-Echo  -local wound care  -continue abx coverage  -monitor temps  -f/u CBC  -supportive care  2. Other issues:   -care per medicine

## 2021-08-30 ENCOUNTER — TRANSCRIPTION ENCOUNTER (OUTPATIENT)
Age: 56
End: 2021-08-30

## 2021-08-30 VITALS — TEMPERATURE: 98 F | OXYGEN SATURATION: 100 % | RESPIRATION RATE: 18 BRPM | HEART RATE: 101 BPM

## 2021-08-30 LAB — SARS-COV-2 RNA SPEC QL NAA+PROBE: SIGNIFICANT CHANGE UP

## 2021-08-30 PROCEDURE — 36573 INSJ PICC RS&I 5 YR+: CPT

## 2021-08-30 PROCEDURE — 99239 HOSP IP/OBS DSCHRG MGMT >30: CPT

## 2021-08-30 RX ORDER — CHLORHEXIDINE GLUCONATE 213 G/1000ML
1 SOLUTION TOPICAL
Refills: 0 | Status: DISCONTINUED | OUTPATIENT
Start: 2021-08-30 | End: 2021-08-30

## 2021-08-30 RX ORDER — SODIUM CHLORIDE 9 MG/ML
10 INJECTION INTRAMUSCULAR; INTRAVENOUS; SUBCUTANEOUS
Refills: 0 | Status: DISCONTINUED | OUTPATIENT
Start: 2021-08-30 | End: 2021-08-30

## 2021-08-30 RX ADMIN — Medication 250 MILLIGRAM(S): at 12:52

## 2021-08-30 RX ADMIN — Medication 5 MILLIGRAM(S): at 10:30

## 2021-08-30 RX ADMIN — Medication 1 DROP(S): at 10:30

## 2021-08-30 RX ADMIN — Medication 1 TABLET(S): at 10:31

## 2021-08-30 RX ADMIN — CEFEPIME 100 MILLIGRAM(S): 1 INJECTION, POWDER, FOR SOLUTION INTRAMUSCULAR; INTRAVENOUS at 10:29

## 2021-08-30 RX ADMIN — ENOXAPARIN SODIUM 40 MILLIGRAM(S): 100 INJECTION SUBCUTANEOUS at 10:32

## 2021-08-30 RX ADMIN — CHLORHEXIDINE GLUCONATE 15 MILLILITER(S): 213 SOLUTION TOPICAL at 10:31

## 2021-08-30 RX ADMIN — LAMOTRIGINE 100 MILLIGRAM(S): 25 TABLET, ORALLY DISINTEGRATING ORAL at 10:32

## 2021-08-30 NOTE — DISCHARGE NOTE NURSING/CASE MANAGEMENT/SOCIAL WORK - PATIENT PORTAL LINK FT
You can access the FollowMyHealth Patient Portal offered by [facility] by registering at the following website: [email or web address]. By joining [facility]’s FollowMyHealth portal, you will also be able to view your health information using other applications (apps) compatible with our system.

## 2021-08-30 NOTE — PROGRESS NOTE ADULT - REASON FOR ADMISSION
Sacral wound

## 2021-08-30 NOTE — PROGRESS NOTE ADULT - NUTRITIONAL ASSESSMENT
This patient has been assessed with a concern for Malnutrition and has been determined to have a diagnosis/diagnoses of Severe protein-calorie malnutrition and Underweight (BMI < 19).    This patient is being managed with:   Diet Mechanical Soft-  Prosource Gelatein Plus     Qty per Day:  TID  Entered: Aug 19 2021  8:59AM    

## 2021-08-30 NOTE — PATIENT PROFILE ADULT - LIVING ENVIRONMENT
Labs to be completed at primary care    Follow up in 1 month. We will repeat labs in the infusion center at that time.     [facility] -[phone]
no

## 2021-08-30 NOTE — PROGRESS NOTE ADULT - PROVIDER SPECIALTY LIST ADULT
Hospitalist
Infectious Disease
Family Medicine
Hospitalist
Infectious Disease
Hospitalist
Infectious Disease

## 2021-08-30 NOTE — DISCHARGE NOTE NURSING/CASE MANAGEMENT/SOCIAL WORK - NSDCVIVACCINE_GEN_ALL_CORE_FT
Tdap; 25-May-2015 12:27; Sheela Hummel (ONESIMO); [employer]; [phone]; IntraMuscular; Deltoid Right.; 0.5 milliLiter(s); VIS (VIS Published: 09-May-2013, VIS Presented: 25-May-2015);

## 2021-08-30 NOTE — PROGRESS NOTE ADULT - SUBJECTIVE AND OBJECTIVE BOX
CC: Sacral wound  HPI/Hospital course:  56 y/o M w/ PMH of cerebral palsy, HTN, developmentally delayed, neurogenic bladder, psoriasis, spinal stenosis, seizure disorder, dysphagia presents from group home with worsening sacral ulcer with discharge. Patient with baseline confusion, denies any acute complaints. Aide states pt was in a rehab and 2 weeks ago returned to group home with open wound to sacrum and wound appears to be getting larger and deeper.  States pt had possibly yellow discharge a few days ago but aides hasn't seen wound since. No fever.    Pt admitted for sacral decubitis ulcer with superimposed infection and acute on chronic OM.  Pt found to have gram + bacteremia due to ulcer with strep hominis.  He was placed on broad spectrum antibiotics with vanco and cefepime per ID.  Echo normal EF, no vegetations.  Repeat blood cultures cleared.  Pt receiving local wound care daily.  He denies pain and is otherwise comfortable.  Plan for PICC line and long term antibiotics per ID recommendations.    8/28: PICC to be placed Monday.  No new events.  Pt status quo.  8/29: No new events. Pt comfortable. PICC line scheduled for tomorrow.  8/30: In bed, alert, comfortable.  Pt to get PICC today.     REVIEW OF SYSTEMS: All other review of systems is negative unless indicated above.    Vital Signs Last 24 Hrs  T(C): 36.7 (30 Aug 2021 07:59), Max: 36.7 (29 Aug 2021 16:52)  T(F): 98.1 (30 Aug 2021 07:59), Max: 98.1 (29 Aug 2021 16:52)  HR: 100 (30 Aug 2021 07:59) (91 - 100)  BP: 143/79 (30 Aug 2021 07:59) (123/66 - 143/79)  BP(mean): --  RR: 16 (30 Aug 2021 07:59) (16 - 18)  SpO2: 98% (30 Aug 2021 07:59) (98% - 100%)    Constitutional: NAD, awake, frail, thin appearing  Neurological: no focal deficits  HEENT: PERRLA, EOMI, MMM  Neck: Soft and supple, No LAD, No JVD  Respiratory: Breath sounds are clear bilaterally, No wheezing, rales or rhonchi  Cardiovascular: S1 and S2, regular rate and rhythm; no Murmurs, gallops or rubs  Gastrointestinal: Bowel Sounds present, soft, nontender, nondistended, no guarding, no rebound tenderness  Back: No CVA tenderness +Stage IV decubiti  Extremities: No peripheral edema  Vascular: 2+ peripheral pulses  Skin: No rashes    MEDICATIONS  (STANDING):  artificial  tears Solution 1 Drop(s) Both EYES daily  baclofen 5 milliGRAM(s) Oral two times a day  bethanechol 25 milliGRAM(s) Oral at bedtime  calcium carbonate 1250 mG  + Vitamin D (OsCal 500 + D) 1 Tablet(s) Oral daily  cefepime   IVPB 2000 milliGRAM(s) IV Intermittent every 12 hours  chlorhexidine 0.12% Liquid 15 milliLiter(s) Oral Mucosa two times a day  chlorhexidine 4% Liquid 1 Application(s) Topical <User Schedule>  cloNIDine 0.2 milliGRAM(s) Oral at bedtime  enoxaparin Injectable 40 milliGRAM(s) SubCutaneous daily  lamoTRIgine 100 milliGRAM(s) Oral two times a day  multivitamin 1 Tablet(s) Oral daily  oxybutynin 5 milliGRAM(s) Oral at bedtime  polyethylene glycol 3350 17 Gram(s) Oral at bedtime  senna 2 Tablet(s) Oral at bedtime  tamsulosin 0.4 milliGRAM(s) Oral at bedtime  vancomycin  IVPB 750 milliGRAM(s) IV Intermittent every 12 hours    MEDICATIONS  (PRN):  acetaminophen   Tablet .. 650 milliGRAM(s) Oral every 6 hours PRN Temp greater or equal to 38.5C (101.3F), Mild Pain (1 - 3)  aluminum hydroxide/magnesium hydroxide/simethicone Suspension 30 milliLiter(s) Oral every 4 hours PRN Dyspepsia  melatonin 3 milliGRAM(s) Oral at bedtime PRN Insomnia  ondansetron Injectable 4 milliGRAM(s) IV Push every 8 hours PRN Nausea and/or Vomiting  sodium chloride 0.9% lock flush 10 milliLiter(s) IV Push every 1 hour PRN Pre/post blood products, medications, blood draw, and to maintain line patency        Assessment and Plan:  56 y/o M w/ PMH of cerebral palsy, HTN, developmentally delayed, neurogenic bladder, psoriasis, spinal stenosis, seizure disorder, dysphagia presents from group home with worsening sacral ulcer with discharge.    #Sacral Decubitus Infection w/ Acute on Chronic Osteomyelitis  #Bacteremia with strep hominis likely source is large sacral wound and left heel pressure ulcer.  -Xray Sacrum >>>> osteomyelitis  -on vancomycin 1 gm IV q12h and cefepime 2 gm IV q12h # 9  -ECHO  Estimated left ventricular ejection fraction is 55-60 %.  -ID following - plan for PICC with long term IV antibiotics  -wound care services following  -repeat cultures 8/23 negative    #HTN / Neurogenic bladder / psoriasis / spinal stenosis / seizure disorder / Severe protein-calorie malnutrition:  -continue current care  -enourage oral intake  -supplementation    #anxiety  - started prn xanax    #Fecal impaction on xray  -Duculox suppository    #DVT prophylaxis     - Lovenox     DIspo:  -d/c to [facility] with wound care and long term IV antibiotics via PICC

## 2021-09-13 DIAGNOSIS — R78.81 BACTEREMIA: ICD-10-CM

## 2021-09-13 DIAGNOSIS — K56.41 FECAL IMPACTION: ICD-10-CM

## 2021-09-13 DIAGNOSIS — M46.28 OSTEOMYELITIS OF VERTEBRA, SACRAL AND SACROCOCCYGEAL REGION: ICD-10-CM

## 2021-09-13 DIAGNOSIS — N31.9 NEUROMUSCULAR DYSFUNCTION OF BLADDER, UNSPECIFIED: ICD-10-CM

## 2021-09-13 DIAGNOSIS — B95.4 OTHER STREPTOCOCCUS AS THE CAUSE OF DISEASES CLASSIFIED ELSEWHERE: ICD-10-CM

## 2021-09-13 DIAGNOSIS — Z20.822 CONTACT WITH AND (SUSPECTED) EXPOSURE TO COVID-19: ICD-10-CM

## 2021-09-13 DIAGNOSIS — Z82.49 FAMILY HISTORY OF ISCHEMIC HEART DISEASE AND OTHER DISEASES OF THE CIRCULATORY SYSTEM: ICD-10-CM

## 2021-09-13 DIAGNOSIS — Z74.01 BED CONFINEMENT STATUS: ICD-10-CM

## 2021-09-13 DIAGNOSIS — R13.10 DYSPHAGIA, UNSPECIFIED: ICD-10-CM

## 2021-09-13 DIAGNOSIS — R62.50 UNSPECIFIED LACK OF EXPECTED NORMAL PHYSIOLOGICAL DEVELOPMENT IN CHILDHOOD: ICD-10-CM

## 2021-09-13 DIAGNOSIS — L40.9 PSORIASIS, UNSPECIFIED: ICD-10-CM

## 2021-09-13 DIAGNOSIS — G80.8 OTHER CEREBRAL PALSY: ICD-10-CM

## 2021-09-13 DIAGNOSIS — I10 ESSENTIAL (PRIMARY) HYPERTENSION: ICD-10-CM

## 2021-09-13 DIAGNOSIS — F79 UNSPECIFIED INTELLECTUAL DISABILITIES: ICD-10-CM

## 2021-09-13 DIAGNOSIS — G40.909 EPILEPSY, UNSPECIFIED, NOT INTRACTABLE, WITHOUT STATUS EPILEPTICUS: ICD-10-CM

## 2021-09-13 DIAGNOSIS — L89.629 PRESSURE ULCER OF LEFT HEEL, UNSPECIFIED STAGE: ICD-10-CM

## 2021-09-13 DIAGNOSIS — E43 UNSPECIFIED SEVERE PROTEIN-CALORIE MALNUTRITION: ICD-10-CM

## 2021-09-13 DIAGNOSIS — F41.9 ANXIETY DISORDER, UNSPECIFIED: ICD-10-CM

## 2021-09-13 DIAGNOSIS — L89.154 PRESSURE ULCER OF SACRAL REGION, STAGE 4: ICD-10-CM

## 2021-09-13 DIAGNOSIS — G47.00 INSOMNIA, UNSPECIFIED: ICD-10-CM

## 2021-09-13 DIAGNOSIS — Z99.3 DEPENDENCE ON WHEELCHAIR: ICD-10-CM

## 2021-09-13 DIAGNOSIS — M48.00 SPINAL STENOSIS, SITE UNSPECIFIED: ICD-10-CM

## 2021-10-29 NOTE — OCCUPATIONAL THERAPY INITIAL EVALUATION ADULT - LEVEL OF INDEPENDENCE: SIT/SUPINE, REHAB EVAL
Preventive Health Recommendations  Male Ages 26 - 39    Yearly exam:             See your health care provider every year in order to  o   Review health changes.   o   Discuss preventive care.    o   Review your medicines if your doctor has prescribed any.    You should be tested each year for STDs (sexually transmitted diseases), if you re at risk.     After age 35, talk to your provider about cholesterol testing. If you are at risk for heart disease, have your cholesterol tested at least every 5 years.     If you are at risk for diabetes, you should have a diabetes test (fasting glucose).  Shots: Get a flu shot each year. Get a tetanus shot every 10 years.     Nutrition:    Eat at least 5 servings of fruits and vegetables daily.     Eat whole-grain bread, whole-wheat pasta and brown rice instead of white grains and rice.     Get adequate Calcium and Vitamin D.     Lifestyle    Exercise for at least 150 minutes a week (30 minutes a day, 5 days a week). This will help you control your weight and prevent disease.     Limit alcohol to one drink per day.     No smoking.     Wear sunscreen to prevent skin cancer.     See your dentist every six months for an exam and cleaning.      unable to perform/refused OOB

## 2021-11-01 NOTE — H&P ADULT - NSHPPOAPULMEMBOLUS_GEN_A_CORE
POST OPERATIVE VISIT     PROCEDURE: R thumb MP fusion     POST  OP  TIME: 10 days    GENERAL CONDITION:   Healing. More hip pain.   Some burng discomfort and wearing loose pants.   Numbness lateral thigh    EXAMINATION:   Wounds healing well   Minimal hand swelling   SOme lateral hip tenderness, with resolving ecchymisis, area of lateral thuigh numbness    IMPRESSION:   Healing thumb fusion   Healing hip bopne graft donor site, with LFC nerve LAC    PLAN:   Sx will get better   Light activities only   Unable to RTW yet   Splint the thumb   RTC me 3 weeks PCXR thumb       no

## 2021-12-08 NOTE — PATIENT PROFILE ADULT - ...
Patient BS is 89, no need for insulin coverage.  Did offer HS snack and patient denies at this time 30-Apr-2021 00:20:03

## 2022-03-28 NOTE — ED PROVIDER NOTE - CHIEF COMPLAINT
The patient is a 52y Male complaining of swelling to left arm Pt seen and examined by me   mental status improving    plan  1- Metabolic encephalopathy- some improvement in mental status  2- Hypernatremia I improving  3- Presumed aspiration PNA_ c/w ceftriaxone  4- Chronic Afib- c/w xarelto  5- Hx CVA Pt seen and examined by me   mental status improving    plan  1- Metabolic encephalopathy- some improvement in mental status  2- Hypernatremia  Improving  3- Hypoglycemia - etiology unclear, potentially 2/2 decrease PO intake  on D5  calorie count  ensure  4- Presumed aspiration PNA_ c/w ceftriaxone  5- Chronic Afib- c/w xarelto  6- Hx CVA    Pt recommended for sonja daughter wishes to take pt home  tentative dc date- tomorrow pending improvement of persistent hypoglycemia

## 2022-04-16 NOTE — DISCHARGE NOTE NURSING/CASE MANAGEMENT/SOCIAL WORK - NSDCPEFALRISK_GEN_ALL_CORE
For information on Fall & injury Prevention, visit [email or web address]

## 2022-05-06 NOTE — PROGRESS NOTE ADULT - SUBJECTIVE AND OBJECTIVE BOX
ID Progress note     Name: DARI CIFUENTES  Age: 52y  Gender: Male  MRN: [id number]    Interval History-- Events noted, has wide I & D of the right posterior triceps muscle abscess, seen by orthopedics in OR . Doing well, afebrile   Notes reviewed    Past Medical History--  Spinal stenosis  Psoriasis  Neurogenic bladder  Seizure  Hypertension  Mental retardation  No significant past surgical history      For details regarding the patient's social history, family history, and other miscellaneous elements, please refer the initial infectious diseases consultation and/or the admitting history and physical examination for this admission.    Allergies--  Allergies    No Known Allergies    Intolerances        Medications--  Antibiotics:  piperacillin/tazobactam IVPB. 3.375 Gram(s) IV Intermittent every 8 hours  vancomycin  IVPB 1250 milliGRAM(s) IV Intermittent every 12 hours    Immunologic:    Other:  bethanechol  citalopram  cloNIDine  docusate sodium  ketorolac   Injectable PRN  lamoTRIgine  LORazepam   Injectable PRN  tamsulosin      Review of Systems--  Review of systems unable to be obtained secondary to clinical condition.    Physical Examination--    Vital Signs: T(F): 97.7 (08-13-18 @ 06:00), Max: 99.3 (08-12-18 @ 12:04)  HR: 81 (08-13-18 @ 06:00)  BP: 121/84 (08-13-18 @ 06:00)  RR: 19 (08-13-18 @ 06:00)  SpO2: 98% (08-13-18 @ 06:00)  Wt(kg): --  General: Nontoxic-appearing Male in no acute distress.  HEENT: AT/NC. PERRL. EOMI. Anicteric. Conjunctiva pink and moist. Oropharynx clear. Dentition fair.  Neck: Not rigid. No sense of mass.  Nodes: None palpable.  Lungs: Clear bilaterally without rales, wheezing or rhonchi  Heart: Regular rate and rhythm. No Murmur. No rub. No gallop. No palpable thrill.  Abdomen: Bowel sounds present and normoactive. Soft. Nondistended. Nontender. No sense of mass. No organomegaly.  Back: No spinal tenderness. No costovertebral angle tenderness.   Extremities: No cyanosis or clubbing. RUE: ACE bandage in place; Clean/Dry/Intact , STS + right arm, wound as per orthopedics   Skin: Warm. Dry. Good turgor. No rash. No vasculitic stigmata.  Psychiatric: Appropriate affect and mood for situation.         Laboratory Studies--  CBC                        10.1   7.86  )-----------( 176      ( 13 Aug 2018 06:52 )             30.3       Chemistries  08-13    141  |  102  |  22  ----------------------------<  103<H>  4.1   |  30  |  1.10    Ca    9.5      13 Aug 2018 06:57    Sedimentation Rate, Erythrocyte (08.11.18 @ 10:56)    Sedimentation Rate, Erythrocyte: 97 mm/hr    C-Reactive Protein, Serum (08.11.18 @ 15:49)    C-Reactive Protein, Serum: 26.01 mg/dL    Procalcitonin, Serum (08.11.18 @ 10:56)    Procalcitonin, Serum: 0.68:     Vancomycin Level, Trough -Pre 4th Dose, order if dosed q6/8/12h (08.12.18 @ 01:19)    Vancomycin Level, Trough: 10.1:     Recent Cultures:    Culture - Urine (collected 11 Aug 2018 10:09)  Source: .Urine Clean Catch (Midstream)  Final Report (12 Aug 2018 10:52):    No growth    Culture - Blood (collected 10 Aug 2018 14:56)  Source: .Blood Blood-Peripheral  Preliminary Report (11 Aug 2018 15:05):    No growth to date.    Culture - Blood (collected 10 Aug 2018 14:56)  Source: .Blood Blood-Peripheral  Preliminary Report (11 Aug 2018 15:05):    No growth to date.    MRSA/MSSA PCR (08.11.18 @ 15:49)    MRSA PCR Result.: NotDetec:     Staph Aureus PCR Result: Detect  Radiology:      Assessment--  CT Humerus w/ IV Cont, Right (08.11.18 @ 15:20) >  FINDINGS/  IMPRESSION:    There are multiple loculated fluid collections in the wall enhancement,   likely representing abscesses within the right triceps muscle, the   largest lungs measure 2.3 x 4.4 x 7.4 cm (AP x TRV x CC).    No vascular thrombosis.    No fracture or dislocation.    The visualized right lung and upper right abdomen are unremarkable.        Assessment--  51 y/o MRDD male with PMHx of HTN, neurogenic bladder, psoriasis, seizure, and spinal stenosis admitted with fever , swelling of the right arm most at elbow, denies any trauma . he most likely has cellulitis with soft tissue infection, possibility of olecranon bursitis or septic bursitis cannot be rule out . Ct scan shows multiloculated right triceps abscesses .     He has hx of right elbow abscess with MRSA in 2014 , but MRSA screen negative .    Ct scan of elbow - shows multi loculated fluid collections consistent with abscess  within right triceps muscle , he is POD # 1 , s/p wide I X D by surgery/orthopedics     Plan :   - will continue with IV Zosyn and  Vancomycin pending  intra op cs   - keep right arm elevated  - DC MRSA precautions as MRSA screen negative   - trend fevers and CBC    Discussed with Dr. Layton and Dr. Becerra     Continue with present regime .  Appropriate use of antibiotics and adverse effects reviewed.      I have discussed the above plan of care with patient's family in detail. They expressed understanding of the treatment plan . Risks, benefits and alternatives discussed in detail. I have asked if they have any questions or concerns and appropriately addressed them to the best of my ability .      > 35 minutes spent in direct patient care reviewing  the notes, lab data/ imaging , discussion with multidisciplinary team. All questions were addressed and answered to the best of my capacity .    Thank you for allowing me to participate in the care of your patient .        Grisel Chang MD  [phone] 12:45

## 2022-10-13 NOTE — PATIENT PROFILE ADULT - FUNCTIONAL SCREEN CURRENT LEVEL: SWALLOWING (IF SCORE 2 OR MORE FOR ANY ITEM, CONSULT REHAB SERVICES), MLM)
Writer left a voicemail for the patient to have her labs completed so we can finish her Prior Auth for her medication.
2 = difficulty swallowing liquids/foods

## 2022-10-24 NOTE — ED ADULT TRIAGE NOTE - NSTRIAGECARE_GEN_A_ER
Administrative documentation to document COVID-19 status.    COVID-19 status: Most recent home kit COVID-19 test positive    Onset of Symptom Date:  10/18/22, tested positive on 10/23/22    Message to patient.      Face Mask

## 2023-04-02 NOTE — ED PROVIDER NOTE - PATIENT PORTAL LINK FT
3 You can access the FollowMyHealth Patient Portal offered by [facility] by registering at the following website: [email or web address]. By joining [employer]’s FollowMyHealth portal, you will also be able to view your health information using other applications (apps) compatible with our system.

## 2023-04-17 NOTE — ED PROVIDER NOTE - MDM ORDERS SUBMITTED SELECTION
Group Topic: BH Symptom Management    Date: 4/17/2023  Start Time:  1:00 PM  End Time:  1:50 PM  Facilitators: Sakina Amaya LPC    Focus: Adaptive Behaviors-Gratitude  Number in attendance: 4    Group talked about the benefits of gratitude as well as ways to practice this. Group also viewed and discussed the [employer] Talk \"My journey to thank all those responsible for my morning coffee\".    Method: Group  Attendance: Present; Stepped out for 10 minutes  Patient Response: Attentive, Good eye contact, Interactive and Took notes    Pt was attentive, took notes, and contributed to discussion. He shared his takeaways from the video. He stepped out of group 10 minutes for a phone call.    Sakina Amaya LPC  4/17/23           Labs Labs/EKG/Imaging Studies

## 2023-11-11 NOTE — DIETITIAN INITIAL EVALUATION ADULT. - FLUID ACCUMULATION
noted 3+ edema l/r feet and legs
PAST MEDICAL HISTORY:  BPH (benign prostatic hypertrophy)     HTN (hypertension) on med x 3 yrs    Prostate cancer

## 2023-11-22 NOTE — PATIENT PROFILE ADULT - NSPRESCRALCSCORE_GEN_A_NUR_CAL
Carrie Chu called for their medication refill. Last Office visit:  1/25/2023    Last Filled:       Levetiracetam; 7/11/2023; 60 w/ 1 refill    Tizanidine; 7/21/2023; Qty 60 w/ 1 refill     Follow up visit:  No future appointments. 0

## 2024-11-08 NOTE — ED ADULT TRIAGE NOTE - CHIEF COMPLAINT QUOTE
from [facility]   while at day  program fell and hit his head has laceration to R side of forhead Statement Selected

## 2025-03-03 NOTE — PROGRESS NOTE ADULT - PROBLEM/PLAN-9
DISPLAY PLAN FREE TEXT
color consistent with ethnicity/race

## 2025-05-12 NOTE — DIETITIAN INITIAL EVALUATION ADULT. - NS AS NUTRI INTERV MEALS SNACK
Addended by: LETITIA JUDD on: 5/12/2025 12:11 PM     Modules accepted: Orders     Recommend low sodium diet, defer consistency of diet to SLP. RD to remain available./General/healthful diet